# Patient Record
Sex: FEMALE | Race: BLACK OR AFRICAN AMERICAN | NOT HISPANIC OR LATINO | Employment: OTHER | URBAN - METROPOLITAN AREA
[De-identification: names, ages, dates, MRNs, and addresses within clinical notes are randomized per-mention and may not be internally consistent; named-entity substitution may affect disease eponyms.]

---

## 2022-02-16 ENCOUNTER — HOSPITAL ENCOUNTER (EMERGENCY)
Facility: HOSPITAL | Age: 52
Discharge: HOME | End: 2022-02-16
Attending: EMERGENCY MEDICINE
Payer: COMMERCIAL

## 2022-02-16 VITALS
HEIGHT: 63 IN | OXYGEN SATURATION: 99 % | WEIGHT: 220 LBS | DIASTOLIC BLOOD PRESSURE: 82 MMHG | RESPIRATION RATE: 15 BRPM | TEMPERATURE: 98 F | HEART RATE: 105 BPM | BODY MASS INDEX: 38.98 KG/M2 | SYSTOLIC BLOOD PRESSURE: 127 MMHG

## 2022-02-16 DIAGNOSIS — Z76.0 MEDICATION REFILL: Primary | ICD-10-CM

## 2022-02-16 PROCEDURE — 99281 EMR DPT VST MAYX REQ PHY/QHP: CPT

## 2022-02-16 PROCEDURE — 99284 EMERGENCY DEPT VISIT MOD MDM: CPT | Performed by: PHYSICIAN ASSISTANT

## 2022-02-16 RX ORDER — AMLODIPINE BESYLATE 5 MG/1
5 TABLET ORAL DAILY
Qty: 7 TABLET | Refills: 0 | Status: SHIPPED | OUTPATIENT
Start: 2022-02-16 | End: 2022-02-24 | Stop reason: SDUPTHER

## 2022-02-16 RX ORDER — LITHIUM CARBONATE 300 MG
TABLET ORAL
Qty: 21 TABLET | Refills: 0 | Status: SHIPPED | OUTPATIENT
Start: 2022-02-16 | End: 2022-02-24 | Stop reason: SDUPTHER

## 2022-02-16 RX ORDER — QUETIAPINE FUMARATE 100 MG/1
100 TABLET, FILM COATED ORAL
Qty: 7 TABLET | Refills: 0 | Status: SHIPPED | OUTPATIENT
Start: 2022-02-16 | End: 2022-02-24 | Stop reason: SDUPTHER

## 2022-02-16 RX ORDER — HYDROXYZINE PAMOATE 50 MG/1
50 CAPSULE ORAL 3 TIMES DAILY PRN
Qty: 12 CAPSULE | Refills: 0 | Status: SHIPPED | OUTPATIENT
Start: 2022-02-16 | End: 2022-02-24 | Stop reason: SDUPTHER

## 2022-02-16 NOTE — DISCHARGE INSTRUCTIONS
DISCHARGE INSTRUCTIONS:    FOLLOW UP WITH YOUR PRIMARY CARE PROVIDER OR THE [address] [place] [address]  MAKE AN APPOINTMENT TO BE SEEN  TAKE MEDICATION AS PRESCRIBED  IF RASH, SHORTNESS OF BREATH OR TROUBLE SWALLOWING, STOP TAKING THE MEDICATION AND BE SEEN  REST AND DRINK PLENTY OF FLUIDS  IF SYMPTOMS WORSEN OR NEW SYMPTOMS ARISE, RETURN TO THE ER TO BE SEEN

## 2022-02-16 NOTE — ED PROVIDER NOTES
History  Chief Complaint   Patient presents with    Medication Refill     amlodipine 5mg, lithium 300mg, seroquel 100mg, hydroxyzine 50mg  has appt on 3/23, recently out of rehab     52y  o female with PMH of HTN and bipolar presents to the ER for medication refill  Patient is requesting a refill on Hydroxyzine 50mg TID PRN anxiety, Seroquel 100mg HS, Lithium 300mg 1 tablet daily and 2 tablets HS and Amlodipine 5mg daily  Patient states she recently was discharged from drug rehab and only had enough tablets until today  She had an appointment today and was planning to get her medications refilled then but the appointment was  rescheduled for next week  She denies fever, chills, URI symptoms, chest pain, dyspnea, N/V/D, abdominal pain, weakness or paresthesias  History provided by:  Patient   used: No        None       Past Medical History:   Diagnosis Date    HTN (hypertension)     Mental health problem        Past Surgical History:   Procedure Laterality Date    BREAST SURGERY      COSMETIC SURGERY      HYSTERECTOMY      LAPAROSCOPIC GASTRIC BANDING         History reviewed  No pertinent family history  I have reviewed and agree with the history as documented  E-Cigarette/Vaping     E-Cigarette/Vaping Substances     Social History     Tobacco Use    Smoking status: Current Every Day Smoker     Types: Cigarettes    Smokeless tobacco: Never Used   Substance Use Topics    Alcohol use: Not Currently    Drug use: Not Currently       Review of Systems   Constitutional: Negative for activity change, appetite change, chills and fever  HENT: Negative for congestion, drooling, ear discharge, ear pain, facial swelling, rhinorrhea and sore throat  Eyes: Negative for redness  Respiratory: Negative for cough and shortness of breath  Cardiovascular: Negative for chest pain  Gastrointestinal: Negative for abdominal pain, diarrhea, nausea and vomiting     Musculoskeletal: Negative for neck stiffness  Skin: Negative for rash  Allergic/Immunologic: Negative for food allergies  Neurological: Negative for weakness and numbness  Physical Exam  Physical Exam  Vitals and nursing note reviewed  Constitutional:       General: She is not in acute distress  Appearance: She is not toxic-appearing  HENT:      Head: Normocephalic and atraumatic  Eyes:      Conjunctiva/sclera: Conjunctivae normal    Neck:      Trachea: No tracheal deviation  Cardiovascular:      Rate and Rhythm: Normal rate  Pulmonary:      Effort: Pulmonary effort is normal  No respiratory distress  Abdominal:      General: There is no distension  Musculoskeletal:      Cervical back: Normal range of motion and neck supple  Skin:     General: Skin is warm and dry  Findings: No rash  Neurological:      Mental Status: She is alert  GCS: GCS eye subscore is 4  GCS verbal subscore is 5  GCS motor subscore is 6  Psychiatric:         Mood and Affect: Mood normal          Vital Signs  ED Triage Vitals [02/16/22 1324]   Temperature Pulse Respirations Blood Pressure SpO2   98 °F (36 7 °C) 105 15 127/82 99 %      Temp src Heart Rate Source Patient Position - Orthostatic VS BP Location FiO2 (%)   -- -- -- -- --      Pain Score       --           Vitals:    02/16/22 1324   BP: 127/82   Pulse: 105         Visual Acuity      ED Medications  Medications - No data to display    Diagnostic Studies  Results Reviewed     None                 No orders to display              Procedures  Procedures         ED Course                               SBIRT 22yo+      Most Recent Value   SBIRT (24 yo +)    In order to provide better care to our patients, we are screening all of our patients for alcohol and drug use  Would it be okay to ask you these screening questions?  Unable to answer at this time Filed at: 02/16/2022 1326                    [facility]  Number of Diagnoses or Management Options  Medication refill: new and does not require workup  Diagnosis management comments: DDX consists of but not limited to: medication refill    Will refill medications  The management plan was discussed in detail with the patient at bedside and all questions were answered  Prior to discharge, we provided both verbal and written instructions  We discussed with the patient the signs and symptoms for which to return to the emergency department  All questions were answered and patient was comfortable with the plan of care and discharged to home  Instructed the patient to follow up with the primary care provider and/or specialist provided and their written instructions  The patient verbalized understanding of our discussion and plan of care, and agrees to return to the Emergency Department for concerns and progression of illness  At discharge, I instructed the patient to:  -follow up with pcp  -take medication as prescribed  -rest and drink plenty of fluids  -return to the ER if symptoms worsened or new symptoms arose  Patient agreed to this plan and was stable at time of discharge  Patient Progress  Patient progress: stable      Disposition  Final diagnoses:   Medication refill     Time reflects when diagnosis was documented in both MDM as applicable and the Disposition within this note     Time User Action Codes Description Comment    2/16/2022  1:49 PM Ree Guard A Add [Z76 0] Medication refill       ED Disposition     ED Disposition Condition Date/Time Comment    Discharge Stable Wed Feb 16, 2022  1:49 PM Gladis Rutledge discharge to home/self care              Follow-up Information     Follow up With Specialties Details Why Contact Info Additional [facility] Schedule an appointment as soon as possible for a visit in 1 day  [address], [address] [facility] [address]  [address], [address], [facility] [place], [place], [address]          Discharge Medication List as of 2/16/2022  1:55 PM      START taking these medications    Details   amLODIPine (NORVASC) 5 mg tablet Take 1 tablet (5 mg total) by mouth daily for 7 days, Starting Wed 2/16/2022, Until Wed 2/23/2022, Normal      hydrOXYzine pamoate (VISTARIL) 50 mg capsule Take 1 capsule (50 mg total) by mouth 3 (three) times a day as needed for anxiety for up to 4 days, Starting Wed 2/16/2022, Until Sun 2/20/2022 at 2359, Normal      lithium 300 MG tablet Take 1 tablet by mouth once daily and 2 tablets at bedtime, Normal      QUEtiapine (SEROquel) 100 mg tablet Take 1 tablet (100 mg total) by mouth daily at bedtime, Starting Wed 2/16/2022, Normal             No discharge procedures on file      PDMP Review       Value Time User    PDMP Reviewed  Yes 2/16/2022  1:56 PM Marito Molina PA-C          ED Provider  Electronically Signed by           Marito Molina PA-C  02/16/22 2522

## 2022-02-23 ENCOUNTER — HOSPITAL ENCOUNTER (EMERGENCY)
Facility: HOSPITAL | Age: 52
Discharge: HOME | End: 2022-02-23
Attending: EMERGENCY MEDICINE | Admitting: EMERGENCY MEDICINE
Payer: COMMERCIAL

## 2022-02-23 ENCOUNTER — APPOINTMENT (EMERGENCY)
Dept: NON INVASIVE DIAGNOSTICS | Facility: HOSPITAL | Age: 52
End: 2022-02-23
Payer: COMMERCIAL

## 2022-02-23 ENCOUNTER — APPOINTMENT (EMERGENCY)
Dept: RADIOLOGY | Facility: HOSPITAL | Age: 52
End: 2022-02-23
Payer: COMMERCIAL

## 2022-02-23 VITALS
WEIGHT: 195.77 LBS | HEART RATE: 98 BPM | TEMPERATURE: 99.1 F | DIASTOLIC BLOOD PRESSURE: 111 MMHG | RESPIRATION RATE: 20 BRPM | SYSTOLIC BLOOD PRESSURE: 160 MMHG | BODY MASS INDEX: 35.24 KG/M2 | OXYGEN SATURATION: 100 %

## 2022-02-23 DIAGNOSIS — M17.12 ARTHRITIS OF LEFT KNEE: ICD-10-CM

## 2022-02-23 DIAGNOSIS — M25.562 LEFT KNEE PAIN: Primary | ICD-10-CM

## 2022-02-23 PROCEDURE — 73564 X-RAY EXAM KNEE 4 OR MORE: CPT

## 2022-02-23 PROCEDURE — 99284 EMERGENCY DEPT VISIT MOD MDM: CPT | Performed by: EMERGENCY MEDICINE

## 2022-02-23 PROCEDURE — 99284 EMERGENCY DEPT VISIT MOD MDM: CPT

## 2022-02-23 PROCEDURE — 93971 EXTREMITY STUDY: CPT | Performed by: SURGERY

## 2022-02-23 PROCEDURE — 93971 EXTREMITY STUDY: CPT

## 2022-02-23 NOTE — ED PROVIDER NOTES
History  Chief Complaint   Patient presents with    Knee Pain     b/l knee pain x1 month  Denies injury  Reports b/l knee swelling after taking trazodone  51y F here for evaluation of left knee pain/left leg pain  Reports pain for about a month  Reports being in rehab in December and started on trazadone  Said she felt the trazadone caused her legs to swell, so she stopped the trazadone about a month ago  She said the swelling improved b/l, but then she feels the left has swollen again  C/o left knee pain, worse w/ activity - especially going down stairs  Feels like the knee wants to buckle or give out on her at times  Notes swelling to the knee and diffuse pain  Denies any falls or injuries to the knee in the last 1-2 months  No previous surgeries  Reports having trouble sleeping b/c it is painful to lay on the left side/leg  Denies any sig hip or ankle pain  No sig right leg/knee pain  Hx of PE - had double mastectomy for breast cancer (no chemo or radiation) and was told she "had a clot in surgery" and was placed on xarelto  She was told she was supposed to be on it for life, but she self terminated the med in 2018 - didn't want to take it anymore  Denies any known dvt/pe since then  History provided by:  Patient   used: No    Knee Pain  Location:  Knee  Injury: no    Knee location:  L knee  Pain details:     Quality:  Aching    Radiates to:  L leg    Severity:  Moderate    Onset quality:  Gradual    Timing:  Constant    Progression:  Unchanged  Chronicity:  New  Prior injury to area: no recent injury    Relieved by:  Nothing  Worsened by:  Extension, flexion and bearing weight  Ineffective treatments:  Elevation  Associated symptoms: decreased ROM and swelling    Associated symptoms: no back pain, no fatigue, no fever, no muscle weakness, no numbness, no stiffness and no tingling    Risk factors: no concern for non-accidental trauma and no recent illness Prior to Admission Medications   Prescriptions Last Dose Informant Patient Reported? Taking? QUEtiapine (SEROquel) 100 mg tablet   No No   Sig: Take 1 tablet (100 mg total) by mouth daily at bedtime   amLODIPine (NORVASC) 5 mg tablet   No No   Sig: Take 1 tablet (5 mg total) by mouth daily for 7 days   hydrOXYzine pamoate (VISTARIL) 50 mg capsule   No No   Sig: Take 1 capsule (50 mg total) by mouth 3 (three) times a day as needed for anxiety for up to 4 days   lithium 300 MG tablet   No No   Sig: Take 1 tablet by mouth once daily and 2 tablets at bedtime      Facility-Administered Medications: None       Past Medical History:   Diagnosis Date    Asthma     Bipolar 1 disorder ([facility] [address] )     HTN (hypertension)     Hypertension     Mental health problem        Past Surgical History:   Procedure Laterality Date    BREAST SURGERY      COSMETIC SURGERY      HYSTERECTOMY      LAPAROSCOPIC GASTRIC BANDING         History reviewed  No pertinent family history  I have reviewed and agree with the history as documented  E-Cigarette/Vaping     E-Cigarette/Vaping Substances     Social History     Tobacco Use    Smoking status: Current Every Day Smoker     Packs/day: 0 25     Types: Cigarettes    Smokeless tobacco: Never Used   Substance Use Topics    Alcohol use: Not Currently    Drug use: Not Currently       Review of Systems   Constitutional: Negative for chills, fatigue and fever  Musculoskeletal: Positive for arthralgias, gait problem and joint swelling  Negative for back pain and stiffness  Skin: Negative for color change and wound  Neurological: Negative for weakness and numbness  All other systems reviewed and are negative  Physical Exam  Physical Exam  Vitals and nursing note reviewed  Constitutional:       Appearance: Normal appearance  HENT:      Nose: Nose normal    Eyes:      Conjunctiva/sclera: Conjunctivae normal    Cardiovascular:      Rate and Rhythm: Normal rate     Pulmonary: Effort: Pulmonary effort is normal    Abdominal:      General: Abdomen is flat  Musculoskeletal:         General: Swelling (trace pretibial edema noted on the left  no edema to the right) present  Cervical back: Normal range of motion  Right hip: Normal       Left hip: Normal       Right knee: Normal       Left knee: Effusion and bony tenderness present  No deformity, erythema, ecchymosis or crepitus  Normal range of motion  Tenderness present over the medial joint line and lateral joint line  Normal alignment  Normal pulse  Instability Tests: Anterior drawer test negative  Posterior drawer test negative  Right ankle: Normal       Left ankle: Normal    Skin:     General: Skin is warm  Findings: No rash  Neurological:      General: No focal deficit present  Mental Status: She is alert and oriented to person, place, and time     Psychiatric:         Mood and Affect: Mood normal          Vital Signs  ED Triage Vitals   Temperature Pulse Respirations Blood Pressure SpO2   02/23/22 1012 02/23/22 1012 02/23/22 1012 02/23/22 1015 02/23/22 1012   99 1 °F (37 3 °C) 98 20 (!) 160/111 100 %      Temp Source Heart Rate Source Patient Position - Orthostatic VS BP Location FiO2 (%)   02/23/22 1012 02/23/22 1012 02/23/22 1015 02/23/22 1015 --   Oral Monitor Sitting Right leg       Pain Score       02/23/22 1012       8           Vitals:    02/23/22 1012 02/23/22 1015   BP:  (!) 160/111   Pulse: 98    Patient Position - Orthostatic VS:  Sitting         Visual Acuity      ED Medications  Medications - No data to display    Diagnostic Studies  Results Reviewed     None                 VAS lower limb venous duplex study, unilateral/limited   Final Result by Qiana Montelongo MD (02/23 1651)      XR knee 4+ views left injury   ED Interpretation by Ravi Chacko DO (02/23 1325)   Degen changes, no acute findings      Final Result by Araceli Crabtree MD (02/23 2938)      No acute osseous abnormality  Degenerative changes as described  Workstation performed: [id number]                    Procedures  Procedures         ED Course  ED Course as of 02/23/22 1654   Wed Feb 23, 2022   1354 VAS lower limb venous duplex study, unilateral/limited  Per tech, negative for DVT  MDM  Number of Diagnoses or Management Options  Arthritis of left knee: new and requires workup  Left knee pain: new and requires workup  Diagnosis management comments: Left knee/leg pain x1m, no new injury  Does have hx of PE and stopped [address] in 2018  Relatively benign exam  Likely degen changes/arthritis related but given clot hx will get venous duplex to eval for dvt  Additionally will get xray to eval joint space for degen changes/arthritis  Amount and/or Complexity of Data Reviewed  Tests in the radiology section of CPT®: ordered and reviewed  Independent visualization of images, tracings, or specimens: yes        Disposition  Final diagnoses:   Left knee pain   Arthritis of left knee     Time reflects when diagnosis was documented in both MDM as applicable and the Disposition within this note     Time User Action Codes Description Comment    2/23/2022  2:01 PM Kristyn Reyna Add [M25 562] Left knee pain     2/23/2022  2:01 PM Marcelo PICHARDO Add [M17 12] Arthritis of left knee       ED Disposition     ED Disposition Condition Date/Time Comment    Discharge Stable Wed Feb 23, 2022  2:01 PM Gladis Rutledge discharge to home/self care              Follow-up Information     Follow up With Specialties Details Why Contact Info Additional [address] [facility] [place] [facility] Schedule an appointment as soon as possible for a visit in 1 week for further evaluation and treatment [address] [facility]  [address] [facility] [address]  [address] [place], [place] [address] [facility], [address], [place], [place], [address]   [phone]          Discharge Medication List as of 2/23/2022  2:03 PM      CONTINUE these medications which have NOT CHANGED    Details   amLODIPine (NORVASC) 5 mg tablet Take 1 tablet (5 mg total) by mouth daily for 7 days, Starting Wed 2/16/2022, Until Wed 2/23/2022, Normal      hydrOXYzine pamoate (VISTARIL) 50 mg capsule Take 1 capsule (50 mg total) by mouth 3 (three) times a day as needed for anxiety for up to 4 days, Starting Wed 2/16/2022, Until Sun 2/20/2022 at 2359, Normal      lithium 300 MG tablet Take 1 tablet by mouth once daily and 2 tablets at bedtime, Normal      QUEtiapine (SEROquel) 100 mg tablet Take 1 tablet (100 mg total) by mouth daily at bedtime, Starting Wed 2/16/2022, Normal             No discharge procedures on file      PDMP Review       Value Time User    PDMP Reviewed  Yes 2/16/2022  1:56 PM Aly Freeman PA-C          ED Provider  Electronically Signed by           Baltazar Almaguer DO  02/23/22 9346

## 2022-02-23 NOTE — DISCHARGE INSTRUCTIONS
Return for any worsening swelling, redness, pain, fever more than 101 or for any concerns  Use the ace wrap for compression / pain control    Follow up with orthopedics for further evaluation and treatment

## 2022-02-24 ENCOUNTER — OFFICE VISIT (OUTPATIENT)
Dept: FAMILY MEDICINE CLINIC | Facility: CLINIC | Age: 52
End: 2022-02-24

## 2022-02-24 VITALS
RESPIRATION RATE: 16 BRPM | WEIGHT: 195 LBS | OXYGEN SATURATION: 97 % | DIASTOLIC BLOOD PRESSURE: 80 MMHG | TEMPERATURE: 97 F | BODY MASS INDEX: 34.55 KG/M2 | HEIGHT: 63 IN | SYSTOLIC BLOOD PRESSURE: 140 MMHG | HEART RATE: 91 BPM

## 2022-02-24 DIAGNOSIS — J45.20 MILD INTERMITTENT ASTHMA WITHOUT COMPLICATION: ICD-10-CM

## 2022-02-24 DIAGNOSIS — M17.0 OSTEOARTHRITIS OF BOTH KNEES, UNSPECIFIED OSTEOARTHRITIS TYPE: ICD-10-CM

## 2022-02-24 DIAGNOSIS — F31.9 BIPOLAR 1 DISORDER (HCC): Primary | ICD-10-CM

## 2022-02-24 DIAGNOSIS — Z76.0 MEDICATION REFILL: ICD-10-CM

## 2022-02-24 PROCEDURE — 99202 OFFICE O/P NEW SF 15 MIN: CPT | Performed by: FAMILY MEDICINE

## 2022-02-24 RX ORDER — AMLODIPINE BESYLATE 5 MG/1
5 TABLET ORAL DAILY
Qty: 7 TABLET | Refills: 0 | Status: SHIPPED | OUTPATIENT
Start: 2022-02-24 | End: 2022-03-03 | Stop reason: SDUPTHER

## 2022-02-24 RX ORDER — IBUPROFEN 800 MG/1
TABLET ORAL
COMMUNITY
Start: 2022-01-05 | End: 2022-02-24 | Stop reason: ALTCHOICE

## 2022-02-24 RX ORDER — QUETIAPINE FUMARATE 100 MG/1
100 TABLET, FILM COATED ORAL
Qty: 7 TABLET | Refills: 0 | Status: SHIPPED | OUTPATIENT
Start: 2022-02-24 | End: 2022-03-03 | Stop reason: SDUPTHER

## 2022-02-24 RX ORDER — LITHIUM CARBONATE 300 MG
TABLET ORAL
Qty: 21 TABLET | Refills: 0 | Status: SHIPPED | OUTPATIENT
Start: 2022-02-24 | End: 2022-03-03 | Stop reason: SDUPTHER

## 2022-02-24 RX ORDER — HYDROXYZINE PAMOATE 50 MG/1
50 CAPSULE ORAL 3 TIMES DAILY PRN
Qty: 12 CAPSULE | Refills: 0 | Status: SHIPPED | OUTPATIENT
Start: 2022-02-24 | End: 2022-03-03 | Stop reason: SDUPTHER

## 2022-02-24 RX ORDER — TRAZODONE HYDROCHLORIDE 100 MG/1
200 TABLET ORAL
COMMUNITY
Start: 2022-01-15 | End: 2022-02-24

## 2022-02-24 NOTE — ASSESSMENT & PLAN NOTE
-Blood pressure is suboptimal, but could be due to her current pain  -Blood pressure goal of less than 140/90  -DASH diet discussed with patient  -Continue amlodipine 5mg  -Recommend ambulatory blood pressure monitoring

## 2022-02-24 NOTE — ASSESSMENT & PLAN NOTE
-Suboptimal control  -Starting to have more mood swings  -Continue Seroquel and lithium  -Has appointment upcoming with Psychiatry outpatient

## 2022-02-24 NOTE — PATIENT INSTRUCTIONS
DASH Eating Plan   WHAT YOU NEED TO KNOW:   The DASH (Dietary Approaches to Stop Hypertension) Eating Plan is designed to help prevent or lower high blood pressure  It can also help to lower LDL (bad) cholesterol and decrease your risk for heart disease  The plan is low in sodium, sugar, unhealthy fats, and total fat  It is high in potassium, calcium, magnesium, and fiber  These nutrients are added when you eat more fruits, vegetables, and whole grains  With the DASH eating plan, you need to eat a certain number of servings from each food group  This will help you get enough of certain nutrients and limit others  The amount of servings you should eat depends on how many calories you need  Your dietitian can help you create meal plans with the right number of servings for each food group  DISCHARGE INSTRUCTIONS:   What you need to know about sodium:  Your dietitian will tell you how much sodium is safe for you to have each day  People with high blood pressure should have no more than 1,500 to 2,300 mg of sodium in a day  A teaspoon (tsp) of salt has 2,300 mg of sodium  This may seem like a difficult goal, but small changes to the foods you eat can make a big difference  Your healthcare provider or dietitian can help you create a meal plan that follows your sodium limit  · Read food labels  Food labels can help you choose foods that are low in sodium  The amount of sodium is listed in milligrams (mg)  The % Daily Value (DV) column tells you how much of your daily needs are met by 1 serving of the food for each nutrient listed  Choose foods that have less than 5% of the DV of sodium  These foods are considered low in sodium  Foods that have 20% or more of the DV of sodium are considered high in sodium  Avoid foods that have more than 300 mg of sodium in each serving  Choose foods that say low-sodium, reduced-sodium, or no salt added on the food label  · Limit added salt    Do not salt food at the table if you add salt when you cook  Use herbs and spices, such as onions, garlic, and salt-free seasonings to add flavor  Try lemon or lime juice or vinegar to add a tart flavor  Use hot peppers or a small amount of hot pepper sauce to add a spicy flavor  Limit foods high in added salt, such as the following:    ? Seasonings made with salt, such as garlic salt, celery salt, onion salt, seasoned salt, meat tenderizers, and monosodium glutamate (MSG)    ? Miso soup and canned or dried soup mixes    ? Regular soy sauce, barbecue sauce, teriyaki sauce, steak sauce, Worcestershire sauce, and most flavored vinegars    ? Snack foods, such as salted chips, popcorn, pretzels, pork rinds, salted crackers, and salted nuts    ? Frozen foods, such as dinners, entrees, vegetables with sauces, and breaded meats    · Ask about salt substitutes  Ask your healthcare provider if you may use salt substitutes  Some salt substitutes have ingredients that can be harmful if you have certain health conditions  · Choose foods carefully at restaurants  Meals from restaurants, especially fast food restaurants, are often high in sodium  Some restaurants have nutrition information that tells you the amount of sodium in their foods  Ask to have your food prepared with less, or no salt  What you need to know about fats:  Healthy fats include unsaturated fats and omega-3 fatty acids  Unhealthy fats include saturated fats and trans fats  · Include healthy fats, such as the following:      ? Cooking oils, such as soybean, canola, olive, or sunflower    ? Fatty fish, such as salmon, tuna, mackerel, or sardines    ? Flaxseed oil or ground flaxseed    ? ½ cup of cooked beans, such as black beans, kidney beans, or darnell beans    ? 1½ ounces of low-sodium nuts, such as almonds or walnuts    ? Low-sugar, low-sodium peanut butter    ?  Seeds such as romy seeds or sunflower seeds       · Limit or do not have unhealthy fats, such as the following: ? Foods that contain fat from animals, such as fatty meats, whole milk, butter, and cream    ? Shortening, stick margarine, palm oil, and coconut oil    ? Full-fat or creamy salad dressing    ? Creamy soup    ? Crackers, chips, and baked goods made with margarine or shortening    ? Foods that are fried in unhealthy fats    ? Gravy and sauces, such as Franky or cheese sauces    What you need to know about carbohydrates (carbs): All carbs break down into sugar  Complex carbs contain more fiber than simple carbs  This means complex carbs go into the bloodstream more slowly and cause less of a blood sugar spike  Try to include more complex carbs and fewer simple carbs  · Include complex carbs, such as the following:      ? 1 slice of whole-grain bread    ? 1 ounce of dry cereal that does not contain added sugar    ? ½ cup of cooked oatmeal    ? 2 ounces of cooked whole-grain pasta    ? ½ cup of cooked brown rice    · Limit or do not have simple carbs, such as the following:      ? Baked goods, such as doughnuts, pastries, and cookies    ? Mixes for cornbread and biscuits    ? White rice and pasta mixes, such as boxed macaroni and cheese    ? Instant and cold cereals that contain sugar    ? Jelly, jam, and ice cream that contain sugar    ? Condiments such as ketchup    ? Drinks high in sugar, such as soft drinks, lemonade, and fruit juice    What you need to know about vegetables and fruits:  Vegetables and fruits can be fresh, frozen, or canned  If possible, try to choose low-sodium canned options  · Include a variety of vegetables and fruits, such as the following:      ? 1 medium apple, pear, or peach (about ½ cup chopped)    ? ½ small banana    ? ½ cup berries, such as blueberries, strawberries, or blackberries    ? 1 cup of raw leafy greens, such as lettuce, spinach, kale, or saroj greens    ? ½ cup of frozen or canned (no added salt) vegetables, such as green beans    ?  ½ cup of fresh, frozen, or canned fruit (canned in light syrup or fruit juice)    ? ½ cup of vegetable or fruit juice    · Limit or do not have vegetables and fruits made in the following ways:      ? Frozen fruit such as cherries that have added sugar    ? Fruit in cream or butter sauce    ? Canned vegetables that are high in sodium    ? Sauerkraut, pickled vegetables, and other foods prepared in brine    ? Fried vegetables or vegetables in butter or high-fat sauces    What you need to know about protein foods:   · Include lean or low-fat protein foods, such as the following:      ? Poultry (chicken, turkey) with no skin    ? Fish (especially fatty fish, such as salmon, fresh tuna, or mackerel)    ? Lean beef and pork (loin, round, extra lean hamburger)    ? Egg whites and egg substitutes    ? 1 cup of nonfat (skim) or 1% milk    ? 1½ ounces of fat-free or low-fat cheese    ? 6 ounces of nonfat or low-fat yogurt    · Limit or do not have high-fat protein foods, such as the following:      ? Smoked or cured meat, such as corned beef, fernandez, ham, hot dogs, and sausage    ? Canned beans and canned meats or spreads, such as potted meats, sardines, anchovies, and imitation seafood    ? Deli or lunch meats, such as bologna, ham, turkey, and roast beef    ? High-fat meat (T-bone steak, regular hamburger, and ribs)    ? Whole eggs and egg yolks    ? Whole milk, 2% milk, and cream    ? Regular cheese and processed cheese    Other guidelines to follow:   · Maintain a healthy weight  Your risk for heart disease is higher if you are overweight  Your healthcare provider may suggest that you lose weight if you are overweight  You can lose weight by eating fewer calories and foods that have added sugars and fat  The DASH meal plan can help you do this  Decrease calories by eating smaller portions at each meal and fewer snacks  Ask your healthcare provider for more information about how to lose weight  · Exercise regularly    Regular exercise can help you reach or maintain a healthy weight  Regular exercise can also help decrease your blood pressure and improve your cholesterol levels  Get 30 minutes or more of moderate exercise each day of the week  To lose weight, get at least 60 minutes of exercise  Talk to your healthcare provider about the best exercise program for you  · Limit alcohol  Women should limit alcohol to 1 drink a day  Men should limit alcohol to 2 drinks a day  A drink of alcohol is 12 ounces of beer, 5 ounces of wine, or 1½ ounces of liquor  For more information:   · [facility] [address]  [address] [address]  [place] [address]  Phone: [phone] [phone] - [phone]  Web Address: [facility] no    © 5[address] [facility] 2021 Information is for End User's use only and may not be sold, redistributed or otherwise used for commercial purposes  All illustrations and images included in CareNotes® are the copyrighted property of A D [employer] M [employer]  or [facility] Corina Oneal   The above information is an  only  It is not intended as medical advice for individual conditions or treatments  Talk to your doctor, nurse or pharmacist before following any medical regimen to see if it is safe and effective for you

## 2022-02-24 NOTE — PROGRESS NOTES
Assessment/Plan:    Hypertension  -Blood pressure is suboptimal, but could be due to her current pain  -Blood pressure goal of less than 140/90  -DASH diet discussed with patient  -Continue amlodipine 5mg  -Recommend ambulatory blood pressure monitoring      Bipolar 1 disorder (HCC)  -Suboptimal control  -Starting to have more mood swings  -Continue Seroquel and lithium  -Has appointment upcoming with Psychiatry outpatient    Asthma  Well conttrolled   -continue using the albuterol p r n  Osteoarthritis of both knees  -Chronic  knee pain  -Recent Xray of right knee showed Mild lateral compartment osteoarthritis with osteophyte formation  -Referral to physical therapy  -Will schedule for knee injection      Return in about 4 days (around 2/28/2022) for Next scheduled follow up knee steroid injection with me  Patient Instructions     DASH Eating Plan   WHAT YOU NEED TO KNOW:   The DASH (Dietary Approaches to Stop Hypertension) Eating Plan is designed to help prevent or lower high blood pressure  It can also help to lower LDL (bad) cholesterol and decrease your risk for heart disease  The plan is low in sodium, sugar, unhealthy fats, and total fat  It is high in potassium, calcium, magnesium, and fiber  These nutrients are added when you eat more fruits, vegetables, and whole grains  With the DASH eating plan, you need to eat a certain number of servings from each food group  This will help you get enough of certain nutrients and limit others  The amount of servings you should eat depends on how many calories you need  Your dietitian can help you create meal plans with the right number of servings for each food group  DISCHARGE INSTRUCTIONS:   What you need to know about sodium:  Your dietitian will tell you how much sodium is safe for you to have each day  People with high blood pressure should have no more than 1,500 to 2,300 mg of sodium in a day  A teaspoon (tsp) of salt has 2,300 mg of sodium   This may seem like a difficult goal, but small changes to the foods you eat can make a big difference  Your healthcare provider or dietitian can help you create a meal plan that follows your sodium limit  · Read food labels  Food labels can help you choose foods that are low in sodium  The amount of sodium is listed in milligrams (mg)  The % Daily Value (DV) column tells you how much of your daily needs are met by 1 serving of the food for each nutrient listed  Choose foods that have less than 5% of the DV of sodium  These foods are considered low in sodium  Foods that have 20% or more of the DV of sodium are considered high in sodium  Avoid foods that have more than 300 mg of sodium in each serving  Choose foods that say low-sodium, reduced-sodium, or no salt added on the food label  · Limit added salt  Do not salt food at the table if you add salt when you cook  Use herbs and spices, such as onions, garlic, and salt-free seasonings to add flavor  Try lemon or lime juice or vinegar to add a tart flavor  Use hot peppers or a small amount of hot pepper sauce to add a spicy flavor  Limit foods high in added salt, such as the following:    ? Seasonings made with salt, such as garlic salt, celery salt, onion salt, seasoned salt, meat tenderizers, and monosodium glutamate (MSG)    ? Miso soup and canned or dried soup mixes    ? Regular soy sauce, barbecue sauce, teriyaki sauce, steak sauce, Worcestershire sauce, and most flavored vinegars    ? Snack foods, such as salted chips, popcorn, pretzels, pork rinds, salted crackers, and salted nuts    ? Frozen foods, such as dinners, entrees, vegetables with sauces, and breaded meats    · Ask about salt substitutes  Ask your healthcare provider if you may use salt substitutes  Some salt substitutes have ingredients that can be harmful if you have certain health conditions  · Choose foods carefully at restaurants    Meals from restaurants, especially fast food restaurants, are often high in sodium  Some restaurants have nutrition information that tells you the amount of sodium in their foods  Ask to have your food prepared with less, or no salt  What you need to know about fats:  Healthy fats include unsaturated fats and omega-3 fatty acids  Unhealthy fats include saturated fats and trans fats  · Include healthy fats, such as the following:      ? Cooking oils, such as soybean, canola, olive, or sunflower    ? Fatty fish, such as salmon, tuna, mackerel, or sardines    ? Flaxseed oil or ground flaxseed    ? ½ cup of cooked beans, such as black beans, kidney beans, or darnell beans    ? 1½ ounces of low-sodium nuts, such as almonds or walnuts    ? Low-sugar, low-sodium peanut butter    ? Seeds such as romy seeds or sunflower seeds       · Limit or do not have unhealthy fats, such as the following:      ? Foods that contain fat from animals, such as fatty meats, whole milk, butter, and cream    ? Shortening, stick margarine, palm oil, and coconut oil    ? Full-fat or creamy salad dressing    ? Creamy soup    ? Crackers, chips, and baked goods made with margarine or shortening    ? Foods that are fried in unhealthy fats    ? Gravy and sauces, such as Franky or cheese sauces    What you need to know about carbohydrates (carbs): All carbs break down into sugar  Complex carbs contain more fiber than simple carbs  This means complex carbs go into the bloodstream more slowly and cause less of a blood sugar spike  Try to include more complex carbs and fewer simple carbs  · Include complex carbs, such as the following:      ? 1 slice of whole-grain bread    ? 1 ounce of dry cereal that does not contain added sugar    ? ½ cup of cooked oatmeal    ? 2 ounces of cooked whole-grain pasta    ? ½ cup of cooked brown rice    · Limit or do not have simple carbs, such as the following:      ? Baked goods, such as doughnuts, pastries, and cookies    ?  Mixes for cornbread and biscuits    ? White rice and pasta mixes, such as boxed macaroni and cheese    ? Instant and cold cereals that contain sugar    ? Jelly, jam, and ice cream that contain sugar    ? Condiments such as ketchup    ? Drinks high in sugar, such as soft drinks, lemonade, and fruit juice    What you need to know about vegetables and fruits:  Vegetables and fruits can be fresh, frozen, or canned  If possible, try to choose low-sodium canned options  · Include a variety of vegetables and fruits, such as the following:      ? 1 medium apple, pear, or peach (about ½ cup chopped)    ? ½ small banana    ? ½ cup berries, such as blueberries, strawberries, or blackberries    ? 1 cup of raw leafy greens, such as lettuce, spinach, kale, or saroj greens    ? ½ cup of frozen or canned (no added salt) vegetables, such as green beans    ? ½ cup of fresh, frozen, or canned fruit (canned in light syrup or fruit juice)    ? ½ cup of vegetable or fruit juice    · Limit or do not have vegetables and fruits made in the following ways:      ? Frozen fruit such as cherries that have added sugar    ? Fruit in cream or butter sauce    ? Canned vegetables that are high in sodium    ? Sauerkraut, pickled vegetables, and other foods prepared in brine    ? Fried vegetables or vegetables in butter or high-fat sauces    What you need to know about protein foods:   · Include lean or low-fat protein foods, such as the following:      ? Poultry (chicken, turkey) with no skin    ? Fish (especially fatty fish, such as salmon, fresh tuna, or mackerel)    ? Lean beef and pork (loin, round, extra lean hamburger)    ? Egg whites and egg substitutes    ? 1 cup of nonfat (skim) or 1% milk    ? 1½ ounces of fat-free or low-fat cheese    ? 6 ounces of nonfat or low-fat yogurt    · Limit or do not have high-fat protein foods, such as the following:      ? Smoked or cured meat, such as corned beef, fernandez, ham, hot dogs, and sausage    ?  Canned beans and canned meats or spreads, such as potted meats, sardines, anchovies, and imitation seafood    ? Deli or lunch meats, such as bologna, ham, turkey, and roast beef    ? High-fat meat (T-bone steak, regular hamburger, and ribs)    ? Whole eggs and egg yolks    ? Whole milk, 2% milk, and cream    ? Regular cheese and processed cheese    Other guidelines to follow:   · Maintain a healthy weight  Your risk for heart disease is higher if you are overweight  Your healthcare provider may suggest that you lose weight if you are overweight  You can lose weight by eating fewer calories and foods that have added sugars and fat  The DASH meal plan can help you do this  Decrease calories by eating smaller portions at each meal and fewer snacks  Ask your healthcare provider for more information about how to lose weight  · Exercise regularly  Regular exercise can help you reach or maintain a healthy weight  Regular exercise can also help decrease your blood pressure and improve your cholesterol levels  Get 30 minutes or more of moderate exercise each day of the week  To lose weight, get at least 60 minutes of exercise  Talk to your healthcare provider about the best exercise program for you  · Limit alcohol  Women should limit alcohol to 1 drink a day  Men should limit alcohol to 2 drinks a day  A drink of alcohol is 12 ounces of beer, 5 ounces of wine, or 1½ ounces of liquor  For more information:   · [facility] and [address]  [address] [address]  [place] [address]  Phone: [phone]  Web Address: [facility] no    © [address] [facility] 2021 Information is for End User's use only and may not be sold, redistributed or otherwise used for commercial purposes  All illustrations and images included in CareNotes® are the copyrighted property of [employer]  or Ricki Oneal   The above information is an  only   It is not intended as medical advice for individual conditions or treatments  Talk to your doctor, nurse or pharmacist before following any medical regimen to see if it is safe and effective for you  Diagnoses and all orders for this visit:    Bipolar 1 disorder [facility])  -     Ambulatory Referral to Psychiatry; Future    Medication refill  -     amLODIPine (NORVASC) 5 mg tablet; Take 1 tablet (5 mg total) by mouth daily for 7 days  -     hydrOXYzine pamoate (VISTARIL) 50 mg capsule; Take 1 capsule (50 mg total) by mouth 3 (three) times a day as needed for anxiety for up to 3 days  -     lithium 300 MG tablet; Take 1 tablet by mouth once daily and 2 tablets at bedtime  -     QUEtiapine (SEROquel) 100 mg tablet; Take 1 tablet (100 mg total) by mouth daily at bedtime    Osteoarthritis of both knees, unspecified osteoarthritis type  -     Ambulatory Referral to Physical Therapy; Future    Mild intermittent asthma without complication    Other orders  -     Discontinue: traZODone (DESYREL) 100 mg tablet; Take 200 mg by mouth daily at bedtime  -     Discontinue: ibuprofen (MOTRIN) 800 mg tablet          Subjective:     Tegan Norris is a 47 y o  female who  has a past medical history of Asthma, Bipolar 1 disorder ([address] ), HTN (hypertension), Hypertension, and Mental health problem  who presented to the office today to establish care  She Ran out of medications and needs refill  Knee pain  -Chronic bilateral knee pain since over a year  -She has Difficulty coming down stairs  -She is Taking Advil for pain  -She had X-rays done on her knees which showed arthritis  - She Wants steroid injection    She has a history of bipolar disorder  She still gets mood swings here and there  She will be seeing a psychiatrist next month  She is  requesting refills for 1 week because she cannot afford her medications               HPI        Current Outpatient Medications on File Prior to Visit   Medication Sig Dispense Refill    [DISCONTINUED] amLODIPine (NORVASC) 5 mg tablet Take 1 tablet (5 mg total) by mouth daily for 7 days 7 tablet 0    [DISCONTINUED] hydrOXYzine pamoate (VISTARIL) 50 mg capsule Take 1 capsule (50 mg total) by mouth 3 (three) times a day as needed for anxiety for up to 4 days 12 capsule 0    [DISCONTINUED] ibuprofen (MOTRIN) 800 mg tablet       [DISCONTINUED] lithium 300 MG tablet Take 1 tablet by mouth once daily and 2 tablets at bedtime 21 tablet 0    [DISCONTINUED] QUEtiapine (SEROquel) 100 mg tablet Take 1 tablet (100 mg total) by mouth daily at bedtime 7 tablet 0    [DISCONTINUED] traZODone (DESYREL) 100 mg tablet Take 200 mg by mouth daily at bedtime       No current facility-administered medications on file prior to visit  Past Medical History:   Diagnosis Date    Asthma     [facility]     HTN (hypertension)     Hypertension     Mental health problem      Past Surgical History:   Procedure Laterality Date    BREAST SURGERY      COSMETIC SURGERY      HYSTERECTOMY      LAPAROSCOPIC GASTRIC BANDING       Social History     Socioeconomic History    Marital status: Single     Spouse name: None    Number of children: None    Years of education: None    Highest education level: None   Occupational History    None   Tobacco Use    Smoking status: Current Every Day Smoker     Packs/day: 0 25     Types: Cigarettes    Smokeless tobacco: Never Used   Substance and Sexual Activity    Alcohol use: Not Currently    Drug use: Not Currently    Sexual activity: None   Other Topics Concern    None   Social History Narrative    None     Social Determinants of Health     Financial Resource Strain: Not on file   Food Insecurity: Not on file   Transportation Needs: Not on file   Physical Activity: Not on file   Stress: Not on file   Social Connections: Not on file   Intimate Partner Violence: Not on file   Housing Stability: Not on file     History reviewed   No pertinent family history  Review of Systems   Constitutional: Negative for chills, diaphoresis, fatigue and fever  HENT: Negative for nosebleeds, postnasal drip, rhinorrhea, sinus pressure, sinus pain and sneezing  Eyes: Negative for visual disturbance  Respiratory: Negative for cough, choking and shortness of breath  Cardiovascular: Negative for chest pain, palpitations and leg swelling  Gastrointestinal: Negative for abdominal pain, blood in stool and constipation  Endocrine: Negative for polydipsia, polyphagia and polyuria  Musculoskeletal: Positive for arthralgias  Negative for myalgias  Skin: Negative for color change and rash  Neurological: Negative for dizziness, tremors, syncope, weakness, light-headedness and numbness  Hematological: Negative for adenopathy  Psychiatric/Behavioral: Negative for agitation and confusion  The patient is not hyperactive  Objective:    /80 (BP Location: Right leg, Patient Position: Sitting, Cuff Size: Large)   Pulse 91   Temp (!) 97 °F (36 1 °C) (Temporal)   Resp 16   Ht 5' 2 5" (1 588 m)   Wt 88 5 kg (195 lb)   SpO2 97%   Breastfeeding No   BMI 35 10 kg/m²     Physical Exam  Constitutional:       Appearance: She is well-developed  HENT:      Head: Normocephalic  Right Ear: External ear normal       Left Ear: External ear normal       Nose: Nose normal    Eyes:      Pupils: Pupils are equal, round, and reactive to light  Neck:      Thyroid: No thyromegaly  Vascular: No JVD  Trachea: No tracheal deviation  Cardiovascular:      Rate and Rhythm: Normal rate and regular rhythm  Heart sounds: Normal heart sounds  No murmur heard  No friction rub  No gallop  Pulmonary:      Effort: Pulmonary effort is normal  No respiratory distress  Breath sounds: Normal breath sounds  No stridor  No wheezing  Abdominal:      General: Bowel sounds are normal  There is no distension  Palpations: Abdomen is soft   There is no mass       Tenderness: There is no abdominal tenderness  There is no guarding  Musculoskeletal:         General: Normal range of motion  Cervical back: Normal range of motion  Right knee: Crepitus present  No swelling, effusion or bony tenderness  Normal range of motion  Instability Tests: Anterior drawer test negative  Left knee: Crepitus present  No swelling, effusion or bony tenderness  Normal range of motion  Instability Tests: Anterior drawer test negative  Skin:     General: Skin is warm  Capillary Refill: Capillary refill takes less than 2 seconds  Findings: No rash  Neurological:      Mental Status: She is alert and oriented to person, place, and time  Cranial Nerves: No cranial nerve deficit  Motor: No abnormal muscle tone        Coordination: Coordination normal       Deep Tendon Reflexes: Reflexes normal    Psychiatric:         Behavior: Behavior normal          Donna Choe MD  02/24/22  6:54 PM

## 2022-02-24 NOTE — ASSESSMENT & PLAN NOTE
-Chronic  knee pain  -Recent Xray of right knee showed Mild lateral compartment osteoarthritis with osteophyte formation  -Referral to physical therapy  -Will schedule for knee injection

## 2022-02-28 ENCOUNTER — PROCEDURE VISIT (OUTPATIENT)
Dept: FAMILY MEDICINE CLINIC | Facility: CLINIC | Age: 52
End: 2022-02-28

## 2022-02-28 VITALS
WEIGHT: 195 LBS | RESPIRATION RATE: 18 BRPM | HEIGHT: 63 IN | HEART RATE: 85 BPM | SYSTOLIC BLOOD PRESSURE: 128 MMHG | TEMPERATURE: 97.8 F | BODY MASS INDEX: 34.55 KG/M2 | DIASTOLIC BLOOD PRESSURE: 70 MMHG | OXYGEN SATURATION: 96 %

## 2022-02-28 DIAGNOSIS — M17.0 OSTEOARTHRITIS OF BOTH KNEES, UNSPECIFIED OSTEOARTHRITIS TYPE: ICD-10-CM

## 2022-02-28 DIAGNOSIS — J45.20 MILD INTERMITTENT ASTHMA WITHOUT COMPLICATION: Primary | ICD-10-CM

## 2022-02-28 PROCEDURE — 99213 OFFICE O/P EST LOW 20 MIN: CPT | Performed by: INTERNAL MEDICINE

## 2022-02-28 PROCEDURE — 20610 DRAIN/INJ JOINT/BURSA W/O US: CPT | Performed by: INTERNAL MEDICINE

## 2022-02-28 RX ORDER — ALBUTEROL SULFATE 90 UG/1
2 AEROSOL, METERED RESPIRATORY (INHALATION) EVERY 6 HOURS PRN
Qty: 18 G | Refills: 5 | Status: SHIPPED | OUTPATIENT
Start: 2022-02-28

## 2022-02-28 RX ORDER — TRIAMCINOLONE ACETONIDE 40 MG/ML
40 INJECTION, SUSPENSION INTRA-ARTICULAR; INTRAMUSCULAR
Status: COMPLETED | OUTPATIENT
Start: 2022-02-28 | End: 2022-02-28

## 2022-02-28 RX ORDER — LIDOCAINE HYDROCHLORIDE 10 MG/ML
2 INJECTION, SOLUTION INFILTRATION; PERINEURAL
Status: COMPLETED | OUTPATIENT
Start: 2022-02-28 | End: 2022-02-28

## 2022-02-28 RX ADMIN — TRIAMCINOLONE ACETONIDE 40 MG: 40 INJECTION, SUSPENSION INTRA-ARTICULAR; INTRAMUSCULAR at 11:55

## 2022-02-28 RX ADMIN — LIDOCAINE HYDROCHLORIDE 2 ML: 10 INJECTION, SOLUTION INFILTRATION; PERINEURAL at 11:55

## 2022-02-28 NOTE — ASSESSMENT & PLAN NOTE
-Chronic  knee pain  -Recent Xray of right knee showed  lateral compartment osteoarthritis with osteophyte formation  -Received left knee injection today  -Follow up with Physical therapy

## 2022-02-28 NOTE — PROGRESS NOTES
Assessment/Plan:    Osteoarthritis of both knees  -Chronic  knee pain  -Recent Xray of right knee showed  lateral compartment osteoarthritis with osteophyte formation  -Received knee injection today  -Follow up with Physical therapy        Return in about 4 weeks (around 3/28/2022) for Next scheduled follow up, Annual physical       There are no Patient Instructions on file for this visit  Diagnoses and all orders for this visit:    Mild intermittent asthma without complication  -     albuterol (Ventolin HFA) 90 mcg/act inhaler; Inhale 2 puffs every 6 (six) hours as needed for wheezing    Osteoarthritis of both knees, unspecified osteoarthritis type  -     Large joint arthrocentesis          Subjective:     Claribel Rebollar is a 47 y o  female who  has a past medical history of Asthma, Bipolar 1 disorder ([address] ), HTN (hypertension), Hypertension, and Mental health problem  who presented to the office today for knee injection for her left Osteoarthritis  She is still having pain with difficulty coming down the stairs  She is currently managing pain with oral pain medications  She is scheduled to see physical therapy in 2 weeks       HPI      The following portions of the patient's history were reviewed and updated as appropriate: allergies, current medications, past family history, past medical history, past social history, past surgical history and problem list     Current Outpatient Medications on File Prior to Visit   Medication Sig Dispense Refill    amLODIPine (NORVASC) 5 mg tablet Take 1 tablet (5 mg total) by mouth daily for 7 days 7 tablet 0    hydrOXYzine pamoate (VISTARIL) 50 mg capsule Take 1 capsule (50 mg total) by mouth 3 (three) times a day as needed for anxiety for up to 3 days 12 capsule 0    lithium 300 MG tablet Take 1 tablet by mouth once daily and 2 tablets at bedtime 21 tablet 0    QUEtiapine (SEROquel) 100 mg tablet Take 1 tablet (100 mg total) by mouth daily at bedtime 7 tablet 0     No current facility-administered medications on file prior to visit  Review of Systems   Constitutional: Negative for chills, diaphoresis, fatigue and fever  Eyes: Negative for visual disturbance  Respiratory: Negative for shortness of breath  Cardiovascular: Negative for chest pain  Gastrointestinal: Negative for nausea and vomiting  Musculoskeletal: Positive for arthralgias  Skin: Negative for rash  Neurological: Negative for syncope  Psychiatric/Behavioral: Negative for confusion  Objective:    /70 (BP Location: Right leg, Patient Position: Sitting, Cuff Size: Adult)   Pulse 85   Temp 97 8 °F (36 6 °C) (Temporal)   Resp 18   Ht 5' 2 5" (1 588 m)   Wt 88 5 kg (195 lb)   SpO2 96%   BMI 35 10 kg/m²     Physical Exam  Constitutional:       General: She is not in acute distress  Appearance: Normal appearance  She is well-developed  She is not ill-appearing, toxic-appearing or diaphoretic  HENT:      Head: Normocephalic and atraumatic  Right Ear: External ear normal       Left Ear: External ear normal       Nose: Nose normal    Eyes:      Extraocular Movements: Extraocular movements intact  Neck:      Thyroid: No thyromegaly  Vascular: No JVD  Trachea: No tracheal deviation  Cardiovascular:      Rate and Rhythm: Normal rate and regular rhythm  Heart sounds: Normal heart sounds  No murmur heard  No friction rub  No gallop  Pulmonary:      Effort: Pulmonary effort is normal       Breath sounds: Normal breath sounds  No wheezing  Musculoskeletal:         General: No swelling  Cervical back: Normal range of motion  Right knee: Crepitus present  No swelling, effusion or bony tenderness  Normal range of motion  Instability Tests: Anterior drawer test negative  Left knee: Crepitus present  No swelling, effusion or bony tenderness  Normal range of motion  Instability Tests: Anterior drawer test negative        Right lower leg: No edema  Left lower leg: No edema  Skin:     General: Skin is warm  Findings: No rash  Neurological:      Mental Status: She is alert and oriented to person, place, and time  Motor: No abnormal muscle tone  Large joint arthrocentesis  Universal Protocol:  Consent: Verbal consent obtained  Consent given by: patient  Patient understanding: patient states understanding of the procedure being performed  Patient consent: the patient's understanding of the procedure matches consent given  Site marked: the operative site was marked  Radiology Images displayed and confirmed   If images not available, report reviewed: imaging studies available  Patient identity confirmed: verbally with patient    Supporting Documentation  Indications: pain   Procedure Details  Location: knee -   Needle size: 25 G  Ultrasound guidance: no  Approach: lateral  Medications administered: 2 mL lidocaine 1 %; 40 mg triamcinolone acetonide 40 mg/mL    Patient tolerance: patient tolerated the procedure well with no immediate complications          Bin Lemons MD  02/28/22  11:55 AM

## 2022-03-03 DIAGNOSIS — Z76.0 MEDICATION REFILL: ICD-10-CM

## 2022-03-03 RX ORDER — HYDROXYZINE PAMOATE 50 MG/1
50 CAPSULE ORAL 3 TIMES DAILY PRN
Qty: 12 CAPSULE | Refills: 0 | Status: SHIPPED | OUTPATIENT
Start: 2022-03-03 | End: 2022-03-10 | Stop reason: SDUPTHER

## 2022-03-03 RX ORDER — QUETIAPINE FUMARATE 100 MG/1
100 TABLET, FILM COATED ORAL
Qty: 7 TABLET | Refills: 0 | Status: SHIPPED | OUTPATIENT
Start: 2022-03-03 | End: 2022-03-10 | Stop reason: SDUPTHER

## 2022-03-03 RX ORDER — LITHIUM CARBONATE 300 MG
TABLET ORAL
Qty: 21 TABLET | Refills: 0 | Status: SHIPPED | OUTPATIENT
Start: 2022-03-03 | End: 2022-03-10 | Stop reason: SDUPTHER

## 2022-03-03 RX ORDER — AMLODIPINE BESYLATE 5 MG/1
5 TABLET ORAL DAILY
Qty: 7 TABLET | Refills: 0 | Status: SHIPPED | OUTPATIENT
Start: 2022-03-03 | End: 2022-03-10 | Stop reason: SDUPTHER

## 2022-03-10 ENCOUNTER — TELEPHONE (OUTPATIENT)
Dept: FAMILY MEDICINE CLINIC | Facility: CLINIC | Age: 52
End: 2022-03-10

## 2022-03-10 DIAGNOSIS — J45.20 MILD INTERMITTENT ASTHMA WITHOUT COMPLICATION: ICD-10-CM

## 2022-03-10 DIAGNOSIS — Z76.0 MEDICATION REFILL: ICD-10-CM

## 2022-03-10 RX ORDER — LITHIUM CARBONATE 300 MG
TABLET ORAL
Qty: 14 TABLET | Refills: 0 | Status: SHIPPED | OUTPATIENT
Start: 2022-03-10 | End: 2022-03-17 | Stop reason: SDUPTHER

## 2022-03-10 RX ORDER — HYDROXYZINE PAMOATE 50 MG/1
50 CAPSULE ORAL 3 TIMES DAILY PRN
Qty: 12 CAPSULE | Refills: 0 | Status: SHIPPED | OUTPATIENT
Start: 2022-03-10 | End: 2022-03-17 | Stop reason: SDUPTHER

## 2022-03-10 RX ORDER — AMLODIPINE BESYLATE 5 MG/1
5 TABLET ORAL DAILY
Qty: 7 TABLET | Refills: 0 | Status: SHIPPED | OUTPATIENT
Start: 2022-03-10 | End: 2022-03-17 | Stop reason: SDUPTHER

## 2022-03-10 RX ORDER — QUETIAPINE FUMARATE 100 MG/1
100 TABLET, FILM COATED ORAL
Qty: 7 TABLET | Refills: 0 | Status: SHIPPED | OUTPATIENT
Start: 2022-03-10 | End: 2022-03-17 | Stop reason: SDUPTHER

## 2022-03-10 NOTE — TELEPHONE ENCOUNTER
Pt called office today 03/10/22 requesting refill on the following med:    amLODIPine (NORVASC) 5 mg tablet     hydrOXYzine pamoate (VISTARIL) 50 mg capsule    lithium 300 MG tablet    QUEtiapine (SEROquel) 100 mg tablet      Pt states that medication quantity is only for 7 days and she will be out of her med tomorrow 03/11/22  Pt next Appt: 03/28/22      Please Advise     Thank You

## 2022-03-16 DIAGNOSIS — Z76.0 MEDICATION REFILL: ICD-10-CM

## 2022-03-16 RX ORDER — QUETIAPINE FUMARATE 100 MG/1
100 TABLET, FILM COATED ORAL
Qty: 7 TABLET | Refills: 0 | OUTPATIENT
Start: 2022-03-16

## 2022-03-16 RX ORDER — LITHIUM CARBONATE 300 MG
TABLET ORAL
Qty: 14 TABLET | Refills: 0 | OUTPATIENT
Start: 2022-03-16

## 2022-03-16 RX ORDER — HYDROXYZINE PAMOATE 50 MG/1
50 CAPSULE ORAL 3 TIMES DAILY PRN
Qty: 12 CAPSULE | Refills: 0 | OUTPATIENT
Start: 2022-03-16 | End: 2022-03-19

## 2022-03-16 RX ORDER — AMLODIPINE BESYLATE 5 MG/1
5 TABLET ORAL DAILY
Qty: 7 TABLET | Refills: 0 | OUTPATIENT
Start: 2022-03-16 | End: 2022-03-23

## 2022-03-17 NOTE — TELEPHONE ENCOUNTER
Please review the medication refill request  The qty amount is low not sure if that needs to be changed   Thank you

## 2022-03-17 NOTE — TELEPHONE ENCOUNTER
Pt would like to know the reason as of why medications are being refused to be refilled  Please contact pt

## 2022-03-18 RX ORDER — HYDROXYZINE PAMOATE 50 MG/1
50 CAPSULE ORAL 3 TIMES DAILY PRN
Qty: 12 CAPSULE | Refills: 0 | Status: SHIPPED | OUTPATIENT
Start: 2022-03-18 | End: 2022-04-20

## 2022-03-18 RX ORDER — AMLODIPINE BESYLATE 5 MG/1
5 TABLET ORAL DAILY
Qty: 7 TABLET | Refills: 0 | Status: SHIPPED | OUTPATIENT
Start: 2022-03-18 | End: 2022-03-28 | Stop reason: SDUPTHER

## 2022-03-18 RX ORDER — QUETIAPINE FUMARATE 100 MG/1
100 TABLET, FILM COATED ORAL
Qty: 7 TABLET | Refills: 0 | Status: SHIPPED | OUTPATIENT
Start: 2022-03-18

## 2022-03-18 RX ORDER — LITHIUM CARBONATE 300 MG
TABLET ORAL
Qty: 14 TABLET | Refills: 0 | Status: SHIPPED | OUTPATIENT
Start: 2022-03-18

## 2022-03-28 ENCOUNTER — OFFICE VISIT (OUTPATIENT)
Dept: FAMILY MEDICINE CLINIC | Facility: CLINIC | Age: 52
End: 2022-03-28

## 2022-03-28 VITALS
HEART RATE: 83 BPM | RESPIRATION RATE: 18 BRPM | HEIGHT: 63 IN | OXYGEN SATURATION: 97 % | SYSTOLIC BLOOD PRESSURE: 132 MMHG | DIASTOLIC BLOOD PRESSURE: 80 MMHG | BODY MASS INDEX: 35.61 KG/M2 | TEMPERATURE: 96.7 F | WEIGHT: 201 LBS

## 2022-03-28 DIAGNOSIS — Z01.20 ENCOUNTER FOR DENTAL EXAM AND CLEANING W/O ABNORMAL FINDINGS: ICD-10-CM

## 2022-03-28 DIAGNOSIS — Z76.0 MEDICATION REFILL: ICD-10-CM

## 2022-03-28 DIAGNOSIS — E66.9 CLASS 2 OBESITY WITH BODY MASS INDEX (BMI) OF 36.0 TO 36.9 IN ADULT, UNSPECIFIED OBESITY TYPE, UNSPECIFIED WHETHER SERIOUS COMORBIDITY PRESENT: ICD-10-CM

## 2022-03-28 DIAGNOSIS — Z11.59 ENCOUNTER FOR HEPATITIS C SCREENING TEST FOR LOW RISK PATIENT: ICD-10-CM

## 2022-03-28 DIAGNOSIS — I10 PRIMARY HYPERTENSION: ICD-10-CM

## 2022-03-28 DIAGNOSIS — Z12.11 ENCOUNTER FOR SCREENING COLONOSCOPY: ICD-10-CM

## 2022-03-28 DIAGNOSIS — Z11.4 SCREENING FOR HIV (HUMAN IMMUNODEFICIENCY VIRUS): ICD-10-CM

## 2022-03-28 DIAGNOSIS — Z12.31 ENCOUNTER FOR SCREENING MAMMOGRAM FOR MALIGNANT NEOPLASM OF BREAST: Primary | ICD-10-CM

## 2022-03-28 PROBLEM — E66.812 CLASS 2 OBESITY WITH BODY MASS INDEX (BMI) OF 36.0 TO 36.9 IN ADULT: Status: ACTIVE | Noted: 2022-03-28

## 2022-03-28 PROCEDURE — 3008F BODY MASS INDEX DOCD: CPT | Performed by: FAMILY MEDICINE

## 2022-03-28 PROCEDURE — 1090F PRES/ABSN URINE INCON ASSESS: CPT | Performed by: FAMILY MEDICINE

## 2022-03-28 PROCEDURE — 1003F LEVEL OF ACTIVITY ASSESS: CPT | Performed by: FAMILY MEDICINE

## 2022-03-28 PROCEDURE — G0438 PPPS, INITIAL VISIT: HCPCS | Performed by: FAMILY MEDICINE

## 2022-03-28 PROCEDURE — 3725F SCREEN DEPRESSION PERFORMED: CPT | Performed by: FAMILY MEDICINE

## 2022-03-28 RX ORDER — AMLODIPINE BESYLATE 5 MG/1
5 TABLET ORAL DAILY
Qty: 90 TABLET | Refills: 1 | Status: SHIPPED | OUTPATIENT
Start: 2022-03-28 | End: 2022-04-20

## 2022-03-28 NOTE — PATIENT INSTRUCTIONS
Medicare Preventive Visit Patient Instructions  Thank you for completing your Welcome to Medicare Visit or Medicare Annual Wellness Visit today  Your next wellness visit will be due in one year (3/29/2023)  The screening/preventive services that you may require over the next 5-10 years are detailed below  Some tests may not apply to you based off risk factors and/or age  Screening tests ordered at today's visit but not completed yet may show as past due  Also, please note that scanned in results may not display below  Preventive Screenings:  Service Recommendations Previous Testing/Comments   Colorectal Cancer Screening  * Colonoscopy    * Fecal Occult Blood Test (FOBT)/Fecal Immunochemical Test (FIT)  * Fecal DNA/Cologuard Test  * Flexible Sigmoidoscopy Age: 54-65 years old   Colonoscopy: every 10 years (may be performed more frequently if at higher risk)  OR  FOBT/FIT: every 1 year  OR  Cologuard: every 3 years  OR  Sigmoidoscopy: every 5 years  Screening may be recommended earlier than age 48 if at higher risk for colorectal cancer  Also, an individualized decision between you and your healthcare provider will decide whether screening between the ages of 74-80 would be appropriate  Colonoscopy: Not on file  FOBT/FIT: Not on file  Cologuard: Not on file  Sigmoidoscopy: Not on file          Breast Cancer Screening Age: 36 years old  Frequency: every 1-2 years  Not required if history of left and right mastectomy Mammogram: Not on file        Cervical Cancer Screening Between the ages of 21-29, pap smear recommended once every 3 years  Between the ages of 33-67, can perform pap smear with HPV co-testing every 5 years     Recommendations may differ for women with a history of total hysterectomy, cervical cancer, or abnormal pap smears in past  Pap Smear: Not on file        Hepatitis C Screening Once for adults born between [facility]  More frequently in patients at high risk for Hepatitis C Hep C Antibody: Not on file        Diabetes Screening 1-2 times per year if you're at risk for diabetes or have pre-diabetes Fasting glucose: No results in last 5 years   A1C: No results in last 5 years        Cholesterol Screening Once every 5 years if you don't have a lipid disorder  May order more often based on risk factors  Lipid panel: Not on file          Other Preventive Screenings Covered by Medicare:  1  Abdominal Aortic Aneurysm (AAA) Screening: covered once if your at risk  You're considered to be at risk if you have a family history of AAA  2  Lung Cancer Screening: covers low dose CT scan once per year if you meet all of the following conditions: (1) Age 50-69; (2) No signs or symptoms of lung cancer; (3) Current smoker or have quit smoking within the last 15 years; (4) You have a tobacco smoking history of at least 30 pack years (packs per day multiplied by number of years you smoked); (5) You get a written order from a healthcare provider  3  Glaucoma Screening: covered annually if you're considered high risk: (1) You have diabetes OR (2) Family history of glaucoma OR (3)  aged 48 and older OR (3)  American aged 72 and older  3  Osteoporosis Screening: covered every 2 years if you meet one of the following conditions: (1) You're estrogen deficient and at risk for osteoporosis based off medical history and other findings; (2) Have a vertebral abnormality; (3) On glucocorticoid therapy for more than 3 months; (4) Have primary hyperparathyroidism; (5) On osteoporosis medications and need to assess response to drug therapy  · Last bone density test (DXA Scan): Not on file  5  HIV Screening: covered annually if you're between the age of 12-76  Also covered annually if you are younger than 13 and older than 72 with risk factors for HIV infection  For pregnant patients, it is covered up to 3 times per pregnancy      Immunizations:  Immunization Recommendations   Influenza Vaccine Annual influenza vaccination during flu season is recommended for all persons aged >= 6 months who do not have contraindications   Pneumococcal Vaccine (Prevnar and Pneumovax)  * Prevnar = PCV13  * Pneumovax = PPSV23   Adults 25-60 years old: 1-3 doses may be recommended based on certain risk factors  Adults 72 years old: Prevnar (PCV13) vaccine recommended followed by Pneumovax (PPSV23) vaccine  If already received PPSV23 since turning 65, then PCV13 recommended at least one year after PPSV23 dose  Hepatitis B Vaccine 3 dose series if at intermediate or high risk (ex: diabetes, end stage renal disease, liver disease)   Tetanus (Td) Vaccine - COST NOT COVERED BY MEDICARE PART B Following completion of primary series, a booster dose should be given every 10 years to maintain immunity against tetanus  Td may also be given as tetanus wound prophylaxis  Tdap Vaccine - COST NOT COVERED BY MEDICARE PART B Recommended at least once for all adults  For pregnant patients, recommended with each pregnancy  Shingles Vaccine (Shingrix) - COST NOT COVERED BY MEDICARE PART B  2 shot series recommended in those aged 48 and above     Health Maintenance Due:      Topic Date Due    Hepatitis C Screening  Never done    HIV Screening  Never done    Cervical Cancer Screening  Never done    Breast Cancer Screening: Mammogram  Never done    Colorectal Cancer Screening  Never done     Immunizations Due:      Topic Date Due    Pneumococcal Vaccine: Pediatrics (0 to 5 Years) and At-Risk Patients (6 to 59 Years) (1 of 2 - PPSV23) Never done    DTaP,Tdap,and Td Vaccines (1 - Tdap) Never done    Influenza Vaccine (1) Never done    COVID-19 Vaccine (3 - Booster for Pfizer series) 11/23/2021     Advance Directives   What are advance directives? Advance directives are legal documents that state your wishes and plans for medical care  These plans are made ahead of time in case you lose your ability to make decisions for yourself   Advance directives can apply to any medical decision, such as the treatments you want, and if you want to donate organs  What are the types of advance directives? There are many types of advance directives, and each state has rules about how to use them  You may choose a combination of any of the following:  · Living will: This is a written record of the treatment you want  You can also choose which treatments you do not want, which to limit, and which to stop at a certain time  This includes surgery, medicine, IV fluid, and tube feedings  · Durable power of  for healthcare [facility]): This is a written record that states who you want to make healthcare choices for you when you are unable to make them for yourself  This person, called a proxy, is usually a family member or a friend  You may choose more than 1 proxy  · Do not resuscitate (DNR) order:  A DNR order is used in case your heart stops beating or you stop breathing  It is a request not to have certain forms of treatment, such as CPR  A DNR order may be included in other types of advance directives  · Medical directive: This covers the care that you want if you are in a coma, near death, or unable to make decisions for yourself  You can list the treatments you want for each condition  Treatment may include pain medicine, surgery, blood transfusions, dialysis, IV or tube feedings, and a ventilator (breathing machine)  · Values history: This document has questions about your views, beliefs, and how you feel and think about life  This information can help others choose the care that you would choose  Why are advance directives important? An advance directive helps you control your care  Although spoken wishes may be used, it is better to have your wishes written down  Spoken wishes can be misunderstood, or not followed  Treatments may be given even if you do not want them   An advance directive may make it easier for your family to make difficult choices about your care  Cigarette Smoking and Your Health   Risks to your health if you smoke:  Nicotine and other chemicals found in tobacco damage every cell in your body  Even if you are a light smoker, you have an increased risk for cancer, heart disease, and lung disease  If you are pregnant or have diabetes, smoking increases your risk for complications  Benefits to your health if you stop smoking:   · You decrease respiratory symptoms such as coughing, wheezing, and shortness of breath  · You reduce your risk for cancers of the lung, mouth, throat, kidney, bladder, pancreas, stomach, and cervix  If you already have cancer, you increase the benefits of chemotherapy  You also reduce your risk for cancer returning or a second cancer from developing  · You reduce your risk for heart disease, blood clots, heart attack, and stroke  · You reduce your risk for lung infections, and diseases such as pneumonia, asthma, chronic bronchitis, and emphysema  · Your circulation improves  More oxygen can be delivered to your body  If you have diabetes, you lower your risk for complications, such as kidney, artery, and eye diseases  You also lower your risk for nerve damage  Nerve damage can lead to amputations, poor vision, and blindness  · You improve your body's ability to heal and to fight infections  For more information and support to stop smoking:   · [employer]  Phone: 2- 147 - 648-6974  Web Address: www "[employer]  Weight Management   Why it is important to manage your weight:  Being overweight increases your risk of health conditions such as heart disease, high blood pressure, type 2 diabetes, and certain types of cancer  It can also increase your risk for osteoarthritis, sleep apnea, and other respiratory problems  Aim for a slow, steady weight loss  Even a small amount of weight loss can lower your risk of health problems    How to lose weight safely:  A safe and healthy way to lose weight is to eat fewer calories and get regular exercise  You can lose up about 1 pound a week by decreasing the number of calories you eat by 500 calories each day  Healthy meal plan for weight management:  A healthy meal plan includes a variety of foods, contains fewer calories, and helps you stay healthy  A healthy meal plan includes the following:  · Eat whole-grain foods more often  A healthy meal plan should contain fiber  Fiber is the part of grains, fruits, and vegetables that is not broken down by your body  Whole-grain foods are healthy and provide extra fiber in your diet  Some examples of whole-grain foods are whole-wheat breads and pastas, oatmeal, brown rice, and bulgur  · Eat a variety of vegetables every day  Include dark, leafy greens such as spinach, kale, saroj greens, and mustard greens  Eat yellow and orange vegetables such as carrots, sweet potatoes, and winter squash  · Eat a variety of fruits every day  Choose fresh or canned fruit (canned in its own juice or light syrup) instead of juice  Fruit juice has very little or no fiber  · Eat low-fat dairy foods  Drink fat-free (skim) milk or 1% milk  Eat fat-free yogurt and low-fat cottage cheese  Try low-fat cheeses such as mozzarella and other reduced-fat cheeses  · Choose meat and other protein foods that are low in fat  Choose beans or other legumes such as split peas or lentils  Choose fish, skinless poultry (chicken or turkey), or lean cuts of red meat (beef or pork)  Before you cook meat or poultry, cut off any visible fat  · Use less fat and oil  Try baking foods instead of frying them  Add less fat, such as margarine, sour cream, regular salad dressing and mayonnaise to foods  Eat fewer high-fat foods  Some examples of high-fat foods include french fries, doughnuts, ice cream, and cakes  · Eat fewer sweets  Limit foods and drinks that are high in sugar  This includes candy, cookies, regular soda, and sweetened drinks    Exercise: Exercise at least 30 minutes per day on most days of the week  Some examples of exercise include walking, biking, dancing, and swimming  You can also fit in more physical activity by taking the stairs instead of the elevator or parking farther away from stores  Ask your healthcare provider about the best exercise plan for you  © Copyright [employer] 2018 Information is for End User's use only and may not be sold, redistributed or otherwise used for commercial purposes   All illustrations and images included in CareNotes® are the copyrighted property of [employer]  or [address] [place] [address]

## 2022-03-28 NOTE — PROGRESS NOTES
Assessment and Plan:     Problem List Items Addressed This Visit        Cardiovascular and Mediastinum    Hypertension     Well controlled   Continue current treatment         Relevant Medications    amLODIPine (NORVASC) 5 mg tablet       Other    Class 2 obesity with body mass index (BMI) of 36 0 to 36 9 in adult     BMI Counseling: Body mass index is 36 18 kg/m²  The BMI is above normal  Nutrition recommendations include reducing portion sizes and 3-5 servings of fruits/vegetables daily  Exercise recommendations include moderate aerobic physical activity for 150 minutes/week  -Lipid panel & HBA1C         Relevant Orders    CBC and differential    Comprehensive metabolic panel    Lipid panel    HEMOGLOBIN A1C W/ EAG ESTIMATION      Other Visit Diagnoses     Encounter for screening mammogram for malignant neoplasm of breast    -  Primary    Encounter for screening colonoscopy        Relevant Orders    Ambulatory Referral to General Surgery    Encounter for dental exam and cleaning w/o abnormal findings        Relevant Orders    Ambulatory Referral to Dentistry    Medication refill        Relevant Medications    amLODIPine (NORVASC) 5 mg tablet    Screening for HIV (human immunodeficiency virus)        Relevant Orders    HIV 1/2 Antigen/Antibody (4th Generation) w Reflex [facility]    Encounter for hepatitis C screening test for low risk patient        Relevant Orders    Hepatitis C antibody        BMI Counseling: Body mass index is 36 18 kg/m²  The BMI is above normal  Nutrition recommendations include decreasing portion sizes and moderation in carbohydrate intake  Exercise recommendations include moderate physical activity 150 minutes/week  Rationale for BMI follow-up plan is due to patient being overweight or obese  Preventive health issues were discussed with patient, and age appropriate screening tests were ordered as noted in patient's After Visit Summary    Personalized health advice and appropriate referrals for health education or preventive services given if needed, as noted in patient's After Visit Summary  History of Present Illness:     Patient presents for [employer] to Medicare visit  She has a history of breast cancer that required bilateral mastectomy with silicon breast reconstruction  She has a Significant family of breast, colon and prostate  She was told that she carried a cancer gene  She reports that she had a prophylactic total hysterectomy after for her risk factor      Patient Care Team:  Michael Taylor MD as PCP - General (Family Medicine)     Review of Systems:     Review of Systems   Constitutional: Negative for chills, diaphoresis, fatigue and fever  Eyes: Negative for visual disturbance  Respiratory: Negative for shortness of breath and wheezing  Cardiovascular: Negative for chest pain, palpitations and leg swelling  Gastrointestinal: Negative for abdominal pain, constipation, diarrhea, nausea and vomiting  Endocrine: Negative for polydipsia, polyphagia and polyuria  Genitourinary: Negative for dysuria, flank pain, frequency and urgency  Skin: Negative for rash  Neurological: Negative for syncope  Psychiatric/Behavioral: Negative for confusion  Problem List:     Patient Active Problem List   Diagnosis    Hypertension    [facility]    Asthma    Osteoarthritis of both knees    Class 2 obesity with body mass index (BMI) of 36 0 to 36 9 in adult      Past Medical and Surgical History:     Past Medical History:   Diagnosis Date    Asthma     Bipolar 1 disorder ([address] )     HTN (hypertension)     Hypertension     Mental health problem      Past Surgical History:   Procedure Laterality Date    BREAST SURGERY      COSMETIC SURGERY      HYSTERECTOMY      LAPAROSCOPIC GASTRIC BANDING        Family History:     No family history on file     Social History:     Social History     Socioeconomic History    Marital status: Single     Spouse name: None    Number of children: None    Years of education: None    Highest education level: None   Occupational History    None   Tobacco Use    Smoking status: Current Every Day Smoker     Packs/day: 0 25     Types: Cigarettes    Smokeless tobacco: Never Used   Substance and Sexual Activity    Alcohol use: Not Currently    Drug use: Not Currently    Sexual activity: None   Other Topics Concern    None   Social History Narrative    None     Social Determinants of Health     Financial Resource Strain: Not on file   Food Insecurity: Not on file   Transportation Needs: Not on file   Physical Activity: Not on file   Stress: Not on file   Social Connections: Not on file   Intimate Partner Violence: Not on file   Housing Stability: Not on file      Medications and Allergies:     Current Outpatient Medications   Medication Sig Dispense Refill    albuterol (Ventolin HFA) 90 mcg/act inhaler Inhale 2 puffs every 6 (six) hours as needed for wheezing 18 g 5    amLODIPine (NORVASC) 5 mg tablet Take 1 tablet (5 mg total) by mouth daily for 7 days 90 tablet 1    hydrOXYzine pamoate (VISTARIL) 50 mg capsule Take 1 capsule (50 mg total) by mouth 3 (three) times a day as needed for anxiety for up to 3 days 12 capsule 0    lithium 300 MG tablet Take 1 tablet by mouth once daily and 2 tablets at bedtime 14 tablet 0    QUEtiapine (SEROquel) 100 mg tablet Take 1 tablet (100 mg total) by mouth daily at bedtime 7 tablet 0     No current facility-administered medications for this visit       No Known Allergies   Immunizations:     Immunization History   Administered Date(s) Administered    COVID-19 PFIZER VACCINE 0 3 ML IM 06/02/2021, 06/23/2021      Health Maintenance:         Topic Date Due    Hepatitis C Screening  Never done    HIV Screening  Never done    Cervical Cancer Screening  Never done    Breast Cancer Screening: Mammogram  Never done    Colorectal Cancer Screening  Never done         Topic Date Due    Pneumococcal Vaccine: Pediatrics (0 to 5 Years) and At-Risk Patients (6 to 59 Years) (1 of 2 - PPSV23) Never done    DTaP,Tdap,and Td Vaccines (1 - Tdap) Never done    Influenza Vaccine (1) Never done    COVID-19 Vaccine (3 - Booster for Pfizer series) 11/23/2021      Medicare Screening Tests and Risk Assessments:     Fabio Farah is here for her Initial Wellness visit  Health Risk Assessment:   Patient rates overall health as good  Patient feels that their physical health rating is same  Patient is satisfied with their life  Eyesight was rated as same  Hearing was rated as same  Patient feels that their emotional and mental health rating is slightly better  Patients states they are never, rarely angry  Patient states they are always unusually tired/fatigued  Pain experienced in the last 7 days has been none  Patient states that she has experienced weight loss or gain in last 6 months  Depression Screening:   PHQ-2 Score: 4      Fall Risk Screening: In the past year, patient has experienced: no history of falling in past year      Urinary Incontinence Screening:   Patient has not leaked urine accidently in the last six months  Home Safety:  Patient does not have trouble with stairs inside or outside of their home  Patient has working smoke alarms and has working carbon monoxide detector  Home safety hazards include: none  Nutrition:   Current diet is Regular  Medications:   Patient is currently taking over-the-counter supplements  OTC medications include: women multivitamin, tylenol  Patient is able to manage medications  Activities of Daily Living (ADLs)/Instrumental Activities of Daily Living (IADLs):   Walk and transfer into and out of bed and chair?: Yes  Dress and groom yourself?: Yes    Bathe or shower yourself?: Yes    Feed yourself?  Yes  Do your laundry/housekeeping?: Yes  Manage your money, pay your bills and track your expenses?: Yes  Make your own meals?: Yes    Do your own shopping?: Yes    Previous Hospitalizations:   Any hospitalizations or ED visits within the last 12 months?: Yes    How many hospitalizations have you had in the last year?: 1-2    Advance Care Planning:     Five wishes given: Yes      Cognitive Screening:   Provider or family/friend/caregiver concerned regarding cognition?: No    PREVENTIVE SCREENINGS      Cardiovascular Screening:      Due for: Lipid Panel      Diabetes Screening:       Due for: Blood Glucose      Colorectal Cancer Screening:     General: Risks and Benefits Discussed    Due for: Colonoscopy - High Risk      Breast Cancer Screening:     General: History Breast Cancer      Cervical Cancer Screening:    General: Screening Not Indicated      Lung Cancer Screening:     General: Screening Not Indicated    Screening, Brief Intervention, and Referral to Treatment (SBIRT)    Screening  Typical number of drinks in a day: 0  Typical number of drinks in a week: 0  Interpretation: Low risk drinking behavior  No exam data present     Physical Exam:     /80 (BP Location: Left leg, Patient Position: Sitting, Cuff Size: Large)   Pulse 83   Temp (!) 96 7 °F (35 9 °C) (Temporal)   Resp 18   Ht 5' 2 5" (1 588 m)   Wt 91 2 kg (201 lb)   SpO2 97%   BMI 36 18 kg/m²     Physical Exam  Constitutional:       General: She is not in acute distress  Appearance: Normal appearance  She is well-developed  She is not ill-appearing, toxic-appearing or diaphoretic  HENT:      Head: Normocephalic and atraumatic  Right Ear: External ear normal       Left Ear: External ear normal       Mouth/Throat:      Pharynx: No oropharyngeal exudate or posterior oropharyngeal erythema  Eyes:      General: No scleral icterus  Right eye: No discharge  Left eye: No discharge  Extraocular Movements: Extraocular movements intact  Cardiovascular:      Rate and Rhythm: Normal rate and regular rhythm  Heart sounds: Normal heart sounds   No murmur heard   No friction rub  No gallop  Pulmonary:      Effort: Pulmonary effort is normal  No respiratory distress  Breath sounds: No stridor  No wheezing  Abdominal:      General: Bowel sounds are normal  There is no distension  Palpations: Abdomen is soft  There is no mass  Tenderness: There is no abdominal tenderness  There is no guarding or rebound  Hernia: No hernia is present  Musculoskeletal:         General: Normal range of motion  Cervical back: Normal range of motion  Lymphadenopathy:      Cervical: No cervical adenopathy  Skin:     General: Skin is warm  Capillary Refill: Capillary refill takes less than 2 seconds  Neurological:      General: No focal deficit present  Mental Status: She is alert and oriented to person, place, and time     Psychiatric:         Mood and Affect: Mood normal          Behavior: Behavior normal           Cesario Hayes MD

## 2022-03-28 NOTE — ASSESSMENT & PLAN NOTE
BMI Counseling: Body mass index is 36 18 kg/m²  The BMI is above normal  Nutrition recommendations include reducing portion sizes and 3-5 servings of fruits/vegetables daily  Exercise recommendations include moderate aerobic physical activity for 150 minutes/week     -Lipid panel & HBA1C

## 2022-04-04 ENCOUNTER — APPOINTMENT (OUTPATIENT)
Dept: LAB | Facility: CLINIC | Age: 52
End: 2022-04-04
Payer: COMMERCIAL

## 2022-04-04 DIAGNOSIS — Z11.59 ENCOUNTER FOR HEPATITIS C SCREENING TEST FOR LOW RISK PATIENT: ICD-10-CM

## 2022-04-04 DIAGNOSIS — E66.9 CLASS 2 OBESITY WITH BODY MASS INDEX (BMI) OF 36.0 TO 36.9 IN ADULT, UNSPECIFIED OBESITY TYPE, UNSPECIFIED WHETHER SERIOUS COMORBIDITY PRESENT: ICD-10-CM

## 2022-04-04 DIAGNOSIS — Z11.4 SCREENING FOR HIV (HUMAN IMMUNODEFICIENCY VIRUS): ICD-10-CM

## 2022-04-04 LAB
ALBUMIN SERPL BCP-MCNC: 3.5 G/DL (ref 3.5–5)
ALP SERPL-CCNC: 69 U/L (ref 46–116)
ALT SERPL W P-5'-P-CCNC: 35 U/L (ref 12–78)
ANION GAP SERPL CALCULATED.3IONS-SCNC: 6 MMOL/L (ref 4–13)
AST SERPL W P-5'-P-CCNC: 22 U/L (ref 5–45)
BASOPHILS # BLD AUTO: 0.05 THOUSANDS/ΜL (ref 0–0.1)
BASOPHILS NFR BLD AUTO: 1 % (ref 0–1)
BILIRUB SERPL-MCNC: 0.32 MG/DL (ref 0.2–1)
BUN SERPL-MCNC: 12 MG/DL (ref 5–25)
CALCIUM SERPL-MCNC: 9.3 MG/DL (ref 8.3–10.1)
CHLORIDE SERPL-SCNC: 108 MMOL/L (ref 100–108)
CHOLEST SERPL-MCNC: 212 MG/DL
CO2 SERPL-SCNC: 24 MMOL/L (ref 21–32)
CREAT SERPL-MCNC: 1.15 MG/DL (ref 0.6–1.3)
EOSINOPHIL # BLD AUTO: 0.36 THOUSAND/ΜL (ref 0–0.61)
EOSINOPHIL NFR BLD AUTO: 4 % (ref 0–6)
ERYTHROCYTE [DISTWIDTH] IN BLOOD BY AUTOMATED COUNT: 14.6 % (ref 11.6–15.1)
EST. AVERAGE GLUCOSE BLD GHB EST-MCNC: 105 MG/DL
GFR SERPL CREATININE-BSD FRML MDRD: 55 ML/MIN/1.73SQ M
GLUCOSE P FAST SERPL-MCNC: 98 MG/DL (ref 65–99)
HBA1C MFR BLD: 5.3 %
HCT VFR BLD AUTO: 39.4 % (ref 34.8–46.1)
HCV AB SER QL: NORMAL
HDLC SERPL-MCNC: 82 MG/DL
HGB BLD-MCNC: 12.9 G/DL (ref 11.5–15.4)
IMM GRANULOCYTES # BLD AUTO: 0.03 THOUSAND/UL (ref 0–0.2)
IMM GRANULOCYTES NFR BLD AUTO: 0 % (ref 0–2)
LDLC SERPL CALC-MCNC: 117 MG/DL (ref 0–100)
LYMPHOCYTES # BLD AUTO: 3.7 THOUSANDS/ΜL (ref 0.6–4.47)
LYMPHOCYTES NFR BLD AUTO: 43 % (ref 14–44)
MCH RBC QN AUTO: 25.1 PG (ref 26.8–34.3)
MCHC RBC AUTO-ENTMCNC: 32.7 G/DL (ref 31.4–37.4)
MCV RBC AUTO: 77 FL (ref 82–98)
MONOCYTES # BLD AUTO: 0.52 THOUSAND/ΜL (ref 0.17–1.22)
MONOCYTES NFR BLD AUTO: 6 % (ref 4–12)
NEUTROPHILS # BLD AUTO: 3.93 THOUSANDS/ΜL (ref 1.85–7.62)
NEUTS SEG NFR BLD AUTO: 46 % (ref 43–75)
NONHDLC SERPL-MCNC: 130 MG/DL
NRBC BLD AUTO-RTO: 0 /100 WBCS
PLATELET # BLD AUTO: 298 THOUSANDS/UL (ref 149–390)
PMV BLD AUTO: 9.6 FL (ref 8.9–12.7)
POTASSIUM SERPL-SCNC: 3.8 MMOL/L (ref 3.5–5.3)
PROT SERPL-MCNC: 7.2 G/DL (ref 6.4–8.2)
RBC # BLD AUTO: 5.13 MILLION/UL (ref 3.81–5.12)
SODIUM SERPL-SCNC: 138 MMOL/L (ref 136–145)
TRIGL SERPL-MCNC: 66 MG/DL
WBC # BLD AUTO: 8.59 THOUSAND/UL (ref 4.31–10.16)

## 2022-04-04 PROCEDURE — 80061 LIPID PANEL: CPT

## 2022-04-04 PROCEDURE — 86803 HEPATITIS C AB TEST: CPT

## 2022-04-04 PROCEDURE — 83036 HEMOGLOBIN GLYCOSYLATED A1C: CPT

## 2022-04-04 PROCEDURE — 85025 COMPLETE CBC W/AUTO DIFF WBC: CPT

## 2022-04-04 PROCEDURE — 36415 COLL VENOUS BLD VENIPUNCTURE: CPT

## 2022-04-04 PROCEDURE — 80053 COMPREHEN METABOLIC PANEL: CPT

## 2022-04-04 PROCEDURE — 87389 HIV-1 AG W/HIV-1&-2 AB AG IA: CPT

## 2022-04-05 LAB — HIV 1+2 AB+HIV1 P24 AG SERPL QL IA: NORMAL

## 2022-04-20 ENCOUNTER — CONSULT (OUTPATIENT)
Dept: SURGERY | Facility: CLINIC | Age: 52
End: 2022-04-20
Payer: COMMERCIAL

## 2022-04-20 VITALS
HEART RATE: 120 BPM | WEIGHT: 201 LBS | SYSTOLIC BLOOD PRESSURE: 128 MMHG | TEMPERATURE: 98.4 F | HEIGHT: 63 IN | OXYGEN SATURATION: 96 % | DIASTOLIC BLOOD PRESSURE: 76 MMHG | BODY MASS INDEX: 35.61 KG/M2

## 2022-04-20 DIAGNOSIS — Z12.11 ENCOUNTER FOR SCREENING COLONOSCOPY: Primary | ICD-10-CM

## 2022-04-20 PROCEDURE — 99204 OFFICE O/P NEW MOD 45 MIN: CPT | Performed by: SURGERY

## 2022-04-20 PROCEDURE — 3008F BODY MASS INDEX DOCD: CPT | Performed by: SURGERY

## 2022-04-20 PROCEDURE — 3008F BODY MASS INDEX DOCD: CPT | Performed by: FAMILY MEDICINE

## 2022-04-20 RX ORDER — QUETIAPINE FUMARATE 200 MG/1
TABLET, FILM COATED ORAL
COMMUNITY
Start: 2022-04-20

## 2022-04-20 RX ORDER — POLYETHYLENE GLYCOL 3350 17 G/17G
238 POWDER, FOR SOLUTION ORAL SEE ADMIN INSTRUCTIONS
Qty: 238 G | Refills: 0 | Status: SHIPPED | OUTPATIENT
Start: 2022-04-20 | End: 2022-04-25 | Stop reason: HOSPADM

## 2022-04-20 RX ORDER — LITHIUM CARBONATE 300 MG/1
600 CAPSULE ORAL
COMMUNITY
Start: 2022-04-20

## 2022-04-20 RX ORDER — HYDROXYZINE 50 MG/1
TABLET, FILM COATED ORAL
COMMUNITY
Start: 2022-04-20

## 2022-04-20 NOTE — PROGRESS NOTES
Assessment/Plan:  Discussed risks and benefits of colonoscopy including low risk of bleeding if polypectomy performed, abdominal discomfort/bloating and very small risk of colon perforation  Explained bowel prep in detail and gave instructions detailing appropriate pre-procedure diet and medication use  Prescription for bowel prep will be sent to the pharmacy  Procedure will be scheduled at earliest convenience  No problem-specific Assessment & Plan notes found for this encounter  Diagnoses and all orders for this visit:    Encounter for screening colonoscopy  -     Ambulatory Referral to General Surgery  -     polyethylene glycol (GLYCOLAX) 17 GM/SCOOP powder; Take 238 g by mouth see administration instructions Mix 238 g with 64 oz of clear liquid  Drink half starting at noon on the day prior to colonoscopy  Drink remaining half 6 hours prior to procedure on day of colonoscopy  Other orders  -     hydrOXYzine HCL (ATARAX) 50 mg tablet  -     lithium carbonate 300 mg capsule; 600 mg    -     QUEtiapine (SEROquel) 200 mg tablet          Subjective:      Patient ID: Marquis Mcelroy is a 47 y o  female  She presents to discuss colonoscopy screening  She had 1 previous colonoscopy when she was around 29to 37years old  This was done because her grandmother was diagnosed with colon cancer and did pass away from colon cancer at age 61  Her mother was diagnosed with breast cancer and she has undergone bilateral mastectomy  She denies any findings on her last colonoscopy  She denies any abdominal pain or rectal pain, no blood in her stool  No issues with constipation or loose stools, occasionally takes MiraLax but nothing consistently  A chart review was performed and previous primary care visit notes were reviewed  All applicable imaging studies were reviewed and images were reviewed personally  All applicable laboratory studies were reviewed personally    Care everywhere review was performed if  available and all pertinent notes were reviewed  The following portions of the patient's history were reviewed and updated as appropriate:   She  has a past medical history of Asthma, Bipolar 1 disorder ([facility] [address] ), HTN (hypertension), Hypertension, and Mental health problem  She   Patient Active Problem List    Diagnosis Date Noted    Class 2 obesity with body mass index (BMI) of 36 0 to 36 9 in adult 03/28/2022    Osteoarthritis of both knees 02/24/2022    Hypertension     Bipolar 1 disorder ([facility] )     Asthma      She  has a past surgical history that includes Hysterectomy; Cosmetic surgery; Laparoscopic gastric banding; and Breast surgery  Her family history is not on file  She  reports that she has been smoking cigarettes  She has been smoking about 0 25 packs per day  She has never used smokeless tobacco  She reports previous alcohol use  She reports previous drug use  Current Outpatient Medications   Medication Sig Dispense Refill    albuterol (Ventolin HFA) 90 mcg/act inhaler Inhale 2 puffs every 6 (six) hours as needed for wheezing 18 g 5    amLODIPine (NORVASC) 5 mg tablet Take 1 tablet (5 mg total) by mouth daily for 7 days 90 tablet 1    hydrOXYzine HCL (ATARAX) 50 mg tablet       hydrOXYzine pamoate (VISTARIL) 50 mg capsule Take 1 capsule (50 mg total) by mouth 3 (three) times a day as needed for anxiety for up to 3 days 12 capsule 0    lithium 300 MG tablet Take 1 tablet by mouth once daily and 2 tablets at bedtime 14 tablet 0    lithium carbonate 300 mg capsule 600 mg        QUEtiapine (SEROquel) 200 mg tablet       polyethylene glycol (GLYCOLAX) 17 GM/SCOOP powder Take 238 g by mouth see administration instructions Mix 238 g with 64 oz of clear liquid  Drink half starting at noon on the day prior to colonoscopy  Drink remaining half 6 hours prior to procedure on day of colonoscopy   238 g 0    QUEtiapine (SEROquel) 100 mg tablet Take 1 tablet (100 mg total) by mouth daily at bedtime (Patient not taking: Reported on 4/20/2022 ) 7 tablet 0     No current facility-administered medications for this visit  She has No Known Allergies       Review of Systems   Gastrointestinal: Negative for abdominal distention, abdominal pain, anal bleeding, constipation, diarrhea and rectal pain  All other systems reviewed and are negative  Objective:      /76 (BP Location: Left leg, Patient Position: Sitting, Cuff Size: Standard)   Pulse (!) 120   Temp 98 4 °F (36 9 °C) (Tympanic)   Ht 5' 2 5" (1 588 m)   Wt 91 2 kg (201 lb)   SpO2 96%   BMI 36 18 kg/m²          Physical Exam  Vitals reviewed  Constitutional:       General: She is not in acute distress  Appearance: Normal appearance  She is obese  HENT:      Head: Normocephalic and atraumatic  Eyes:      Extraocular Movements: Extraocular movements intact  Conjunctiva/sclera: Conjunctivae normal       Pupils: Pupils are equal, round, and reactive to light  Cardiovascular:      Rate and Rhythm: Normal rate and regular rhythm  Heart sounds: Normal heart sounds  Pulmonary:      Effort: Pulmonary effort is normal  No respiratory distress  Breath sounds: Normal breath sounds  Abdominal:      General: Abdomen is flat  There is no distension  Palpations: Abdomen is soft  Tenderness: There is no abdominal tenderness  Musculoskeletal:         General: No swelling or tenderness  Normal range of motion  Cervical back: Normal range of motion and neck supple  Skin:     General: Skin is warm and dry  Neurological:      General: No focal deficit present  Mental Status: She is alert and oriented to person, place, and time

## 2022-04-22 ENCOUNTER — TELEPHONE (OUTPATIENT)
Dept: PREADMISSION TESTING | Facility: HOSPITAL | Age: 52
End: 2022-04-22

## 2022-04-22 NOTE — TELEPHONE ENCOUNTER
Patients GI provider:  Dr Ed Callahan    Number to return call: ([phone])    Reason for call: Pt calling returning your call about her colonoscopy scheduled on 4/25/22  Please call back, thank you!     Scheduled procedure/appointment date if applicable: Apt/procedure 4/25/22

## 2022-04-24 ENCOUNTER — ANESTHESIA EVENT (OUTPATIENT)
Dept: GASTROENTEROLOGY | Facility: HOSPITAL | Age: 52
End: 2022-04-24

## 2022-04-25 ENCOUNTER — HOSPITAL ENCOUNTER (OUTPATIENT)
Dept: GASTROENTEROLOGY | Facility: HOSPITAL | Age: 52
Setting detail: OUTPATIENT SURGERY
Discharge: HOME | End: 2022-04-25
Attending: SURGERY | Admitting: SURGERY
Payer: COMMERCIAL

## 2022-04-25 ENCOUNTER — ANESTHESIA (OUTPATIENT)
Dept: GASTROENTEROLOGY | Facility: HOSPITAL | Age: 52
End: 2022-04-25

## 2022-04-25 VITALS
BODY MASS INDEX: 38.46 KG/M2 | HEART RATE: 4 BPM | TEMPERATURE: 98.3 F | SYSTOLIC BLOOD PRESSURE: 114 MMHG | OXYGEN SATURATION: 93 % | RESPIRATION RATE: 20 BRPM | WEIGHT: 209 LBS | HEIGHT: 62 IN | DIASTOLIC BLOOD PRESSURE: 88 MMHG

## 2022-04-25 DIAGNOSIS — Z12.11 ENCOUNTER FOR SCREENING COLONOSCOPY: ICD-10-CM

## 2022-04-25 PROCEDURE — 45385 COLONOSCOPY W/LESION REMOVAL: CPT | Performed by: SURGERY

## 2022-04-25 PROCEDURE — 88305 TISSUE EXAM BY PATHOLOGIST: CPT | Performed by: PATHOLOGY

## 2022-04-25 RX ORDER — SODIUM CHLORIDE 9 MG/ML
100 INJECTION, SOLUTION INTRAVENOUS CONTINUOUS
Status: DISCONTINUED | OUTPATIENT
Start: 2022-04-25 | End: 2022-04-29 | Stop reason: HOSPADM

## 2022-04-25 RX ORDER — PROPOFOL 10 MG/ML
INJECTION, EMULSION INTRAVENOUS AS NEEDED
Status: DISCONTINUED | OUTPATIENT
Start: 2022-04-25 | End: 2022-04-25

## 2022-04-25 RX ORDER — SODIUM CHLORIDE 9 MG/ML
75 INJECTION, SOLUTION INTRAVENOUS CONTINUOUS
Status: CANCELLED | OUTPATIENT
Start: 2022-04-25

## 2022-04-25 RX ORDER — LIDOCAINE HYDROCHLORIDE 10 MG/ML
INJECTION, SOLUTION EPIDURAL; INFILTRATION; INTRACAUDAL; PERINEURAL AS NEEDED
Status: DISCONTINUED | OUTPATIENT
Start: 2022-04-25 | End: 2022-04-25

## 2022-04-25 RX ORDER — SODIUM CHLORIDE 9 MG/ML
INJECTION, SOLUTION INTRAVENOUS CONTINUOUS PRN
Status: DISCONTINUED | OUTPATIENT
Start: 2022-04-25 | End: 2022-04-25

## 2022-04-25 RX ADMIN — PROPOFOL 50 MG: 10 INJECTION, EMULSION INTRAVENOUS at 09:48

## 2022-04-25 RX ADMIN — PROPOFOL 50 MG: 10 INJECTION, EMULSION INTRAVENOUS at 09:35

## 2022-04-25 RX ADMIN — PROPOFOL 50 MG: 10 INJECTION, EMULSION INTRAVENOUS at 09:44

## 2022-04-25 RX ADMIN — SODIUM CHLORIDE 100 ML/HR: 0.9 INJECTION, SOLUTION INTRAVENOUS at 07:36

## 2022-04-25 RX ADMIN — PROPOFOL 50 MG: 10 INJECTION, EMULSION INTRAVENOUS at 09:52

## 2022-04-25 RX ADMIN — PROPOFOL 150 MG: 10 INJECTION, EMULSION INTRAVENOUS at 09:30

## 2022-04-25 RX ADMIN — SODIUM CHLORIDE: 0.9 INJECTION, SOLUTION INTRAVENOUS at 07:33

## 2022-04-25 RX ADMIN — PROPOFOL 20 MG: 10 INJECTION, EMULSION INTRAVENOUS at 09:38

## 2022-04-25 RX ADMIN — LIDOCAINE HYDROCHLORIDE 50 MG: 10 INJECTION, SOLUTION EPIDURAL; INFILTRATION; INTRACAUDAL; PERINEURAL at 09:30

## 2022-04-25 NOTE — INTERVAL H&P NOTE
H&P reviewed  After examining the patient I find no changes in the patients condition since the H&P had been written      Vitals:    04/25/22 0730   BP: 150/98   Pulse: 96   Resp: 20   Temp: 97 6 °F (36 4 °C)   SpO2: 97%

## 2022-04-25 NOTE — DISCHARGE INSTRUCTIONS
Colonoscopy   WHAT YOU NEED TO KNOW:   A colonoscopy is a procedure to examine the inside of your colon (intestine) with a scope  Polyps or tissue growths may have been removed during your colonoscopy  It is normal to feel bloated and to have some abdominal discomfort  You should be passing gas  If you have hemorrhoids or you had polyps removed, you may have a small amount of bleeding  DISCHARGE INSTRUCTIONS:   Seek care immediately if:   · You have a large amount of bright red blood in your bowel movements  · Your abdomen is hard and firm and you have severe pain  · You have sudden trouble breathing  Call your doctor if:   · You develop a rash or hives  · You have a fever within 24 hours of your procedure  · You have nausea and vomiting  · You feel anesthesia effects greater than 24 hours  · You have not had a bowel movement for 3 days after your procedure  · You have questions or concerns about your condition or care  After your colonoscopy:   · Do not lift, strain, or run  until your healthcare provider says it is okay  · Rest as much as possible  You have been given medicine to relax you  Do not  drive or make important decisions for at least 24 hours  Return to your normal activity as directed  · Relieve gas and discomfort from bloating  by lying on your left side with a heating pad on your abdomen  You may need to take short walks to help the gas move out  Eat small meals until bloating is relieved  If you had polyps removed: For 7 days after your procedure:  · Do not  take aspirin  · Do not  go on long car rides  Help prevent constipation:   · Eat a variety of healthy foods  Healthy foods include fruit, vegetables, whole-grain breads, low-fat dairy products, beans, lean meat, and fish  Ask if you need to be on a special diet  Your healthcare provider may recommend that you eat high-fiber foods such as cooked beans   Fiber helps you have regular bowel movements  · Drink liquids as directed  Adults should drink between 9 and 13 eight-ounce cups of liquid every day  Ask what amount is best for you  For most people, good liquids to drink are water, juice, and milk  · Exercise as directed  Talk to your healthcare provider about the best exercise plan for you  Exercise can help prevent constipation, decrease your blood pressure and improve your health  Follow up with your doctor as directed:  Write down your questions so you remember to ask them during your visits  © Copyright [employer] 2022 Information is for End User's use only and may not be sold, redistributed or otherwise used for commercial purposes  All illustrations and images included in CareNotes® are the copyrighted property of A D A M , Inc  or [facility] Corina Oneal   The above information is an  only  It is not intended as medical advice for individual conditions or treatments  Talk to your doctor, nurse or pharmacist before following any medical regimen to see if it is safe and effective for you

## 2022-04-25 NOTE — ANESTHESIA PREPROCEDURE EVALUATION
Procedure:  COLONOSCOPY    Relevant Problems   CARDIO   (+) Hypertension      MUSCULOSKELETAL   (+) Osteoarthritis of both knees      PULMONARY   (+) Asthma      Other   (+) Bipolar 1 disorder (HCC)   (+) Class 2 obesity with body mass index (BMI) of 36 0 to 36 9 in adult        Physical Exam    Airway    Mallampati score: II  TM Distance: >3 FB  Neck ROM: full     Dental       Cardiovascular      Pulmonary      Other Findings        Anesthesia Plan  ASA Score- 3     Anesthesia Type- IV sedation with anesthesia with ASA Monitors  Additional Monitors:   Airway Plan:           Plan Factors-Exercise tolerance (METS): >4 METS  Chart reviewed  Patient summary reviewed  Patient is a current smoker  Patient smoked on day of surgery  Induction- intravenous  Postoperative Plan-     Informed Consent- Anesthetic plan and risks discussed with patient  I personally reviewed this patient with the CRNA  Discussed and agreed on the Anesthesia Plan with the CRNA  Darletta Pac

## 2022-05-11 ENCOUNTER — TELEPHONE (OUTPATIENT)
Dept: SURGERY | Facility: CLINIC | Age: 52
End: 2022-05-11

## 2022-05-19 ENCOUNTER — TELEPHONE (OUTPATIENT)
Dept: FAMILY MEDICINE CLINIC | Facility: CLINIC | Age: 52
End: 2022-05-19

## 2022-05-19 NOTE — TELEPHONE ENCOUNTER
Patient called and stated back in February she got a knee injection and that she believes Dr Emilia Narvaez told her she would need another in 3 months  Patient is asking if she can be scheduled for an injection because she has been in pain as of recently and can't even go to the gym

## 2022-06-13 ENCOUNTER — RA CDI HCC (OUTPATIENT)
Dept: OTHER | Facility: HOSPITAL | Age: 52
End: 2022-06-13

## 2022-06-13 ENCOUNTER — PROCEDURE VISIT (OUTPATIENT)
Dept: FAMILY MEDICINE CLINIC | Facility: CLINIC | Age: 52
End: 2022-06-13

## 2022-06-13 VITALS
OXYGEN SATURATION: 95 % | DIASTOLIC BLOOD PRESSURE: 84 MMHG | SYSTOLIC BLOOD PRESSURE: 136 MMHG | WEIGHT: 204 LBS | HEIGHT: 62 IN | RESPIRATION RATE: 18 BRPM | TEMPERATURE: 97.8 F | HEART RATE: 100 BPM | BODY MASS INDEX: 37.54 KG/M2

## 2022-06-13 DIAGNOSIS — R00.2 PALPITATION: Primary | ICD-10-CM

## 2022-06-13 DIAGNOSIS — M17.0 OSTEOARTHRITIS OF BOTH KNEES, UNSPECIFIED OSTEOARTHRITIS TYPE: ICD-10-CM

## 2022-06-13 PROCEDURE — 3008F BODY MASS INDEX DOCD: CPT | Performed by: FAMILY MEDICINE

## 2022-06-13 PROCEDURE — 99214 OFFICE O/P EST MOD 30 MIN: CPT | Performed by: FAMILY MEDICINE

## 2022-06-13 PROCEDURE — 20610 DRAIN/INJ JOINT/BURSA W/O US: CPT | Performed by: FAMILY MEDICINE

## 2022-06-13 RX ORDER — LIDOCAINE HYDROCHLORIDE 10 MG/ML
1 INJECTION, SOLUTION INFILTRATION; PERINEURAL
Status: COMPLETED | OUTPATIENT
Start: 2022-06-13 | End: 2022-06-13

## 2022-06-13 RX ORDER — TRIAMCINOLONE ACETONIDE 40 MG/ML
40 INJECTION, SUSPENSION INTRA-ARTICULAR; INTRAMUSCULAR
Status: COMPLETED | OUTPATIENT
Start: 2022-06-13 | End: 2022-06-13

## 2022-06-13 RX ADMIN — LIDOCAINE HYDROCHLORIDE 1 ML: 10 INJECTION, SOLUTION INFILTRATION; PERINEURAL at 14:20

## 2022-06-13 RX ADMIN — TRIAMCINOLONE ACETONIDE 40 MG: 40 INJECTION, SUSPENSION INTRA-ARTICULAR; INTRAMUSCULAR at 14:20

## 2022-06-13 NOTE — ASSESSMENT & PLAN NOTE
-Received cortisone injection into left knee today with immediate relief of symptoms   -Continue Home physical therapy

## 2022-06-13 NOTE — PROGRESS NOTES
[facility]  coding opportunities       Chart reviewed, no opportunity found:   [address]        Patients Insurance     Medicare Insurance: [employer]

## 2022-06-13 NOTE — PROGRESS NOTES
Assessment/Plan:    Palpitation  -Chronic intermittent asymptomatic palpitations  -Will check 48 hour Holter monitor  -Recommend that she limits stimulants (caffeine/sports drinks etc)    Osteoarthritis of both knees  -Received cortisone injection into left knee today with immediate relief of symptoms   -Continue Home physical therapy        Return in about 4 weeks (around 7/11/2022) for Next scheduled follow up palpitation  There are no Patient Instructions on file for this visit  Diagnoses and all orders for this visit:    Palpitation  -     Holter monitor; Future    Osteoarthritis of both knees, unspecified osteoarthritis type  -     Large joint arthrocentesis    Other orders  -     Cancel: Large joint arthrocentesis: L knee          Subjective:     Chris العراقي is a 47 y o  female who  has a past medical history of Asthma, Bipolar 1 disorder ([address] ), HTN (hypertension), Hypertension, and Mental health problem  who presented to the office today for Knee injection today  She has a history of OA  She has had steroid injection before with relieve of symptoms  Her last injection was over 3 months ago  She also endorses intermittent Palpitation that last less than 1 minute  She gets this episodes twice per week since about a year  She has no symptoms during these episodes             HPI      The following portions of the patient's history were reviewed and updated as appropriate: allergies, current medications, past family history, past medical history, past social history, past surgical history and problem list     Current Outpatient Medications on File Prior to Visit   Medication Sig Dispense Refill    albuterol (Ventolin HFA) 90 mcg/act inhaler Inhale 2 puffs every 6 (six) hours as needed for wheezing 18 g 5    amLODIPine (NORVASC) 5 mg tablet Take 1 tablet (5 mg total) by mouth daily for 7 days 90 tablet 1    hydrOXYzine HCL (ATARAX) 50 mg tablet       hydrOXYzine pamoate (VISTARIL) 50 mg capsule Take 1 capsule (50 mg total) by mouth 3 (three) times a day as needed for anxiety for up to 3 days 12 capsule 0    lithium 300 MG tablet Take 1 tablet by mouth once daily and 2 tablets at bedtime 14 tablet 0    lithium carbonate 300 mg capsule 600 mg        QUEtiapine (SEROquel) 100 mg tablet Take 1 tablet (100 mg total) by mouth daily at bedtime (Patient not taking: Reported on 4/20/2022 ) 7 tablet 0    QUEtiapine (SEROquel) 200 mg tablet        No current facility-administered medications on file prior to visit  Review of Systems   Constitutional: Negative for chills, diaphoresis, fatigue and fever  Eyes: Negative for visual disturbance  Respiratory: Negative for shortness of breath and wheezing  Cardiovascular: Positive for palpitations  Negative for chest pain and leg swelling  Gastrointestinal: Negative for abdominal pain, constipation, diarrhea, nausea and vomiting  Musculoskeletal: Positive for arthralgias  Negative for joint swelling and myalgias  Skin: Negative for rash  Neurological: Negative for dizziness, seizures, syncope, facial asymmetry, speech difficulty, light-headedness and headaches  Psychiatric/Behavioral: Negative for confusion  Objective:    /84 (BP Location: Right leg, Patient Position: Sitting, Cuff Size: Standard)   Pulse 100   Temp 97 8 °F (36 6 °C) (Temporal)   Resp 18   Ht 5' 2" (1 575 m)   Wt 92 5 kg (204 lb)   SpO2 95%   Breastfeeding No   BMI 37 31 kg/m²     Physical Exam  Constitutional:       General: She is not in acute distress  Appearance: Normal appearance  She is well-developed  She is obese  She is not ill-appearing, toxic-appearing or diaphoretic  HENT:      Head: Normocephalic and atraumatic  Right Ear: External ear normal       Left Ear: External ear normal       Nose: Nose normal    Eyes:      General: No scleral icterus  Right eye: No discharge  Left eye: No discharge        Extraocular Movements: Extraocular movements intact  Neck:      Thyroid: No thyromegaly  Vascular: No JVD  Trachea: No tracheal deviation  Cardiovascular:      Rate and Rhythm: Normal rate and regular rhythm  Heart sounds: Normal heart sounds  No murmur heard  No friction rub  No gallop  Pulmonary:      Effort: Pulmonary effort is normal  No respiratory distress  Breath sounds: Normal breath sounds  No stridor  No wheezing, rhonchi or rales  Chest:      Chest wall: No tenderness  Abdominal:      General: There is no distension  Palpations: Abdomen is soft  There is no mass  Tenderness: There is no abdominal tenderness  There is no guarding or rebound  Hernia: No hernia is present  Musculoskeletal:         General: No swelling or tenderness  Cervical back: Normal range of motion  Right knee: Crepitus present  No swelling, effusion or bony tenderness  Normal range of motion  Instability Tests: Anterior drawer test negative  Left knee: Crepitus present  No swelling, effusion or bony tenderness  Normal range of motion  Instability Tests: Anterior drawer test negative  Right lower leg: No edema  Left lower leg: No edema  Skin:     General: Skin is warm  Findings: No rash  Neurological:      General: No focal deficit present  Mental Status: She is alert and oriented to person, place, and time  Motor: No weakness or abnormal muscle tone            Large joint arthrocentesis: L knee  Universal Protocol:  Risks and benefits: risks, benefits and alternatives were discussed  Consent given by: patient  Patient understanding: patient states understanding of the procedure being performed  Relevant documents: relevant documents present and verified  Site marked: the operative site was marked  Patient identity confirmed: verbally with patient    Supporting Documentation  Indications: pain   Procedure Details  Location: knee - L knee  Needle size: 25 G  Ultrasound guidance: no  Approach: lateral  Medications administered: 1 mL lidocaine 1 %; 40 mg triamcinolone acetonide 40 mg/mL    Patient tolerance: patient tolerated the procedure well with no immediate complications  Dressing:  Sterile dressing applied          Ariel Lopez MD  06/13/22  8:03 PM

## 2022-06-13 NOTE — ASSESSMENT & PLAN NOTE
-Chronic intermittent asymptomatic palpitations  -Will check 48 hour Holter monitor  -Recommend that she limits stimulants (caffeine/sports drinks etc)

## 2022-07-06 ENCOUNTER — HOSPITAL ENCOUNTER (OUTPATIENT)
Dept: MAMMOGRAPHY | Facility: CLINIC | Age: 52
Discharge: HOME | End: 2022-07-06

## 2022-07-06 DIAGNOSIS — Z12.31 ENCOUNTER FOR SCREENING MAMMOGRAM FOR MALIGNANT NEOPLASM OF BREAST: ICD-10-CM

## 2022-07-08 ENCOUNTER — TELEPHONE (OUTPATIENT)
Dept: FAMILY MEDICINE CLINIC | Facility: CLINIC | Age: 52
End: 2022-07-08

## 2022-07-08 DIAGNOSIS — Z98.82 HISTORY OF BILATERAL BREAST IMPLANTS: ICD-10-CM

## 2022-07-08 DIAGNOSIS — Z90.13 HISTORY OF MASTECTOMY, BILATERAL: ICD-10-CM

## 2022-07-08 DIAGNOSIS — Z12.39 ENCOUNTER FOR SCREENING FOR MALIGNANT NEOPLASM OF BREAST, UNSPECIFIED SCREENING MODALITY: Primary | ICD-10-CM

## 2022-07-08 NOTE — TELEPHONE ENCOUNTER
Héctor from [facility] called the office regarding a mammogram screening that was inappropriately ordered  I called back Héctor, she advised that pt should f/u with a breast surgeon due to pt having a Bilateral Mastectomy with implants

## 2022-07-10 NOTE — TELEPHONE ENCOUNTER
Hi,   Please inform patient and ask her if she would like me to order a consult to a breast surgeon     Thanks,   Dr Brook Caceres

## 2022-07-11 ENCOUNTER — OFFICE VISIT (OUTPATIENT)
Dept: DENTISTRY | Facility: CLINIC | Age: 52
End: 2022-07-11

## 2022-07-11 VITALS — HEART RATE: 130 BPM | TEMPERATURE: 98 F | SYSTOLIC BLOOD PRESSURE: 166 MMHG | DIASTOLIC BLOOD PRESSURE: 99 MMHG

## 2022-07-11 DIAGNOSIS — Z01.20 ENCOUNTER FOR DENTAL EXAMINATION: Primary | ICD-10-CM

## 2022-07-11 DIAGNOSIS — K02.9 DECAY, TEETH: ICD-10-CM

## 2022-07-11 PROCEDURE — D0210 INTRAORAL - COMPLETE SERIES OF RADIOGRAPHIC IMAGES: HCPCS

## 2022-07-11 PROCEDURE — D0150 COMPREHENSIVE ORAL EVALUATION - NEW OR ESTABLISHED PATIENT: HCPCS

## 2022-07-11 NOTE — PROGRESS NOTES
Comprehensive Exam    Syeda Alanis presents for a comprehensive exam  Verbal consent for treatment given in addition to the forms  Reviewed health history -  Pt has had double mastectomy and elevated BP   Patient is ASA Class II  Consents signed: Yes    Perio: Generalized and Slight bleeding  Pain Scale: 5  Caries Assessment: medium  Radiographs:  FMX  Oral Hygiene instruction reviewed and given  Hygiene recall visits recommended to the patient  Treatment Plan:  1  Infection control   2  Periodontal therapy: all 4 quad SRP's   3  Caries control: moderate ( patient has dry mouth from medications)   4: Occlusal evaluation    Initial appointment its been few years since last exam pt is a smoker, and has used biotene products for dry mouth, patient is a bilateral cheek bitter  Patient was told to have #17 & #32 extracted by previous dentist      Prognosis is Good    Referrals needed:  [facility] for  #17,#18 #30 #32     NV:1 SRP UL & LL 90 min ( at SRP appt give patient the saliva script by Dr Eliza Bond)   NV: 2 SRP UR & LR   NV:3  4-6 week post op   NV: 4 Restorative to follow (in treatment plan)     Exam by Dr Matt Valdes (Dr Eliza Bond)

## 2022-08-22 DIAGNOSIS — J45.20 MILD INTERMITTENT ASTHMA WITHOUT COMPLICATION: ICD-10-CM

## 2022-08-22 RX ORDER — ALBUTEROL SULFATE 90 UG/1
2 AEROSOL, METERED RESPIRATORY (INHALATION) EVERY 6 HOURS PRN
Qty: 18 G | Refills: 5 | Status: SHIPPED | OUTPATIENT
Start: 2022-08-22 | End: 2022-10-13 | Stop reason: SDUPTHER

## 2022-08-22 NOTE — TELEPHONE ENCOUNTER
PATIENT IS REQUESTING REFILL ON HER MEDICATION     albuterol (Ventolin HFA) 90 mcg/act inhaler       SHE WOULD LIKE IT SENT TO THE PHARMACY THAT IS ON HER CHART

## 2022-08-24 ENCOUNTER — OFFICE VISIT (OUTPATIENT)
Dept: DENTISTRY | Facility: CLINIC | Age: 52
End: 2022-08-24

## 2022-08-24 VITALS — TEMPERATURE: 97.7 F | DIASTOLIC BLOOD PRESSURE: 78 MMHG | SYSTOLIC BLOOD PRESSURE: 129 MMHG | HEART RATE: 87 BPM

## 2022-08-24 DIAGNOSIS — K05.6 PERIODONTAL DISEASE: Primary | ICD-10-CM

## 2022-08-24 PROCEDURE — D4341 PERIODONTAL SCALING AND ROOT PLANING - 4 OR MORE TEETH PER QUADRANT: HCPCS | Performed by: DENTAL HYGIENIST

## 2022-08-24 RX ORDER — BENZTROPINE MESYLATE 1 MG/1
TABLET ORAL
COMMUNITY
Start: 2022-08-15

## 2022-08-24 NOTE — PROGRESS NOTES
Dental procedures in this visit     - PERIODONTAL SCALING AND ROOT PLANING - 4 OR MORE TEETH PER QUADRANT UR (Completed)     Service provider: [facility]     Billing provider: Yury Winter DDS     - PERIODONTAL SCALING AND ROOT PLANING - 4 OR MORE TEETH PER QUADRANT LR (Completed)     Service provider: [facility]     Billing provider: Yury Winter DDS     Subjective   Patient ID: Harley Meyer is a 47 y o  female  Chief Complaint   Patient presents with    Srp     UR/LR     SCALING AND ROOT PLANING     ASA:   II  Pain:  0  Reviewed M/H;  Had double Masectomy -  2014    SC/RP:   UR/LR   Quad scaling and root planning  Applied Topical Anesthetic,   Administered  _1__ carpules,  Long Needle, Lidocaine HCL 2 % and Epi 1 7 mL 1:100,000, Infiltration, IANB  ___2__ carpule , Short Needle,  Septocaine (articaine HCI ) and Epi 4 % and  1:100,000 1 7 mL, Infiltration, IANB   Type of Treatment:  Used Ultrasonic and Hand Scaling, Flossed, Polished, Subgingival Irrigation - post tx - Chlorhexidine  Reviewed OHI  - Brush 2X/day and Floss 1X/day    Oral Hygiene:  Poor   Plaque: Moderate  / Heavy  Calculus: Moderate /  Heavy  Bleeding: Moderate /  Heavy    Stain:  Light      Treatment Plan:  no changes to tx plan       Dr  Exam:  Resident Dr Debby Womack gave anesthesia today                 Referral:  No referral given  Gave follow-up directions    NV1:   Rest 28, 29, 31 - 90 min  NV2:  SC/RP - UL/LL - 75 min  NV3:  Ex, FMP, post SRP eval - 60 min  NV3:  3 month perio maintenance - 60 min

## 2022-09-06 ENCOUNTER — OFFICE VISIT (OUTPATIENT)
Dept: DENTISTRY | Facility: CLINIC | Age: 52
End: 2022-09-06

## 2022-09-06 VITALS — DIASTOLIC BLOOD PRESSURE: 75 MMHG | HEART RATE: 108 BPM | SYSTOLIC BLOOD PRESSURE: 109 MMHG | TEMPERATURE: 99.3 F

## 2022-09-06 DIAGNOSIS — Z01.20 ENCOUNTER FOR DENTAL EXAMINATION: Primary | ICD-10-CM

## 2022-09-06 PROCEDURE — D4341 PERIODONTAL SCALING AND ROOT PLANING - 4 OR MORE TEETH PER QUADRANT: HCPCS

## 2022-09-06 NOTE — PROGRESS NOTES
SCALING AND ROOT PLANING     ASA: I  Pain:0  Reviewed M/H took BP on right leg ( patient had double mastectomy   Patient has appointment to have #17 #18, #30 & 32 extracted with oral surgeon  SC/RP: UL/LL   Quad scaling and root planning  Applied Topical Anesthetic,   Administered  1 carpules,  Long Needle, Lidocaine HCL 2 % and Epi 1 7 mL 1:100,000,   3 carpule  Septocaine (articaine HCI ) and Epi 4 % and  1:100,000 1 7 mL, Infiltration, IANB     Type of Treatment:  Used Ultrasonic and Hand Scaling,  Subgingival Irrigation - post tx - Chlorhexidine  Reviewed OHI  - Brush 2X/day and Floss 1X/day    Oral Hygiene: Fair  Plaque: Light   Calculus: Light   Bleeding:  Light   Stain:   Light   Treatment Plan:  no changes to tx     Dr Knott Senior gave anesthesia today                 Referral:  No referral given  Gave follow-up directions  Patient is a smoker recommend try to refrain from smoking today       NV1:  post SRP eval - 45 min  NV2:  3 month perio maintenance - 60 min  NV: 3 Restorative see treatment plan ( 60 min)   NV: 4 3MRC

## 2022-09-09 ENCOUNTER — TELEPHONE (OUTPATIENT)
Dept: SURGICAL ONCOLOGY | Facility: CLINIC | Age: 52
End: 2022-09-09

## 2022-09-09 NOTE — TELEPHONE ENCOUNTER
After speaking with Juventino Rausch regarding patient's lack of records  Called and spoke with patient  She doesn't know anyone that would have anyone that has knowledge of her care  [facility] [address] and see if they have records  Provided her with fax number  Informed we will keep appt as scheduled and Juventino Rausch may order new genetic testing to confirm which gene she is a carrier for  She was agreeable

## 2022-09-09 NOTE — TELEPHONE ENCOUNTER
Called and spoke with the patient to find out if she has any records from her previous doctor/surgeon  She is scheduled to see Glenroy Clarke on 9/16 for a consult  She reported "I don't have any of that  I don't remember who or where I had it done  Maybe [facility] [address] but I don't know " I asked her where she had been getting her mammograms done, she reported she hasn't been getting any mammograms or routine imaging done  I asked if she had genetic testing done  She reported "I did have testing done and I have the gene " I asked her which gene she is a carrier for and she stated she doesn't know  I did confirm she had a bilateral mastectomy with reconstruction in 2014  I informed her I would let Glenroy Clarke know and will call her if anything changes

## 2022-09-16 ENCOUNTER — CONSULT (OUTPATIENT)
Dept: SURGICAL ONCOLOGY | Facility: CLINIC | Age: 52
End: 2022-09-16
Payer: COMMERCIAL

## 2022-09-16 VITALS
TEMPERATURE: 97.3 F | OXYGEN SATURATION: 96 % | RESPIRATION RATE: 16 BRPM | WEIGHT: 204 LBS | HEIGHT: 62 IN | HEART RATE: 89 BPM | BODY MASS INDEX: 37.54 KG/M2

## 2022-09-16 DIAGNOSIS — Z85.3 HISTORY OF BREAST CANCER: Primary | ICD-10-CM

## 2022-09-16 DIAGNOSIS — Z80.3 FAMILY HISTORY OF BREAST CANCER: ICD-10-CM

## 2022-09-16 PROCEDURE — 99213 OFFICE O/P EST LOW 20 MIN: CPT

## 2022-09-16 RX ORDER — MIRTAZAPINE 7.5 MG/1
TABLET, FILM COATED ORAL
COMMUNITY
Start: 2022-09-16

## 2022-09-16 NOTE — PROGRESS NOTES
Surgical Oncology Follow Up       [facility] SURGICAL ONCOLOGY [facility] [address] [place] [address]    Regla Valverde  1970  [id number]  [facility] SURGICAL ONCOLOGY [facility] [address] [address]    Chief Complaint   Patient presents with    Consult       Assessment/Plan:  1  History of breast cancer  - 1 year follow up  - Ambulatory Referral to Oncology Genetics; Future    2  Family history of breast cancer  - Ambulatory Referral to Oncology Genetics; Future    Discussion/Summary:  Patient is a 55-year-old female presenting today for a consult for history of breast left cancer  She was treated in [place] in 2014  Patient is unable to remember where exactly she was treated therefore we were not able to obtain any records or images  She is not sure what type of breast cancer she  She states she moved to the area in 2021 and has not had a breast exam since she was in [place]  Patient stated that she is unsure what type of breast cancer she had but she states she does know she had a positive gene mutation  She was treated with a bilateral mastectomy with implant reconstruction in 2014  She did not have radiation therapy, chemotherapy, or anti hormone therapy  Patient stated that she also had a prophylactic total hysterectomy  I have ordered genetic testing at this time  There were no concerns on her clinical breast exam  I told this patient we will plan to see her in 1 year as a follow up breast exam and we do not order routine breast imaging s/p mastectomy  I encouraged her to be self breast aware and call with issues or concerns  I will see the patient back in 1 year or sooner should the need arise  She was instructed to call with any questions or concerns prior to this time  All questions were answered today        History of Present Illness:     Oncology History    No history exists         -Interval History: Patient is a 71-year-old female with a history of left breast cancer treated in 2014  Her mother was diagnosed with breast cancer at the age of 37  Her maternal grandmother was diagnosed with colon cancer, maternal grandfather was diagnosed with prostate cancer and her maternal cousin was diagnosed cervical cancer, all at unknown ages  Patient states that she does have a genetic mutation but is unsure which gene  She has never taken hormone replacement therapy  Age of menarche was 7years old  She has had 1 pregnancy and 0 live births  She is currently perimenopausal   Patient denies changes on her breast exam  She denies persistent headaches, bone pain, back pain, sob, abdominal pain  Review of Systems:  Review of Systems   Constitutional: Negative for activity change, appetite change, fatigue and unexpected weight change  Respiratory: Positive for shortness of breath (smoker/asthmatic)  Negative for cough  Cardiovascular: Negative for chest pain  Gastrointestinal: Negative for abdominal pain, diarrhea, nausea and vomiting  Endocrine: Negative for heat intolerance  Musculoskeletal: Negative for arthralgias, back pain and myalgias  Skin: Negative for rash  Neurological: Negative for weakness and headaches  Hematological: Negative for adenopathy  Patient Active Problem List   Diagnosis    Hypertension    [facility]    Asthma    Osteoarthritis of both knees    Class 2 obesity with body mass index (BMI) of 36 0 to 36 9 in adult    Palpitation    History of breast cancer     Past Medical History:   Diagnosis Date    Asthma     [facility]     HTN (hypertension)     Hypertension     Mental health problem      Past Surgical History:   Procedure Laterality Date    BREAST SURGERY      COSMETIC SURGERY      HYSTERECTOMY      LAPAROSCOPIC GASTRIC BANDING       No family history on file    Social History     Socioeconomic History    Marital status: Single     Spouse name: Not on file    Number of children: Not on file    Years of education: Not on file    Highest education level: Not on file   Occupational History    Not on file   Tobacco Use    Smoking status: Current Every Day Smoker     Packs/day: 0 25     Types: Cigarettes    Smokeless tobacco: Never Used   Vaping Use    Vaping Use: Never used   Substance and Sexual Activity    Alcohol use: Not Currently    Drug use: Not Currently    Sexual activity: Not on file   Other Topics Concern    Not on file   Social History Narrative    Not on file     Social Determinants of Health     Financial Resource Strain: Low Risk     Difficulty of Paying Living Expenses: Not hard at all   Food Insecurity: No Food Insecurity    Worried About Running Out of Food in the Last Year: Never true    [address] in the Last Year: Never true   Transportation Needs: No Transportation Needs    Lack of Transportation (Medical): No    Lack of Transportation (Non-Medical):  No   Physical Activity: Not on file   Stress: Not on file   Social Connections: Not on file   Intimate Partner Violence: Not on file   Housing Stability: Not on file       Current Outpatient Medications:     albuterol (Ventolin HFA) 90 mcg/act inhaler, Inhale 2 puffs every 6 (six) hours as needed for wheezing, Disp: 18 g, Rfl: 5    amLODIPine (NORVASC) 5 mg tablet, Take 1 tablet (5 mg total) by mouth daily for 7 days, Disp: 90 tablet, Rfl: 1    benztropine (COGENTIN) 1 mg tablet, ONE TABLET 1 TIMES AT NIGHT , Disp: , Rfl:     hydrOXYzine pamoate (VISTARIL) 50 mg capsule, Take 1 capsule (50 mg total) by mouth 3 (three) times a day as needed for anxiety for up to 3 days, Disp: 12 capsule, Rfl: 0    lithium 300 MG tablet, Take 1 tablet by mouth once daily and 2 tablets at bedtime (Patient taking differently: Take 450 mg by mouth in the morning Take 1 tablet by mouth once daily and 2 tablets at bedtime), Disp: 14 tablet, Rfl: 0    lithium 600 MG capsule, Take 600 mg by mouth daily at bedtime, Disp: , Rfl:     mirtazapine (REMERON) 7 5 MG tablet, daily at bedtime, Disp: , Rfl:     QUEtiapine (SEROquel) 200 mg tablet, Take 200 mg by mouth daily at bedtime, Disp: , Rfl:     hydrOXYzine HCL (ATARAX) 50 mg tablet, Take 50 mg by mouth 3 (three) times a day as needed (Patient not taking: Reported on 9/16/2022), Disp: , Rfl:     QUEtiapine (SEROquel) 100 mg tablet, Take 1 tablet (100 mg total) by mouth daily at bedtime (Patient not taking: No sig reported), Disp: 7 tablet, Rfl: 0  No Known Allergies  Vitals:    09/16/22 1259   Pulse: 89   Resp: 16   Temp: (!) 97 3 °F (36 3 °C)   SpO2: 96%       Physical Exam  Constitutional:       General: She is not in acute distress  Appearance: Normal appearance  Cardiovascular:      Rate and Rhythm: Normal rate and regular rhythm  Pulses: Normal pulses  Heart sounds: Normal heart sounds  Pulmonary:      Effort: Pulmonary effort is normal       Breath sounds: Normal breath sounds  Chest:      Chest wall: No mass  Breasts:      Right: No swelling, bleeding, mass, skin change, tenderness, axillary adenopathy or supraclavicular adenopathy  Left: No swelling, bleeding, mass, skin change, tenderness, axillary adenopathy or supraclavicular adenopathy  Comments: B/L mastectomy scars with implants  No masses, nodularity, skin changes, or adenopathy appreciated on physical exam      Abdominal:      General: Abdomen is flat  Palpations: Abdomen is soft  Lymphadenopathy:      Upper Body:      Right upper body: No supraclavicular, axillary or pectoral adenopathy  Left upper body: No supraclavicular, axillary or pectoral adenopathy  Skin:     General: Skin is warm  Neurological:      General: No focal deficit present  Mental Status: She is alert and oriented to person, place, and time     Psychiatric:         Mood and Affect: Mood normal          Behavior: Behavior normal  Results:    Imaging  No results found  I reviewed the above imaging data  Advance Care Planning/Advance Directives:  Discussed disease status, cancer treatment plans and/or cancer treatment goals with the patient

## 2022-09-21 ENCOUNTER — TELEPHONE (OUTPATIENT)
Dept: GENETICS | Facility: CLINIC | Age: 52
End: 2022-09-21

## 2022-09-21 NOTE — TELEPHONE ENCOUNTER
I called Fabio Farah to schedule a new patient appointment with the Cancer [facility] Genetics Program       Outcome:   I left a voice message encouraging the patient to call the genetics team at [phone] to schedule this appointment  Follow-up:   At this time the referral will be closed and we will wait to hear back from the patient regarding scheduling this appointment

## 2022-10-13 ENCOUNTER — OFFICE VISIT (OUTPATIENT)
Dept: FAMILY MEDICINE CLINIC | Facility: CLINIC | Age: 52
End: 2022-10-13

## 2022-10-13 VITALS
BODY MASS INDEX: 37.17 KG/M2 | TEMPERATURE: 97.3 F | OXYGEN SATURATION: 97 % | RESPIRATION RATE: 18 BRPM | HEIGHT: 63 IN | DIASTOLIC BLOOD PRESSURE: 80 MMHG | HEART RATE: 86 BPM | SYSTOLIC BLOOD PRESSURE: 132 MMHG | WEIGHT: 209.8 LBS

## 2022-10-13 DIAGNOSIS — Z72.0 TOBACCO ABUSE: ICD-10-CM

## 2022-10-13 DIAGNOSIS — J45.30 MILD PERSISTENT ASTHMA WITHOUT COMPLICATION: Primary | ICD-10-CM

## 2022-10-13 DIAGNOSIS — E66.01 OBESITY, MORBID (HCC): ICD-10-CM

## 2022-10-13 DIAGNOSIS — Z98.84 HX OF BARIATRIC SURGERY: ICD-10-CM

## 2022-10-13 DIAGNOSIS — Z23 ENCOUNTER FOR IMMUNIZATION: ICD-10-CM

## 2022-10-13 DIAGNOSIS — I10 PRIMARY HYPERTENSION: ICD-10-CM

## 2022-10-13 DIAGNOSIS — J45.20 MILD INTERMITTENT ASTHMA WITHOUT COMPLICATION: ICD-10-CM

## 2022-10-13 DIAGNOSIS — F31.9 BIPOLAR 1 DISORDER (HCC): ICD-10-CM

## 2022-10-13 DIAGNOSIS — R00.2 PALPITATION: ICD-10-CM

## 2022-10-13 DIAGNOSIS — Z76.0 MEDICATION REFILL: ICD-10-CM

## 2022-10-13 PROCEDURE — G0009 ADMIN PNEUMOCOCCAL VACCINE: HCPCS | Performed by: FAMILY MEDICINE

## 2022-10-13 PROCEDURE — G0008 ADMIN INFLUENZA VIRUS VAC: HCPCS | Performed by: FAMILY MEDICINE

## 2022-10-13 PROCEDURE — 90682 RIV4 VACC RECOMBINANT DNA IM: CPT | Performed by: FAMILY MEDICINE

## 2022-10-13 PROCEDURE — 90677 PCV20 VACCINE IM: CPT | Performed by: FAMILY MEDICINE

## 2022-10-13 PROCEDURE — 99215 OFFICE O/P EST HI 40 MIN: CPT | Performed by: FAMILY MEDICINE

## 2022-10-13 RX ORDER — AMLODIPINE BESYLATE 5 MG/1
5 TABLET ORAL DAILY
Qty: 90 TABLET | Refills: 1 | Status: SHIPPED | OUTPATIENT
Start: 2022-10-13 | End: 2023-01-11

## 2022-10-13 RX ORDER — NICOTINE 21 MG/24HR
1 PATCH, TRANSDERMAL 24 HOURS TRANSDERMAL EVERY 24 HOURS
Qty: 28 PATCH | Refills: 0 | Status: SHIPPED | OUTPATIENT
Start: 2022-10-13

## 2022-10-13 RX ORDER — PREDNISONE 20 MG/1
40 TABLET ORAL DAILY
Qty: 10 TABLET | Refills: 0 | Status: SHIPPED | OUTPATIENT
Start: 2022-10-13 | End: 2022-10-18

## 2022-10-13 RX ORDER — ALBUTEROL SULFATE 90 UG/1
2 AEROSOL, METERED RESPIRATORY (INHALATION) EVERY 6 HOURS PRN
Qty: 18 G | Refills: 5 | Status: SHIPPED | OUTPATIENT
Start: 2022-10-13

## 2022-10-13 NOTE — ASSESSMENT & PLAN NOTE
-Currently Smokes 5 cigarette   -She wants to quit  -Will provide nicotine patch  -Spent 5 minutes on cousneling

## 2022-10-13 NOTE — ASSESSMENT & PLAN NOTE
-Hx of Lap band bariatric surgery from a few years ago  -Requesting referral to bariatric for follow up

## 2022-10-13 NOTE — ASSESSMENT & PLAN NOTE
-Patient did not obtain  Holter monitor ordered a few months ago  -She is no longer symptomatic once she reduce her caffeine and sports drink intake  -Will monitor

## 2022-10-13 NOTE — ASSESSMENT & PLAN NOTE
-Asthma flaring up lately with seasonal change  -Patient currently using asthma once to twice daily now  -Patient was previously prescribed ICS Laba but was unable to afford the co-pay  -Will provide Flovent for symptom control  -Will provide emergency prednisone burst treatment to use for persistent symptoms   ED precautions provided

## 2022-10-13 NOTE — ASSESSMENT & PLAN NOTE
Stable  Following with psychiatrist and therapist  Continue hydroxyzine prn, cogentin, Remeron, lithium and Seroquel

## 2022-10-13 NOTE — PROGRESS NOTES
Name: lCaribel Rebollar      : 1970      MRN: [id number]  Encounter Provider: Luz Elena Saleh MD  Encounter Date: 10/13/2022   Encounter department: [address] [place] [address]     1  Mild persistent asthma without complication  Assessment & Plan:  -Asthma flaring up lately with seasonal change  -Patient currently using asthma once to twice daily now  -Patient was previously prescribed ICS Laba but was unable to afford the co-pay  -Will provide Flovent for symptom control  -Will provide emergency prednisone burst treatment to use for persistent symptoms   ED precautions provided          Orders:  -     fluticasone (Flovent Diskus) 50 MCG/BLIST diskus inhaler; Inhale 1 puff 2 (two) times a day Rinse mouth after use  -     predniSONE 20 mg tablet; Take 2 tablets (40 mg total) by mouth daily for 5 days    2  Primary hypertension  Assessment & Plan:  Well controlled   Continue amlodipine 5 mg  Diet discussed with patient      3  Tobacco abuse  Assessment & Plan:  -Currently Smokes 5 cigarette   -She wants to quit  -Will provide nicotine patch  -Spent 5 minutes on cousneling    Orders:  -     nicotine (NICODERM CQ) 14 mg/24hr TD 24 hr patch; Place 1 patch on the skin every 24 hours    4  Obesity, morbid ([facility] )    5  Hx of bariatric surgery  Assessment & Plan:  -Hx of Lap band bariatric surgery from a few years ago  -Requesting referral to bariatric for follow up     Orders:  -     Ambulatory Referral to Bariatric Surgery; Future    6  Bipolar 1 disorder ([facility] )  Assessment & Plan:  Stable  Following with psychiatrist and therapist  Continue hydroxyzine prn, cogentin, Remeron, lithium and Seroquel       7   Mild intermittent asthma without complication  Assessment & Plan:  -Asthma flaring up lately with seasonal change  -Patient currently using asthma once to twice daily now  -Patient was previously prescribed ICS Laba but was unable to afford the co-pay  -Will provide Flovent for symptom control  -Will provide emergency prednisone burst treatment to use for persistent symptoms   ED precautions provided          Orders:  -     albuterol (Ventolin HFA) 90 mcg/act inhaler; Inhale 2 puffs every 6 (six) hours as needed for wheezing    8  Palpitation  Assessment & Plan:  -Patient did not obtain  Holter monitor ordered a few months ago  -She is no longer symptomatic once she reduce her caffeine and sports drink intake  -Will monitor        9  Encounter for immunization  -     influenza vaccine, quadrivalent, recombinant, PF, 0 5 mL, for patients 18 yr+ (FLUBLOK)  -     Pneumococcal Conjugate Vaccine 20-valent (Pcv20)    10  Medication refill  -     amLODIPine (NORVASC) 5 mg tablet; Take 1 tablet (5 mg total) by mouth daily         Subjective      72-year-old female with a history of back surgery, hypertension, and asthma who presents today for routine medical follow-up  She states that the only complaint she has is that her asthma has been flaring up for the past few weeks since the weather changed  She states that she is using her albuterol inhaler more frequently about once or twice daily  She was ordered a controller medication in the past but she was not able to afford it because of the high co-pay  She would like to try another inhaler to see if she can afford it  Otherwise she denies any fever ,chills ,nausea ,vomiting any chest pain and shortness of breath    Review of Systems   Constitutional: Negative for chills, diaphoresis, fatigue and fever  HENT: Negative for congestion, postnasal drip, rhinorrhea, sinus pressure and sinus pain  Eyes: Negative for visual disturbance  Respiratory: Negative for cough, shortness of breath and wheezing  Cardiovascular: Negative for chest pain, palpitations and leg swelling  Gastrointestinal: Negative for abdominal pain, blood in stool, constipation, diarrhea, nausea and vomiting     Genitourinary: Negative for dysuria, hematuria and urgency  Musculoskeletal: Negative for arthralgias  Skin: Negative for rash  Neurological: Negative for syncope, facial asymmetry, weakness, numbness and headaches  Hematological: Negative for adenopathy  Psychiatric/Behavioral: Negative for agitation  Current Outpatient Medications on File Prior to Visit   Medication Sig   • benztropine (COGENTIN) 1 mg tablet ONE TABLET 1 TIMES AT NIGHT  • hydrOXYzine HCL (ATARAX) 50 mg tablet Take 50 mg by mouth 3 (three) times a day as needed (Patient not taking: Reported on 9/16/2022)   • hydrOXYzine pamoate (VISTARIL) 50 mg capsule Take 1 capsule (50 mg total) by mouth 3 (three) times a day as needed for anxiety for up to 3 days   • lithium 300 MG tablet Take 1 tablet by mouth once daily and 2 tablets at bedtime (Patient taking differently: Take 450 mg by mouth in the morning Take 1 tablet by mouth once daily and 2 tablets at bedtime)   • lithium 600 MG capsule Take 600 mg by mouth daily at bedtime   • mirtazapine (REMERON) 7 5 MG tablet daily at bedtime   • QUEtiapine (SEROquel) 100 mg tablet Take 1 tablet (100 mg total) by mouth daily at bedtime (Patient not taking: No sig reported)   • QUEtiapine (SEROquel) 200 mg tablet Take 200 mg by mouth daily at bedtime   • [DISCONTINUED] albuterol (Ventolin HFA) 90 mcg/act inhaler Inhale 2 puffs every 6 (six) hours as needed for wheezing   • [DISCONTINUED] amLODIPine (NORVASC) 5 mg tablet TAKE 1 TABLET BY MOUTH DAILY FOR 7 DAYS  Objective     /80 (BP Location: Right leg, Patient Position: Sitting, Cuff Size: Standard)   Pulse 86   Temp (!) 97 3 °F (36 3 °C) (Temporal)   Resp 18   Ht 5' 2 5" (1 588 m)   Wt 95 2 kg (209 lb 12 8 oz)   SpO2 97%   BMI 37 76 kg/m²     Physical Exam  Vitals and nursing note reviewed  Constitutional:       General: She is not in acute distress  Appearance: Normal appearance  She is well-developed  She is not ill-appearing, toxic-appearing or diaphoretic     HENT: Head: Normocephalic and atraumatic  Right Ear: External ear normal       Left Ear: External ear normal       Nose: Nose normal       Mouth/Throat:      Mouth: Mucous membranes are moist       Pharynx: No oropharyngeal exudate or posterior oropharyngeal erythema  Eyes:      General: No scleral icterus  Right eye: No discharge  Left eye: No discharge  Extraocular Movements: Extraocular movements intact  Conjunctiva/sclera: Conjunctivae normal    Neck:      Thyroid: No thyromegaly  Vascular: No JVD  Trachea: No tracheal deviation  Cardiovascular:      Rate and Rhythm: Normal rate and regular rhythm  Pulses:           Dorsalis pedis pulses are 2+ on the right side and 2+ on the left side  Posterior tibial pulses are 2+ on the right side and 2+ on the left side  Heart sounds: Normal heart sounds  No murmur heard  No friction rub  No gallop  Pulmonary:      Effort: Pulmonary effort is normal  No respiratory distress  Breath sounds: Normal breath sounds  No stridor  No wheezing or rhonchi  Abdominal:      General: Bowel sounds are normal  There is no distension  Palpations: Abdomen is soft  There is no mass  Tenderness: There is no abdominal tenderness  There is no guarding or rebound  Hernia: No hernia is present  Musculoskeletal:         General: Normal range of motion  Cervical back: Normal range of motion and neck supple  Right lower leg: No edema  Left lower leg: No edema  Feet:      Right foot:      Skin integrity: No ulcer, skin breakdown, erythema, warmth, callus or dry skin  Left foot:      Skin integrity: No ulcer, skin breakdown, erythema, warmth, callus or dry skin  Skin:     General: Skin is warm  Capillary Refill: Capillary refill takes less than 2 seconds  Findings: No lesion or rash  Neurological:      General: No focal deficit present        Mental Status: She is alert and oriented to person, place, and time  Cranial Nerves: No cranial nerve deficit  Motor: No weakness or abnormal muscle tone        Coordination: Coordination normal       Gait: Gait normal    Psychiatric:         Mood and Affect: Mood normal          Behavior: Behavior normal        Angel Sood MD

## 2022-10-27 ENCOUNTER — TELEPHONE (OUTPATIENT)
Dept: PSYCHIATRY | Facility: CLINIC | Age: 52
End: 2022-10-27

## 2022-10-27 NOTE — TELEPHONE ENCOUNTER
Spoke to patient in regards to med mgmt wait list and to determine if services are still needed  Patient is still interested  [place]

## 2022-10-28 ENCOUNTER — TELEPHONE (OUTPATIENT)
Dept: BARIATRICS | Facility: CLINIC | Age: 52
End: 2022-10-28

## 2022-11-09 DIAGNOSIS — Z98.84 BARIATRIC SURGERY STATUS: Primary | ICD-10-CM

## 2022-11-10 DIAGNOSIS — J45.30 MILD PERSISTENT ASTHMA WITHOUT COMPLICATION: ICD-10-CM

## 2022-11-10 RX ORDER — FLUTICASONE PROPIONATE 50 MCG
BLISTER, WITH INHALATION DEVICE INHALATION
Qty: 60 EACH | Refills: 0 | Status: SHIPPED | OUTPATIENT
Start: 2022-11-10

## 2022-11-21 ENCOUNTER — HOSPITAL ENCOUNTER (OUTPATIENT)
Dept: RADIOLOGY | Facility: HOSPITAL | Age: 52
Discharge: HOME | End: 2022-11-21
Attending: SURGERY

## 2022-11-21 DIAGNOSIS — Z98.84 BARIATRIC SURGERY STATUS: ICD-10-CM

## 2022-12-13 ENCOUNTER — OFFICE VISIT (OUTPATIENT)
Dept: FAMILY MEDICINE CLINIC | Facility: CLINIC | Age: 52
End: 2022-12-13

## 2022-12-13 VITALS
OXYGEN SATURATION: 97 % | TEMPERATURE: 98.7 F | DIASTOLIC BLOOD PRESSURE: 80 MMHG | BODY MASS INDEX: 37.17 KG/M2 | WEIGHT: 202 LBS | SYSTOLIC BLOOD PRESSURE: 126 MMHG | RESPIRATION RATE: 16 BRPM | HEIGHT: 62 IN | HEART RATE: 111 BPM

## 2022-12-13 DIAGNOSIS — Z00.00 ANNUAL PHYSICAL EXAM: ICD-10-CM

## 2022-12-13 DIAGNOSIS — Z72.0 TOBACCO ABUSE: Primary | ICD-10-CM

## 2022-12-13 DIAGNOSIS — Z23 ENCOUNTER FOR IMMUNIZATION: ICD-10-CM

## 2022-12-13 RX ORDER — NICOTINE 21 MG/24HR
1 PATCH, TRANSDERMAL 24 HOURS TRANSDERMAL EVERY 24 HOURS
Qty: 28 PATCH | Refills: 1 | Status: SHIPPED | OUTPATIENT
Start: 2022-12-13

## 2022-12-13 NOTE — PATIENT INSTRUCTIONS
Wellness Visit for Adults   AMBULATORY CARE:   A wellness visit  is when you see your healthcare provider to get screened for health problems  Your healthcare provider will also give you advice on how to stay healthy  Write down your questions so you remember to ask them  Ask your healthcare provider how often you should have a wellness visit  What happens at a wellness visit:  Your healthcare provider will ask about your health, and your family history of health problems  This includes high blood pressure, heart disease, and cancer  He or she will ask if you have symptoms that concern you, if you smoke, and about your mood  You may also be asked about your intake of medicines, supplements, food, and alcohol  Any of the following may be done:  · Your weight  will be checked  Your height may also be checked so your body mass index (BMI) can be calculated  Your BMI shows if you are at a healthy weight  · Your blood pressure  and heart rate will be checked  Your temperature may also be checked  · Blood and urine tests  may be done  Blood tests may be done to check your cholesterol levels  Abnormal cholesterol levels increase your risk for heart disease and stroke  You may also need a blood or urine test to check for diabetes if you are at increased risk  Urine tests may be done to look for signs of an infection or kidney disease  · A physical exam  includes checking your heartbeat and lungs with a stethoscope  Your healthcare provider may also check your skin to look for sun damage  · Screening tests  may be recommended  A screening test is done to check for diseases that may not cause symptoms  The screening tests you may need depend on your age, gender, family history, and lifestyle habits  For example, colorectal screening may be recommended if you are 48years old or older  Screening tests you need if you are a woman:   · A Pap smear  is used to screen for cervical cancer   Pap smears are usually done every 3 to 5 years depending on your age  You may need them more often if you have had abnormal Pap smear test results in the past  Ask your healthcare provider how often you should have a Pap smear  · A mammogram  is an x-ray of your breasts to screen for breast cancer  Experts recommend mammograms every 2 years starting at age 48 years  You may need a mammogram at age 52 years or younger if you have an increased risk for breast cancer  Talk to your healthcare provider about when you should start having mammograms and how often you need them  Vaccines you may need:   · Get an influenza vaccine  every year  The influenza vaccine protects you from the flu  Several types of viruses cause the flu  The viruses change over time, so new vaccines are made each year  · Get a tetanus-diphtheria (Td) booster vaccine  every 10 years  This vaccine protects you against tetanus and diphtheria  Tetanus is a severe infection that may cause painful muscle spasms and lockjaw  Diphtheria is a severe bacterial infection that causes a thick covering in the back of your mouth and throat  · Get a human papillomavirus (HPV) vaccine  if you are female and aged 23 to 32 or male 23 to 24 and never received it  This vaccine protects you from HPV infection  HPV is the most common infection spread by sexual contact  HPV may also cause vaginal, penile, and anal cancers  · Get a pneumococcal vaccine  if you are aged 72 years or older  The pneumococcal vaccine is an injection given to protect you from pneumococcal disease  Pneumococcal disease is an infection caused by pneumococcal bacteria  The infection may cause pneumonia, meningitis, or an ear infection  · Get a shingles vaccine  if you are 60 or older, even if you have had shingles before  The shingles vaccine is an injection to protect you from the varicella-zoster virus  This is the same virus that causes chickenpox   Shingles is a painful rash that develops in people who had chickenpox or have been exposed to the virus  How to eat healthy:  My Plate is a model for planning healthy meals  It shows the types and amounts of foods that should go on your plate  Fruits and vegetables make up about half of your plate, and grains and protein make up the other half  A serving of dairy is included on the side of your plate  The amount of calories and serving sizes you need depends on your age, gender, weight, and height  Examples of healthy foods are listed below:  · Eat a variety of vegetables  such as dark green, red, and orange vegetables  You can also include canned vegetables low in sodium (salt) and frozen vegetables without added butter or sauces  · Eat a variety of fresh fruits , canned fruit in 100% juice, frozen fruit, and dried fruit  · Include whole grains  At least half of the grains you eat should be whole grains  Examples include whole-wheat bread, wheat pasta, brown rice, and whole-grain cereals such as oatmeal     · Eat a variety of protein foods such as seafood (fish and shellfish), lean meat, and poultry without skin (turkey and chicken)  Examples of lean meats include pork leg, shoulder, or tenderloin, and beef round, sirloin, tenderloin, and extra lean ground beef  Other protein foods include eggs and egg substitutes, beans, peas, soy products, nuts, and seeds  · Choose low-fat dairy products such as skim or 1% milk or low-fat yogurt, cheese, and cottage cheese  · Limit unhealthy fats  such as butter, hard margarine, and shortening  Exercise:  Exercise at least 30 minutes per day on most days of the week  Some examples of exercise include walking, biking, dancing, and swimming  You can also fit in more physical activity by taking the stairs instead of the elevator or parking farther away from stores  Include muscle strengthening activities 2 days each week  Regular exercise provides many health benefits   It helps you manage your weight, and decreases your risk for type 2 diabetes, heart disease, stroke, and high blood pressure  Exercise can also help improve your mood  Ask your healthcare provider about the best exercise plan for you  General health and safety guidelines:   · Do not smoke  Nicotine and other chemicals in cigarettes and cigars can cause lung damage  Ask your healthcare provider for information if you currently smoke and need help to quit  E-cigarettes or smokeless tobacco still contain nicotine  Talk to your healthcare provider before you use these products  · Limit alcohol  A drink of alcohol is 12 ounces of beer, 5 ounces of wine, or 1½ ounces of liquor  · Lose weight, if needed  Being overweight increases your risk of certain health conditions  These include heart disease, high blood pressure, type 2 diabetes, and certain types of cancer  · Protect your skin  Do not sunbathe or use tanning beds  Use sunscreen with a SPF 15 or higher  Apply sunscreen at least 15 minutes before you go outside  Reapply sunscreen every 2 hours  Wear protective clothing, hats, and sunglasses when you are outside  · Drive safely  Always wear your seatbelt  Make sure everyone in your car wears a seatbelt  A seatbelt can save your life if you are in an accident  Do not use your cell phone when you are driving  This could distract you and cause an accident  Pull over if you need to make a call or send a text message  · Practice safe sex  Use latex condoms if are sexually active and have more than one partner  Your healthcare provider may recommend screening tests for sexually transmitted infections (STIs)  · Wear helmets, lifejackets, and protective gear  Always wear a helmet when you ride a bike or motorcycle, go skiing, or play sports that could cause a head injury  Wear protective equipment when you play sports  Wear a lifejacket when you are on a boat or doing water sports      © Copyright [employer] 2022 Information is for End User's use only and may not be sold, redistributed or otherwise used for commercial purposes  All illustrations and images included in CareNotes® are the copyrighted property of A D A M [employer] Inc  or Ricki Madison  The above information is an  only  It is not intended as medical advice for individual conditions or treatments  Talk to your doctor, nurse or pharmacist before following any medical regimen to see if it is safe and effective for you  Weight Management   AMBULATORY CARE:   Why it is important to manage your weight:  Being overweight increases your risk of health conditions such as heart disease, high blood pressure, type 2 diabetes, and certain types of cancer  It can also increase your risk for osteoarthritis, sleep apnea, and other respiratory problems  Aim for a slow, steady weight loss  Even a small amount of weight loss can lower your risk of health problems  Risks of being overweight:  Extra weight can cause many health problems, including the following:  · Diabetes (high blood sugar level)    · High blood pressure or high cholesterol    · Heart disease    · Stroke    · Gallbladder or liver disease    · Cancer of the colon, breast, prostate, liver, or kidney    · Sleep apnea    · Arthritis or gout    Screening  is done to check for health conditions before you have signs or symptoms  If you are 28to 79years old, your blood sugar level may be checked every 3 years for signs of prediabetes or diabetes  Your healthcare provider will check your blood pressure at each visit  High blood pressure can lead to a stroke or other problems  Your provider may check for signs of heart disease, cancer, or other health problems  How to lose weight safely:  A safe and healthy way to lose weight is to eat fewer calories and get regular exercise  · You can lose up about 1 pound a week by decreasing the number of calories you eat by 500 calories each day   You can decrease calories by eating smaller portion sizes or by cutting out high-calorie foods  Read labels to find out how many calories are in the foods you eat  · You can also burn calories with exercise such as walking, swimming, or biking  You will be more likely to keep weight off if you make these changes part of your lifestyle  Exercise at least 30 minutes per day on most days of the week  You can also fit in more physical activity by taking the stairs instead of the elevator or parking farther away from stores  Ask your healthcare provider about the best exercise plan for you  Healthy meal plan for weight management:  A healthy meal plan includes a variety of foods, contains fewer calories, and helps you stay healthy  A healthy meal plan includes the following:     · Eat whole-grain foods more often  A healthy meal plan should contain fiber  Fiber is the part of grains, fruits, and vegetables that is not broken down by your body  Whole-grain foods are healthy and provide extra fiber in your diet  Some examples of whole-grain foods are whole-wheat breads and pastas, oatmeal, brown rice, and bulgur  · Eat a variety of vegetables every day  Include dark, leafy greens such as spinach, kale, saroj greens, and mustard greens  Eat yellow and orange vegetables such as carrots, sweet potatoes, and winter squash  · Eat a variety of fruits every day  Choose fresh or canned fruit (canned in its own juice or light syrup) instead of juice  Fruit juice has very little or no fiber  · Eat low-fat dairy foods  Drink fat-free (skim) milk or 1% milk  Eat fat-free yogurt and low-fat cottage cheese  Try low-fat cheeses such as mozzarella and other reduced-fat cheeses  · Choose meat and other protein foods that are low in fat  Choose beans or other legumes such as split peas or lentils  Choose fish, skinless poultry (chicken or turkey), or lean cuts of red meat (beef or pork)   Before you cook meat or poultry, cut off any visible fat  · Use less fat and oil  Try baking foods instead of frying them  Add less fat, such as margarine, sour cream, regular salad dressing and mayonnaise to foods  Eat fewer high-fat foods  Some examples of high-fat foods include french fries, doughnuts, ice cream, and cakes  · Eat fewer sweets  Limit foods and drinks that are high in sugar  This includes candy, cookies, regular soda, and sweetened drinks  Ways to decrease calories:   · Eat smaller portions  ? Use a small plate with smaller servings  ? Do not eat second helpings  ? When you eat at a restaurant, ask for a box and place half of your meal in the box before you eat  ? Share an entrée with someone else  · Replace high-calorie snacks with healthy, low-calorie snacks  ? Choose fresh fruit, vegetables, fat-free rice cakes, or air-popped popcorn instead of potato chips, nuts, or chocolate  ? Choose water or calorie-free drinks instead of soda or sweetened drinks  · Do not shop for groceries when you are hungry  You may be more likely to make unhealthy food choices  Take a grocery list of healthy foods and shop after you have eaten  · Eat regular meals  Do not skip meals  Skipping meals can lead to overeating later in the day  This can make it harder for you to lose weight  Eat a healthy snack in place of a meal if you do not have time to eat a regular meal  Talk with a dietitian to help you create a meal plan and schedule that is right for you  Other things to consider as you try to lose weight:   · Be aware of situations that may give you the urge to overeat, such as eating while watching television  Find ways to avoid these situations  For example, read a book, go for a walk, or do crafts  · Meet with a weight loss support group or friends who are also trying to lose weight  This may help you stay motivated to continue working on your weight loss goals      © Copyright [employer] 2022 Information is for End User's use only and may not be sold, redistributed or otherwise used for commercial purposes  All illustrations and images included in CareNotes® are the copyrighted property of A D A M , Inc  or Ricki Madison  The above information is an  only  It is not intended as medical advice for individual conditions or treatments  Talk to your doctor, nurse or pharmacist before following any medical regimen to see if it is safe and effective for you  Cigarette Smoking and Your Health   AMBULATORY CARE:   Risks to your health if you smoke:  Nicotine and other chemicals found in tobacco and e-cigarettes can damage every cell in your body  Even if you are a light smoker, you have an increased risk for cancer, heart disease, and lung disease  If you are pregnant or have diabetes, smoking increases your risk for complications  Nicotine can affect an adolescent's developing brain  This can lead to trouble thinking, learning, or paying attention  Benefits to your health if you stop smoking:   · You decrease respiratory symptoms such as coughing, wheezing, and shortness of breath  · You reduce your risk for cancers of the lung, mouth, throat, kidney, bladder, pancreas, stomach, and cervix  If you already have cancer, you increase the benefits of chemotherapy  You also reduce your risk for cancer returning or a second cancer from developing  · You reduce your risk for heart disease, blood clots, heart attack, and stroke  · You reduce your risk for lung infections, and diseases such as pneumonia, asthma, chronic bronchitis, and emphysema  · Your circulation improves  More oxygen can be delivered to your body  If you have diabetes, you lower your risk for complications, such as kidney, artery, and eye diseases  You also lower your risk for nerve damage  Nerve damage can lead to amputations, poor vision, and blindness      · You improve your body's ability to heal and to fight infections  · An adolescent can help his or her brain and body develop in a healthy way  Talk to your adolescent about all the health risks of nicotine  If you can, start talking about nicotine when your child is younger than 12 years  This may make it easier for him or her not to start using nicotine as a teenager or adult  Explain to him or her that it is best never to start  It can be hard to try to quit later  Benefits to the health of others if you stop smoking:  Tobacco is harmful to nonsmokers who breathe in your secondhand smoke  The following are ways the health of others around you may improve when you stop smoking:  · You lower the risks for lung cancer and heart disease in nonsmoking adults  · If you are pregnant, you lower the risk for miscarriage, early delivery, low birth weight, and stillbirth  You also lower your baby's risk for SIDS, obesity, developmental delay, and neurobehavioral problems, such as ADHD  · If you have children, you lower their risk for ear infections, colds, pneumonia, bronchitis, and asthma  Follow up with your doctor as directed:  Write down your questions so you remember to ask them during your visits  For support and more information:   · [employer]  [address] [place] [address],[address]  [address]  Phone: [facility] [address]  Phone: 7- 719 - 431-8055  Web Address: [employer]    · Smokefree  gov  Phone: 3- 148 - 460-2214  Web Address: www smokefree  [facility] 21 2022 Information is for End User's use only and may not be sold, redistributed or otherwise used for commercial purposes  All illustrations and images included in CareNotes® are the copyrighted property of [employer]  or [address] [place], [place] [address]reginald   The above information is an  only  It is not intended as medical advice for individual conditions or treatments   Talk to your doctor, nurse or pharmacist before following any medical regimen to see if it is safe and effective for you

## 2022-12-13 NOTE — PROGRESS NOTES
[address] [facility] PRACTICE TIA    NAME: Jose Cazares  AGE: 47 y o  SEX: female  : 1970     DATE: 2022     Assessment and Plan:     Problem List Items Addressed This Visit        Other    Tobacco abuse - Primary     -Currently smokes 8-10 cigarettes per day currently  -Tobacco Cessation Counseling: Tobacco cessation counseling and education was provided  The patient is sincerely urged to quit consumption of tobacco  She is ready to quit tobacco  The numerous health risks of tobacco consumption were discussed  Prescribed the following medications: nicotine patch    -Spent 4 minutes on smoking cessation counseling         Relevant Medications    nicotine (NICODERM CQ) 21 mg/24 hr TD 24 hr patch   Other Visit Diagnoses     Annual physical exam        Encounter for immunization        Relevant Orders    Age 15 y+, BOOSTER (BIVALENT): Kristy 78 vac bivalent israel-sucr (Completed)          Immunizations and preventive care screenings were discussed with patient today  Appropriate education was printed on patient's after visit summary  Counseling:  Alcohol/drug use: discussed moderation in alcohol intake, the recommendations for healthy alcohol use, and avoidance of illicit drug use  · Exercise: the importance of regular exercise/physical activity was discussed  Recommend exercise 3-5 times per week for at least 30 minutes  Return in 3 months (on 3/13/2023) for Next scheduled follow up tobacco abuse/ HTN  Chief Complaint:     Chief Complaint   Patient presents with   • Physical Exam      History of Present Illness:     Adult Annual Physical   Patient here for a comprehensive physical exam  The patient reports no problems  She is trying to quit smoking  She will like a higher dose of nicotine patch  Diet and Physical Activity  · Diet/Nutrition: poor diet  · Exercise: no formal exercise        Depression Screening  PHQ-2/9 Depression Screening         General Health  · Sleep: sleeps well  · Hearing: normal - bilateral   · Vision: wears glasses  · Dental: regular dental visits  /GYN Health  · Patient is: postmenopausal  · Last menstrual period: years ago  · Contraceptive method: Bilateral ophorectomy and hysteroctomy  Review of Systems:     Review of Systems   Constitutional: Negative for chills, diaphoresis, fatigue and fever  HENT: Negative for congestion, postnasal drip and rhinorrhea  Eyes: Negative for visual disturbance  Respiratory: Negative for cough, shortness of breath and wheezing  Cardiovascular: Negative for chest pain, palpitations and leg swelling  Gastrointestinal: Negative for abdominal pain, blood in stool, constipation, diarrhea, nausea and vomiting  Genitourinary: Negative for dysuria, hematuria and urgency  Musculoskeletal: Negative for arthralgias and gait problem  Skin: Negative for rash  Neurological: Negative for syncope, weakness, numbness and headaches  Hematological: Negative for adenopathy  Psychiatric/Behavioral: Negative for agitation        Past Medical History:     Past Medical History:   Diagnosis Date   • Asthma    • B[facility] 1 disorder ([facility] )    • HTN (hypertension)    • Hypertension    • Mental health problem       Past Surgical History:     Past Surgical History:   Procedure Laterality Date   • BREAST SURGERY     • COSMETIC SURGERY     • HYSTERECTOMY     • LAPAROSCOPIC GASTRIC BANDING        Social History:     Social History     Socioeconomic History   • Marital status: Single     Spouse name: None   • Number of children: None   • Years of education: None   • Highest education level: None   Occupational History   • None   Tobacco Use   • Smoking status: Every Day     Packs/day: 0 25     Types: Cigarettes   • Smokeless tobacco: Never   Vaping Use   • Vaping Use: Never used   Substance and Sexual Activity   • Alcohol use: Not Currently   • Drug use: Not Currently   • Sexual activity: None   Other Topics Concern   • None   Social History Narrative   • None     Social Determinants of Health     Financial Resource Strain: Low Risk    • Difficulty of Paying Living Expenses: Not hard at all   Food Insecurity: No Food Insecurity   • Worried About Running Out of Food in the Last Year: Never true   • Ran Out of Food in the Last Year: Never true   Transportation Needs: No Transportation Needs   • Lack of Transportation (Medical): No   • Lack of Transportation (Non-Medical): No   Physical Activity: Not on file   Stress: Not on file   Social Connections: Not on file   Intimate Partner Violence: Not on file   Housing Stability: Not on file      Family History:     History reviewed  No pertinent family history  Current Medications:     Current Outpatient Medications   Medication Sig Dispense Refill   • nicotine (NICODERM CQ) 21 mg/24 hr TD 24 hr patch Place 1 patch on the skin every 24 hours 28 patch 1   • albuterol (Ventolin HFA) 90 mcg/act inhaler Inhale 2 puffs every 6 (six) hours as needed for wheezing 18 g 5   • amLODIPine (NORVASC) 5 mg tablet Take 1 tablet (5 mg total) by mouth daily 90 tablet 1   • benztropine (COGENTIN) 1 mg tablet ONE TABLET 1 TIMES AT NIGHT  • Flovent Diskus 50 MCG/ACT diskus inhaler INHALE 1 PUFF 2 TIMES A DAY RINSE MOUTH AFTER USE   60 each 0   • hydrOXYzine HCL (ATARAX) 50 mg tablet Take 50 mg by mouth 3 (three) times a day as needed (Patient not taking: Reported on 9/16/2022)     • hydrOXYzine pamoate (VISTARIL) 50 mg capsule Take 1 capsule (50 mg total) by mouth 3 (three) times a day as needed for anxiety for up to 3 days 12 capsule 0   • lithium 300 MG tablet Take 1 tablet by mouth once daily and 2 tablets at bedtime (Patient taking differently: Take 450 mg by mouth in the morning Take 1 tablet by mouth once daily and 2 tablets at bedtime) 14 tablet 0   • lithium 600 MG capsule Take 600 mg by mouth daily at bedtime     • mirtazapine (REMERON) 7 5 MG tablet daily at bedtime     • QUEtiapine (SEROquel) 100 mg tablet Take 1 tablet (100 mg total) by mouth daily at bedtime (Patient not taking: No sig reported) 7 tablet 0   • QUEtiapine (SEROquel) 200 mg tablet Take 200 mg by mouth daily at bedtime       No current facility-administered medications for this visit  Allergies:     No Known Allergies   Physical Exam:     /80 (BP Location: Other (Comment), Patient Position: Sitting, Cuff Size: Standard)   Pulse (!) 111   Temp 98 7 °F (37 1 °C) (Temporal)   Resp 16   Ht 5' 2" (1 575 m)   Wt 91 6 kg (202 lb)   SpO2 97%   Breastfeeding No   BMI 36 95 kg/m²     Physical Exam  Constitutional:       General: She is not in acute distress  Appearance: Normal appearance  She is well-developed  She is not ill-appearing, toxic-appearing or diaphoretic  HENT:      Head: Normocephalic and atraumatic  Right Ear: External ear normal       Left Ear: External ear normal       Mouth/Throat:      Pharynx: No oropharyngeal exudate  Eyes:      General: No scleral icterus  Right eye: No discharge  Left eye: No discharge  Extraocular Movements: Extraocular movements intact  Pupils: Pupils are equal, round, and reactive to light  Cardiovascular:      Rate and Rhythm: Normal rate and regular rhythm  Heart sounds: Normal heart sounds  No murmur heard  No friction rub  No gallop  Pulmonary:      Effort: Pulmonary effort is normal  No respiratory distress  Breath sounds: No stridor  No wheezing or rhonchi  Abdominal:      General: Bowel sounds are normal  There is no distension  Palpations: Abdomen is soft  There is no mass  Tenderness: There is no abdominal tenderness  There is no guarding or rebound  Hernia: No hernia is present  Musculoskeletal:         General: Normal range of motion  Cervical back: Normal range of motion  Right lower leg: No edema        Left lower leg: No edema  Lymphadenopathy:      Cervical: No cervical adenopathy  Skin:     General: Skin is warm  Capillary Refill: Capillary refill takes less than 2 seconds  Neurological:      General: No focal deficit present  Mental Status: She is alert and oriented to person, place, and time  Motor: No weakness     Psychiatric:         Mood and Affect: Mood normal           MD Erickson Klein

## 2022-12-13 NOTE — ASSESSMENT & PLAN NOTE
-Currently smokes 8-10 cigarettes per day currently  -Tobacco Cessation Counseling: Tobacco cessation counseling and education was provided  The patient is sincerely urged to quit consumption of tobacco  She is ready to quit tobacco  The numerous health risks of tobacco consumption were discussed   Prescribed the following medications: nicotine patch    -Spent 4 minutes on smoking cessation counseling

## 2022-12-29 ENCOUNTER — OFFICE VISIT (OUTPATIENT)
Dept: DENTISTRY | Facility: CLINIC | Age: 52
End: 2022-12-29

## 2022-12-29 VITALS — TEMPERATURE: 97.8 F | DIASTOLIC BLOOD PRESSURE: 97 MMHG | HEART RATE: 102 BPM | SYSTOLIC BLOOD PRESSURE: 128 MMHG

## 2022-12-29 DIAGNOSIS — K02.9 DENTAL CARIES: Primary | ICD-10-CM

## 2022-12-29 NOTE — PROGRESS NOTES
Resin Filling #8MIDFL    Sulaiman Berry is a 55yo female and presented to clinic for composite fillings in LRQ-#28-O, #29-O, #31OB  Pt wished to switch today's procedure to #8MIDFL    PMH reviewed, significant findings include:  • Asthma  • Bipolar dx  • Hx breast cancer  • Tobacco abuse  • Hx osteoarthritis  • HTN    ASA II  Pain = pt denies  BP: 128/97mmHg  Translation: none needed    Applied topical benzocaine, administered 0 5 carps 4% articaine 1:100k epi via ASA block    Prepped tooth #8MIDFL with 330 carbide on high speed  Caries removed with 4 round carbide on slow speed  Placed mylar strip  Isolation with cotton rolls  Etched with 37% H2PO4, rinse and dry  Applied Limelight with light cure  Applied Adhese with 20 second scrub once, gentle air dry and light cured for 10s  Restored with Omnichroma  Refined with finishing burs, polished with enhance point  Verified occlusion and contacts  POI given  Pt left satisfied and ambulatory        NV: #9MILF - 75min    NNV: LRQ-#28-O, #29-O, #31OB - 75min

## 2023-01-11 ENCOUNTER — OFFICE VISIT (OUTPATIENT)
Dept: BARIATRICS | Facility: CLINIC | Age: 53
End: 2023-01-11

## 2023-01-11 VITALS — BODY MASS INDEX: 35.53 KG/M2 | TEMPERATURE: 96.1 F | WEIGHT: 200.5 LBS | HEIGHT: 63 IN | HEART RATE: 98 BPM

## 2023-01-11 DIAGNOSIS — Z98.84 HX OF BARIATRIC SURGERY: ICD-10-CM

## 2023-01-11 DIAGNOSIS — E66.9 OBESITY, CLASS II, BMI 35-39.9: ICD-10-CM

## 2023-01-11 DIAGNOSIS — F17.200 SMOKER: Primary | ICD-10-CM

## 2023-01-11 DIAGNOSIS — E66.01 OBESITY, MORBID (HCC): ICD-10-CM

## 2023-01-11 RX ORDER — COVID-19 ANTIGEN TEST
KIT MISCELLANEOUS
COMMUNITY

## 2023-01-11 RX ORDER — DIPHENOXYLATE HYDROCHLORIDE AND ATROPINE SULFATE 2.5; .025 MG/1; MG/1
1 TABLET ORAL DAILY
COMMUNITY

## 2023-01-11 NOTE — PROGRESS NOTES
OFFICE VISIT - BARIATRIC SURGERY  Emelia Bain 47 y o  female MRN: [id number]  Unit/Bed#:  Encounter: [id number]      HPI:  Emelia Bain is a 47 y o  female with history of lap band in 2004 with Dr Monico Villagomez in [place] comes to the office with complaints of reflux, dysphasia, weight regain, pain, and vomiting, referred by PCP  Subjective     Patient notes feelings of dysphagia and feels as if band is out of place  She also notes intermittent vomiting  Patient saw Dr Monico Villagomez and had band deflated 2011  She notes gaining weight after band deflated  She is currently smoking but is open to quitting  At the time of their surgery the patient was 320lb, and was able to drop down as low as 145lb  Today, their weight is 200lb, with a BMI of 36 09  Smoking: Patient smokes 5-6 cigarettes per day  Alcohol use: Denies  NSAID use: Aleve PRN, 3x monthly  Caffeine use: Patient drinks 1 cup coffee in the morning  Previous EGD: No  Previous Upper GI: Yes   Previous Manometry: No  Ambulation is not impaired  Review of Systems   Constitutional: Negative for chills and fever  HENT: Negative for ear pain and sore throat  Eyes: Negative for pain and visual disturbance  Respiratory: Negative for cough and shortness of breath  Cardiovascular: Negative for chest pain and palpitations  Gastrointestinal: Negative for abdominal pain and vomiting  Dsyphasia   Genitourinary: Negative for dysuria and hematuria  Musculoskeletal: Negative for arthralgias and back pain  Skin: Negative for color change and rash  Neurological: Negative for seizures and syncope  All other systems reviewed and are negative        Historical Information   Past Medical History:   Diagnosis Date   • Asthma    • Bipolar 1 disorder ([facility] )    • HTN (hypertension)    • Hypertension    • Mental health problem      Past Surgical History:   Procedure Laterality Date   • BREAST SURGERY Bilateral 2014   • COSMETIC SURGERY     • HYSTERECTOMY     • LAPAROSCOPIC GASTRIC BANDING       Social History   Social History     Substance and Sexual Activity   Alcohol Use Not Currently     Social History     Substance and Sexual Activity   Drug Use Not Currently     Social History     Tobacco Use   Smoking Status Every Day   • Packs/day: 0 25   • Types: Cigarettes   Smokeless Tobacco Never       Objective       Current Vitals:   Pulse: 98 (01/11/23 0929)  Temperature: (!) 96 1 °F (35 6 °C) (01/11/23 0929)  Temp Source: Tympanic (01/11/23 0929)  Height: 5' 2 5" (158 8 cm) (01/11/23 0929)  Weight - Scale: 90 9 kg (200 lb 8 oz) (01/11/23 0929)    Invasive Devices     None                 Physical Exam  Constitutional:       Appearance: Normal appearance  She is obese  HENT:      Head: Normocephalic and atraumatic  Nose: Nose normal       Mouth/Throat:      Mouth: Mucous membranes are moist    Eyes:      Extraocular Movements: Extraocular movements intact  Pupils: Pupils are equal, round, and reactive to light  Cardiovascular:      Rate and Rhythm: Normal rate and regular rhythm  Pulses: Normal pulses  Heart sounds: Normal heart sounds  Pulmonary:      Effort: Pulmonary effort is normal       Breath sounds: Normal breath sounds  Abdominal:      Palpations: Abdomen is soft  Musculoskeletal:      Cervical back: Normal range of motion  Skin:     General: Skin is warm  Neurological:      General: No focal deficit present  Mental Status: She is alert and oriented to person, place, and time  Psychiatric:         Mood and Affect: Mood normal          Behavior: Behavior normal            Pathology, and Other Studies: I have personally reviewed pertinent reports  Assessment/PLAN:    Cosme Weston is a 47 y o  female with history of lap band in 2004 with Dr Cabrera Araiza in [place] comes to the office with complaints of dysphasia, weight regain, and vomiting, referred by PCP      Workup thus far reviewed and discussed with patient  Workup includes:       UGI  UPPER GI SERIES  SINGLE CONTRAST     INDICATION:  [id number]: Bariatric surgery status  History of lap band       COMPARISON:  None     IMAGES:  28     FLUOROSCOPY TIME:  2 1min     TECHNIQUE:  The patient was given barium by mouth and images of the esophagus, stomach, and small bowel were obtained       FINDINGS:      image demonstrates a lap band in appropriate position with phi angle of 31 degrees      The esophagus is normal in caliber  Esophageal motility is normal and emptying of contrast from the esophagus is prompt  There was mild proximal escape with grade 1 stasis  No gross mucosal abnormalities although evaluation is limited with single   contrast technique      The stomach is unremarkable in size  No gross gastric mucosal abnormalities although evaluation limited with single contrast technique  No penetrating ulcers or masses      Contrast empties promptly into the duodenum  The duodenum is normal in caliber  The ligament of Treitz/duodenojejunal junction lies in a normal position      Gastroesophageal reflux was not observed        There is a small to moderate hiatal hernia         IMPRESSION:     1  Lap band in appropriate position      2   Small to moderate hiatal hernia      3   Mild proximal escape with grade 1 stasis      4   Unremarkable single contrast appearance of the stomach            Workstation performed: [id number]    --------------------------------------------------------------------  Patient having reflux, dysphasia, pain, vomiting, and weight regain  Smoking cessation is necessary to proceed with lap band removal with conversion to RNYGB  Discussed that conversion to sleeve isn't the best option due to hernia  Discussed we may need to stage her if band eroded  We will see that on planned EGD  We will enroll in level 3 revision pathway and submit to patient's insurance when she is smoke free       We will proceed with EGD to further assess anatomy    Follow up after smoking cessation successful and revision pathway complete              Allison Macario PA-C  Bariatric Surgery  1/11/2023  10:12 AM

## 2023-01-17 ENCOUNTER — TELEPHONE (OUTPATIENT)
Dept: PSYCHIATRY | Facility: CLINIC | Age: 53
End: 2023-01-17

## 2023-02-07 ENCOUNTER — PREP FOR PROCEDURE (OUTPATIENT)
Dept: BARIATRICS | Facility: CLINIC | Age: 53
End: 2023-02-07

## 2023-02-07 DIAGNOSIS — Z98.84 BARIATRIC SURGERY STATUS: Primary | ICD-10-CM

## 2023-02-28 NOTE — PROGRESS NOTES
Bariatric Behavioral Health Evaluation    Revision pathway level 3   (smoker)    Presenting Problem:  history of lap band in 2004 with Dr Romana Overman in [place]   Now with complaints of reflux, dysphasia, weight regain, pain, and vomiting,  At the time of their surgery the patient was 320lb, and was able to drop down as low as 145lb  Is the patient seeking Bariatric Surgery Eval? Yes      Realizes Post- Op Requirements? Yes:  And will learn more through the program while meeting with the dietitian and the   Pre-morbid level of function and history of present illness: GERD, HTN    Additional comments/STRESSORS related to family/relationships/peer SUPPORT:   is a good friend and supportive  Sponsor is supportive: They talk every day  Some people in the [place] [id number] meetings  Work:  Disability     Activity:  Some walking  About 3 or 4 times a week  Lives with:  Roommates    5 people in the home  Stated it is a [facility]  Everyone smokes in the house, but they smoke outside   is a good friend of hers  Moved in January 14, 2022: After discharge from inpt rehab  Family [employer] (include relationship with each and Psych/Med HX)    Brother / sister both passed of OD  Brother in 2020 and sister last year  2022  Stated she has "no family"      Psychiatric/Psychological Treatment Diagnosis:  Bi Polar 1              Outpatient Counselor:  Yes  Current: Since February 2022  [facility] family services:  Joey Sanders, [address], [address] [facility]                 Psychiatrist Yes    Dr David Krueger               Have you had Inpatient Treatment? 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    Clean date December 15, 2021  Follows AA and has a sponsor     Tobacco History: Smoking: Patient smokes 5-6 cigarettes per day  Negative test due in chart by June 1st 2023  Physical/Psychological Assessment:              Appearance: appropriate           Sociability: average           Affect: appropriate           Mood: calm           Thought Process: coherent           Speech: normal           Content: no impairment           Orientation: person  Yes , place  Yes , time  Yes , normal attention span  Yes , normal memory  Yes   and normal judgement  Yes            Insight: emotional  good      Risk Assessment:                Risk of Harm to Self or Others:   none noted during evaluation  No HI/SI                Observation: Interviews :  this interview only (SW and RD will follow Brian Pederson through the bariatric program)                Access to weapons : not reported                Based on the previous information, the client presents the following risk of harm to self or others: low      Recommendations: Recommended for surgery  yes  Smoking cessation is necessary to proceed with lap band removal with conversion to RNYGB  Discussed that conversion to sleeve isn't the best option due to hernia   Note :  Patient presented for behavioral health evaluation for the bariatric program    Positive for Mental Health with diagnosis of Bi Polar 1     Current  Therapy and Psychiatry at [facility]      Previous Drug and/or Alcohol abuse and treatment  Clean date December 15, 2021   Current tobacco use  Patient educated regarding the impact of nicotine and alcohol on the post surgery bariatric patient  Patient has a positive  family history of tobacco and alcohol addiction  Patient meets criteria for surgery at this time and is referred to the bariatric surgeon  Negative Nicotine test is due 90 days from today ( on 6/1/2023 )  After the negative nicotine test is recorded, patient can proceed to reschedule with the bariatric surgeon              DULCE Colon, LCSW  _____________________________      BARIATRIC SURGERY EDUCATION CHECKLIST     Education related to my bariatric surgery process:     Patients may be required to complete a psychiatric evaluation and receive clearance for surgery from their psychiatrist     Patients who undergo weight loss surgery are at higher risk of increased mental health concerns and suicide attempts  Patients may be required to complete a full substance abuse evaluation and then complete all  treatment recommendations prior to surgery  If diagnosis of abuse/dependence results, patient may be required to remain sober for one (1) year before having bariatric surgery  Patient's on psychiatric medications should check with their provider to discuss psychiatric medications and the changes in absorption  Patient should discuss all time release medications with provider and take all medications as prescribed  The recommendation is that there is no use of any tobacco products, Hookah or  vapes for the bariatric post-operation patient  Bariatric surgery patients should not consume alcohol as a post-operative patient as it may increase risk of numerous health conditions including but not limited to alcohol abuse and ulcers  There is a possibility of weight regain if patient does not follow all program guidelines and recommendations  Bariatric surgery patients should exercise thirty (30) to sixty (60) minutes per day to maintain post-surgical weight loss  Research indicates that bariatric patients are more successful when they see a therapist for up to two (2) years post-op  Patients will follow all medical and dietary recommendations provided  Patient will keep all scheduled appointments and follow up with their physician for a minimum of five (5) years  Patient will take all vitamins as recommended  Post-operative vitamins are life-long  There is a goal month set  All requirements should be met by this time  Don't wait to get started! There is a deadline month set  All requirements must be finished by this time and if not, the patient will be halted in the surgery process  The patient can be referred to the medical weight management program or can come back to the surgical program once the unfinished tasks from the previous program are completed

## 2023-02-28 NOTE — PROGRESS NOTES
Bariatric Nutrition Assessment Note    Type of surgery    lap band in 2004 with Dr Cheryl Harding in [place]   9 years post-op  had band deflated 2011  Surgeon: Dr Estevan Singleton  47 y o   female     Wt with BMI of 25: 138 91lbs  Pre-Op Excess Wt: 65 59lbs from current weight  Wt 92 8 kg (204 lb 8 oz)   BMI 36 81 kg/m²     [facility]  [place] Equation:     Weight [id number]/day  Estimated calories for weight loss 1301kcal/day (1# per wk wt loss - sedentary )  Estimated protein needs 63-94 7g/day (1 0-1 5 gms/kg IBW )   Estimated fluid needs 1890-2205ml/day (30-35 ml/kg IBW )      Weight History   Onset of Obesity: Childhood  Family history of obesity: Yes  Wt Loss Attempts: Commercial Programs ([employer] - /l?k â€¢ splik/rp, Berta Cowden, etc )  Counseling with  MD  Exercise  Self Created Diets (Portion Control, Healthy Food Choices, etc )  Patient has tried the above for 6 months or more with insufficient weight loss or weight regain, which is why patient has requested to be evaluated for weight loss surgery today  Maximum Wt Lost: At the time of their surgery the patient was 320lb, and was able to drop down as low as 145lb- 2/5 years after surgery  Started noticing weight regain around 2020    Weight on Day of Weight Loss Surgery: 320lbs  Weight in (lb) to have BMI = 25: 138 91lbs  Pre-Op Excess Wt: 181lbs  Post-Op Wt Loss: 115 5#/ 63 8% EBWL in 9 year(s)  Post-op [id number]    Review of History and Medications   Past Medical History:   Diagnosis Date   • Asthma    • Bipolar 1 disorder ([facility] [address] )    • HTN (hypertension)    • Hypertension    • Mental health problem      Past Surgical History:   Procedure Laterality Date   • BREAST SURGERY Bilateral 2014   • COSMETIC SURGERY     • HYSTERECTOMY     • LAPAROSCOPIC GASTRIC BANDING       Social History     Socioeconomic History   • Marital status: Single     Spouse name: Not on file   • Number of children: Not on file   • Years of education: Not on file   • Highest education level: Not on file   Occupational History   • Not on file   Tobacco Use   • Smoking status: Every Day     Packs/day: 0 25     Types: Cigarettes   • Smokeless tobacco: Never   Vaping Use   • Vaping Use: Never used   Substance and Sexual Activity   • Alcohol use: Not Currently   • Drug use: Not Currently   • Sexual activity: Not on file   Other Topics Concern   • Not on file   Social History Narrative   • Not on file     Social Determinants of Health     Financial Resource Strain: Low Risk    • Difficulty of Paying Living Expenses: Not hard at all   Food Insecurity: No Food Insecurity   • Worried About Running Out of Food in the Last Year: Never true   • Ran Out of Food in the Last Year: Never true   Transportation Needs: No Transportation Needs   • Lack of Transportation (Medical): No   • Lack of Transportation (Non-Medical):  No   Physical Activity: Not on file   Stress: Not on file   Social Connections: Not on file   Intimate Partner Violence: Not on file   Housing Stability: Not on file       Current Outpatient Medications:   •  albuterol (Ventolin HFA) 90 mcg/act inhaler, Inhale 2 puffs every 6 (six) hours as needed for wheezing, Disp: 18 g, Rfl: 5  •  amLODIPine (NORVASC) 5 mg tablet, Take 1 tablet (5 mg total) by mouth daily, Disp: 90 tablet, Rfl: 1  •  benztropine (COGENTIN) 1 mg tablet, ONE TABLET 1 TIMES AT NIGHT , Disp: , Rfl:   •  Flovent Diskus 50 MCG/ACT diskus inhaler, INHALE 1 PUFF 2 TIMES A DAY RINSE MOUTH AFTER USE , Disp: 60 each, Rfl: 0  •  hydrOXYzine HCL (ATARAX) 50 mg tablet, Take 50 mg by mouth 3 (three) times a day as needed, Disp: , Rfl:   •  hydrOXYzine pamoate (VISTARIL) 50 mg capsule, Take 1 capsule (50 mg total) by mouth 3 (three) times a day as needed for anxiety for up to 3 days, Disp: 12 capsule, Rfl: 0  •  lithium 300 MG tablet, Take 1 tablet by mouth once daily and 2 tablets at bedtime (Patient taking differently: Take 450 mg by mouth in the morning Take 1 tablet by mouth once daily and 2 tablets at bedtime), Disp: 14 tablet, Rfl: 0  •  lithium 600 MG capsule, Take 600 mg by mouth daily at bedtime, Disp: , Rfl:   •  mirtazapine (REMERON) 7 5 MG tablet, daily at bedtime, Disp: , Rfl:   •  multivitamin (THERAGRAN) TABS, Take 1 tablet by mouth daily, Disp: , Rfl:   •  Naproxen Sodium (Aleve) 220 MG CAPS, Take by mouth, Disp: , Rfl:   •  nicotine (NICODERM CQ) 21 mg/24 hr TD 24 hr patch, Place 1 patch on the skin every 24 hours (Patient not taking: Reported on 1/11/2023), Disp: 28 patch, Rfl: 1  •  QUEtiapine (SEROquel) 100 mg tablet, Take 1 tablet (100 mg total) by mouth daily at bedtime, Disp: 7 tablet, Rfl: 0  •  QUEtiapine (SEROquel) 200 mg tablet, Take 200 mg by mouth daily at bedtime (Patient not taking: Reported on 1/11/2023), Disp: , Rfl:      Food Intake and Lifestyle Assessment   Food Intake Assessment completed via usual diet recall  Breakfast: coffee with cream and sugar  Usually skips  sometimes 1-2 days a week: 2 over easy eggs and a toast   Snack: none   Lunch: sometimes hamburger or oatmeal or grits or couple hot dogs  Snack: none  Dinner: heaviest meal: chicken: pan seared then in oven  Asparagus or green beans    Snack: most nights bite sized chocolates  Beverage intake: water and diet soda: one can a day  Protein supplement: none  Estimated protein intake per day: 30-60g  Estimated fluid intake per day: 5-6 16 oz corbin, 12 oz soda, 4-8oz coffee  Meals eaten away from home: never tried them  Typical meal pattern: 3 meals per day and 0 snacks per day  Eating Behaviors: Consumption of high calorie/ high fat foods  Food allergies or intolerances: No Known Allergies NKFA  Cultural or Episcopal considerations: none   Avoids milk- bother her asthma    Physical Assessment  Physical Activity  Types of exercise: Walking most mornings 3-4 days a week for 30-60 minutes  GMI Ratings- goes maybe 5 times a month: 30 min treadmill, 10 min bike, then hand weights  Current physical limitations: knee pain    Lithium makes her mouth extremely dry- drinks a lot of water    Psychosocial Assessment   Support systems: lives with 5 friends in recovery house  Socioeconomic factors: not currently working  Food stamps: $270/mo    Nutrition Diagnosis  Diagnosis: Overweight / Obesity (NC-3 3) and Altered GI function (NC-1 4)  Related to: Physical inactivity, Excessive energy intake and Altered GI function  As Evidenced by: BMI >25, Excessive energy intake and Unintentional weight gain     Nutrition Prescription: Recommend the following diet  Low fat, Low sugar, High protein and Regular    Interventions and Teaching   Discussed pre-op and post-op nutrition guidelines  Patient educated and handouts provided    Surgical changes to stomach / GI  Capacity of post-surgery stomach  Diet progression  Adequate hydration  Sugar and fat restriction to decrease "dumping syndrome"  Expected weight loss  Weight loss plateaus/ possibility of weight regain  Exercise  Suggestions for pre-op diet  Nutrition considerations after surgery  Meal planning and preparation  Appropriate carbohydrate, protein, and fat intake, and food/fluid choices to maximize safe weight loss, nutrient intake, and tolerance   Dietary and lifestyle changes  Possible problems with poor eating habits  Techniques for self monitoring and keeping daily food journal  Potential for food intolerance after surgery, and ways to deal with them including: lactose intolerance, nausea, reflux, vomiting, diarrhea, food intolerance, appetite changes, gas  Vitamin / Mineral supplementation of Multivitamin with minerals, Calcium, Vitamin B12, Iron and Vitamin D  OTC MVI daily gummy  Cranberry supplement    Patient is not currently pregnant and doesn't desire to become pregnant a minimum of one year post-op    Education provided to: patient    Barriers to learning: No barriers identified    Readiness to change: action    Prior research on procedure: discussed with provider and previous wt  loss surgery    Comprehension: verbalizes understanding     Expected Compliance: good    Recommendations  Pt is an appropriate candidate for surgery  Yes  Pt should make the following changes and then be reassessed for weight loss surgery:   Pt will need negative nicotine blood test prior to proceeding with a revision      Evaluation / Monitoring  Dietitian to Monitor: Eating pattern as discussed Tolerance of nutrition prescription Body weight Lab values Physical activity Bowel pattern    Goals  Food journal, Exercise 30 minutes 5 times per week, Complete lession plans 1-6, Eat 3 meals per day and Eliminate mindless snacking    Time Spent:   1 Hour

## 2023-03-01 ENCOUNTER — OFFICE VISIT (OUTPATIENT)
Dept: BARIATRICS | Facility: CLINIC | Age: 53
End: 2023-03-01

## 2023-03-01 VITALS — WEIGHT: 204.5 LBS | BODY MASS INDEX: 36.81 KG/M2

## 2023-03-01 DIAGNOSIS — Z98.84 HX OF BARIATRIC SURGERY: ICD-10-CM

## 2023-03-01 DIAGNOSIS — E66.01 OBESITY, MORBID (HCC): Primary | ICD-10-CM

## 2023-03-01 DIAGNOSIS — Z72.0 TOBACCO ABUSE: ICD-10-CM

## 2023-03-07 ENCOUNTER — TELEPHONE (OUTPATIENT)
Dept: BARIATRICS | Facility: CLINIC | Age: 53
End: 2023-03-07

## 2023-03-07 NOTE — TELEPHONE ENCOUNTER
Spoke to pt to ensure they knew their EGD was scheduled for next week 3/15   Reminded patient not to eat or drink anything after midnight and to have a

## 2023-03-13 ENCOUNTER — OFFICE VISIT (OUTPATIENT)
Dept: FAMILY MEDICINE CLINIC | Facility: CLINIC | Age: 53
End: 2023-03-13

## 2023-03-13 VITALS
DIASTOLIC BLOOD PRESSURE: 80 MMHG | WEIGHT: 210 LBS | OXYGEN SATURATION: 97 % | BODY MASS INDEX: 37.21 KG/M2 | RESPIRATION RATE: 16 BRPM | SYSTOLIC BLOOD PRESSURE: 130 MMHG | HEIGHT: 63 IN | TEMPERATURE: 96.4 F | HEART RATE: 93 BPM

## 2023-03-13 DIAGNOSIS — N18.30 STAGE 3 CHRONIC KIDNEY DISEASE, UNSPECIFIED WHETHER STAGE 3A OR 3B CKD (HCC): ICD-10-CM

## 2023-03-13 DIAGNOSIS — Z72.0 TOBACCO ABUSE: ICD-10-CM

## 2023-03-13 DIAGNOSIS — R33.9 ACUTE ON CHRONIC RETENTION OF URINE: ICD-10-CM

## 2023-03-13 DIAGNOSIS — E66.01 OBESITY, MORBID (HCC): ICD-10-CM

## 2023-03-13 DIAGNOSIS — M17.0 OSTEOARTHRITIS OF BOTH KNEES, UNSPECIFIED OSTEOARTHRITIS TYPE: ICD-10-CM

## 2023-03-13 DIAGNOSIS — J45.40 MODERATE PERSISTENT ASTHMA WITHOUT COMPLICATION: ICD-10-CM

## 2023-03-13 DIAGNOSIS — I10 PRIMARY HYPERTENSION: Primary | ICD-10-CM

## 2023-03-13 DIAGNOSIS — Z23 ENCOUNTER FOR IMMUNIZATION: ICD-10-CM

## 2023-03-13 RX ORDER — AMLODIPINE BESYLATE 5 MG/1
5 TABLET ORAL DAILY
Qty: 90 TABLET | Refills: 1 | Status: SHIPPED | OUTPATIENT
Start: 2023-03-13 | End: 2023-06-11

## 2023-03-13 RX ORDER — SODIUM CHLORIDE 9 MG/ML
125 INJECTION, SOLUTION INTRAVENOUS CONTINUOUS
Status: CANCELLED | OUTPATIENT
Start: 2023-03-13

## 2023-03-13 RX ORDER — NICOTINE 21 MG/24HR
1 PATCH, TRANSDERMAL 24 HOURS TRANSDERMAL EVERY 24 HOURS
Qty: 28 PATCH | Refills: 1 | Status: SHIPPED | OUTPATIENT
Start: 2023-03-13

## 2023-03-13 RX ORDER — BUDESONIDE AND FORMOTEROL FUMARATE DIHYDRATE 80; 4.5 UG/1; UG/1
2 AEROSOL RESPIRATORY (INHALATION) 2 TIMES DAILY
Qty: 10.2 G | Refills: 1 | Status: SHIPPED | OUTPATIENT
Start: 2023-03-13

## 2023-03-13 RX ORDER — ALBUTEROL SULFATE 90 UG/1
2 AEROSOL, METERED RESPIRATORY (INHALATION) EVERY 6 HOURS PRN
Qty: 18 G | Refills: 5 | Status: SHIPPED | OUTPATIENT
Start: 2023-03-13

## 2023-03-13 NOTE — ASSESSMENT & PLAN NOTE
-Poorly controlled  -Will presctribe Symbicort  -Continue albuterol PRN  -Smoking cessation counseling provided

## 2023-03-13 NOTE — ASSESSMENT & PLAN NOTE
Patient:   BRIAN ARCE            MRN: [id number]            FIN: [id number]              Age:   33 years     Sex:  FEMALE     :  87   Associated Diagnoses:   None   Author:   Stephanie Barton     Chief Complaint   buttock abscess   Additional Info   Referring physician: Dr. Elin Beckman     History of Present Illness             The patient presents with persistent left buttock abscess. Pt is a 34yo female with history of hypothyroidism and diabetes that has had an enlarging abscess of the left buttock over the last week. She was first prescribed Clindamycin by her PCP last week. The abscess only got bigger. She went again to her PCP on Monday where they attempted to drain it in the office. Pt was then cleansing the area with antibiotic soap and using hot  soaks. This did not help the infection. She then went to the [facility] ED yesterday where it was again drained at the bedside. She was transfered to [facility] this morning for insurance issues. Pt states that her pain has only slightly improved since being on antibiotics and having it drained twice. She reports subjective fevers at home on Wednesday with shaking chills. She has had boils before, mostly on her abdomen and back. General surgery was  asked to see patient regarding abscess and need for further drainage.       Review of Systems   Constitutional:  Chills.    Eye:  Negative.    Ear/Nose/Mouth/Throat:  Negative.    Cardiovascular:  Negative.    Respiratory:  Negative.    Gastrointestinal:  Negative.    Genitourinary:  Negative.    Musculoskeletal:  Negative.    Integumentary:  Negative except as documented in history of present illness.   Hematology/Lymphatics:  Negative.      Histories   Past Med History: Past Medical History   Diabetes mellitus  Hypothyroid  Obesity   Family History:    FATHER  Diabetes mellitus type 2  GRANDMOTHER  Diabetes mellitus type 2     History: Uncomplicated  delivery   Procedure History:   Will check bladder US    section (SNOMED CT 34070641).   Social History       Alcohol  Details: Alcohol Abuse in Household: Yes.  Use: Past.  Exercise  Details: Exercise: Never.  Substance Abuse  Details: Substance Abuse in Household: No.  Tobacco  Details: Smoker in Houshold: No.  Smoked/Smokeless Tobacco Last 30 Days: No.  Smoking Tobacco Use: Never smoker.  Smokeless Tobacco Use Never.  Cultural/Jehovah's witness Practices  Details: Jehovah's witness or Cultural Practices While in Hospital: No.  .       Health Status   Allergies:    Allergic Reactions (All)  Severity Not Documented  Eggs- No reactions were documented.  PCN (penicillins)- No reactions were documented.   Current medications:  (Selected)   Documented Medications  Documented  Victoza subcutaneous injection 18 mg/3 mL: 0.8, Subcutaneous, Daily, Maintenance  Vitamin C 1000 mg oral tablet: 1,000 mg = 1 tab, Oral, Daily, Tab, Maintenance  ibuprofen oral 800 mg tablet (Motrin / Advil): 800 mg = 1 tab, Oral, Q6H, Tab, # 40 tab, Maintenance  levothyroxine 175 mcg (0.175 mg) oral capsule: 0.175, Oral, Daily, Maintenance  multivitamin oral tablet: = 1 tab, Oral, Daily, Tab, Maintenance  traMADol oral 50 mg tablet: 50 mg = 1 tab, Oral, Q6H, PRN for pain, Maintenance     Physical Examination   VS/Measurements       Vitals between:   2019 15:39:18   TO   01-MAR-2019 15:39:18                   LAST RESULT MINIMUM MAXIMUM  Temperature 37.1 37.1 37.1  Heart Rate 99 99 99  Respiratory Rate 16 16 16  NISBP           110 110 110  NIDBP           68 68 68  SpO2                    96 96 96    General:  Alert and oriented, No acute distress.    Eye:  Pupils are equal, round and reactive to light, Extraocular movements are intact, Normal conjunctiva.   HENT:  Normocephalic.    Respiratory:  Lungs are clear to auscultation, Respirations are non-labored, Breath sounds are equal.   Cardiovascular:  Normal rate, Regular rhythm, No murmur.    Gastrointestinal:  Soft, Non-tender, Non-distended,  Normal bowel sounds, healed lower abdominal incision with hernias.   Genitourinary:  No inguinal tenderness.    Integumentary:  1.5cm incision on left buttock with surrounding induration especially inferior. cavity packed with iodoform, removed. tried to manually probe wound with finger but pt did not tolerate this. some purulent drainage on gauze dressing.      Review / Management   Laboratory results:       Labs between:  28-FEB-2019 15:43 to 01-MAR-2019 15:43    CBC:                 WBC  HgB  Hct  Plt  MCV  RDW   01-MAR-2019 (H) 12.1  12.4  (L) 35.7  300  (L) 77.9  12.5     BMP:                 Na  Cl  BUN  Glu   01-MAR-2019 142  (H) 111  (L) 5  (H) 221                              K  CO2  Cr  Ca                              3.4  23  0.58  (L) 8.3     CMP:                 AST  ALT  AlkPhos  Bili  Albumin   01-MAR-2019 16  22  115  0.5  (L) 2.7     POC GLU:                 Latest Result  Latest Date  Minimum  Min Date  Maximum  Max Date                             (H) 213  01-MAR-2019 (H) 213  01-MAR-2019 (H) 213                              .      Impression and Plan   Dx and Plan:     Diagnosis     Persistent left buttock abscess that failed outpatient treatment.    .         Course: Pt afraid of general anesthesia and breathing tubes. Discussed that we could perform the surgery under MAC or \"twilight\" that doesn't require intubation. She is in agreement of this method. Would not recommend bedside drainage since this has failed twice and patient is very tender. Will proceed to the OR tomorrow for incision and drainage of left buttock abscess. Discussed with Dr. Johnathan Glynn.    Stephanie Joy PA-C  General Surgery.

## 2023-03-13 NOTE — PROGRESS NOTES
Name: Kimani Casas      : 1970      MRN: [id number]  Encounter Provider: Lissette Massey MD  Encounter Date: 3/13/2023   Encounter department: [address] [place] [address]     1  Primary hypertension  Assessment & Plan:  Well controlled   Continue amlodipine 5 mg      Orders:  -     amLODIPine (NORVASC) 5 mg tablet; Take 1 tablet (5 mg total) by mouth daily    2  Obesity, morbid ([facility] [address] )  -     CBC and differential; Future  -     Comprehensive metabolic panel; Future  -     Hemoglobin A1C; Future  -     TSH, 3rd generation with Free T4 reflex; Future  -     Lipid panel; Future    3  Acute on chronic retention of urine  Assessment & Plan: Will check bladder US    Orders:  -     US bladder; Future; Expected date: 2023    4  Stage 3 chronic kidney disease, unspecified whether stage 3a or 3b CKD ([address] )    5  Tobacco abuse  Assessment & Plan:  Tobacco Cessation Counseling: Tobacco cessation counseling and education was provided  The patient is sincerely urged to quit consumption of tobacco  She is ready to quit tobacco  The numerous health risks of tobacco consumption were discussed  Prescribed the following medications: nicotine patch  Spent 3 minutes on counseling    Orders:  -     nicotine (NICODERM CQ) 21 mg/24 hr TD 24 hr patch; Place 1 patch on the skin over 24 hours every 24 hours    6  Encounter for immunization  -     TDAP VACCINE GREATER THAN OR EQUAL TO 8YO IM    7  Moderate persistent asthma without complication  Assessment & Plan:  -Poorly controlled  -Will presctribe Symbicort  -Continue albuterol PRN  -Smoking cessation counseling provided    Orders:  -     albuterol (Ventolin HFA) 90 mcg/act inhaler; Inhale 2 puffs every 6 (six) hours as needed for wheezing  -     budesonide-formoterol (Symbicort) 80-4 5 MCG/ACT inhaler; Inhale 2 puffs 2 (two) times a day Rinse mouth after use      8  Osteoarthritis of both knees, unspecified osteoarthritis type  Assessment & Plan:  -Chronic knee pain  -Continue oral pain medication  -Schedule for knee injection           Subjective       46 yo female with a history of osteoarthritis, HTN and tobacco abuse who presents today for routine follow up  She reports that for the past month she has been having diculty with urination  She states that she has to strain to force urine out of her bladder  She denies any other urinary symptoms  She has been experiencing chronic left knee pan  She has not had physical therapy  She will like to have knee injection  She states that her asthma has been acting up the past few months  She uses her albuterol at least once per day  She continues to smoke cigarette        Review of Systems   Constitutional: Negative for chills, diaphoresis, fatigue and fever  HENT: Negative for congestion, postnasal drip and rhinorrhea  Eyes: Negative for visual disturbance  Respiratory: Negative for cough, shortness of breath and wheezing  Cardiovascular: Negative for chest pain, palpitations and leg swelling  Gastrointestinal: Negative for abdominal pain, blood in stool, constipation, diarrhea, nausea and vomiting  Genitourinary: Positive for difficulty urinating  Negative for dysuria, frequency, hematuria and urgency  Musculoskeletal: Positive for arthralgias  Negative for gait problem  Skin: Negative for rash  Neurological: Negative for syncope, weakness, numbness and headaches  Hematological: Negative for adenopathy  Psychiatric/Behavioral: Negative for agitation  Current Outpatient Medications on File Prior to Visit   Medication Sig   • benztropine (COGENTIN) 1 mg tablet ONE TABLET 1 TIMES AT NIGHT     • hydrOXYzine HCL (ATARAX) 50 mg tablet Take 50 mg by mouth 3 (three) times a day as needed   • hydrOXYzine pamoate (VISTARIL) 50 mg capsule Take 1 capsule (50 mg total) by mouth 3 (three) times a day as needed for anxiety for up to 3 days   • lithium 300 MG tablet Take 1 tablet by mouth once daily and 2 tablets at bedtime (Patient taking differently: Take 450 mg by mouth in the morning Take 1 tablet by mouth once daily and 2 tablets at bedtime)   • lithium 600 MG capsule Take 600 mg by mouth daily at bedtime   • mirtazapine (REMERON) 7 5 MG tablet daily at bedtime   • multivitamin (THERAGRAN) TABS Take 1 tablet by mouth daily   • Naproxen Sodium (Aleve) 220 MG CAPS Take by mouth   • QUEtiapine (SEROquel) 100 mg tablet Take 1 tablet (100 mg total) by mouth daily at bedtime   • QUEtiapine (SEROquel) 200 mg tablet Take 200 mg by mouth daily at bedtime (Patient not taking: Reported on 1/11/2023)   • [DISCONTINUED] albuterol (Ventolin HFA) 90 mcg/act inhaler Inhale 2 puffs every 6 (six) hours as needed for wheezing   • [DISCONTINUED] amLODIPine (NORVASC) 5 mg tablet Take 1 tablet (5 mg total) by mouth daily   • [DISCONTINUED] Flovent Diskus 50 MCG/ACT diskus inhaler INHALE 1 PUFF 2 TIMES A DAY RINSE MOUTH AFTER USE  • [DISCONTINUED] nicotine (NICODERM CQ) 21 mg/24 hr TD 24 hr patch Place 1 patch on the skin every 24 hours (Patient not taking: Reported on 1/11/2023)       Objective     /80 (BP Location: Right leg, Patient Position: Sitting, Cuff Size: Large)   Pulse 93   Temp (!) 96 4 °F (35 8 °C) (Temporal)   Resp 16   Ht 5' 2 5" (1 588 m)   Wt 95 3 kg (210 lb)   SpO2 97%   BMI 37 80 kg/m²     Physical Exam  Vitals and nursing note reviewed  Constitutional:       General: She is not in acute distress  Appearance: Normal appearance  She is well-developed  She is not ill-appearing, toxic-appearing or diaphoretic  HENT:      Head: Normocephalic and atraumatic  Right Ear: External ear normal       Left Ear: External ear normal       Nose: Nose normal       Mouth/Throat:      Mouth: Mucous membranes are moist       Pharynx: No oropharyngeal exudate or posterior oropharyngeal erythema  Eyes:      General: No scleral icterus  Right eye: No discharge  Left eye: No discharge  Extraocular Movements: Extraocular movements intact  Conjunctiva/sclera: Conjunctivae normal    Neck:      Thyroid: No thyromegaly  Vascular: No JVD  Trachea: No tracheal deviation  Cardiovascular:      Rate and Rhythm: Normal rate and regular rhythm  Pulses:           Dorsalis pedis pulses are 2+ on the right side and 2+ on the left side  Posterior tibial pulses are 2+ on the right side and 2+ on the left side  Heart sounds: Normal heart sounds  No murmur heard  No friction rub  No gallop  Pulmonary:      Effort: Pulmonary effort is normal  No respiratory distress  Breath sounds: Normal breath sounds  No stridor  No wheezing or rhonchi  Abdominal:      General: Bowel sounds are normal  There is no distension  Palpations: Abdomen is soft  There is no mass  Tenderness: There is no abdominal tenderness  There is no guarding or rebound  Hernia: No hernia is present  Musculoskeletal:         General: Normal range of motion  Cervical back: Normal range of motion and neck supple  Right lower leg: No edema  Left lower leg: No edema  Feet:      Right foot:      Skin integrity: No ulcer, skin breakdown, erythema, warmth, callus or dry skin  Left foot:      Skin integrity: No ulcer, skin breakdown, erythema, warmth, callus or dry skin  Skin:     General: Skin is warm  Capillary Refill: Capillary refill takes less than 2 seconds  Findings: No lesion or rash  Neurological:      General: No focal deficit present  Mental Status: She is alert and oriented to person, place, and time  Cranial Nerves: No cranial nerve deficit  Motor: No weakness or abnormal muscle tone        Coordination: Coordination normal       Gait: Gait normal    Psychiatric:         Mood and Affect: Mood normal          Behavior: Behavior normal        Breanna Moya MD

## 2023-03-13 NOTE — ASSESSMENT & PLAN NOTE
Tobacco Cessation Counseling: Tobacco cessation counseling and education was provided  The patient is sincerely urged to quit consumption of tobacco  She is ready to quit tobacco  The numerous health risks of tobacco consumption were discussed  Prescribed the following medications: nicotine patch     Spent 3 minutes on counseling

## 2023-03-15 ENCOUNTER — ANESTHESIA EVENT (OUTPATIENT)
Dept: GASTROENTEROLOGY | Facility: HOSPITAL | Age: 53
End: 2023-03-15

## 2023-03-15 ENCOUNTER — HOSPITAL ENCOUNTER (OUTPATIENT)
Dept: GASTROENTEROLOGY | Facility: HOSPITAL | Age: 53
Setting detail: OUTPATIENT SURGERY
Discharge: HOME | End: 2023-03-15
Attending: SURGERY | Admitting: SURGERY

## 2023-03-15 ENCOUNTER — ANESTHESIA (OUTPATIENT)
Dept: GASTROENTEROLOGY | Facility: HOSPITAL | Age: 53
End: 2023-03-15

## 2023-03-15 VITALS
SYSTOLIC BLOOD PRESSURE: 120 MMHG | RESPIRATION RATE: 16 BRPM | TEMPERATURE: 98 F | WEIGHT: 210 LBS | HEIGHT: 63 IN | DIASTOLIC BLOOD PRESSURE: 70 MMHG | BODY MASS INDEX: 37.21 KG/M2 | HEART RATE: 76 BPM | OXYGEN SATURATION: 100 %

## 2023-03-15 DIAGNOSIS — Z98.84 BARIATRIC SURGERY STATUS: ICD-10-CM

## 2023-03-15 RX ORDER — PROPOFOL 10 MG/ML
INJECTION, EMULSION INTRAVENOUS AS NEEDED
Status: DISCONTINUED | OUTPATIENT
Start: 2023-03-15 | End: 2023-03-15

## 2023-03-15 RX ORDER — LIDOCAINE HYDROCHLORIDE 20 MG/ML
INJECTION, SOLUTION EPIDURAL; INFILTRATION; INTRACAUDAL; PERINEURAL AS NEEDED
Status: DISCONTINUED | OUTPATIENT
Start: 2023-03-15 | End: 2023-03-15

## 2023-03-15 RX ORDER — SODIUM CHLORIDE 9 MG/ML
125 INJECTION, SOLUTION INTRAVENOUS CONTINUOUS
Status: DISCONTINUED | OUTPATIENT
Start: 2023-03-15 | End: 2023-03-19 | Stop reason: HOSPADM

## 2023-03-15 RX ADMIN — PROPOFOL 170 MG: 10 INJECTION, EMULSION INTRAVENOUS at 10:34

## 2023-03-15 RX ADMIN — PROPOFOL 50 MG: 10 INJECTION, EMULSION INTRAVENOUS at 10:40

## 2023-03-15 RX ADMIN — PROPOFOL 30 MG: 10 INJECTION, EMULSION INTRAVENOUS at 10:36

## 2023-03-15 RX ADMIN — SODIUM CHLORIDE 125 ML/HR: 0.9 INJECTION, SOLUTION INTRAVENOUS at 10:11

## 2023-03-15 RX ADMIN — LIDOCAINE HYDROCHLORIDE 100 MG: 20 INJECTION, SOLUTION EPIDURAL; INFILTRATION; INTRACAUDAL; PERINEURAL at 10:34

## 2023-03-15 NOTE — ANESTHESIA POSTPROCEDURE EVALUATION
Post-Op Assessment Note    CV Status:  Stable    Pain management: adequate     Mental Status:  Alert and awake   Hydration Status:  Euvolemic   PONV Controlled:  Controlled   Airway Patency:  Patent      Post Op Vitals Reviewed: Yes      Staff: Anesthesiologist, CRNA         No notable events documented      BP      Temp      Pulse     Resp      SpO2      /70   Pulse 76   Temp 98 °F (36 7 °C) (Temporal)   Resp 16   Ht 5' 2 5" (1 588 m)   Wt 95 3 kg (210 lb)   SpO2 100%   BMI 37 80 kg/m²

## 2023-03-15 NOTE — ANESTHESIA PREPROCEDURE EVALUATION
Procedure:  EGD    Relevant Problems   CARDIO   (+) Hypertension      GI/HEPATIC   (+) Hx of bariatric surgery      /RENAL   (+) Stage 3 chronic kidney disease, unspecified whether stage 3a or 3b CKD (HCC)      MUSCULOSKELETAL   (+) Osteoarthritis of both knees      NEURO/PSYCH   (+) History of breast cancer      PULMONARY   (+) Moderate persistent asthma without complication        Physical Exam    Airway    Mallampati score: III  TM Distance: >3 FB  Neck ROM: full     Dental   No notable dental hx     Cardiovascular  Rhythm: regular, Rate: normal, Cardiovascular exam normal    Pulmonary  Pulmonary exam normal Breath sounds clear to auscultation,     Other Findings        Anesthesia Plan  ASA Score- 3     Anesthesia Type- IV sedation with anesthesia with ASA Monitors  Additional Monitors:   Airway Plan:     Comment: GA prn  Plan Factors-    Chart reviewed  Patient summary reviewed  Patient is not a current smoker  Patient not instructed to abstain from smoking on day of procedure  Patient did not smoke on day of surgery  Induction- intravenous  Postoperative Plan-     Informed Consent- Anesthetic plan and risks discussed with patient  I personally reviewed this patient with the CRNA  Discussed and agreed on the Anesthesia Plan with the CRNA  David Arboleda

## 2023-03-20 ENCOUNTER — PROCEDURE VISIT (OUTPATIENT)
Dept: FAMILY MEDICINE CLINIC | Facility: CLINIC | Age: 53
End: 2023-03-20

## 2023-03-20 VITALS
OXYGEN SATURATION: 98 % | WEIGHT: 210 LBS | HEART RATE: 88 BPM | TEMPERATURE: 98.3 F | RESPIRATION RATE: 18 BRPM | HEIGHT: 63 IN | BODY MASS INDEX: 37.21 KG/M2

## 2023-03-20 DIAGNOSIS — M17.0 OSTEOARTHRITIS OF BOTH KNEES, UNSPECIFIED OSTEOARTHRITIS TYPE: Primary | ICD-10-CM

## 2023-03-20 RX ORDER — TRIAMCINOLONE ACETONIDE 40 MG/ML
40 INJECTION, SUSPENSION INTRA-ARTICULAR; INTRAMUSCULAR
Status: COMPLETED | OUTPATIENT
Start: 2023-03-20 | End: 2023-03-20

## 2023-03-20 RX ORDER — BUPIVACAINE HYDROCHLORIDE 5 MG/ML
2 INJECTION, SOLUTION EPIDURAL; INTRACAUDAL
Status: COMPLETED | OUTPATIENT
Start: 2023-03-20 | End: 2023-03-20

## 2023-03-20 RX ADMIN — BUPIVACAINE HYDROCHLORIDE 2 ML: 5 INJECTION, SOLUTION EPIDURAL; INTRACAUDAL at 15:04

## 2023-03-20 RX ADMIN — TRIAMCINOLONE ACETONIDE 40 MG: 40 INJECTION, SUSPENSION INTRA-ARTICULAR; INTRAMUSCULAR at 15:04

## 2023-03-20 NOTE — ASSESSMENT & PLAN NOTE
Chronic knee pain  -Received left knee Cortisone injection today with improvement in symptoms  -Follow up with PT outpatient

## 2023-03-20 NOTE — PROGRESS NOTES
Name: Cosme Weston      : 1970      MRN: [id number]  Encounter Provider: Jaime Dow MD  Encounter Date: 3/20/2023   Encounter department: [address] [place] [address]  Osteoarthritis of both knees, unspecified osteoarthritis type  Assessment & Plan:  Chronic knee pain  -Received left knee Cortisone injection today with improvement in symptoms  -Follow up with PT outpatient           Subjective      81-year-old female with a past medical history of osteoarthritis, asthma, hypertension, and obesity who presents today for worsening of left knee pain  She reports that she has been having knee pain for quite some time  Her knee pain is worse when ambulation  She denies any rauma  She had steroid injection before which helped her symptoms  She has not had a chance to schedule physical therapy evaluation  Review of Systems   Constitutional: Negative for chills, diaphoresis, fatigue and fever  Respiratory: Negative for shortness of breath and wheezing  Cardiovascular: Negative for chest pain, palpitations and leg swelling  Gastrointestinal: Negative for abdominal pain, diarrhea, nausea and vomiting  Musculoskeletal: Positive for arthralgias  Skin: Negative for rash  Neurological: Negative for syncope, weakness and numbness  Hematological: Negative for adenopathy  Psychiatric/Behavioral: Negative for agitation  Current Outpatient Medications on File Prior to Visit   Medication Sig   • albuterol (Ventolin HFA) 90 mcg/act inhaler Inhale 2 puffs every 6 (six) hours as needed for wheezing   • amLODIPine (NORVASC) 5 mg tablet Take 1 tablet (5 mg total) by mouth daily   • benztropine (COGENTIN) 1 mg tablet ONE TABLET 1 TIMES AT NIGHT  • budesonide-formoterol (Symbicort) 80-4 5 MCG/ACT inhaler Inhale 2 puffs 2 (two) times a day Rinse mouth after use     • hydrOXYzine HCL (ATARAX) 50 mg tablet Take 50 mg by mouth 3 (three) times a day as needed   • hydrOXYzine pamoate (VISTARIL) 50 mg capsule Take 1 capsule (50 mg total) by mouth 3 (three) times a day as needed for anxiety for up to 3 days   • lithium 300 MG tablet Take 1 tablet by mouth once daily and 2 tablets at bedtime (Patient taking differently: Take 450 mg by mouth in the morning Take 1 tablet by mouth once daily and 2 tablets at bedtime)   • lithium 600 MG capsule Take 600 mg by mouth daily at bedtime   • mirtazapine (REMERON) 7 5 MG tablet daily at bedtime   • multivitamin (THERAGRAN) TABS Take 1 tablet by mouth daily   • Naproxen Sodium 220 MG CAPS Take by mouth   • nicotine (NICODERM CQ) 21 mg/24 hr TD 24 hr patch Place 1 patch on the skin over 24 hours every 24 hours   • QUEtiapine (SEROquel) 100 mg tablet Take 1 tablet (100 mg total) by mouth daily at bedtime   • QUEtiapine (SEROquel) 200 mg tablet Take 200 mg by mouth daily at bedtime (Patient not taking: Reported on 1/11/2023)       Objective     Pulse 88   Temp 98 3 °F (36 8 °C) (Temporal)   Resp 18   Ht 5' 2 5" (1 588 m)   Wt 95 3 kg (210 lb)   SpO2 98%   BMI 37 80 kg/m²     Physical Exam  Vitals and nursing note reviewed  Constitutional:       General: She is not in acute distress  Appearance: Normal appearance  She is well-developed  She is not ill-appearing, toxic-appearing or diaphoretic  HENT:      Head: Normocephalic and atraumatic  Right Ear: External ear normal       Left Ear: External ear normal       Nose: Nose normal       Mouth/Throat:      Mouth: Mucous membranes are moist       Pharynx: No oropharyngeal exudate or posterior oropharyngeal erythema  Eyes:      General: No scleral icterus  Right eye: No discharge  Left eye: No discharge  Extraocular Movements: Extraocular movements intact  Conjunctiva/sclera: Conjunctivae normal    Neck:      Thyroid: No thyromegaly  Vascular: No JVD  Trachea: No tracheal deviation     Cardiovascular:      Rate and Rhythm: Normal rate and regular rhythm  Pulses:           Dorsalis pedis pulses are 2+ on the right side and 2+ on the left side  Posterior tibial pulses are 2+ on the right side and 2+ on the left side  Heart sounds: Normal heart sounds  No murmur heard  No friction rub  No gallop  Pulmonary:      Effort: Pulmonary effort is normal  No respiratory distress  Breath sounds: Normal breath sounds  No stridor  No wheezing or rhonchi  Abdominal:      General: Bowel sounds are normal  There is no distension  Palpations: Abdomen is soft  There is no mass  Tenderness: There is no abdominal tenderness  There is no guarding or rebound  Hernia: No hernia is present  Musculoskeletal:         General: Normal range of motion  Cervical back: Normal range of motion and neck supple  Right knee: Bony tenderness and crepitus present  No swelling, deformity, effusion or ecchymosis  Normal range of motion  Left knee: Bony tenderness and crepitus present  No swelling, deformity, effusion or ecchymosis  Normal range of motion  Right lower leg: No edema  Left lower leg: No edema  Feet:      Right foot:      Skin integrity: No ulcer, skin breakdown, erythema, warmth, callus or dry skin  Left foot:      Skin integrity: No ulcer, skin breakdown, erythema, warmth, callus or dry skin  Skin:     General: Skin is warm  Capillary Refill: Capillary refill takes less than 2 seconds  Findings: No lesion or rash  Neurological:      General: No focal deficit present  Mental Status: She is alert and oriented to person, place, and time  Cranial Nerves: No cranial nerve deficit  Motor: No weakness or abnormal muscle tone        Coordination: Coordination normal       Gait: Gait normal    Psychiatric:         Mood and Affect: Mood normal          Behavior: Behavior normal      Large joint arthrocentesis: L knee  Universal Protocol:  Consent: Verbal consent obtained    Risks and benefits: risks, benefits and alternatives were discussed  Consent given by: patient  Patient understanding: patient states understanding of the procedure being performed  Patient consent: the patient's understanding of the procedure matches consent given  Relevant documents: relevant documents present and verified  Site marked: the operative site was marked  Patient identity confirmed: verbally with patient    Supporting Documentation  Indications: pain   Procedure Details  Location: knee - L knee  Needle size: 25 G  Ultrasound guidance: no  Approach: lateral  Medications administered: 2 mL bupivacaine (PF) 0 5 %; 40 mg triamcinolone acetonide 40 mg/mL    Patient tolerance: patient tolerated the procedure well with no immediate complications  Dressing:  Sterile dressing applied          Wan Christensen MD

## 2023-04-05 ENCOUNTER — OFFICE VISIT (OUTPATIENT)
Dept: BARIATRICS | Facility: CLINIC | Age: 53
End: 2023-04-05

## 2023-04-05 VITALS
WEIGHT: 205 LBS | BODY MASS INDEX: 36.32 KG/M2 | HEART RATE: 90 BPM | TEMPERATURE: 98 F | OXYGEN SATURATION: 98 % | HEIGHT: 63 IN

## 2023-04-05 DIAGNOSIS — K21.9 GERD (GASTROESOPHAGEAL REFLUX DISEASE): Primary | ICD-10-CM

## 2023-04-05 DIAGNOSIS — E66.01 MORBID (SEVERE) OBESITY DUE TO EXCESS CALORIES (HCC): ICD-10-CM

## 2023-04-05 NOTE — PROGRESS NOTES
"  Follow Up - Bariatric Surgery   Helio Khanna 47 y o  female MRN: [id number]  Unit/Bed#:  Encounter: [id number]            Subjective: Patient still complaining of chronic nausea and vomiting    Objective:         Lab, Imaging and other studies: I have personally reviewed pertinent labs  Family History: non-contributory    Meds/Allergies   all medications and allergies reviewed    Vitals: Pulse 90, temperature 98 °F (36 7 °C), temperature source Tympanic, height 5' 2 5\" (1 588 m), weight 93 kg (205 lb), SpO2 98 %, not currently breastfeeding  ,Body mass index is 36 9 kg/m²  [unfilled]    Invasive Devices     None                 Physical Exam:     NAD  Abdomen soft non-tender, incisions well healed  No palpable hernias    Assessment/Plan:    I had a long discussion today with the patient regarding her endoscopy findings and we reviewed all the surgical options with her including band removal , band removal and conversion to sleeve gastrectomy and band removal and conversion to Kenneth-en-Y gastric bypass and we decided to proceed with band removal and conversion to Kenneth-en-Y gastric bypass,  risks and benefits were explained to the patient in details, all questions were answered all concerns were addressed   Patient is level 3    "

## 2023-04-05 NOTE — PROGRESS NOTES
OFFICE VISIT - BARIATRIC SURGERY  Katarina Mruray 47 y o  female MRN: [id number]  Unit/Bed#:  Encounter: [id number]      HPI:  Katarina Murray is a 47 y o  female status post *** by Dr Layla Elliott on ***  Comes to the office today with complaints of ***, referred by Dr Tammi Zaidi Subjective     At the time of their surgery the patient was ***, and was able to drop down as low as ***  Today, their weight is ***, with a BMI of ***  The EWL is ***%  Tolerating diet: ***  Main symptoms: ***  Worse with food: ***  Nausea: ***  Vomiting: ***  Diarrhea: ***  Duration of symptoms: ***  Smoking: ***  Alcohol use: ***  NSAID use: ***  Caffeine use: ***  Current treatment: ***  Past medical history significant for: ***  Previous cholecystectomy or pertinent abdominal surgeries:   Previous EGD: ***  Previous Upper GI: ***   Previous Manometry: ***  Ambulation is not impaired  Review of Systems   Constitutional: Negative for chills and fever  HENT: Negative for ear pain and sore throat  Eyes: Negative for pain and visual disturbance  Respiratory: Negative for cough and shortness of breath  Cardiovascular: Negative for chest pain and palpitations  Gastrointestinal: Negative for abdominal pain and vomiting  Genitourinary: Negative for dysuria and hematuria  Musculoskeletal: Negative for arthralgias and back pain  Skin: Negative for color change and rash  Neurological: Negative for seizures and syncope  All other systems reviewed and are negative        Historical Information   Past Medical History:   Diagnosis Date   • Asthma    • Bipolar 1 disorder ([facility] [address] )    • HTN (hypertension)    • Hypertension    • Mental health problem      Past Surgical History:   Procedure Laterality Date   • BREAST SURGERY Bilateral 2014   • COSMETIC SURGERY     • HYSTERECTOMY     • LAPAROSCOPIC GASTRIC BANDING       Social History   Social History     Substance and Sexual Activity   Alcohol Use Not Currently     Social History "    Substance and Sexual Activity   Drug Use Not Currently     Social History     Tobacco Use   Smoking Status Former   • Packs/day: 0 25   • Types: Cigarettes   • Quit date: 2023   • Years since quittin 0   Smokeless Tobacco Never       Objective       Current Vitals:   Pulse: 90 (23)  Temperature: 98 °F (36 7 °C) (23)  Temp Source: Tympanic (23)  Height: 5' 2 5\" (158 8 cm) (23)  Weight - Scale: 93 kg (205 lb) (23)  SpO2: 98 % (23)    Invasive Devices     None                 Physical Exam  Constitutional:       Appearance: Normal appearance  She is obese  HENT:      Head: Normocephalic and atraumatic  Nose: Nose normal       Mouth/Throat:      Mouth: Mucous membranes are moist    Eyes:      Extraocular Movements: Extraocular movements intact  Pupils: Pupils are equal, round, and reactive to light  Cardiovascular:      Rate and Rhythm: Normal rate and regular rhythm  Pulses: Normal pulses  Heart sounds: Normal heart sounds  Pulmonary:      Effort: Pulmonary effort is normal       Breath sounds: Normal breath sounds  Abdominal:      Palpations: Abdomen is soft  Musculoskeletal:      Cervical back: Normal range of motion  Skin:     General: Skin is warm  Neurological:      General: No focal deficit present  Mental Status: She is alert and oriented to person, place, and time  Psychiatric:         Mood and Affect: Mood normal          Behavior: Behavior normal            Pathology, and Other Studies: {Results Review Statement:39555}      Assessment/PLAN:    Jae Lazo is a 47 y o  female status post *** on *** with Dr Deeann Dakins Workup thus far reviewed and discussed with patient: *** for abnormal findings    Workup includes:     UGI        EGD        Pathology from EGD         MANOMETRY/pH Study        GASTRIC EMPTYING STUDY      --------------------------------------------------------------------    We " will refer for medical clearance preoperatively  Follow up in *** month  Ronen Macario PA-C  Bariatric Surgery  4/5/2023  9:37 AM

## 2023-05-06 DIAGNOSIS — J45.40 MODERATE PERSISTENT ASTHMA WITHOUT COMPLICATION: ICD-10-CM

## 2023-05-08 RX ORDER — DILTIAZEM HYDROCHLORIDE 60 MG/1
TABLET, FILM COATED ORAL
Qty: 10.2 G | Refills: 1 | Status: SHIPPED | OUTPATIENT
Start: 2023-05-08

## 2023-05-08 NOTE — PROGRESS NOTES
Weight check:  Level 3  (smoker)  Negative test due in chart by June 1st 2023  history of lap band in 2004 with Dr Nataliia Jang in [place]   Now with complaints of reflux, dysphasia, weight regain, pain, and vomiting,  At the time of their surgery the patient was 320lb, and was able to drop down as low as 145lb  Following Dr Milton Booker    Started to wear the patch for her nicotine  Discussed June 1st as her deadline for the negative nicotine test      Increased her awareness with her food intake  Stopped eating around 7pm   Some sweet cravings, but better  History of candy for breakfast, lunch and dinner  Doing better with the emotional cravings  Using butter on her fresh vegetables  Routine of eating chicken (gets old), some steak  Stopped eating sugary cereal  Working on her workflow  Stated she is taking off the nicotine patch today  That's provides 21 days until deadline

## 2023-05-10 ENCOUNTER — OFFICE VISIT (OUTPATIENT)
Dept: BARIATRICS | Facility: CLINIC | Age: 53
End: 2023-05-10

## 2023-05-10 VITALS — WEIGHT: 206.6 LBS | BODY MASS INDEX: 37.19 KG/M2

## 2023-05-10 DIAGNOSIS — Z98.84 BARIATRIC SURGERY STATUS: Primary | ICD-10-CM

## 2023-05-14 PROBLEM — N18.31 STAGE 3A CHRONIC KIDNEY DISEASE (HCC): Status: ACTIVE | Noted: 2023-05-14

## 2023-05-14 PROBLEM — Z01.810 PREOPERATIVE CARDIOVASCULAR EXAMINATION: Status: ACTIVE | Noted: 2023-05-14

## 2023-05-19 ENCOUNTER — OFFICE VISIT (OUTPATIENT)
Dept: CARDIOLOGY CLINIC | Facility: CLINIC | Age: 53
End: 2023-05-19

## 2023-05-19 VITALS
SYSTOLIC BLOOD PRESSURE: 110 MMHG | WEIGHT: 207 LBS | DIASTOLIC BLOOD PRESSURE: 60 MMHG | HEART RATE: 73 BPM | BODY MASS INDEX: 37.26 KG/M2

## 2023-05-19 DIAGNOSIS — Z01.810 PREOPERATIVE CARDIOVASCULAR EXAMINATION: Primary | ICD-10-CM

## 2023-05-19 DIAGNOSIS — N18.31 STAGE 3A CHRONIC KIDNEY DISEASE (HCC): ICD-10-CM

## 2023-05-19 DIAGNOSIS — Z72.0 TOBACCO ABUSE: ICD-10-CM

## 2023-05-19 DIAGNOSIS — E66.01 OBESITY, MORBID (HCC): ICD-10-CM

## 2023-05-19 DIAGNOSIS — N18.30 STAGE 3 CHRONIC KIDNEY DISEASE, UNSPECIFIED WHETHER STAGE 3A OR 3B CKD (HCC): ICD-10-CM

## 2023-05-19 DIAGNOSIS — I10 PRIMARY HYPERTENSION: ICD-10-CM

## 2023-05-19 DIAGNOSIS — Z98.84 HX OF BARIATRIC SURGERY: ICD-10-CM

## 2023-05-19 NOTE — LETTER
May 19, 2023     Tarun Pires, [address] [facility] [address] [facility]    Patient: Sarah Baca   YOB: 1970   Date of Visit: 2023       Dear Dr Jose Ardon: Thank you for referring Sarah Baca to me for evaluation  Below are my notes for this consultation  If you have questions, please do not hesitate to call me  I look forward to following your patient along with you  Sincerely,        Maureen Carlos MD        CC: MD Maureen Maldonado MD  2023 10:52 AM  Signed     [facility]  [address] [place] [address]  Phone#  [phone]   Fax#  [phone]  *-*-*-*-*-*-*-*-*-*-*-*-*-*-*-*-*-*-*-*-*-*-*-*-*-*-*-*-*-*-*-*-*-*-*-*-*-*-*-*-*-*-*-*-*-*-*-*-*-*-*-*-*-*                                              Cardiology Consultation       ENCOUNTER DATE: 23 10:52 AM  PATIENT NAME: Sarah Baca   : 1970    MRN: [id number]  AGE:53 y o  SEX: female  Katlyn Pruitt MD     PRIMARY CARE PHYSICIAN: Maryan Cohen MD    ACTIVE DIAGNOSIS THIS VISIT  1  Preoperative cardiovascular examination  POCT ECG      2  Primary hypertension        3  Tobacco abuse        4  Stage 3 chronic kidney disease, unspecified whether stage 3a or 3b CKD (HCC)        5  Stage 3a chronic kidney disease ([address] )        6  Hx of bariatric surgery        7   Obesity, morbid ([address] )  Ambulatory referral to Cardiology        ACTIVE PROBLEM LIST  Patient Active Problem List   Diagnosis   • Hypertension   • Bipolar 1 disorder ([address] )   • Moderate persistent asthma without complication   • Osteoarthritis of both knees   • Obesity, morbid (HCC)   • Palpitation   • History of breast cancer   • Hx of bariatric surgery   • Tobacco abuse   • Acute on chronic retention of urine   • Preoperative cardiovascular examination   • Stage 3a chronic kidney disease (HCC)       CARDIOLOGY SPECIALTY COMMENTS  Referred to cardiology by Mikayla Colbert MD for preoperative cardiovascular examination for bariatric surgery  HPI:   Patient is here for preoperative cardiovascular approval for bariatric surgery  Patient has known history of heart disease  She was having palpitations when she would lay down at night and it would keep her awake  However at that time she was drinking energy drinks and since she stopped the energy drinks, she no longer has palpitations and can go to sleep without difficulty  Her PCP had ordered a Holter monitor at that time which she never had done because the palpitations resolved when she stopped the energy drinks  Patient has chest discomfort which occurs at rest and she feels it is related to indigestion  Patient denies anginal type chest discomfort which occurs with exertion and is relieved by rest   Patient denies shortness of breath  Patient has only has no palpitations  Patient denies symptoms of dizziness, lightheadedness or near-syncope/syncope  Patient denies leg edema  Patient denies symptoms of orthopnea or paroxysmal nocturnal dyspnea  He stopped smoking on May 10 and only smoked 5 or 6 cigarettes a day for 10 years to a total of 2 5 pack years  She has no family history of heart disease but her mother had metastatic breast cancer and in later life had strokes which may have been secondary to metastasis from her breast cancer rather than cardiovascular disease  Her first stroke was at age 59  DISCUSSION/PLAN:         · Recommend proceeding with bariatric surgery as planned  · Patient is a low cardiac risk    · Return as needed    Lab Studies:    Lab Results   Component Value Date    CHOLESTEROL 212 (H) 04/04/2022     Lab Results   Component Value Date    TRIG 66 04/04/2022     Lab Results   Component Value Date    HDL 82 04/04/2022     Lab Results   Component Value Date    LDLCALC 117 (H) 04/04/2022       Lab Results   Component Value Date    HGBA1C 5 3 04/04/2022      Lab Results   Component Value Date    EGFR 55 04/04/2022    SODIUM 138 04/04/2022    K 3 8 04/04/2022     04/04/2022    CO2 24 04/04/2022    BUN 12 04/04/2022    CREATININE 1 15 04/04/2022     Lab Results   Component Value Date    WBC 8 59 04/04/2022    HGB 12 9 04/04/2022    HCT 39 4 04/04/2022    MCV 77 (L) 04/04/2022    MCH 25 1 (L) 04/04/2022    MCHC 32 7 04/04/2022     04/04/2022        Lab Results   Component Value Date    CALCIUM 9 3 04/04/2022    AST 22 04/04/2022    ALT 35 04/04/2022    ALKPHOS 69 04/04/2022       Results for orders placed or performed in visit on 05/19/23   POCT ECG    Narrative    Normal sinus rhythm at a rate of 73 bpm   Low amplitude T waves  Normal EKG           Current Outpatient Medications:   •  albuterol (Ventolin HFA) 90 mcg/act inhaler, Inhale 2 puffs every 6 (six) hours as needed for wheezing, Disp: 18 g, Rfl: 5  •  amLODIPine (NORVASC) 5 mg tablet, Take 1 tablet (5 mg total) by mouth daily, Disp: 90 tablet, Rfl: 1  •  benztropine (COGENTIN) 1 mg tablet, ONE TABLET 1 TIMES AT NIGHT , Disp: , Rfl:   •  hydrOXYzine HCL (ATARAX) 50 mg tablet, Take 50 mg by mouth 3 (three) times a day as needed, Disp: , Rfl:   •  lithium 300 MG tablet, Take 1 tablet by mouth once daily and 2 tablets at bedtime (Patient taking differently: Take 450 mg by mouth in the morning Take 1 tablet by mouth once daily and 2 tablets at bedtime), Disp: 14 tablet, Rfl: 0  •  lithium 600 MG capsule, Take 600 mg by mouth daily at bedtime, Disp: , Rfl:   •  mirtazapine (REMERON) 7 5 MG tablet, daily at bedtime, Disp: , Rfl:   •  multivitamin (THERAGRAN) TABS, Take 1 tablet by mouth daily, Disp: , Rfl:   •  QUEtiapine (SEROquel) 100 mg tablet, Take 1 tablet (100 mg total) by mouth daily at bedtime, Disp: 7 tablet, Rfl: 0  •  Symbicort 80-4 5 MCG/ACT inhaler, INHALE 2 PUFFS BY MOUTH 2 TIMES A DAY RINSE MOUTH AFTER USE , Disp: 10 2 g, Rfl: 1  •  hydrOXYzine pamoate (VISTARIL) 50 mg capsule, Take 1 capsule (50 mg total) by mouth 3 (three) times a day as needed for anxiety for up to 3 days, Disp: 12 capsule, Rfl: 0  •  Naproxen Sodium 220 MG CAPS, Take by mouth (Patient not taking: Reported on 2023), Disp: , Rfl:   •  nicotine (NICODERM CQ) 21 mg/24 hr TD 24 hr patch, Place 1 patch on the skin over 24 hours every 24 hours (Patient not taking: Reported on 2023), Disp: 28 patch, Rfl: 1  •  QUEtiapine (SEROquel) 200 mg tablet, Take 200 mg by mouth daily at bedtime (Patient not taking: Reported on 2023), Disp: , Rfl:   No Known Allergies    Past Medical History:   Diagnosis Date   • Asthma    • Bipolar 1 disorder ([facility] )    • HTN (hypertension)    • Hypertension    • Mental health problem      Social History     Socioeconomic History   • Marital status: Single     Spouse name: Not on file   • Number of children: Not on file   • Years of education: Not on file   • Highest education level: Not on file   Occupational History   • Not on file   Tobacco Use   • Smoking status: Former     Packs/day: 0 25     Types: Cigarettes     Quit date: 2023     Years since quittin 2   • Smokeless tobacco: Never   Vaping Use   • Vaping Use: Never used   Substance and Sexual Activity   • Alcohol use: Not Currently   • Drug use: Not Currently   • Sexual activity: Not on file   Other Topics Concern   • Not on file   Social History Narrative   • Not on file     Social Determinants of Health     Financial Resource Strain: Low Risk    • Difficulty of Paying Living Expenses: Not hard at all   Food Insecurity: No Food Insecurity   • Worried About Running Out of Food in the Last Year: Never true   • Ran Out of Food in the Last Year: Never true   Transportation Needs: No Transportation Needs   • Lack of Transportation (Medical): No   • Lack of Transportation (Non-Medical):  No   Physical Activity: Not on file   Stress: Not on file   Social Connections: Not on file   Intimate Partner Violence: Not on file   Housing Stability: Not on file History reviewed  No pertinent family history  Past Surgical History:   Procedure Laterality Date   • BREAST SURGERY Bilateral 2014   • COSMETIC SURGERY     • HYSTERECTOMY     • LAPAROSCOPIC GASTRIC BANDING         Review of Systems:  Review of Systems   Constitutional: Negative  HENT: Negative  Respiratory: Negative for cough, choking, chest tightness, shortness of breath, wheezing and stridor  Cardiovascular: Negative for chest pain, palpitations and leg swelling  Gastrointestinal: Negative  Endocrine: Negative  Genitourinary: Negative  Musculoskeletal: Negative  Skin: Negative  Allergic/Immunologic: Negative  Neurological: Negative  Hematological: Negative  Psychiatric/Behavioral: Negative  Physical Exam:  /60 (BP Location: Left leg, Patient Position: Sitting, Cuff Size: Large)   Pulse 73   Wt 93 9 kg (207 lb)   BMI 37 26 kg/m²     PREVIOUS WEIGHTS:   Wt Readings from Last 10 Encounters:   05/19/23 93 9 kg (207 lb)   05/10/23 93 7 kg (206 lb 9 6 oz)   04/05/23 93 kg (205 lb)   03/20/23 95 3 kg (210 lb)   03/15/23 95 3 kg (210 lb)   03/13/23 95 3 kg (210 lb)   03/01/23 92 8 kg (204 lb 8 oz)   01/11/23 90 9 kg (200 lb 8 oz)   12/13/22 91 6 kg (202 lb)   10/13/22 95 2 kg (209 lb 12 8 oz)      Physical Exam  Constitutional:       General: She is not in acute distress  Appearance: She is well-developed  HENT:      Head: Normocephalic and atraumatic  Neck:      Thyroid: No thyromegaly  Vascular: No carotid bruit or JVD  Trachea: No tracheal deviation  Cardiovascular:      Rate and Rhythm: Normal rate and regular rhythm  Pulses: Normal pulses  Heart sounds: Normal heart sounds  No murmur heard  No friction rub  No gallop  Pulmonary:      Effort: Pulmonary effort is normal  No respiratory distress  Breath sounds: Normal breath sounds  No wheezing, rhonchi or rales  Chest:      Chest wall: No tenderness        Comments: "Bilateral mastectomies  Musculoskeletal:         General: Normal range of motion  Cervical back: Normal range of motion and neck supple  Right lower leg: No edema  Left lower leg: No edema  Skin:     General: Skin is warm and dry  Neurological:      General: No focal deficit present  Mental Status: She is alert and oriented to person, place, and time  Gait: Gait normal    Psychiatric:         Mood and Affect: Mood normal          Behavior: Behavior normal          Thought Content: Thought content normal          Judgment: Judgment normal      ======================================================   I have personally reviewed pertinent reports  I spent 40 minutes on the patient's office visit  This time was spent on the day of the visit  I had direct contact with the patient in the office on the day of the visit  Greater than 50% of the total time was spent obtaining a history, examining patient, answering all patient questions, arranging and discussing plan of care with patient as well as directly providing instructions  Additional time then spent on orders and office chart  Portions of the record may have been created with voice recognition software  Occasional wrong word or \"sound a like\" substitutions may have occurred due to the inherent limitations of voice recognition software  Read the chart carefully and recognize, using context, where substitutions have occurred      SIGNATURES:   Lon Mishra MD     "

## 2023-05-19 NOTE — PROGRESS NOTES
[facility]  [address]  Phone#  [phone]   Fax#  [phone]  *-*-*-*-*-*-*-*-*-*-*-*-*-*-*-*-*-*-*-*-*-*-*-*-*-*-*-*-*-*-*-*-*-*-*-*-*-*-*-*-*-*-*-*-*-*-*-*-*-*-*-*-*-*                                              Cardiology Consultation       ENCOUNTER DATE: 23 10:52 AM  PATIENT NAME: Tim Alamo   : 1970    MRN: [id number]  AGE:53 y o  SEX: female  Rohini Vegas MD     PRIMARY CARE PHYSICIAN: Geoffrey Melendrez MD    ACTIVE DIAGNOSIS THIS VISIT  1  Preoperative cardiovascular examination  POCT ECG      2  Primary hypertension        3  Tobacco abuse        4  Stage 3 chronic kidney disease, unspecified whether stage 3a or 3b CKD (HCC)        5  Stage 3a chronic kidney disease ([address] )        6  Hx of bariatric surgery        7  Obesity, morbid ([facility] )  Ambulatory referral to Cardiology        ACTIVE PROBLEM LIST  Patient Active Problem List   Diagnosis   • Hypertension   • Bipolar 1 disorder ([address] )   • Moderate persistent asthma without complication   • Osteoarthritis of both knees   • Obesity, morbid (HCC)   • Palpitation   • History of breast cancer   • Hx of bariatric surgery   • Tobacco abuse   • Acute on chronic retention of urine   • Preoperative cardiovascular examination   • Stage 3a chronic kidney disease ([address] )       CARDIOLOGY SPECIALTY COMMENTS  Referred to cardiology by Kenneth Martinez MD for preoperative cardiovascular examination for bariatric surgery  HPI:   Patient is here for preoperative cardiovascular approval for bariatric surgery  Patient has known history of heart disease  She was having palpitations when she would lay down at night and it would keep her awake  However at that time she was drinking energy drinks and since she stopped the energy drinks, she no longer has palpitations and can go to sleep without difficulty    Her PCP had ordered a Holter monitor at that time which she never had done because the palpitations resolved when she stopped the energy drinks  Patient has chest discomfort which occurs at rest and she feels it is related to indigestion  Patient denies anginal type chest discomfort which occurs with exertion and is relieved by rest   Patient denies shortness of breath  Patient has only has no palpitations  Patient denies symptoms of dizziness, lightheadedness or near-syncope/syncope  Patient denies leg edema  Patient denies symptoms of orthopnea or paroxysmal nocturnal dyspnea  He stopped smoking on May 10 and only smoked 5 or 6 cigarettes a day for 10 years to a total of 2 5 pack years  She has no family history of heart disease but her mother had metastatic breast cancer and in later life had strokes which may have been secondary to metastasis from her breast cancer rather than cardiovascular disease  Her first stroke was at age 59  DISCUSSION/PLAN:         · Recommend proceeding with bariatric surgery as planned  · Patient is a low cardiac risk    · Return as needed    Lab Studies:    Lab Results   Component Value Date    CHOLESTEROL 212 (H) 04/04/2022     Lab Results   Component Value Date    TRIG 66 04/04/2022     Lab Results   Component Value Date    HDL 82 04/04/2022     Lab Results   Component Value Date    LDLCALC 117 (H) 04/04/2022       Lab Results   Component Value Date    HGBA1C 5 3 04/04/2022      Lab Results   Component Value Date    EGFR 55 04/04/2022    SODIUM 138 04/04/2022    K 3 8 04/04/2022     04/04/2022    CO2 24 04/04/2022    BUN 12 04/04/2022    CREATININE 1 15 04/04/2022     Lab Results   Component Value Date    WBC 8 59 04/04/2022    HGB 12 9 04/04/2022    HCT 39 4 04/04/2022    MCV 77 (L) 04/04/2022    MCH 25 1 (L) 04/04/2022    MCHC 32 7 04/04/2022     04/04/2022        Lab Results   Component Value Date    CALCIUM 9 3 04/04/2022    AST 22 04/04/2022    ALT 35 04/04/2022    ALKPHOS 69 04/04/2022       Results for orders placed or performed in visit on 05/19/23   POCT ECG    Narrative    Normal sinus rhythm at a rate of 73 bpm   Low amplitude T waves  Normal EKG           Current Outpatient Medications:   •  albuterol (Ventolin HFA) 90 mcg/act inhaler, Inhale 2 puffs every 6 (six) hours as needed for wheezing, Disp: 18 g, Rfl: 5  •  amLODIPine (NORVASC) 5 mg tablet, Take 1 tablet (5 mg total) by mouth daily, Disp: 90 tablet, Rfl: 1  •  benztropine (COGENTIN) 1 mg tablet, ONE TABLET 1 TIMES AT NIGHT , Disp: , Rfl:   •  hydrOXYzine HCL (ATARAX) 50 mg tablet, Take 50 mg by mouth 3 (three) times a day as needed, Disp: , Rfl:   •  lithium 300 MG tablet, Take 1 tablet by mouth once daily and 2 tablets at bedtime (Patient taking differently: Take 450 mg by mouth in the morning Take 1 tablet by mouth once daily and 2 tablets at bedtime), Disp: 14 tablet, Rfl: 0  •  lithium 600 MG capsule, Take 600 mg by mouth daily at bedtime, Disp: , Rfl:   •  mirtazapine (REMERON) 7 5 MG tablet, daily at bedtime, Disp: , Rfl:   •  multivitamin (THERAGRAN) TABS, Take 1 tablet by mouth daily, Disp: , Rfl:   •  QUEtiapine (SEROquel) 100 mg tablet, Take 1 tablet (100 mg total) by mouth daily at bedtime, Disp: 7 tablet, Rfl: 0  •  Symbicort 80-4 5 MCG/ACT inhaler, INHALE 2 PUFFS BY MOUTH 2 TIMES A DAY RINSE MOUTH AFTER USE , Disp: 10 2 g, Rfl: 1  •  hydrOXYzine pamoate (VISTARIL) 50 mg capsule, Take 1 capsule (50 mg total) by mouth 3 (three) times a day as needed for anxiety for up to 3 days, Disp: 12 capsule, Rfl: 0  •  Naproxen Sodium 220 MG CAPS, Take by mouth (Patient not taking: Reported on 4/5/2023), Disp: , Rfl:   •  nicotine (NICODERM CQ) 21 mg/24 hr TD 24 hr patch, Place 1 patch on the skin over 24 hours every 24 hours (Patient not taking: Reported on 4/5/2023), Disp: 28 patch, Rfl: 1  •  QUEtiapine (SEROquel) 200 mg tablet, Take 200 mg by mouth daily at bedtime (Patient not taking: Reported on 5/19/2023), Disp: , Rfl:   No Known Allergies    Past Medical History:   Diagnosis Date   • Asthma    • Bipolar 1 disorder ([facility] )    • HTN (hypertension)    • Hypertension    • Mental health problem      Social History     Socioeconomic History   • Marital status: Single     Spouse name: Not on file   • Number of children: Not on file   • Years of education: Not on file   • Highest education level: Not on file   Occupational History   • Not on file   Tobacco Use   • Smoking status: Former     Packs/day: 0 25     Types: Cigarettes     Quit date: 2023     Years since quittin 2   • Smokeless tobacco: Never   Vaping Use   • Vaping Use: Never used   Substance and Sexual Activity   • Alcohol use: Not Currently   • Drug use: Not Currently   • Sexual activity: Not on file   Other Topics Concern   • Not on file   Social History Narrative   • Not on file     Social Determinants of Health     Financial Resource Strain: Low Risk    • Difficulty of Paying Living Expenses: Not hard at all   Food Insecurity: No Food Insecurity   • Worried About Running Out of Food in the Last Year: Never true   • Ran Out of Food in the Last Year: Never true   Transportation Needs: No Transportation Needs   • Lack of Transportation (Medical): No   • Lack of Transportation (Non-Medical): No   Physical Activity: Not on file   Stress: Not on file   Social Connections: Not on file   Intimate Partner Violence: Not on file   Housing Stability: Not on file      History reviewed  No pertinent family history  Past Surgical History:   Procedure Laterality Date   • BREAST SURGERY Bilateral    • COSMETIC SURGERY     • HYSTERECTOMY     • LAPAROSCOPIC GASTRIC BANDING         Review of Systems:  Review of Systems   Constitutional: Negative  HENT: Negative  Respiratory: Negative for cough, choking, chest tightness, shortness of breath, wheezing and stridor  Cardiovascular: Negative for chest pain, palpitations and leg swelling  Gastrointestinal: Negative  Endocrine: Negative  Genitourinary: Negative  Musculoskeletal: Negative  Skin: Negative  Allergic/Immunologic: Negative  Neurological: Negative  Hematological: Negative  Psychiatric/Behavioral: Negative  Physical Exam:  /60 (BP Location: Left leg, Patient Position: Sitting, Cuff Size: Large)   Pulse 73   Wt 93 9 kg (207 lb)   BMI 37 26 kg/m²     PREVIOUS WEIGHTS:   Wt Readings from Last 10 Encounters:   05/19/23 93 9 kg (207 lb)   05/10/23 93 7 kg (206 lb 9 6 oz)   04/05/23 93 kg (205 lb)   03/20/23 95 3 kg (210 lb)   03/15/23 95 3 kg (210 lb)   03/13/23 95 3 kg (210 lb)   03/01/23 92 8 kg (204 lb 8 oz)   01/11/23 90 9 kg (200 lb 8 oz)   12/13/22 91 6 kg (202 lb)   10/13/22 95 2 kg (209 lb 12 8 oz)      Physical Exam  Constitutional:       General: She is not in acute distress  Appearance: She is well-developed  HENT:      Head: Normocephalic and atraumatic  Neck:      Thyroid: No thyromegaly  Vascular: No carotid bruit or JVD  Trachea: No tracheal deviation  Cardiovascular:      Rate and Rhythm: Normal rate and regular rhythm  Pulses: Normal pulses  Heart sounds: Normal heart sounds  No murmur heard  No friction rub  No gallop  Pulmonary:      Effort: Pulmonary effort is normal  No respiratory distress  Breath sounds: Normal breath sounds  No wheezing, rhonchi or rales  Chest:      Chest wall: No tenderness  Comments: Bilateral mastectomies  Musculoskeletal:         General: Normal range of motion  Cervical back: Normal range of motion and neck supple  Right lower leg: No edema  Left lower leg: No edema  Skin:     General: Skin is warm and dry  Neurological:      General: No focal deficit present  Mental Status: She is alert and oriented to person, place, and time  Gait: Gait normal    Psychiatric:         Mood and Affect: Mood normal          Behavior: Behavior normal          Thought Content:  Thought content normal  "Judgment: Judgment normal      ======================================================   I have personally reviewed pertinent reports  I spent 40 minutes on the patient's office visit  This time was spent on the day of the visit  I had direct contact with the patient in the office on the day of the visit  Greater than 50% of the total time was spent obtaining a history, examining patient, answering all patient questions, arranging and discussing plan of care with patient as well as directly providing instructions  Additional time then spent on orders and office chart  Portions of the record may have been created with voice recognition software  Occasional wrong word or \"sound a like\" substitutions may have occurred due to the inherent limitations of voice recognition software  Read the chart carefully and recognize, using context, where substitutions have occurred      SIGNATURES:   Camila Ingram MD   "

## 2023-05-23 ENCOUNTER — OFFICE VISIT (OUTPATIENT)
Dept: SLEEP CENTER | Facility: CLINIC | Age: 53
End: 2023-05-23

## 2023-05-23 VITALS
DIASTOLIC BLOOD PRESSURE: 62 MMHG | BODY MASS INDEX: 37.91 KG/M2 | HEIGHT: 62 IN | WEIGHT: 206 LBS | HEART RATE: 85 BPM | SYSTOLIC BLOOD PRESSURE: 102 MMHG

## 2023-05-23 DIAGNOSIS — E66.01 OBESITY, MORBID (HCC): ICD-10-CM

## 2023-05-23 DIAGNOSIS — R06.83 SNORING: ICD-10-CM

## 2023-05-23 DIAGNOSIS — R29.818 SUSPECTED SLEEP APNEA: Primary | ICD-10-CM

## 2023-05-23 RX ORDER — LITHIUM CARBONATE 300 MG/1
CAPSULE ORAL
COMMUNITY
Start: 2023-05-19

## 2023-05-23 NOTE — PATIENT INSTRUCTIONS
I ordered a home sleep study  Please schedule that as soon as possible  If you are found to have sleep apnea, we would order CPAP for you and follow-up with you 30 to 90 days after you begin treatment  If you do not have sleep apnea, we can see you as needed  Nursing Support:  When: Monday through Friday 7A-5PM except holidays  Where: Our direct line is [phone]  If you are having a true emergency please call 911  In the event that the line is busy or it is after hours please leave a voice message and we will return your call  Please speak clearly, leaving your full name, birth date, best number to reach you and the reason for your call  Medication refills: We will need the name of the medication, the dosage, the ordering provider, whether you get a 30 or 90 day refill, and the pharmacy name and address  Medications will be ordered by the provider only  Nurses cannot call in prescriptions  Please allow 7 days for medication refills  Physician requested updates: If your provider requested that you call with an update after starting medication, please be ready to provide us the medication and dosage, what time you take your medication, the time you attempt to fall asleep, time you fall asleep, when you wake up, and what time you get out of bed  Sleep Study Results: We will contact you with sleep study results and/or next steps after the physician has reviewed your testing
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
## 2023-05-23 NOTE — ASSESSMENT & PLAN NOTE
Patient is planning for gastric bypass surgery in the near future  We discussed that weight loss can potentially improve and sometimes resolve obstructive sleep apnea

## 2023-05-23 NOTE — ASSESSMENT & PLAN NOTE
It is likely the patient has undiagnosed and untreated obstructive sleep apnea due to her symptoms loud snoring and waking herself if she is sleeping on her back  A home sleep study was ordered today  If she is found to have sleep apnea, CPAP will be ordered and she will follow-up 30 to 90 days after she begins treatment  We discussed that treating her sleep apnea if she is found to have it would be very important prior to having gastric bypass surgery as it can reduce her perioperative risk

## 2023-06-06 ENCOUNTER — TELEPHONE (OUTPATIENT)
Dept: CARDIOLOGY CLINIC | Facility: CLINIC | Age: 53
End: 2023-06-06

## 2023-06-06 NOTE — TELEPHONE ENCOUNTER
Pt is calling and would explanation of why her her chart has listed on active diagnosis list Stage 3 chronic kidney disease   she has never been made aware of this and would like to review with the doctor that diagnosis  Please advise  Thank you

## 2023-06-08 ENCOUNTER — RA CDI HCC (OUTPATIENT)
Dept: OTHER | Facility: HOSPITAL | Age: 53
End: 2023-06-08

## 2023-06-08 NOTE — PROGRESS NOTES
Jennifer [facility]  coding opportunities       Chart reviewed, no opportunity found:   [address]        Patients Insurance     Medicare Insurance: [employer]

## 2023-06-12 NOTE — PROGRESS NOTES
Bariatric Nutrition Assessment Note    Type of surgery    lap band in 2004 with Dr Nyasia Peterson in [place]   9 years post-op  had band deflated in 2011  Surgeon: Dr Alessia Murray  [facility]  47 [facility]   female   Wt with BMI of 25: 138 91lbs  Pre-Op Excess Wt: 65 59lbs from current weight  Wt 94 8 kg (209 lb)   BMI 38 23 kg/m²    Wt Readings from Last 3 Encounters:   06/14/23 94 8 kg (209 lb)   06/13/23 97 1 kg (214 lb)   05/23/23 93 4 kg (206 lb)     Donna- [place] Equation:     Weight [id number]/day  Estimated calories for weight loss 1301kcal/day (1# per wk wt loss - sedentary )  Estimated protein needs 63-94 7g/day (1 0-1 5 gms/kg IBW )   Estimated fluid needs 1890-2205ml/day (30-35 ml/kg IBW )      Weight History   Onset of Obesity: Childhood  Family history of obesity: Yes  Wt Loss Attempts: Commercial Programs ([place]/Woo[employer], Delia Tse, etc )  Counseling with  MD  Exercise  Self Created Diets (Portion Control, Healthy Food Choices, etc )  Patient has tried the above for 6 months or more with insufficient weight loss or weight regain, which is why patient has requested to be evaluated for weight loss surgery today  Maximum Wt Lost: At the time of their surgery the patient was 320lb, and was able to drop down as low as 145lb- 2/5 years after surgery  Started noticing weight regain around 2020    Weight on Day of Weight Loss Surgery: 320lbs  Weight in (lb) to have BMI = 25: 138 91lbs  Pre-Op Excess Wt: 181lbs  Post-Op Wt Loss: 115 5#/ 63 8% EBWL in 9 year(s)  Post-op [id number]  64lb wt regain    Review of History and Medications   Past Medical History:   Diagnosis Date   • Asthma    • Bipolar 1 disorder ([facility] [address] )    • HTN (hypertension)    • Hypertension    • Mental health problem      Past Surgical History:   Procedure Laterality Date   • BREAST SURGERY Bilateral 2014   • COSMETIC SURGERY     • HYSTERECTOMY     • LAPAROSCOPIC GASTRIC BANDING Social History     Socioeconomic History   • Marital status: Single     Spouse name: Not on file   • Number of children: Not on file   • Years of education: Not on file   • Highest education level: Not on file   Occupational History   • Not on file   Tobacco Use   • Smoking status: Former     Packs/day: 0 25     Types: Cigarettes     Quit date: 2023     Years since quittin 2     Passive exposure: Past   • Smokeless tobacco: Never   Vaping Use   • Vaping Use: Never used   Substance and Sexual Activity   • Alcohol use: Not Currently   • Drug use: Not Currently   • Sexual activity: Not on file   Other Topics Concern   • Not on file   Social History Narrative   • Not on file     Social Determinants of Health     Financial Resource Strain: Low Risk  (2022)    Overall Financial Resource Strain (CARDIA)    • Difficulty of Paying Living Expenses: Not hard at all   Food Insecurity: No Food Insecurity (2022)    Hunger Vital Sign    • Worried About Running Out of Food in the Last Year: Never true    • Ran Out of Food in the Last Year: Never true   Transportation Needs: No Transportation Needs (2022)    PRAPARE - Transportation    • Lack of Transportation (Medical): No    • Lack of Transportation (Non-Medical):  No   Physical Activity: Not on file   Stress: Not on file   Social Connections: Not on file   Intimate Partner Violence: Not on file   Housing Stability: Not on file       Current Outpatient Medications:   •  albuterol (Ventolin HFA) 90 mcg/act inhaler, Inhale 2 puffs every 6 (six) hours as needed for wheezing, Disp: 18 g, Rfl: 5  •  amLODIPine (NORVASC) 5 mg tablet, Take 1 tablet (5 mg total) by mouth daily, Disp: 90 tablet, Rfl: 1  •  benztropine (COGENTIN) 1 mg tablet, ONE TABLET 1 TIMES AT NIGHT , Disp: , Rfl:   •  hydrOXYzine HCL (ATARAX) 50 mg tablet, Take 50 mg by mouth 3 (three) times a day as needed, Disp: , Rfl:   •  lithium 300 MG tablet, Take 1 tablet by mouth once daily and 2 tablets at bedtime (Patient taking differently: Take 450 mg by mouth in the morning Take 1 tablet by mouth once daily and 2 tablets at bedtime), Disp: 14 tablet, Rfl: 0  •  lithium 600 MG capsule, Take 600 mg by mouth daily at bedtime, Disp: , Rfl:   •  mirtazapine (REMERON) 7 5 MG tablet, daily at bedtime, Disp: , Rfl:   •  multivitamin (THERAGRAN) TABS, Take 1 tablet by mouth daily, Disp: , Rfl:   •  nicotine (NICODERM CQ) 21 mg/24 hr TD 24 hr patch, Place 1 patch on the skin over 24 hours every 24 hours, Disp: 28 patch, Rfl: 1  •  QUEtiapine (SEROquel) 100 mg tablet, Take 1 tablet (100 mg total) by mouth daily at bedtime, Disp: 7 tablet, Rfl: 0  •  Symbicort 80-4 5 MCG/ACT inhaler, INHALE 2 PUFFS BY MOUTH 2 TIMES A DAY RINSE MOUTH AFTER USE , Disp: 10 2 g, Rfl: 1     Food Intake and Lifestyle Assessment   Food Intake Assessment completed via usual diet recall  Breakfast: coffee with cream and sugar  Raison bran cereal with lactaid milk  Snack: none   Lunch: grabs something smaller:  Salad with chicken, toast  Snack: none  Dinner: heaviest meal: steak, salad or asparagus  Snack: has stopped snacking at night  Beverage intake: water and diet soda: one can a day  Stopped drinking coffee    Protein supplement: none  Estimated protein intake per day: 30-60g  Estimated fluid intake per day: Pt estimated about a gallon of water, 12 oz soda  Meals eaten away from home: rare  Typical meal pattern: 3 meals per day and 0 snacks per day  Eating Behaviors: Consumption of high calorie/ high fat foods  Food allergies or intolerances: No Known Allergies [facility]  Cultural or Caodaism considerations: none   Avoids milk- bother her asthma    Physical Assessment  Physical Activity  Types of exercise: Walking most mornings 3-4 days a week for 30-60 minutes  Has Actiwave- goes maybe 5 times a month: 30 min treadmill, 10 min bike, then hand weights  Current physical limitations: knee pain    Lithium makes her mouth extremely dry- "drinks a lot of water    Psychosocial Assessment   Support systems: lives with 5 friends in recovery house  Socioeconomic factors: not currently working  Food stamps: $270/mo    Today pt reports no nicotine products at all for 36 days now  Nutrition Diagnosis-continued  Diagnosis: Overweight / Obesity (NC-3 3) and Altered GI function (NC-1 4)  Related to: Physical inactivity, Excessive energy intake and Altered GI function  As Evidenced by: BMI >25, Excessive energy intake and Unintentional weight gain     Nutrition Prescription: Recommend the following diet  Low fat, Low sugar, High protein and Regular    Interventions and Teaching   Discussed pre-op and post-op nutrition guidelines  Patient educated and handouts provided    Surgical changes to stomach / GI  Capacity of post-surgery stomach  Diet progression  Adequate hydration  Sugar and fat restriction to decrease \"dumping syndrome\"  Expected weight loss  Weight loss plateaus/ possibility of weight regain  Exercise  Suggestions for pre-op diet  Nutrition considerations after surgery  Meal planning and preparation  Appropriate carbohydrate, protein, and fat intake, and food/fluid choices to maximize safe weight loss, nutrient intake, and tolerance   Dietary and lifestyle changes  Possible problems with poor eating habits  Techniques for self monitoring and keeping daily food journal  Potential for food intolerance after surgery, and ways to deal with them including: lactose intolerance, nausea, reflux, vomiting, diarrhea, food intolerance, appetite changes, gas  Vitamin / Mineral supplementation of Multivitamin with minerals, Calcium, Vitamin B12, Iron and Vitamin D  OTC MVI daily gummy  Cranberry supplement    Patient is not currently pregnant and doesn't desire to become pregnant a minimum of one year post-op    Education provided to: patient    Barriers to learning: No barriers identified    Readiness to change: action    Prior research on procedure: " discussed with provider and previous wt  loss surgery    Comprehension: verbalizes understanding     Expected Compliance: good    Recommendations  Pt is an appropriate candidate for surgery  Yes  Pt should make the following changes and then be reassessed for weight loss surgery:   Pt will need negative nicotine blood test prior to proceeding with a revision:  Discussed with pt that she had verbalized understanding at last appointment with SW that her nicotine test needed to be done by this appointment of her surgery case was going to be halted  Pt reports she called and spoke to our office yesterday and was told she just needed to have it done some time today and she was planning on getting it drawn directly after this appointment  Emphasized with pt that if the nicotine blood test is not drawn by the end of today her surgery case is going to be halted  Pt verbalized understanding  Evaluation / Monitoring  Dietitian to Monitor: Eating pattern as discussed Tolerance of nutrition prescription Body weight Lab values Physical activity Bowel pattern    Goals  Food journal, Exercise 30 minutes 5 times per week, Complete lession plans 1-6, Eat 3 meals per day and Eliminate mindless snacking     Workflow: (Incomplete in [place]):  • Psych and/or D+A Clearance: not needed  • Blood Work: ordered 3/13/23  • PCP letter: not needed  • Support Group: not done  • Surgeon Appt: 1/11/23+4/5/23  • UGI: done 11/21/22  • EGD: done 3/15/13  • Cardiac Risk Assessment: done 5/19/23  • Sleep Studies: consult done 5/23/23  Home sleep study scheduled for 6/20/23  • Nicotine test: ordered 3/1/23  Deadline for test today     • 6 Month Pre-Operative Program: not required  • Weight Loss: 5% wt loss=10 225#=Day of surgery goal weight of 194 275#  • NAFLD Score Calculated: not needed  • Hepatology consult: not needed    Time Spent:   30 minutes

## 2023-06-13 ENCOUNTER — TELEPHONE (OUTPATIENT)
Dept: BARIATRICS | Facility: CLINIC | Age: 53
End: 2023-06-13

## 2023-06-13 ENCOUNTER — OFFICE VISIT (OUTPATIENT)
Dept: FAMILY MEDICINE CLINIC | Facility: CLINIC | Age: 53
End: 2023-06-13

## 2023-06-13 VITALS
TEMPERATURE: 97.6 F | WEIGHT: 214 LBS | SYSTOLIC BLOOD PRESSURE: 118 MMHG | HEART RATE: 84 BPM | OXYGEN SATURATION: 98 % | DIASTOLIC BLOOD PRESSURE: 76 MMHG | RESPIRATION RATE: 18 BRPM | BODY MASS INDEX: 39.38 KG/M2 | HEIGHT: 62 IN

## 2023-06-13 DIAGNOSIS — F31.9 BIPOLAR 1 DISORDER (HCC): ICD-10-CM

## 2023-06-13 DIAGNOSIS — Z72.0 TOBACCO ABUSE: ICD-10-CM

## 2023-06-13 DIAGNOSIS — I10 PRIMARY HYPERTENSION: Primary | ICD-10-CM

## 2023-06-13 DIAGNOSIS — N18.31 STAGE 3A CHRONIC KIDNEY DISEASE (HCC): ICD-10-CM

## 2023-06-13 DIAGNOSIS — E66.01 OBESITY, MORBID (HCC): ICD-10-CM

## 2023-06-13 DIAGNOSIS — J45.40 MODERATE PERSISTENT ASTHMA WITHOUT COMPLICATION: ICD-10-CM

## 2023-06-13 DIAGNOSIS — R29.818 SUSPECTED SLEEP APNEA: ICD-10-CM

## 2023-06-13 PROCEDURE — 99214 OFFICE O/P EST MOD 30 MIN: CPT | Performed by: FAMILY MEDICINE

## 2023-06-13 NOTE — ASSESSMENT & PLAN NOTE
-Stable  -Following with psychiatrist and therapist  -Continue hydroxyzine prn, cogentin, Remeron, lithium and Seroquel

## 2023-06-13 NOTE — TELEPHONE ENCOUNTER
Called patient and lvm reminding her to go for her Nicotine test due tomorrow as well as her other labs to be completed on her workflow  Also reminded her to join the next Support Group which is also a requirement of the Revision Process  Left my name an phone number for pt to call me back with any questions or concerns   Connie Sinha

## 2023-06-13 NOTE — ASSESSMENT & PLAN NOTE
Lab Results   Component Value Date    CREATININE 1 15 04/04/2022    EGFR 55 04/04/2022   -Recheck renal function  -Avoid nephrotoxic agent

## 2023-06-13 NOTE — ASSESSMENT & PLAN NOTE
-She has not smoked in 34 days  -She has been using nicotine patch  -Congratulated patient on efforts

## 2023-06-13 NOTE — ASSESSMENT & PLAN NOTE
-Dietary and exercise counseling provided to patient  -She following with  weight management outpatient  -She is planning for gastric bypass in the near future

## 2023-06-13 NOTE — PROGRESS NOTES
Name: Gaby Shen      : 1970      MRN: [id number]  Encounter Provider: Aly Mock MD  Encounter Date: 2023   Encounter department: [address] [place], [place] [address]     1  Primary hypertension  Assessment & Plan:  Well controlled   Continue amlodipine 5 mg        2  Suspected sleep apnea  Assessment & Plan:  Following with sleep medicine  Home sleep study has been ordered for patient      3  Obesity, morbid ([facility] )  Assessment & Plan:  -Dietary and exercise counseling provided to patient  -She following with  weight management outpatient  -She is planning for gastric bypass in the near future      4  Tobacco abuse  Assessment & Plan:  -She has not smoked in 34 days  -She has been using nicotine patch  -Congratulated patient on efforts        5  Bipolar 1 disorder ([facility] )  Assessment & Plan:  -Stable  -Following with psychiatrist and therapist  -Continue hydroxyzine prn, cogentin, Remeron, lithium and Seroquel       6  Moderate persistent asthma without complication  Assessment & Plan:  -Well-controlled  -Continue Symbicort and albuterol PRN        7  Stage 3a chronic kidney disease (HCC)  Assessment & Plan:  Lab Results   Component Value Date    CREATININE 1 15 2022    EGFR 55 2022   -Recheck renal function  -Avoid nephrotoxic agent           Subjective      4year-old female with a past medical history of osteoarthritis, asthma, hypertension, bipolar disorder and obesity who presents today for follow-up  She reports that she is doing well overall  She recently just had cardiology evaluation for preop clearance as she is planning to have upcoming bariatric surgery  She also was recently evaluated by sleep medicine and she is going to complete a home study for sleep apnea diagnosis  She quit smoking cigarettes for the past 34 days  She has been using her nicotine patch  She reports that she is stable from a mental health perspective  She is following regularly with her psychiatrist    Review of Systems   Constitutional: Negative for chills, diaphoresis, fatigue and fever  HENT: Negative for congestion  Eyes: Negative for visual disturbance  Respiratory: Negative for cough, shortness of breath and wheezing  Cardiovascular: Negative for chest pain, palpitations and leg swelling  Gastrointestinal: Negative for abdominal pain, diarrhea, nausea and vomiting  Genitourinary: Negative for dysuria, hematuria and urgency  Musculoskeletal: Negative for back pain  Skin: Negative for rash  Neurological: Negative for dizziness, tremors, syncope, weakness, numbness and headaches  Hematological: Negative for adenopathy  Psychiatric/Behavioral: Negative for agitation  Current Outpatient Medications on File Prior to Visit   Medication Sig   • albuterol (Ventolin HFA) 90 mcg/act inhaler Inhale 2 puffs every 6 (six) hours as needed for wheezing   • amLODIPine (NORVASC) 5 mg tablet Take 1 tablet (5 mg total) by mouth daily   • benztropine (COGENTIN) 1 mg tablet ONE TABLET 1 TIMES AT NIGHT  • hydrOXYzine HCL (ATARAX) 50 mg tablet Take 50 mg by mouth 3 (three) times a day as needed   • lithium 300 MG tablet Take 1 tablet by mouth once daily and 2 tablets at bedtime (Patient taking differently: Take 450 mg by mouth in the morning Take 1 tablet by mouth once daily and 2 tablets at bedtime)   • lithium 600 MG capsule Take 600 mg by mouth daily at bedtime   • mirtazapine (REMERON) 7 5 MG tablet daily at bedtime   • multivitamin (THERAGRAN) TABS Take 1 tablet by mouth daily   • nicotine (NICODERM CQ) 21 mg/24 hr TD 24 hr patch Place 1 patch on the skin over 24 hours every 24 hours   • QUEtiapine (SEROquel) 100 mg tablet Take 1 tablet (100 mg total) by mouth daily at bedtime   • Symbicort 80-4 5 MCG/ACT inhaler INHALE 2 PUFFS BY MOUTH 2 TIMES A DAY RINSE MOUTH AFTER USE     • [DISCONTINUED] hydrOXYzine pamoate (VISTARIL) 50 mg capsule Take 1 "capsule (50 mg total) by mouth 3 (three) times a day as needed for anxiety for up to 3 days (Patient not taking: Reported on 5/23/2023)   • [DISCONTINUED] lithium carbonate 300 mg capsule    • [DISCONTINUED] Naproxen Sodium 220 MG CAPS Take by mouth (Patient not taking: Reported on 4/5/2023)   • [DISCONTINUED] QUEtiapine (SEROquel) 200 mg tablet Take 200 mg by mouth daily at bedtime (Patient not taking: Reported on 5/19/2023)       Objective     /76 (BP Location: Left leg, Patient Position: Sitting, Cuff Size: Large)   Pulse 84   Temp 97 6 °F (36 4 °C) (Temporal)   Resp 18   Ht 5' 2\" (1 575 m)   Wt 97 1 kg (214 lb)   SpO2 98%   BMI 39 14 kg/m²     Physical Exam  Vitals and nursing note reviewed  Constitutional:       General: She is not in acute distress  Appearance: Normal appearance  She is well-developed  She is not ill-appearing, toxic-appearing or diaphoretic  HENT:      Head: Normocephalic and atraumatic  Right Ear: External ear normal       Left Ear: External ear normal       Nose: Nose normal       Mouth/Throat:      Mouth: Mucous membranes are moist       Pharynx: No oropharyngeal exudate or posterior oropharyngeal erythema  Eyes:      General: No scleral icterus  Right eye: No discharge  Left eye: No discharge  Extraocular Movements: Extraocular movements intact  Conjunctiva/sclera: Conjunctivae normal    Neck:      Thyroid: No thyromegaly  Vascular: No JVD  Trachea: No tracheal deviation  Cardiovascular:      Rate and Rhythm: Normal rate and regular rhythm  Pulses:           Dorsalis pedis pulses are 2+ on the right side and 2+ on the left side  Posterior tibial pulses are 2+ on the right side and 2+ on the left side  Heart sounds: Normal heart sounds  No murmur heard  No friction rub  No gallop  Pulmonary:      Effort: Pulmonary effort is normal  No respiratory distress  Breath sounds: Normal breath sounds   No " stridor  No wheezing or rhonchi  Abdominal:      General: Bowel sounds are normal  There is no distension  Palpations: Abdomen is soft  There is no mass  Tenderness: There is no abdominal tenderness  There is no guarding or rebound  Hernia: No hernia is present  Musculoskeletal:         General: Normal range of motion  Cervical back: Normal range of motion  Feet:      Right foot:      Skin integrity: No ulcer, skin breakdown, erythema, warmth, callus or dry skin  Left foot:      Skin integrity: No ulcer, skin breakdown, erythema, warmth, callus or dry skin  Skin:     General: Skin is warm  Capillary Refill: Capillary refill takes less than 2 seconds  Neurological:      General: No focal deficit present  Mental Status: She is alert and oriented to person, place, and time  Motor: No weakness or abnormal muscle tone        Gait: Gait normal    Psychiatric:         Mood and Affect: Mood normal          Behavior: Behavior normal        Alecia Davis MD

## 2023-06-14 ENCOUNTER — OFFICE VISIT (OUTPATIENT)
Dept: BARIATRICS | Facility: CLINIC | Age: 53
End: 2023-06-14

## 2023-06-14 ENCOUNTER — APPOINTMENT (OUTPATIENT)
Dept: LAB | Facility: MEDICAL CENTER | Age: 53
End: 2023-06-14
Attending: SURGERY
Payer: COMMERCIAL

## 2023-06-14 VITALS — WEIGHT: 209 LBS | BODY MASS INDEX: 38.23 KG/M2

## 2023-06-14 DIAGNOSIS — E66.01 OBESITY, MORBID (HCC): Primary | ICD-10-CM

## 2023-06-14 DIAGNOSIS — Z98.84 HX OF BARIATRIC SURGERY: ICD-10-CM

## 2023-06-14 DIAGNOSIS — Z72.0 TOBACCO ABUSE: ICD-10-CM

## 2023-06-14 DIAGNOSIS — E66.01 OBESITY, MORBID (HCC): ICD-10-CM

## 2023-06-14 LAB
ALBUMIN SERPL BCP-MCNC: 3.8 G/DL (ref 3.5–5)
ALP SERPL-CCNC: 71 U/L (ref 46–116)
ALT SERPL W P-5'-P-CCNC: 29 U/L (ref 12–78)
ANION GAP SERPL CALCULATED.3IONS-SCNC: 1 MMOL/L (ref 4–13)
AST SERPL W P-5'-P-CCNC: 16 U/L (ref 5–45)
BASOPHILS # BLD AUTO: 0.04 THOUSANDS/ÂΜL (ref 0–0.1)
BASOPHILS NFR BLD AUTO: 1 % (ref 0–1)
BILIRUB SERPL-MCNC: 0.59 MG/DL (ref 0.2–1)
BUN SERPL-MCNC: 9 MG/DL (ref 5–25)
CALCIUM SERPL-MCNC: 9.7 MG/DL (ref 8.3–10.1)
CHLORIDE SERPL-SCNC: 112 MMOL/L (ref 96–108)
CHOLEST SERPL-MCNC: 215 MG/DL
CO2 SERPL-SCNC: 27 MMOL/L (ref 21–32)
CREAT SERPL-MCNC: 1.22 MG/DL (ref 0.6–1.3)
EOSINOPHIL # BLD AUTO: 0.46 THOUSAND/ÂΜL (ref 0–0.61)
EOSINOPHIL NFR BLD AUTO: 6 % (ref 0–6)
ERYTHROCYTE [DISTWIDTH] IN BLOOD BY AUTOMATED COUNT: 15.2 % (ref 11.6–15.1)
GFR SERPL CREATININE-BSD FRML MDRD: 51 ML/MIN/1.73SQ M
GLUCOSE P FAST SERPL-MCNC: 101 MG/DL (ref 65–99)
HCT VFR BLD AUTO: 36 % (ref 34.8–46.1)
HDLC SERPL-MCNC: 77 MG/DL
HGB BLD-MCNC: 11.8 G/DL (ref 11.5–15.4)
IMM GRANULOCYTES # BLD AUTO: 0.02 THOUSAND/UL (ref 0–0.2)
IMM GRANULOCYTES NFR BLD AUTO: 0 % (ref 0–2)
LDLC SERPL CALC-MCNC: 121 MG/DL (ref 0–100)
LYMPHOCYTES # BLD AUTO: 2.52 THOUSANDS/ÂΜL (ref 0.6–4.47)
LYMPHOCYTES NFR BLD AUTO: 33 % (ref 14–44)
MCH RBC QN AUTO: 25 PG (ref 26.8–34.3)
MCHC RBC AUTO-ENTMCNC: 32.8 G/DL (ref 31.4–37.4)
MCV RBC AUTO: 76 FL (ref 82–98)
MONOCYTES # BLD AUTO: 0.55 THOUSAND/ÂΜL (ref 0.17–1.22)
MONOCYTES NFR BLD AUTO: 7 % (ref 4–12)
NEUTROPHILS # BLD AUTO: 4.14 THOUSANDS/ÂΜL (ref 1.85–7.62)
NEUTS SEG NFR BLD AUTO: 53 % (ref 43–75)
NONHDLC SERPL-MCNC: 138 MG/DL
NRBC BLD AUTO-RTO: 0 /100 WBCS
PLATELET # BLD AUTO: 319 THOUSANDS/UL (ref 149–390)
PMV BLD AUTO: 10.1 FL (ref 8.9–12.7)
POTASSIUM SERPL-SCNC: 4.2 MMOL/L (ref 3.5–5.3)
PROT SERPL-MCNC: 7.4 G/DL (ref 6.4–8.4)
RBC # BLD AUTO: 4.72 MILLION/UL (ref 3.81–5.12)
SODIUM SERPL-SCNC: 140 MMOL/L (ref 135–147)
TRIGL SERPL-MCNC: 83 MG/DL
TSH SERPL DL<=0.05 MIU/L-ACNC: 1.7 UIU/ML (ref 0.45–4.5)
WBC # BLD AUTO: 7.73 THOUSAND/UL (ref 4.31–10.16)

## 2023-06-14 PROCEDURE — 83036 HEMOGLOBIN GLYCOSYLATED A1C: CPT

## 2023-06-14 PROCEDURE — 84443 ASSAY THYROID STIM HORMONE: CPT

## 2023-06-14 PROCEDURE — 36415 COLL VENOUS BLD VENIPUNCTURE: CPT

## 2023-06-14 PROCEDURE — 80053 COMPREHEN METABOLIC PANEL: CPT

## 2023-06-14 PROCEDURE — 80061 LIPID PANEL: CPT

## 2023-06-14 PROCEDURE — 85025 COMPLETE CBC W/AUTO DIFF WBC: CPT

## 2023-06-14 PROCEDURE — 80323 ALKALOIDS NOS: CPT

## 2023-06-14 PROCEDURE — RECHECK: Performed by: DIETITIAN, REGISTERED

## 2023-06-15 LAB
EST. AVERAGE GLUCOSE BLD GHB EST-MCNC: 103 MG/DL
HBA1C MFR BLD: 5.2 %

## 2023-06-19 LAB
COTININE SERPL-MCNC: <1 NG/ML
NICOTINE SERPL-MCNC: <1 NG/ML

## 2023-06-20 ENCOUNTER — HOSPITAL ENCOUNTER (OUTPATIENT)
Dept: SLEEP CENTER | Facility: CLINIC | Age: 53
Discharge: HOME | End: 2023-06-20

## 2023-06-20 ENCOUNTER — OFFICE VISIT (OUTPATIENT)
Dept: DENTISTRY | Facility: CLINIC | Age: 53
End: 2023-06-20

## 2023-06-20 VITALS — SYSTOLIC BLOOD PRESSURE: 162 MMHG | HEART RATE: 109 BPM | DIASTOLIC BLOOD PRESSURE: 84 MMHG

## 2023-06-20 DIAGNOSIS — R29.818 SUSPECTED SLEEP APNEA: ICD-10-CM

## 2023-06-20 DIAGNOSIS — Z01.21 ENCOUNTER FOR DENTAL EXAMINATION AND CLEANING WITH ABNORMAL FINDINGS: Primary | ICD-10-CM

## 2023-06-20 DIAGNOSIS — R06.83 SNORING: ICD-10-CM

## 2023-06-20 DIAGNOSIS — E66.01 OBESITY, MORBID (HCC): ICD-10-CM

## 2023-06-20 PROCEDURE — D2335 RESIN-BASED COMPOSITE - 4 OR MORE SURFACES OR INVOLVING INCISAL ANGLE (ANTERIOR): HCPCS | Performed by: DENTIST

## 2023-06-20 PROCEDURE — D2391 RESIN-BASED COMPOSITE - 1 SURFACE, POSTERIOR: HCPCS | Performed by: DENTIST

## 2023-06-20 NOTE — DENTAL PROCEDURE DETAILS
Patient presents for a dental restoration and verbally consents for treatment:  Reviewed health history-  Pt is ASA type II  Treatment consents signed: Yes  Perio: Gingivitis  Pain Scale: 0  Caries Assessment: High    Radiographs: Films are current  Oral Hygiene instruction reviewed and given  Hygiene recall visits recommended to the patient    Patient agrees with the diagnosis of Caries and the proposed treatment plan for the resin restoration:  Tooth ##8 and #21  Dental Anesthesia:  1 7 ml Lido 2 % 1/100 K ( Lower Left via local infiltration ) No Up anesthesia     Material:   Etch Ivoclar bond and resin   Shade: Shade A3    Prognosis is Good     Referrals Needed: No  Next visit: More fillings

## 2023-06-20 NOTE — PROGRESS NOTES
Composite Filling    Bettinagolden Bettina presents for composite filling  PMH reviewed, no changes  ASAS : II  Pain Level : 0  Discussed with patient need for RCT if pulp exposure occurs or in future if pulp is inflamed  Pt understands and consents  Applied topical benzocaine, administered one  carp 2% lido 1:100k epi via local infiltration in the lower left area of # 21 only  No anesthesia at # 8   Prepped teeth # 21 (B) and 8 ([facility]) with 245 carbide on high speed  Caries removed with round carbide on slow speed  Placed tofflemire/palodent matrix  Isolation with cotton rolls and dri-angles    Etch with 37% H2PO4, rinse, dry  Applied Adhese with 20 second scrub once, gentle air dry and light cured for 10s  Restored with Tetric bulk jaime shade A3 and light cured  Refined with finishing burs, polished with enhance point  Verified occlusion and contacts  Post op instructions given  Pt left satisfied      NV : Chiquita Bracket

## 2023-06-21 ENCOUNTER — TELEPHONE (OUTPATIENT)
Dept: SLEEP CENTER | Facility: CLINIC | Age: 53
End: 2023-06-21

## 2023-06-21 NOTE — PROGRESS NOTES
Home Sleep Study Documentation    HOME STUDY DEVICE: Noxturnal no                                           Blanca G3 yes      Pre-Sleep Home Study:    Set-up and instructions performed by:  Shantal    Technician performed demonstration for Patient: yes    Return demonstration performed by Patient: yes    Written instructions provided to Patient: yes    Patient signed consent form: yes        Post-Sleep Home Study:    intermittent pulse ox signal  Will check sensor    Home Sleep Study Failed:yes:     Failure reason: Failed Study sensor     Reported or Detected: detected    Scored by: SANDRA Byrne RPSGT

## 2023-06-21 NOTE — TELEPHONE ENCOUNTER
Spoke to patient regarding Failed HST  intermitten oxygen data  Reschedule patient for tomorrow at 8:30am to try again

## 2023-06-22 ENCOUNTER — HOSPITAL ENCOUNTER (OUTPATIENT)
Dept: SLEEP CENTER | Facility: CLINIC | Age: 53
Discharge: HOME | End: 2023-06-22
Payer: COMMERCIAL

## 2023-06-22 PROCEDURE — G0399 HOME SLEEP TEST/TYPE 3 PORTA: HCPCS

## 2023-06-26 PROCEDURE — 95806 SLEEP STUDY UNATT&RESP EFFT: CPT | Performed by: PSYCHIATRY & NEUROLOGY

## 2023-06-26 NOTE — PROGRESS NOTES
Home Sleep Study Documentation    HOME STUDY DEVICE: Noxturnal no                                           Blanca G3 yes      Pre-Sleep Home Study:    Set-up and instructions performed by: Lit Walters performed demonstration for Patient: yes    Return demonstration performed by Patient: yes    Written instructions provided to Patient: yes    Patient signed consent form: yes        Post-Sleep Home Study:    Additional comments by Patient: None    Home Sleep Study Failed:no:    Failure reason: N/A    Reported or Detected: N/A    Scored by: Carlene Calvillo

## 2023-06-27 DIAGNOSIS — G47.33 OSA (OBSTRUCTIVE SLEEP APNEA): Primary | ICD-10-CM

## 2023-06-27 DIAGNOSIS — R09.02 HYPOXEMIA: ICD-10-CM

## 2023-06-28 ENCOUNTER — TELEPHONE (OUTPATIENT)
Dept: SLEEP CENTER | Facility: CLINIC | Age: 53
End: 2023-06-28

## 2023-06-28 NOTE — TELEPHONE ENCOUNTER
----- Message from Christiana Gupta PA-C sent at 6/27/2023 12:40 PM EDT -----  Mild obstructive sleep apnea is noted  A CPAP titration study is recommended due to sleep apnea and hypoxemia  Order was placed  Please schedule as soon as possible as patient is planning for bariatric surgery  22

## 2023-06-28 NOTE — TELEPHONE ENCOUNTER
Left message for patient to call office to review sleep study results  Need to schedule CPAP study and expedite due to bariatric surgery

## 2023-06-29 NOTE — TELEPHONE ENCOUNTER
Returned call from patient left detailed message advising of sleep study results and Dede's recommendation for a CPAP study  Provided office phone number to call to schedule CPAP study  Study can be expedited once scheduled due to bariatric surgery

## 2023-07-07 DIAGNOSIS — J45.40 MODERATE PERSISTENT ASTHMA WITHOUT COMPLICATION: ICD-10-CM

## 2023-07-10 RX ORDER — DILTIAZEM HYDROCHLORIDE 60 MG/1
TABLET, FILM COATED ORAL
Qty: 10.2 G | Refills: 2 | Status: SHIPPED | OUTPATIENT
Start: 2023-07-10

## 2023-07-11 ENCOUNTER — PROCEDURE VISIT (OUTPATIENT)
Dept: FAMILY MEDICINE CLINIC | Facility: CLINIC | Age: 53
End: 2023-07-11

## 2023-07-11 VITALS
OXYGEN SATURATION: 98 % | SYSTOLIC BLOOD PRESSURE: 132 MMHG | HEIGHT: 62 IN | RESPIRATION RATE: 18 BRPM | TEMPERATURE: 98 F | WEIGHT: 208 LBS | DIASTOLIC BLOOD PRESSURE: 74 MMHG | BODY MASS INDEX: 38.28 KG/M2 | HEART RATE: 76 BPM

## 2023-07-11 DIAGNOSIS — M17.0 OSTEOARTHRITIS OF BOTH KNEES, UNSPECIFIED OSTEOARTHRITIS TYPE: Primary | ICD-10-CM

## 2023-07-11 PROCEDURE — 20610 DRAIN/INJ JOINT/BURSA W/O US: CPT | Performed by: FAMILY MEDICINE

## 2023-07-11 PROCEDURE — 99214 OFFICE O/P EST MOD 30 MIN: CPT | Performed by: FAMILY MEDICINE

## 2023-07-11 RX ORDER — TRIAMCINOLONE ACETONIDE 40 MG/ML
40 INJECTION, SUSPENSION INTRA-ARTICULAR; INTRAMUSCULAR
Status: COMPLETED | OUTPATIENT
Start: 2023-07-11 | End: 2023-07-11

## 2023-07-11 RX ORDER — BUPIVACAINE HYDROCHLORIDE 5 MG/ML
2 INJECTION, SOLUTION EPIDURAL; INTRACAUDAL
Status: COMPLETED | OUTPATIENT
Start: 2023-07-11 | End: 2023-07-11

## 2023-07-11 RX ADMIN — BUPIVACAINE HYDROCHLORIDE 2 ML: 5 INJECTION, SOLUTION EPIDURAL; INTRACAUDAL at 07:00

## 2023-07-11 RX ADMIN — TRIAMCINOLONE ACETONIDE 40 MG: 40 INJECTION, SUSPENSION INTRA-ARTICULAR; INTRAMUSCULAR at 07:00

## 2023-07-11 NOTE — ASSESSMENT & PLAN NOTE
-Chronic bilateral knee pain  -Received bilateral knee cortisone injection today with significant improvement in symptoms  -We discussed that the anesthetic portion of injection will wear off in the next few hours and steroid will take effect within the next 24 hours. Recommend oral anti-inflammatory and ice application to knee.   We discussed that  any hot swollen knee should be evaluated emergently  -Continue home physical therapy

## 2023-07-11 NOTE — PROGRESS NOTES
Name: Ina Dinh      : 1970      MRN: [id number]  Encounter Provider: Patrizia Roe MD  Encounter Date: 2023   Encounter department: [address] [facility]     1. Osteoarthritis of both knees, unspecified osteoarthritis type  Assessment & Plan:  -Chronic bilateral knee pain  -Received bilateral knee cortisone injection today with significant improvement in symptoms  -We discussed that the anesthetic portion of injection will wear off in the next few hours and steroid will take effect within the next 24 hours. Recommend oral anti-inflammatory and ice application to knee. We discussed that  any hot swollen knee should be evaluated emergently  -Continue home physical therapy           Subjective      52-year-old female with a past medical history of obesity, hypertension, and asthma who presents today for knee injection procedure. She has a history of osteoarthritis of both knee. She reports that she has been experiencing bilateral knee pain that is worse when she is walking up and down the stairs. She has had an injection in her knee before which she tolerated well. She is managing her pain with oral anti-inflammatory medication. She denies any recent trauma. She denies any fever, chills, malaise, loss of appetite nausea, or vomiting. Review of Systems   Constitutional: Negative for appetite change, chills, diaphoresis, fatigue, fever and unexpected weight change. Eyes: Negative for visual disturbance. Respiratory: Negative for cough, shortness of breath and wheezing. Cardiovascular: Negative for chest pain, palpitations and leg swelling. Gastrointestinal: Negative for abdominal pain, diarrhea, nausea and vomiting. Musculoskeletal: Positive for arthralgias. Skin: Negative for rash.        Current Outpatient Medications on File Prior to Visit   Medication Sig   • albuterol (Ventolin HFA) 90 mcg/act inhaler Inhale 2 puffs every 6 (six) hours as needed for wheezing   • amLODIPine (NORVASC) 5 mg tablet Take 1 tablet (5 mg total) by mouth daily   • benztropine (COGENTIN) 1 mg tablet ONE TABLET 1 TIMES AT NIGHT. • hydrOXYzine HCL (ATARAX) 50 mg tablet Take 50 mg by mouth 3 (three) times a day as needed   • lithium 300 MG tablet Take 1 tablet by mouth once daily and 2 tablets at bedtime (Patient taking differently: Take 450 mg by mouth in the morning Take 1 tablet by mouth once daily and 2 tablets at bedtime)   • lithium 600 MG capsule Take 600 mg by mouth daily at bedtime   • mirtazapine (REMERON) 7.5 MG tablet daily at bedtime   • multivitamin (THERAGRAN) TABS Take 1 tablet by mouth daily   • nicotine (NICODERM CQ) 21 mg/24 hr TD 24 hr patch Place 1 patch on the skin over 24 hours every 24 hours   • QUEtiapine (SEROquel) 100 mg tablet Take 1 tablet (100 mg total) by mouth daily at bedtime   • Symbicort 80-4.5 MCG/ACT inhaler INHALE 2 PUFFS BY MOUTH TWICE A DAY RINSE MOUTH AFTER USE       Objective     /74 (BP Location: Left leg, Patient Position: Sitting, Cuff Size: Standard)   Pulse 76   Temp 98 °F (36.7 °C) (Temporal)   Resp 18   Ht 5' 2" (1.575 m)   Wt 94.3 kg (208 lb)   SpO2 98%   BMI 38.04 kg/m²     Physical Exam  Vitals reviewed. Constitutional:       General: She is not in acute distress. Appearance: Normal appearance. She is well-developed. She is not ill-appearing, toxic-appearing or diaphoretic. HENT:      Head: Normocephalic and atraumatic. Right Ear: External ear normal.      Left Ear: External ear normal.      Nose: Nose normal.      Mouth/Throat:      Mouth: Mucous membranes are moist.      Pharynx: No oropharyngeal exudate or posterior oropharyngeal erythema. Eyes:      Extraocular Movements: Extraocular movements intact. Conjunctiva/sclera: Conjunctivae normal.   Neck:      Thyroid: No thyromegaly. Vascular: No JVD. Trachea: No tracheal deviation.    Cardiovascular:      Rate and Rhythm: Normal rate and regular rhythm. Pulses:           Dorsalis pedis pulses are 2+ on the right side and 2+ on the left side. Posterior tibial pulses are 2+ on the right side and 2+ on the left side. Heart sounds: Normal heart sounds. No murmur heard. No friction rub. No gallop. Pulmonary:      Effort: Pulmonary effort is normal. No respiratory distress. Breath sounds: Normal breath sounds. No stridor. No wheezing or rhonchi. Abdominal:      General: Bowel sounds are normal. There is no distension. Palpations: Abdomen is soft. There is no mass. Tenderness: There is no abdominal tenderness. There is no guarding or rebound. Hernia: No hernia is present. Musculoskeletal:         General: Normal range of motion. Cervical back: Normal range of motion and neck supple. Right knee: Crepitus present. No swelling, deformity, effusion, erythema or bony tenderness. Normal range of motion. No tenderness. Normal meniscus. Left knee: Crepitus present. No swelling, deformity, effusion, erythema or bony tenderness. Normal range of motion. No tenderness. Normal meniscus. Right lower leg: No edema. Left lower leg: No edema. Feet:      Right foot:      Skin integrity: No ulcer, skin breakdown, erythema, warmth, callus or dry skin. Left foot:      Skin integrity: No ulcer, skin breakdown, erythema, warmth, callus or dry skin. Skin:     General: Skin is warm. Capillary Refill: Capillary refill takes less than 2 seconds. Neurological:      General: No focal deficit present. Mental Status: She is alert and oriented to person, place, and time. Cranial Nerves: No cranial nerve deficit. Motor: No weakness or abnormal muscle tone.       Gait: Gait normal.   Psychiatric:         Mood and Affect: Mood normal.         Behavior: Behavior normal.     Large joint arthrocentesis: bilateral knee  Universal Protocol:  Consent: Verbal consent obtained.   Risks and benefits: risks, benefits and alternatives were discussed  Consent given by: patient  Patient understanding: patient states understanding of the procedure being performed  Patient consent: the patient's understanding of the procedure matches consent given  Relevant documents: relevant documents present and verified  Site marked: the operative site was marked  Patient identity confirmed: verbally with patient    Supporting Documentation  Indications: pain   Procedure Details  Location: knee - bilateral knee  Needle size: 25 G  Ultrasound guidance: no  Approach: lateral    Medications (Right): 2 mL bupivacaine (PF) 0.5 %; 40 mg triamcinolone acetonide 40 mg/mLMedications (Left): 2 mL bupivacaine (PF) 0.5 %; 40 mg triamcinolone acetonide 40 mg/mL   Patient tolerance: patient tolerated the procedure well with no immediate complications  Dressing:  Sterile dressing applied          Camilla Chatterjee MD

## 2023-07-14 ENCOUNTER — OFFICE VISIT (OUTPATIENT)
Dept: DENTISTRY | Facility: CLINIC | Age: 53
End: 2023-07-14

## 2023-07-14 VITALS — HEART RATE: 95 BPM | TEMPERATURE: 99 F | SYSTOLIC BLOOD PRESSURE: 138 MMHG | DIASTOLIC BLOOD PRESSURE: 90 MMHG

## 2023-07-14 DIAGNOSIS — Z01.20 ENCOUNTER FOR DENTAL EXAMINATION: Primary | ICD-10-CM

## 2023-07-14 DIAGNOSIS — K02.9 DENTAL CARIES: ICD-10-CM

## 2023-07-14 DIAGNOSIS — K05.6 PERIODONTAL DISEASE: ICD-10-CM

## 2023-07-14 PROCEDURE — D4910 PERIODONTAL MAINTENANCE: HCPCS

## 2023-07-14 PROCEDURE — D0274 BITEWINGS - 4 RADIOGRAPHIC IMAGES: HCPCS

## 2023-07-14 PROCEDURE — D0120 PERIODIC ORAL EVALUATION - ESTABLISHED PATIENT: HCPCS | Performed by: DENTIST

## 2023-07-14 NOTE — DENTAL PROCEDURE DETAILS
Frankey Glasgow presents for a Periodic exam. Verbal consent for treatment given in addition to the forms. Reviewed health history - Patient is ASA II  Consents signed: Yes     Perio: Localized molars  great improvement noted since initial probe last July !! SRP was completed 9/2022 but she never returned for re eval   STAGE 2 GRADE B  Pain Scale: 0  Caries Assessment: High  XEROSTOMIA NOTED!! Radiographs: Bitewings x 6 VERTICAL      Oral Hygiene instruction reviewed and given. Recommended Hygiene PERIO MAINTENANCE visits with  [facility]. Treatment Plan:  1. Infection control:    2. Periodontal therapy    PERIO MAINTENANCE 4 MOS  3. Caries control: as charted  MULTIPLE AREAS OF DECAY CAHRTED  4. Occlusal evaluation:      Prognosis is Good. Referrals needed: No  Next Visit:   JAG LL  # 19, 20   WILL NEED DF OVERHANG # 21 POLISHED    Nv 2  CONTINUE WITH JAG  Nv 3  4 MOS PERIO MAINT. STRESSED REGULAR VISITS    PERIODONTAL MAINTENANCE     Periodontal therapy includes removal of bacterial plaque and calculus from supragingival and subgingival regions. Site specific scaling and root planing where indicated, and polishing the teeth. SHE HAD 3 TEETH EXTRACTED  areas healing well   minimal BUP this visit!! Good improvement  Periodontal therapy recommendation to continue 4 months intervals.      Exam  DR ELISA [facility]

## 2023-07-18 NOTE — PROGRESS NOTES
[id number] [facility] follow up    Scheduled for surgery:  10/3/2023    Weight check:  Level 3  (smoker)  Negative test 6/14/2023  Will repeat 30 prior to surgery     history of lap band in 2004 with Dr. Shanna Abdalla in [facility]. Now with complaints of reflux, dysphasia, weight regain, pain, and vomiting,  At the time of their surgery the patient was 320lb, and was able to drop down as low as 145lb. Following Dr. Tai Ford for a revision to RYGB    Very organized. Has all her dates written down. Ready for her surgery. Has sleep pending for this week. Will take her nicotine test 30 prior to surgery. Advised her to take in time to get the results. Still struggling with GERD. Made some changes. Purchased protein shakes and a . Framing her changes as a lifestyle change. Supports within the house hold. House hold smokes, but still outside. She is still smoke free. Has a ride to and from the hospital for her surgery.

## 2023-07-21 ENCOUNTER — OFFICE VISIT (OUTPATIENT)
Dept: BARIATRICS | Facility: CLINIC | Age: 53
End: 2023-07-21

## 2023-07-21 VITALS — BODY MASS INDEX: 37.64 KG/M2 | WEIGHT: 205.8 LBS

## 2023-07-21 DIAGNOSIS — Z01.810 PREOPERATIVE CARDIOVASCULAR EXAMINATION: ICD-10-CM

## 2023-07-21 DIAGNOSIS — F17.200 SMOKER: ICD-10-CM

## 2023-07-21 DIAGNOSIS — K21.9 GERD (GASTROESOPHAGEAL REFLUX DISEASE): ICD-10-CM

## 2023-07-21 DIAGNOSIS — Z72.0 TOBACCO ABUSE: ICD-10-CM

## 2023-07-21 DIAGNOSIS — Z98.84 HX OF BARIATRIC SURGERY: ICD-10-CM

## 2023-07-21 DIAGNOSIS — F17.201 NICOTINE DEPENDENCE IN REMISSION, UNSPECIFIED NICOTINE PRODUCT TYPE: ICD-10-CM

## 2023-07-21 DIAGNOSIS — E66.9 OBESITY, CLASS II, BMI 35-39.9: ICD-10-CM

## 2023-07-21 DIAGNOSIS — Z01.818 PRE-OPERATIVE CLEARANCE: Primary | ICD-10-CM

## 2023-07-21 PROCEDURE — RECHECK

## 2023-07-28 ENCOUNTER — HOSPITAL ENCOUNTER (OUTPATIENT)
Dept: SLEEP CENTER | Facility: CLINIC | Age: 53
Discharge: HOME | End: 2023-07-28
Payer: COMMERCIAL

## 2023-07-28 DIAGNOSIS — R09.02 HYPOXEMIA: ICD-10-CM

## 2023-07-28 DIAGNOSIS — G47.33 OSA (OBSTRUCTIVE SLEEP APNEA): ICD-10-CM

## 2023-07-28 PROCEDURE — 95811 POLYSOM 6/>YRS CPAP 4/> PARM: CPT

## 2023-07-28 PROCEDURE — 95811 POLYSOM 6/>YRS CPAP 4/> PARM: CPT | Performed by: PSYCHIATRY & NEUROLOGY

## 2023-07-29 NOTE — PROGRESS NOTES
Sleep Study Documentation    Pre-Sleep Study       Sleep testing procedure explained to patient:YES    Patient napped prior to study:NO    Caffeine:Dayshift worker after 12PM.  Caffeine use:NO    Alcohol:Dayshift workers after 5PM: Alcohol use:NO    Typical day for patient:YES       Study Documentation    Sleep Study Indications: GINGER - diagnose home study    Sleep Study: Treatment   Optimal PAP pressure: +[id number]  Leak:None  Snore:Eliminated  REM Obtained:yes  Supplemental O2: no    Minimum SaO2 86%  Baseline SaO2 92%    PAP mask choice (final)ResMed AirTouch F20  PAP mask type:full face  PAP pressure at which snoring was eliminated +4cmH2O  Minimum SaO2 at final PAP pressure 93%  Mode of Therapy:CPAP  ETCO2:No  CPAP changed to BiPAP:No    Mode of Therapy:CPAP    EKG abnormalities: no     EEG abnormalities: no    Sleep Study Recorded < 2 hours: N/A    Sleep Study Recorded > 2 hours but incomplete study: N/A    Sleep Study Recorded 6 hours but no sleep obtained: NO    Patient classification: employed       Post-Sleep Study    Medication used at bedtime or during sleep study:YES other prescription medications    Patient reports time it took to fall asleep:20 to 30 minutes    Patient reports waking up during study:1 to 2 times. Patient reports returning to sleep without difficulty. Patient reports sleeping 6 to 8 hours without dreaming. Patient reports sleep during study:better than usual    Patient rated sleepiness: Not sleepy or tired    PAP treatment:yes: Post PAP treatment patient reports feeling better and  would wear PAP mask at home.

## 2023-08-01 DIAGNOSIS — G47.33 OSA (OBSTRUCTIVE SLEEP APNEA): Primary | ICD-10-CM

## 2023-08-03 ENCOUNTER — TELEPHONE (OUTPATIENT)
Dept: SLEEP CENTER | Facility: CLINIC | Age: 53
End: 2023-08-03

## 2023-08-03 ENCOUNTER — CLINICAL SUPPORT (OUTPATIENT)
Dept: BARIATRICS | Facility: CLINIC | Age: 53
End: 2023-08-03

## 2023-08-03 DIAGNOSIS — E66.9 OBESITY, CLASS II, BMI 35-39.9: ICD-10-CM

## 2023-08-03 DIAGNOSIS — Z98.84 H/O LAPAROSCOPIC ADJUSTABLE GASTRIC BANDING: Primary | ICD-10-CM

## 2023-08-03 PROCEDURE — RECHECK: Performed by: DIETITIAN, REGISTERED

## 2023-08-03 NOTE — TELEPHONE ENCOUNTER
Left call back message and MyChart message.    Patient needs DME set up and compliance appointments scheduled

## 2023-08-03 NOTE — PROGRESS NOTES
Pt attended pre-op education session. Standardized packet of information for bariatric surgery was provided and was reviewed with pt. Importance of lifestyle change and development of regular exercise routine stressed. Pt given the opportunity to ask questions. Ensure pre-surgery drink instructions were given. Questions were answered. Pt verbalized understanding of all information provided. Pt appeared prepared for upcoming surgery. Pt. educated on two-week pre operative liver shrinking diet. Pt understands that the diet needs to be followed for 2 weeks prior to surgery. Handout reviewed. Emphasized the need to drink 80 ounces of fluid per day while on the diet. Patient will not eat any solid food for 2 days prior to surgery, including 2 cups of non starchy vegetables to ensure that the stomach will be empty day of surgery. Reviewed pre-op ERAS drink, post-operative clear liquid, full liquid, and pureed diet, post-operative nutrition rules and facts, and post-operative bariatric multivitamin/mineral recommendations and brand comparison. Contact information provided for any questions/concerns. Patient was able to verbalize basic diet (protein, fluid, vitamin and mineral) recommendations and possible nutrition-related complications.  Yes

## 2023-08-03 NOTE — TELEPHONE ENCOUNTER
----- Message from Farida Haque PA-C sent at 8/1/2023  8:10 AM EDT -----  Effective CPAP study, I will order CPAP set at 14 cm h2o as recommended. Please have the patient schedule set up as soon as possible and follow up in 31-90 days.

## 2023-08-07 ENCOUNTER — TELEPHONE (OUTPATIENT)
Dept: SLEEP CENTER | Facility: CLINIC | Age: 53
End: 2023-08-07

## 2023-08-08 LAB
DME PARACHUTE DELIVERY DATE REQUESTED: NORMAL
DME PARACHUTE DELIVERY NOTE: NORMAL
DME PARACHUTE ITEM DESCRIPTION: NORMAL
DME PARACHUTE ORDER STATUS: NORMAL
DME PARACHUTE SUPPLIER NAME: NORMAL
DME PARACHUTE SUPPLIER PHONE: NORMAL

## 2023-08-17 ENCOUNTER — OFFICE VISIT (OUTPATIENT)
Dept: DENTISTRY | Facility: CLINIC | Age: 53
End: 2023-08-17

## 2023-08-17 VITALS — TEMPERATURE: 98.3 F | SYSTOLIC BLOOD PRESSURE: 138 MMHG | HEART RATE: 89 BPM | DIASTOLIC BLOOD PRESSURE: 89 MMHG

## 2023-08-17 DIAGNOSIS — K02.9 CARIES INVOLVING MULTIPLE SURFACES OF TOOTH: Primary | ICD-10-CM

## 2023-08-17 NOTE — DENTAL PROCEDURE DETAILS
Composite Filling #19    Antonio Sarah presents for composite filling. Patient consent treatment verbally. PMH reviewed, no changes. ASA" II  Pain score: 0  Chief complain: "Chipped my bottom left back tooth"   EOE: WNL. IOE: Tooth #19 with deep cervical facial caries as well as occlusal, chipped OL angle with caries. Discussed with patient need for RCT if pulp exposure occurs or in future if pulp is inflamed. Pt understands and consents. Applied topical benzocaine, administered one carps of 1.7 ML 2% lido 1:100k epi via mandibular block and infiltration injections. Prepped tooth #19 OL and Class V facial with 245 carbide on high speed. Caries removed with round carbide on slow speed. Placed Dry shield, Isolation with cotton rolls and dri-angles    Etch with 37% H2PO4, rinse, dry. Applied Adhese with 20 second scrub once, gentle air dry and light cured for 10s. Restored with Tetric bulk jaime shade A2 and light cured. Refined with finishing burs, polished with enhance point. Verified occlusion and contacts. POI is given. Pt left satisfied and ambulatory. NV: Restoration teeth #20 and #22.

## 2023-08-18 LAB
DME PARACHUTE DELIVERY DATE ACTUAL: NORMAL
DME PARACHUTE DELIVERY DATE EXPECTED: NORMAL
DME PARACHUTE DELIVERY DATE REQUESTED: NORMAL
DME PARACHUTE DELIVERY NOTE: NORMAL
DME PARACHUTE ITEM DESCRIPTION: NORMAL
DME PARACHUTE ORDER STATUS: NORMAL
DME PARACHUTE SUPPLIER NAME: NORMAL
DME PARACHUTE SUPPLIER PHONE: NORMAL

## 2023-08-22 ENCOUNTER — TELEPHONE (OUTPATIENT)
Dept: SLEEP CENTER | Facility: CLINIC | Age: 53
End: 2023-08-22

## 2023-08-22 NOTE — TELEPHONE ENCOUNTER
Pt was set up on 8/18 by Josefina Cantrell with a Resmed S11 set at 14cm flex 3 and gave mask F20 Large

## 2023-08-30 DIAGNOSIS — J45.40 MODERATE PERSISTENT ASTHMA WITHOUT COMPLICATION: ICD-10-CM

## 2023-08-30 RX ORDER — ALBUTEROL SULFATE 90 UG/1
AEROSOL, METERED RESPIRATORY (INHALATION)
Qty: 18 G | Refills: 5 | Status: SHIPPED | OUTPATIENT
Start: 2023-08-30

## 2023-09-05 ENCOUNTER — APPOINTMENT (OUTPATIENT)
Dept: LAB | Facility: MEDICAL CENTER | Age: 53
DRG: 328 | End: 2023-09-05
Attending: SURGERY
Payer: COMMERCIAL

## 2023-09-05 DIAGNOSIS — K21.9 GERD (GASTROESOPHAGEAL REFLUX DISEASE): ICD-10-CM

## 2023-09-05 DIAGNOSIS — F17.201 NICOTINE DEPENDENCE IN REMISSION, UNSPECIFIED NICOTINE PRODUCT TYPE: ICD-10-CM

## 2023-09-05 DIAGNOSIS — E66.9 OBESITY, CLASS II, BMI 35-39.9: ICD-10-CM

## 2023-09-05 DIAGNOSIS — Z72.0 TOBACCO ABUSE: ICD-10-CM

## 2023-09-05 DIAGNOSIS — Z98.84 HX OF BARIATRIC SURGERY: ICD-10-CM

## 2023-09-05 DIAGNOSIS — F17.200 SMOKER: ICD-10-CM

## 2023-09-05 DIAGNOSIS — Z01.810 PREOPERATIVE CARDIOVASCULAR EXAMINATION: ICD-10-CM

## 2023-09-05 DIAGNOSIS — Z01.818 PRE-OPERATIVE CLEARANCE: ICD-10-CM

## 2023-09-05 PROCEDURE — 80323 ALKALOIDS NOS: CPT

## 2023-09-07 ENCOUNTER — ANESTHESIA EVENT (OUTPATIENT)
Dept: PERIOP | Facility: HOSPITAL | Age: 53
DRG: 328 | End: 2023-09-07
Payer: COMMERCIAL

## 2023-09-07 RX ORDER — ACETAMINOPHEN 500 MG
500 TABLET ORAL EVERY 6 HOURS PRN
COMMUNITY

## 2023-09-07 RX ORDER — POLYETHYLENE GLYCOL 3350 17 G/17G
17 POWDER, FOR SOLUTION ORAL DAILY
COMMUNITY

## 2023-09-07 NOTE — PRE-PROCEDURE INSTRUCTIONS
Pre-Surgery Instructions:   Medication Instructions   • acetaminophen (TYLENOL) 500 mg tablet Uses PRN- OK to take day of surgery   • albuterol (PROVENTIL HFA,VENTOLIN HFA) 90 mcg/act inhaler Uses PRN- OK to take day of surgery   • amLODIPine (NORVASC) 5 mg tablet Take day of surgery. • benztropine (COGENTIN) 1 mg tablet Take day of surgery. • hydrOXYzine HCL (ATARAX) 50 mg tablet Uses PRN- OK to take day of surgery   • lithium 300 MG tablet Take day of surgery. • lithium 600 MG capsule Take night before surgery   • mirtazapine (REMERON) 7.5 MG tablet Take night before surgery   • multivitamin (THERAGRAN) TABS Stop taking 7 days prior to surgery. • polyethylene glycol (MIRALAX) 17 g packet Hold day of surgery. • QUEtiapine (SEROquel) 100 mg tablet Take night before surgery   • Symbicort 80-4.5 MCG/ACT inhaler Take day of surgery. Medication instructions for day surgery reviewed. Please use only a sip of water to take your instructed medications. Avoid all over the counter vitamins, supplements and NSAIDS for one week prior to surgery per anesthesia guidelines. Tylenol is ok to take as needed. You will receive a call one business day prior to surgery with an arrival time and hospital directions. If your surgery is scheduled on a Monday, the hospital will be calling you on the Friday prior to your surgery. If you have not heard from anyone by 8pm, please call the hospital supervisor through the hospital  at [phone]. Owen Waddell [phone]). Do not eat or drink anything after midnight the night before your surgery, including candy, mints, lifesavers, or chewing gum. Do not drink alcohol 24hrs before your surgery. Try not to smoke at least 24hrs before your surgery. Follow the pre surgery showering instructions as listed in the [facility] Surgical Experience Booklet” or otherwise provided by your surgeon's office. Do not shave the surgical area 24 hours before surgery.  Do not apply any lotions, creams, including makeup, cologne, deodorant, or perfumes after showering on the day of your surgery. No contact lenses, eye make-up, or artificial eyelashes. Remove nail polish, including gel polish, and any artificial, gel, or acrylic nails if possible. Remove all jewelry including rings and body piercing jewelry. Wear causal clothing that is easy to take on and off. Consider your type of surgery. Keep any valuables, jewelry, piercings at home. Please bring any specially ordered equipment (sling, braces) if indicated. Arrange for a responsible person to drive you to and from the hospital on the day of your surgery. Visitor Guidelines discussed. Call the surgeon's office with any new illnesses, exposures, or additional questions prior to surgery. Please reference your [facility] Surgical Experience Booklet” for additional information to prepare for your upcoming surgery. Pt has surgical soap. ERAS reviewed.

## 2023-09-08 DIAGNOSIS — I10 PRIMARY HYPERTENSION: ICD-10-CM

## 2023-09-08 LAB
COTININE SERPL-MCNC: <1 NG/ML
NICOTINE SERPL-MCNC: <1 NG/ML

## 2023-09-11 RX ORDER — AMLODIPINE BESYLATE 5 MG/1
5 TABLET ORAL DAILY
Qty: 90 TABLET | Refills: 1 | Status: SHIPPED | OUTPATIENT
Start: 2023-09-11 | End: 2023-12-10

## 2023-09-14 ENCOUNTER — OFFICE VISIT (OUTPATIENT)
Dept: BARIATRICS | Facility: CLINIC | Age: 53
End: 2023-09-14
Payer: COMMERCIAL

## 2023-09-14 VITALS — BODY MASS INDEX: 35.97 KG/M2 | HEIGHT: 63 IN | WEIGHT: 203 LBS | HEART RATE: 103 BPM | TEMPERATURE: 97.7 F

## 2023-09-14 DIAGNOSIS — R63.5 ABNORMAL WEIGHT GAIN: Primary | ICD-10-CM

## 2023-09-14 DIAGNOSIS — K44.9 HIATAL HERNIA: ICD-10-CM

## 2023-09-14 PROCEDURE — 99213 OFFICE O/P EST LOW 20 MIN: CPT | Performed by: SURGERY

## 2023-09-14 RX ORDER — SCOLOPAMINE TRANSDERMAL SYSTEM 1 MG/1
1 PATCH, EXTENDED RELEASE TRANSDERMAL ONCE
OUTPATIENT
Start: 2023-09-14 | End: 2023-09-14

## 2023-09-14 RX ORDER — CEFAZOLIN SODIUM 2 G/50ML
2000 SOLUTION INTRAVENOUS ONCE
OUTPATIENT
Start: 2023-09-14 | End: 2023-09-14

## 2023-09-14 RX ORDER — ACETAMINOPHEN 325 MG/1
975 TABLET ORAL ONCE
OUTPATIENT
Start: 2023-09-14 | End: 2023-09-14

## 2023-09-14 RX ORDER — METRONIDAZOLE 500 MG/100ML
500 INJECTION, SOLUTION INTRAVENOUS ONCE
OUTPATIENT
Start: 2023-09-14 | End: 2023-09-14

## 2023-09-14 RX ORDER — HEPARIN SODIUM 5000 [USP'U]/ML
5000 INJECTION, SOLUTION INTRAVENOUS; SUBCUTANEOUS
OUTPATIENT
Start: 2023-09-15 | End: 2023-09-16

## 2023-09-14 NOTE — H&P (VIEW-ONLY)
BARIATRIC HISTORY AND PHYSICAL - BARIATRIC SURGERY  Halina Thurman 47 y.o. female MRN: [id number]  Unit/Bed#:  Encounter: [id number]      HPI:  Halina Thurman is a 47 y.o. female who presents to review their preoperative workup and see if they are a good candidate to undergo a bariatric procedure. History of lap band in  with Dr. Diana Palacios in [facility] comes to the office with complaints of reflux, dysphasia, weight regain, pain, and vomiting, referred by PCP. Will plan for band removal and gastric bypass    Review of Systems   Constitutional: Negative for activity change and appetite change. HENT: Negative for congestion. Respiratory: Negative for shortness of breath. Cardiovascular: Negative for chest pain. Gastrointestinal: Negative for abdominal distention and abdominal pain. Skin: Negative for color change.        Historical Information   Past Medical History:   Diagnosis Date   • Arthritis    • Asthma    • Bipolar 1 disorder ([address])    • Cancer ([address])     right breast   • Constipation    • CPAP (continuous positive airway pressure) dependence    • Depression    • GERD (gastroesophageal reflux disease)    • Hiatal hernia    • HTN (hypertension)    • Hypertension    • Mental health problem    • Sleep apnea    • Wears glasses      Past Surgical History:   Procedure Laterality Date   • BREAST SURGERY Bilateral     mastectomy   • COLONOSCOPY     • COSMETIC SURGERY      breast reconstruction   • EGD     • HYSTERECTOMY     • LAPAROSCOPIC GASTRIC BANDING       Social History   Social History     Substance and Sexual Activity   Alcohol Use Not Currently     Social History     Substance and Sexual Activity   Drug Use Not Currently     Social History     Tobacco Use   Smoking Status Former   • Packs/day: 0.25   • Types: Cigarettes   • Quit date: 2023   • Years since quittin.5   • Passive exposure: Past   Smokeless Tobacco Never     Family History: non-contributory    Meds/Allergies   all medications and allergies reviewed  No Known Allergies    Objective     Current Vitals:      bowel movement  [unfilled]    Invasive Devices     None                 Physical Exam  Constitutional:       Appearance: Normal appearance. HENT:      Head: Normocephalic. Cardiovascular:      Rate and Rhythm: Normal rate and regular rhythm. Pulmonary:      Effort: Pulmonary effort is normal.      Breath sounds: Normal breath sounds. Abdominal:      General: Abdomen is flat. Skin:     General: Skin is warm. Neurological:      General: No focal deficit present. Mental Status: She is alert. Lab Results: I have personally reviewed pertinent lab results. Imaging: I have personally reviewed pertinent reports. EKG, Pathology, and Other Studies: I have personally reviewed pertinent reports. Code Status: [unfilled]  Advance Directive and Living Will:      Power of :    POLST:      EGD:  DATE OF SERVICE:  3/15/23     PHYSICIAN(S):  Attending:   Mustapha Shipman MD      Fellow:   No Staff Documented         INDICATION:  Bariatric surgery status     POST-OP DIAGNOSIS:  See the impression below.     PREPROCEDURE:  Informed consent was obtained for the procedure, including sedation. Risks of perforation, hemorrhage, adverse drug reaction and aspiration were discussed. The patient was placed in the left lateral decubitus position.     Patient was explained about the risks and benefits of the procedure. Risks including but not limited to bleeding, infection, and perforation were explained in detail. Also explained about less than 100% sensitivity with the exam and other alternatives.     PROCEDURE: EGD     DETAILS OF PROCEDURE:   Patient was taken to the procedure room where a time out was performed to confirm correct patient and correct procedure.  The patient underwent monitored anesthesia care, which was administered by an anesthesia professional. The patient's blood pressure, heart rate, level of consciousness, respirations and oxygen were monitored throughout the procedure. The scope was advanced to the second part of the duodenum. Retroflexion was performed in the fundus. The patient experienced no blood loss. The procedure was not difficult. The patient tolerated the procedure well. There were no apparent complications.      ANESTHESIA INFORMATION:  ASA: III  Anesthesia Type: IV Sedation with Anesthesia     MEDICATIONS:  No administrations occurring from 1031 to 1034 on 03/15/23         FINDINGS:  • Signs of previous surgery in the stomach. Adjustable gastric band  No evidence of band erosion  Possible band slippage  • Medium sliding hiatal hernia (type I hiatal hernia) - GE junction 34 cm from the incisors, diaphragmatic impression 37 cm from the incisors  • The esophagus, stomach and 2nd part of the duodenum appeared normal.        SPECIMENS:  ID Type Source Tests Collected by Time Destination   1 : r/o h pylori Tissue Stomach TISSUE EXAM Bianka Finnegan MD 3/15/2023 1038              IMPRESSION:  Status post adjustable gastric band  No evidence of band erosion  Sliding hiatal hernia     RECOMMENDATION:    There is no recommended follow-up for this procedure.                  Bianka Finnegan MD     Upper GI:  UPPER GI SERIES  SINGLE CONTRAST     INDICATION:  Z98.84: Bariatric surgery status. History of lap band.      COMPARISON:  None     IMAGES:  28     FLUOROSCOPY TIME:  2.1min     TECHNIQUE:  The patient was given barium by mouth and images of the esophagus, stomach, and small bowel were obtained.      FINDINGS:      image demonstrates a lap band in appropriate position with phi angle of 31 degrees.     The esophagus is normal in caliber. Esophageal motility is normal and emptying of contrast from the esophagus is prompt. There was mild proximal escape with grade 1 stasis.  No gross mucosal abnormalities although evaluation is limited with single   contrast technique.     The stomach is unremarkable in size. No gross gastric mucosal abnormalities although evaluation limited with single contrast technique. No penetrating ulcers or masses.     Contrast empties promptly into the duodenum. The duodenum is normal in caliber. The ligament of Treitz/duodenojejunal junction lies in a normal position.     Gastroesophageal reflux was not observed.       There is a small to moderate hiatal hernia.        IMPRESSION:     1. Lap band in appropriate position.     2.  Small to moderate hiatal hernia.     3.  Mild proximal escape with grade 1 stasis.     4.  Unremarkable single contrast appearance of the stomach. Assessment/Plan:    The patient presented to review the preoperative workup and see if bariatric surgery is appropriate and indicated following the extensive preoperative workup and the enrollment in our weight loss program.  Preoperative workup was complete. Results were reviewed with the patient including the blood work results and the endoscopy findings and the biopsy results. We also reviewed the cardiology evaluation.     The patient was determined to be a good candidate for band removal, hiatal hernia repair and RYGB.  ----------------------------------------------------------------------  Smoking: No  Blood thinner use: No  Home pain medication use and who manages it: No  CPAP use: Yes (If yes, sleep study obtained and reminded pt to bring CPAP to hospital)  History of blood clots: No  Previous abdominal surgeries: Gastric band, abdominoplasty in 2009  Cardiac clearance obtained: Yes   EKG performed: Yes  EGD prior to surgery: Completed  Screening Labs obtained (CBC, CMP): Yes  H. Pylori status: Negative  Medication allergies: None  SLIM consult Post-Op: No  Reminded patient about 2 week pre-op liquid diet: Yes  10% body weight loss pre-operatively met/discussed: Yes  Consent signed: Yes  Pre-Op ERAS Orders placed: Yes  -----------------------------------------------------------------------  Risks and benefits explained one more time to the patient. Alternatives to surgery and alternative forms of surgery were also explained. Post-surgical commitment and after care programs were explained. Consent was signed. Questions were answered and concerns were addressed. Patient will need to start the 2 week liquid diet prior to surgery. Patient wishes to proceed. As per [address] [place] [address] guidelines, I had a discussion with the patient regarding their CODE STATUS in the perioperative period and the patient is level 1 or FULL CODE STATUS.     Shannan Francois MD  Bariatric Surgery   9/14/2023  1:29 PM

## 2023-09-14 NOTE — PROGRESS NOTES
BARIATRIC HISTORY AND PHYSICAL - BARIATRIC SURGERY  Rosemary Frank 47 y.o. female MRN: [id number]  Unit/Bed#:  Encounter: [id number]      HPI:  Rosemary Frank is a 47 y.o. female who presents to review their preoperative workup and see if they are a good candidate to undergo a bariatric procedure. History of lap band in  with Dr. Abbi Frankel in [facility] comes to the office with complaints of reflux, dysphasia, weight regain, pain, and vomiting, referred by PCP. Will plan for band removal and gastric bypass    Review of Systems   Constitutional: Negative for activity change and appetite change. HENT: Negative for congestion. Respiratory: Negative for shortness of breath. Cardiovascular: Negative for chest pain. Gastrointestinal: Negative for abdominal distention and abdominal pain. Skin: Negative for color change.        Historical Information   Past Medical History:   Diagnosis Date   • Arthritis    • Asthma    • Bipolar 1 disorder ([address])    • Cancer ([address])     right breast   • Constipation    • CPAP (continuous positive airway pressure) dependence    • Depression    • GERD (gastroesophageal reflux disease)    • Hiatal hernia    • HTN (hypertension)    • Hypertension    • Mental health problem    • Sleep apnea    • Wears glasses      Past Surgical History:   Procedure Laterality Date   • BREAST SURGERY Bilateral     mastectomy   • COLONOSCOPY     • COSMETIC SURGERY      breast reconstruction   • EGD     • HYSTERECTOMY     • LAPAROSCOPIC GASTRIC BANDING       Social History   Social History     Substance and Sexual Activity   Alcohol Use Not Currently     Social History     Substance and Sexual Activity   Drug Use Not Currently     Social History     Tobacco Use   Smoking Status Former   • Packs/day: 0.25   • Types: Cigarettes   • Quit date: 2023   • Years since quittin.5   • Passive exposure: Past   Smokeless Tobacco Never     Family History: non-contributory    Meds/Allergies   all medications and allergies reviewed  No Known Allergies    Objective     Current Vitals:      bowel movement  [unfilled]    Invasive Devices     None                 Physical Exam  Constitutional:       Appearance: Normal appearance. HENT:      Head: Normocephalic. Cardiovascular:      Rate and Rhythm: Normal rate and regular rhythm. Pulmonary:      Effort: Pulmonary effort is normal.      Breath sounds: Normal breath sounds. Abdominal:      General: Abdomen is flat. Skin:     General: Skin is warm. Neurological:      General: No focal deficit present. Mental Status: She is alert. Lab Results: I have personally reviewed pertinent lab results. Imaging: I have personally reviewed pertinent reports. EKG, Pathology, and Other Studies: I have personally reviewed pertinent reports. Code Status: [unfilled]  Advance Directive and Living Will:      Power of :    POLST:      EGD:  DATE OF SERVICE:  3/15/23     PHYSICIAN(S):  Attending:   Juliet Gordon MD      Fellow:   No Staff Documented         INDICATION:  Bariatric surgery status     POST-OP DIAGNOSIS:  See the impression below.     PREPROCEDURE:  Informed consent was obtained for the procedure, including sedation. Risks of perforation, hemorrhage, adverse drug reaction and aspiration were discussed. The patient was placed in the left lateral decubitus position.     Patient was explained about the risks and benefits of the procedure. Risks including but not limited to bleeding, infection, and perforation were explained in detail. Also explained about less than 100% sensitivity with the exam and other alternatives.     PROCEDURE: EGD     DETAILS OF PROCEDURE:   Patient was taken to the procedure room where a time out was performed to confirm correct patient and correct procedure.  The patient underwent monitored anesthesia care, which was administered by an anesthesia professional. The patient's blood pressure, heart rate, level of consciousness, respirations and oxygen were monitored throughout the procedure. The scope was advanced to the second part of the duodenum. Retroflexion was performed in the fundus. The patient experienced no blood loss. The procedure was not difficult. The patient tolerated the procedure well. There were no apparent complications.      ANESTHESIA INFORMATION:  ASA: III  Anesthesia Type: IV Sedation with Anesthesia     MEDICATIONS:  No administrations occurring from 1031 to 1034 on 03/15/23         FINDINGS:  • Signs of previous surgery in the stomach. Adjustable gastric band  No evidence of band erosion  Possible band slippage  • Medium sliding hiatal hernia (type I hiatal hernia) - GE junction 34 cm from the incisors, diaphragmatic impression 37 cm from the incisors  • The esophagus, stomach and 2nd part of the duodenum appeared normal.        SPECIMENS:  ID Type Source Tests Collected by Time Destination   1 : r/o h pylori Tissue Stomach TISSUE EXAM Rossy Ochoa MD 3/15/2023 1038              IMPRESSION:  Status post adjustable gastric band  No evidence of band erosion  Sliding hiatal hernia     RECOMMENDATION:    There is no recommended follow-up for this procedure.                  Rossy Ochoa MD     Upper GI:  UPPER GI SERIES  SINGLE CONTRAST     INDICATION:  Z98.84: Bariatric surgery status. History of lap band.      COMPARISON:  None     IMAGES:  28     FLUOROSCOPY TIME:  2.1min     TECHNIQUE:  The patient was given barium by mouth and images of the esophagus, stomach, and small bowel were obtained.      FINDINGS:      image demonstrates a lap band in appropriate position with phi angle of 31 degrees.     The esophagus is normal in caliber. Esophageal motility is normal and emptying of contrast from the esophagus is prompt. There was mild proximal escape with grade 1 stasis.  No gross mucosal abnormalities although evaluation is limited with single   contrast technique.     The stomach is unremarkable in size. No gross gastric mucosal abnormalities although evaluation limited with single contrast technique. No penetrating ulcers or masses.     Contrast empties promptly into the duodenum. The duodenum is normal in caliber. The ligament of Treitz/duodenojejunal junction lies in a normal position.     Gastroesophageal reflux was not observed.       There is a small to moderate hiatal hernia.        IMPRESSION:     1. Lap band in appropriate position.     2.  Small to moderate hiatal hernia.     3.  Mild proximal escape with grade 1 stasis.     4.  Unremarkable single contrast appearance of the stomach. Assessment/Plan:    The patient presented to review the preoperative workup and see if bariatric surgery is appropriate and indicated following the extensive preoperative workup and the enrollment in our weight loss program.  Preoperative workup was complete. Results were reviewed with the patient including the blood work results and the endoscopy findings and the biopsy results. We also reviewed the cardiology evaluation.     The patient was determined to be a good candidate for band removal, hiatal hernia repair and RYGB.  ----------------------------------------------------------------------  Smoking: No  Blood thinner use: No  Home pain medication use and who manages it: No  CPAP use: Yes (If yes, sleep study obtained and reminded pt to bring CPAP to hospital)  History of blood clots: No  Previous abdominal surgeries: Gastric band, abdominoplasty in 2009  Cardiac clearance obtained: Yes   EKG performed: Yes  EGD prior to surgery: Completed  Screening Labs obtained (CBC, CMP): Yes  H. Pylori status: Negative  Medication allergies: None  SLIM consult Post-Op: No  Reminded patient about 2 week pre-op liquid diet: Yes  10% body weight loss pre-operatively met/discussed: Yes  Consent signed: Yes  Pre-Op ERAS Orders placed: Yes  -----------------------------------------------------------------------  Risks and benefits explained one more time to the patient. Alternatives to surgery and alternative forms of surgery were also explained. Post-surgical commitment and after care programs were explained. Consent was signed. Questions were answered and concerns were addressed. Patient will need to start the 2 week liquid diet prior to surgery. Patient wishes to proceed. As per [facility] guidelines, I had a discussion with the patient regarding their CODE STATUS in the perioperative period and the patient is level 1 or FULL CODE STATUS.     Katelynn Robertson MD  Bariatric Surgery   9/14/2023  1:29 PM

## 2023-09-15 ENCOUNTER — TELEPHONE (OUTPATIENT)
Dept: SURGICAL ONCOLOGY | Facility: CLINIC | Age: 53
End: 2023-09-15

## 2023-09-15 DIAGNOSIS — E66.9 OBESITY, CLASS II, BMI 35-39.9: Primary | ICD-10-CM

## 2023-09-15 NOTE — TELEPHONE ENCOUNTER
Po med, Laparoscopic Removal of Adjustable Gastric Band With Robotics With a Conversion to CHEN-EN-Y Gastric Bypass With Robotics & Intraoperative EGD.   SX 10/3/2023 King Clemens

## 2023-09-18 RX ORDER — OXYCODONE HYDROCHLORIDE 5 MG/1
5 TABLET ORAL EVERY 4 HOURS PRN
Qty: 10 TABLET | Refills: 0 | Status: SHIPPED | OUTPATIENT
Start: 2023-10-04

## 2023-09-18 RX ORDER — OMEPRAZOLE 20 MG/1
20 CAPSULE, DELAYED RELEASE ORAL DAILY
Qty: 30 CAPSULE | Refills: 3 | Status: SHIPPED | OUTPATIENT
Start: 2023-09-18

## 2023-09-26 ENCOUNTER — TELEPHONE (OUTPATIENT)
Dept: BARIATRICS | Facility: CLINIC | Age: 53
End: 2023-09-26

## 2023-09-26 DIAGNOSIS — J45.40 MODERATE PERSISTENT ASTHMA WITHOUT COMPLICATION: ICD-10-CM

## 2023-09-26 RX ORDER — DILTIAZEM HYDROCHLORIDE 60 MG/1
TABLET, FILM COATED ORAL
Qty: 10.2 G | Refills: 2 | Status: SHIPPED | OUTPATIENT
Start: 2023-09-26

## 2023-09-27 ENCOUNTER — TELEPHONE (OUTPATIENT)
Dept: BARIATRICS | Facility: CLINIC | Age: 53
End: 2023-09-27

## 2023-09-27 NOTE — TELEPHONE ENCOUNTER
Returned phone call. Answered questions concerning the pre surgery drinks.   Pt appreciative of the call

## 2023-10-03 ENCOUNTER — HOSPITAL ENCOUNTER (INPATIENT)
Facility: HOSPITAL | Age: 53
LOS: 1 days | Discharge: HOME | DRG: 328 | End: 2023-10-04
Attending: SURGERY | Admitting: SURGERY
Payer: COMMERCIAL

## 2023-10-03 ENCOUNTER — APPOINTMENT (INPATIENT)
Dept: RADIOLOGY | Facility: HOSPITAL | Age: 53
DRG: 328 | End: 2023-10-03
Payer: COMMERCIAL

## 2023-10-03 ENCOUNTER — ANESTHESIA (OUTPATIENT)
Dept: PERIOP | Facility: HOSPITAL | Age: 53
DRG: 328 | End: 2023-10-03
Payer: COMMERCIAL

## 2023-10-03 DIAGNOSIS — I10 PRIMARY HYPERTENSION: ICD-10-CM

## 2023-10-03 DIAGNOSIS — N18.31 STAGE 3A CHRONIC KIDNEY DISEASE (HCC): Primary | ICD-10-CM

## 2023-10-03 DIAGNOSIS — R11.10 VOMITING: ICD-10-CM

## 2023-10-03 DIAGNOSIS — K21.9 GASTROESOPHAGEAL REFLUX DISEASE: ICD-10-CM

## 2023-10-03 DIAGNOSIS — R13.10 DYSPHAGIA: ICD-10-CM

## 2023-10-03 DIAGNOSIS — Z98.84 STATUS POST BARIATRIC SURGERY: ICD-10-CM

## 2023-10-03 DIAGNOSIS — G47.33 OSA (OBSTRUCTIVE SLEEP APNEA): ICD-10-CM

## 2023-10-03 DIAGNOSIS — R10.9 ABDOMINAL PAIN: ICD-10-CM

## 2023-10-03 DIAGNOSIS — F31.9 BIPOLAR 1 DISORDER (HCC): ICD-10-CM

## 2023-10-03 PROCEDURE — C9290 INJ, BUPIVACAINE LIPOSOME: HCPCS | Performed by: STUDENT IN AN ORGANIZED HEALTH CARE EDUCATION/TRAINING PROGRAM

## 2023-10-03 PROCEDURE — 88300 SURGICAL PATH GROSS: CPT | Performed by: PATHOLOGY

## 2023-10-03 PROCEDURE — 87205 SMEAR GRAM STAIN: CPT | Performed by: SURGERY

## 2023-10-03 PROCEDURE — 0DP64CZ REMOVAL OF EXTRALUMINAL DEVICE FROM STOMACH, PERCUTANEOUS ENDOSCOPIC APPROACH: ICD-10-PCS | Performed by: SURGERY

## 2023-10-03 PROCEDURE — 0DJ08ZZ INSPECTION OF UPPER INTESTINAL TRACT, VIA NATURAL OR ARTIFICIAL OPENING ENDOSCOPIC: ICD-10-PCS | Performed by: SURGERY

## 2023-10-03 PROCEDURE — 74018 RADEX ABDOMEN 1 VIEW: CPT

## 2023-10-03 PROCEDURE — 0DNU4ZZ RELEASE OMENTUM, PERCUTANEOUS ENDOSCOPIC APPROACH: ICD-10-PCS | Performed by: SURGERY

## 2023-10-03 PROCEDURE — 43774 LAP RMVL GASTR ADJ ALL PARTS: CPT | Performed by: SURGERY

## 2023-10-03 PROCEDURE — 0FN24ZZ RELEASE LEFT LOBE LIVER, PERCUTANEOUS ENDOSCOPIC APPROACH: ICD-10-PCS | Performed by: SURGERY

## 2023-10-03 PROCEDURE — 87070 CULTURE OTHR SPECIMN AEROBIC: CPT | Performed by: SURGERY

## 2023-10-03 PROCEDURE — 43774 LAP RMVL GASTR ADJ ALL PARTS: CPT | Performed by: STUDENT IN AN ORGANIZED HEALTH CARE EDUCATION/TRAINING PROGRAM

## 2023-10-03 PROCEDURE — 87176 TISSUE HOMOGENIZATION CULTR: CPT | Performed by: SURGERY

## 2023-10-03 PROCEDURE — S2900 ROBOTIC SURGICAL SYSTEM: HCPCS | Performed by: SURGERY

## 2023-10-03 PROCEDURE — 8E0W4CZ ROBOTIC ASSISTED PROCEDURE OF TRUNK REGION, PERCUTANEOUS ENDOSCOPIC APPROACH: ICD-10-PCS | Performed by: SURGERY

## 2023-10-03 RX ORDER — PROMETHAZINE HYDROCHLORIDE 25 MG/ML
25 INJECTION, SOLUTION INTRAMUSCULAR; INTRAVENOUS EVERY 6 HOURS PRN
Status: DISCONTINUED | OUTPATIENT
Start: 2023-10-03 | End: 2023-10-04 | Stop reason: HOSPADM

## 2023-10-03 RX ORDER — FENTANYL CITRATE/PF 50 MCG/ML
25 SYRINGE (ML) INJECTION
Status: DISCONTINUED | OUTPATIENT
Start: 2023-10-03 | End: 2023-10-03 | Stop reason: HOSPADM

## 2023-10-03 RX ORDER — MAGNESIUM HYDROXIDE 1200 MG/15ML
LIQUID ORAL AS NEEDED
Status: DISCONTINUED | OUTPATIENT
Start: 2023-10-03 | End: 2023-10-03 | Stop reason: HOSPADM

## 2023-10-03 RX ORDER — METRONIDAZOLE 500 MG/100ML
500 INJECTION, SOLUTION INTRAVENOUS ONCE
Status: COMPLETED | OUTPATIENT
Start: 2023-10-03 | End: 2023-10-03

## 2023-10-03 RX ORDER — PROPOFOL 10 MG/ML
INJECTION, EMULSION INTRAVENOUS AS NEEDED
Status: DISCONTINUED | OUTPATIENT
Start: 2023-10-03 | End: 2023-10-03

## 2023-10-03 RX ORDER — MEPERIDINE HYDROCHLORIDE 25 MG/ML
12.5 INJECTION INTRAMUSCULAR; INTRAVENOUS; SUBCUTANEOUS
Status: DISCONTINUED | OUTPATIENT
Start: 2023-10-03 | End: 2023-10-03 | Stop reason: HOSPADM

## 2023-10-03 RX ORDER — MIDAZOLAM HYDROCHLORIDE 2 MG/2ML
INJECTION, SOLUTION INTRAMUSCULAR; INTRAVENOUS AS NEEDED
Status: DISCONTINUED | OUTPATIENT
Start: 2023-10-03 | End: 2023-10-03

## 2023-10-03 RX ORDER — LIDOCAINE HYDROCHLORIDE 20 MG/ML
INJECTION, SOLUTION EPIDURAL; INFILTRATION; INTRACAUDAL; PERINEURAL AS NEEDED
Status: DISCONTINUED | OUTPATIENT
Start: 2023-10-03 | End: 2023-10-03

## 2023-10-03 RX ORDER — HYDROXYZINE 50 MG/1
50 TABLET, FILM COATED ORAL 3 TIMES DAILY PRN
Status: DISCONTINUED | OUTPATIENT
Start: 2023-10-03 | End: 2023-10-04 | Stop reason: HOSPADM

## 2023-10-03 RX ORDER — METRONIDAZOLE 500 MG/100ML
500 INJECTION, SOLUTION INTRAVENOUS EVERY 8 HOURS
Status: COMPLETED | OUTPATIENT
Start: 2023-10-03 | End: 2023-10-04

## 2023-10-03 RX ORDER — SODIUM CHLORIDE, SODIUM LACTATE, POTASSIUM CHLORIDE, CALCIUM CHLORIDE 600; 310; 30; 20 MG/100ML; MG/100ML; MG/100ML; MG/100ML
100 INJECTION, SOLUTION INTRAVENOUS CONTINUOUS
Status: DISCONTINUED | OUTPATIENT
Start: 2023-10-03 | End: 2023-10-04 | Stop reason: HOSPADM

## 2023-10-03 RX ORDER — HYDROMORPHONE HCL/PF 1 MG/ML
SYRINGE (ML) INJECTION AS NEEDED
Status: DISCONTINUED | OUTPATIENT
Start: 2023-10-03 | End: 2023-10-03

## 2023-10-03 RX ORDER — SODIUM CHLORIDE 9 MG/ML
INJECTION, SOLUTION INTRAVENOUS AS NEEDED
Status: DISCONTINUED | OUTPATIENT
Start: 2023-10-03 | End: 2023-10-03 | Stop reason: HOSPADM

## 2023-10-03 RX ORDER — FAMOTIDINE 10 MG/ML
20 INJECTION, SOLUTION INTRAVENOUS 2 TIMES DAILY
Status: DISCONTINUED | OUTPATIENT
Start: 2023-10-03 | End: 2023-10-04 | Stop reason: HOSPADM

## 2023-10-03 RX ORDER — ROCURONIUM BROMIDE 10 MG/ML
INJECTION, SOLUTION INTRAVENOUS AS NEEDED
Status: DISCONTINUED | OUTPATIENT
Start: 2023-10-03 | End: 2023-10-03

## 2023-10-03 RX ORDER — LABETALOL HYDROCHLORIDE 5 MG/ML
INJECTION, SOLUTION INTRAVENOUS AS NEEDED
Status: DISCONTINUED | OUTPATIENT
Start: 2023-10-03 | End: 2023-10-03

## 2023-10-03 RX ORDER — FENTANYL CITRATE 50 UG/ML
INJECTION, SOLUTION INTRAMUSCULAR; INTRAVENOUS AS NEEDED
Status: DISCONTINUED | OUTPATIENT
Start: 2023-10-03 | End: 2023-10-03

## 2023-10-03 RX ORDER — DEXAMETHASONE SODIUM PHOSPHATE 10 MG/ML
INJECTION, SOLUTION INTRAMUSCULAR; INTRAVENOUS AS NEEDED
Status: DISCONTINUED | OUTPATIENT
Start: 2023-10-03 | End: 2023-10-03

## 2023-10-03 RX ORDER — HEPARIN SODIUM 5000 [USP'U]/ML
5000 INJECTION, SOLUTION INTRAVENOUS; SUBCUTANEOUS
Status: COMPLETED | OUTPATIENT
Start: 2023-10-03 | End: 2023-10-03

## 2023-10-03 RX ORDER — SCOLOPAMINE TRANSDERMAL SYSTEM 1 MG/1
1 PATCH, EXTENDED RELEASE TRANSDERMAL ONCE
Status: DISCONTINUED | OUTPATIENT
Start: 2023-10-03 | End: 2023-10-03

## 2023-10-03 RX ORDER — ACETAMINOPHEN 160 MG/5ML
975 SUSPENSION ORAL EVERY 8 HOURS
Status: DISCONTINUED | OUTPATIENT
Start: 2023-10-03 | End: 2023-10-04 | Stop reason: HOSPADM

## 2023-10-03 RX ORDER — ONDANSETRON 2 MG/ML
4 INJECTION INTRAMUSCULAR; INTRAVENOUS EVERY 6 HOURS PRN
Status: DISCONTINUED | OUTPATIENT
Start: 2023-10-03 | End: 2023-10-04 | Stop reason: HOSPADM

## 2023-10-03 RX ORDER — PHENYLEPHRINE HCL IN 0.9% NACL 1 MG/10 ML
SYRINGE (ML) INTRAVENOUS AS NEEDED
Status: DISCONTINUED | OUTPATIENT
Start: 2023-10-03 | End: 2023-10-03

## 2023-10-03 RX ORDER — SODIUM CHLORIDE, SODIUM LACTATE, POTASSIUM CHLORIDE, CALCIUM CHLORIDE 600; 310; 30; 20 MG/100ML; MG/100ML; MG/100ML; MG/100ML
125 INJECTION, SOLUTION INTRAVENOUS CONTINUOUS
Status: DISCONTINUED | OUTPATIENT
Start: 2023-10-03 | End: 2023-10-03 | Stop reason: SDUPTHER

## 2023-10-03 RX ORDER — LITHIUM CARBONATE 300 MG/1
300 CAPSULE ORAL
Status: DISCONTINUED | OUTPATIENT
Start: 2023-10-04 | End: 2023-10-04 | Stop reason: HOSPADM

## 2023-10-03 RX ORDER — BUPIVACAINE HYDROCHLORIDE 5 MG/ML
INJECTION, SOLUTION EPIDURAL; INTRACAUDAL AS NEEDED
Status: DISCONTINUED | OUTPATIENT
Start: 2023-10-03 | End: 2023-10-03 | Stop reason: HOSPADM

## 2023-10-03 RX ORDER — CEFAZOLIN SODIUM 2 G/50ML
2000 SOLUTION INTRAVENOUS ONCE
Status: COMPLETED | OUTPATIENT
Start: 2023-10-03 | End: 2023-10-03

## 2023-10-03 RX ORDER — DIPHENHYDRAMINE HCL 25 MG
25 TABLET ORAL EVERY 8 HOURS PRN
Status: DISCONTINUED | OUTPATIENT
Start: 2023-10-03 | End: 2023-10-03 | Stop reason: SDUPTHER

## 2023-10-03 RX ORDER — HYDROMORPHONE HCL/PF 1 MG/ML
0.5 SYRINGE (ML) INJECTION
Status: DISCONTINUED | OUTPATIENT
Start: 2023-10-03 | End: 2023-10-03 | Stop reason: HOSPADM

## 2023-10-03 RX ORDER — MORPHINE SULFATE 4 MG/ML
4 INJECTION, SOLUTION INTRAMUSCULAR; INTRAVENOUS EVERY 4 HOURS PRN
Status: DISCONTINUED | OUTPATIENT
Start: 2023-10-03 | End: 2023-10-04 | Stop reason: HOSPADM

## 2023-10-03 RX ORDER — METOCLOPRAMIDE HYDROCHLORIDE 5 MG/ML
10 INJECTION INTRAMUSCULAR; INTRAVENOUS EVERY 6 HOURS PRN
Status: DISCONTINUED | OUTPATIENT
Start: 2023-10-03 | End: 2023-10-04 | Stop reason: HOSPADM

## 2023-10-03 RX ORDER — LITHIUM CARBONATE 300 MG/1
600 CAPSULE ORAL
Status: DISCONTINUED | OUTPATIENT
Start: 2023-10-03 | End: 2023-10-04 | Stop reason: HOSPADM

## 2023-10-03 RX ORDER — ACETAMINOPHEN 325 MG/1
975 TABLET ORAL ONCE
Status: COMPLETED | OUTPATIENT
Start: 2023-10-03 | End: 2023-10-03

## 2023-10-03 RX ORDER — SIMETHICONE 80 MG
80 TABLET,CHEWABLE ORAL 4 TIMES DAILY PRN
Status: DISCONTINUED | OUTPATIENT
Start: 2023-10-03 | End: 2023-10-04 | Stop reason: HOSPADM

## 2023-10-03 RX ORDER — ACETAMINOPHEN 325 MG/1
975 TABLET ORAL EVERY 8 HOURS
Status: DISCONTINUED | OUTPATIENT
Start: 2023-10-03 | End: 2023-10-04 | Stop reason: HOSPADM

## 2023-10-03 RX ORDER — MIRTAZAPINE 15 MG/1
7.5 TABLET, FILM COATED ORAL
Status: DISCONTINUED | OUTPATIENT
Start: 2023-10-03 | End: 2023-10-04 | Stop reason: HOSPADM

## 2023-10-03 RX ORDER — ONDANSETRON 2 MG/ML
INJECTION INTRAMUSCULAR; INTRAVENOUS AS NEEDED
Status: DISCONTINUED | OUTPATIENT
Start: 2023-10-03 | End: 2023-10-03

## 2023-10-03 RX ORDER — ALBUTEROL SULFATE 90 UG/1
2 AEROSOL, METERED RESPIRATORY (INHALATION) EVERY 4 HOURS PRN
Status: DISCONTINUED | OUTPATIENT
Start: 2023-10-03 | End: 2023-10-04 | Stop reason: HOSPADM

## 2023-10-03 RX ORDER — ONDANSETRON 2 MG/ML
4 INJECTION INTRAMUSCULAR; INTRAVENOUS ONCE AS NEEDED
Status: DISCONTINUED | OUTPATIENT
Start: 2023-10-03 | End: 2023-10-03 | Stop reason: HOSPADM

## 2023-10-03 RX ORDER — QUETIAPINE FUMARATE 100 MG/1
100 TABLET, FILM COATED ORAL
Status: DISCONTINUED | OUTPATIENT
Start: 2023-10-03 | End: 2023-10-04 | Stop reason: HOSPADM

## 2023-10-03 RX ORDER — CEFAZOLIN SODIUM 2 G/50ML
2000 SOLUTION INTRAVENOUS EVERY 8 HOURS
Status: COMPLETED | OUTPATIENT
Start: 2023-10-03 | End: 2023-10-04

## 2023-10-03 RX ORDER — OXYCODONE HCL 5 MG/5 ML
10 SOLUTION, ORAL ORAL EVERY 4 HOURS PRN
Status: DISCONTINUED | OUTPATIENT
Start: 2023-10-03 | End: 2023-10-04 | Stop reason: HOSPADM

## 2023-10-03 RX ORDER — OXYCODONE HCL 5 MG/5 ML
5 SOLUTION, ORAL ORAL EVERY 4 HOURS PRN
Status: DISCONTINUED | OUTPATIENT
Start: 2023-10-03 | End: 2023-10-04 | Stop reason: HOSPADM

## 2023-10-03 RX ORDER — LITHIUM CARBONATE 300 MG/1
300 CAPSULE ORAL
Status: DISCONTINUED | OUTPATIENT
Start: 2023-10-03 | End: 2023-10-03

## 2023-10-03 RX ADMIN — ACETAMINOPHEN 325MG 975 MG: 325 TABLET ORAL at 11:25

## 2023-10-03 RX ADMIN — FENTANYL CITRATE 50 MCG: 50 INJECTION, SOLUTION INTRAMUSCULAR; INTRAVENOUS at 14:21

## 2023-10-03 RX ADMIN — CEFAZOLIN SODIUM 2000 MG: 2 SOLUTION INTRAVENOUS at 13:33

## 2023-10-03 RX ADMIN — HEPARIN SODIUM 5000 UNITS: 5000 INJECTION INTRAVENOUS; SUBCUTANEOUS at 11:51

## 2023-10-03 RX ADMIN — LITHIUM CARBONATE 600 MG: 300 CAPSULE, GELATIN COATED ORAL at 22:28

## 2023-10-03 RX ADMIN — HYDROMORPHONE HYDROCHLORIDE 0.5 MG: 1 INJECTION, SOLUTION INTRAMUSCULAR; INTRAVENOUS; SUBCUTANEOUS at 15:24

## 2023-10-03 RX ADMIN — MIDAZOLAM 2 MG: 1 INJECTION INTRAMUSCULAR; INTRAVENOUS at 13:33

## 2023-10-03 RX ADMIN — FENTANYL CITRATE 50 MCG: 50 INJECTION, SOLUTION INTRAMUSCULAR; INTRAVENOUS at 13:37

## 2023-10-03 RX ADMIN — ROCURONIUM BROMIDE 20 MG: 10 INJECTION, SOLUTION INTRAVENOUS at 14:57

## 2023-10-03 RX ADMIN — ROCURONIUM BROMIDE 10 MG: 10 INJECTION, SOLUTION INTRAVENOUS at 15:46

## 2023-10-03 RX ADMIN — PROPOFOL 200 MG: 10 INJECTION, EMULSION INTRAVENOUS at 13:37

## 2023-10-03 RX ADMIN — DEXAMETHASONE SODIUM PHOSPHATE 8 MG: 10 INJECTION INTRAMUSCULAR; INTRAVENOUS at 13:47

## 2023-10-03 RX ADMIN — FAMOTIDINE 20 MG: 10 INJECTION INTRAVENOUS at 20:24

## 2023-10-03 RX ADMIN — METRONIDAZOLE 500 MG: 500 INJECTION, SOLUTION INTRAVENOUS at 22:28

## 2023-10-03 RX ADMIN — MIRTAZAPINE 7.5 MG: 15 TABLET, FILM COATED ORAL at 22:28

## 2023-10-03 RX ADMIN — SODIUM CHLORIDE, SODIUM LACTATE, POTASSIUM CHLORIDE, AND CALCIUM CHLORIDE 125 ML/HR: .6; .31; .03; .02 INJECTION, SOLUTION INTRAVENOUS at 11:50

## 2023-10-03 RX ADMIN — ROCURONIUM BROMIDE 20 MG: 10 INJECTION, SOLUTION INTRAVENOUS at 14:21

## 2023-10-03 RX ADMIN — Medication 100 MCG: at 13:43

## 2023-10-03 RX ADMIN — ROCURONIUM BROMIDE 50 MG: 10 INJECTION, SOLUTION INTRAVENOUS at 13:37

## 2023-10-03 RX ADMIN — LIDOCAINE HYDROCHLORIDE 100 MG: 20 INJECTION, SOLUTION EPIDURAL; INFILTRATION; INTRACAUDAL; PERINEURAL at 13:37

## 2023-10-03 RX ADMIN — HYDROMORPHONE HYDROCHLORIDE 0.5 MG: 1 INJECTION, SOLUTION INTRAMUSCULAR; INTRAVENOUS; SUBCUTANEOUS at 14:33

## 2023-10-03 RX ADMIN — SODIUM CHLORIDE, SODIUM LACTATE, POTASSIUM CHLORIDE, AND CALCIUM CHLORIDE: .6; .31; .03; .02 INJECTION, SOLUTION INTRAVENOUS at 15:12

## 2023-10-03 RX ADMIN — FENTANYL CITRATE 50 MCG: 50 INJECTION, SOLUTION INTRAMUSCULAR; INTRAVENOUS at 14:27

## 2023-10-03 RX ADMIN — ONDANSETRON 4 MG: 2 INJECTION INTRAMUSCULAR; INTRAVENOUS at 13:47

## 2023-10-03 RX ADMIN — QUETIAPINE FUMARATE 100 MG: 100 TABLET ORAL at 22:28

## 2023-10-03 RX ADMIN — METRONIDAZOLE: 500 INJECTION, SOLUTION INTRAVENOUS at 13:43

## 2023-10-03 RX ADMIN — ACETAMINOPHEN 325MG 975 MG: 325 TABLET ORAL at 22:28

## 2023-10-03 RX ADMIN — SODIUM CHLORIDE, SODIUM LACTATE, POTASSIUM CHLORIDE, AND CALCIUM CHLORIDE 100 ML/HR: .6; .31; .03; .02 INJECTION, SOLUTION INTRAVENOUS at 18:38

## 2023-10-03 RX ADMIN — SODIUM CHLORIDE, SODIUM LACTATE, POTASSIUM CHLORIDE, AND CALCIUM CHLORIDE: .6; .31; .03; .02 INJECTION, SOLUTION INTRAVENOUS at 16:12

## 2023-10-03 RX ADMIN — FENTANYL CITRATE 50 MCG: 50 INJECTION, SOLUTION INTRAMUSCULAR; INTRAVENOUS at 14:08

## 2023-10-03 RX ADMIN — CEFAZOLIN SODIUM 2000 MG: 2 SOLUTION INTRAVENOUS at 20:24

## 2023-10-03 RX ADMIN — SUGAMMADEX 300 MG: 100 INJECTION, SOLUTION INTRAVENOUS at 16:13

## 2023-10-03 RX ADMIN — Medication 10 MG: at 14:41

## 2023-10-03 RX ADMIN — ROCURONIUM BROMIDE 10 MG: 10 INJECTION, SOLUTION INTRAVENOUS at 15:09

## 2023-10-03 RX ADMIN — SCOPALAMINE 1 PATCH: 1 PATCH, EXTENDED RELEASE TRANSDERMAL at 11:28

## 2023-10-03 RX ADMIN — HYDROMORPHONE HYDROCHLORIDE 0.5 MG: 1 INJECTION, SOLUTION INTRAMUSCULAR; INTRAVENOUS; SUBCUTANEOUS at 15:19

## 2023-10-03 RX ADMIN — Medication 10 MG: at 14:47

## 2023-10-03 NOTE — INTERVAL H&P NOTE
H&P reviewed. After examining the patient I find no changes in the patients condition since the H&P had been written.     Vitals:    10/03/23 1141   BP: 133/80   Pulse: 88   Resp: 16   Temp: 98.1 °F (36.7 °C)   SpO2: 94%

## 2023-10-03 NOTE — PLAN OF CARE
Problem: PAIN - ADULT  Goal: Verbalizes/displays adequate comfort level or baseline comfort level  Description: Interventions:  - Encourage patient to monitor pain and request assistance  - Assess pain using appropriate pain scale  - Administer analgesics based on type and severity of pain and evaluate response  - Implement non-pharmacological measures as appropriate and evaluate response  - Consider cultural and social influences on pain and pain management  - Notify physician/advanced practitioner if interventions unsuccessful or patient reports new pain  Outcome: Progressing     Problem: INFECTION - ADULT  Goal: Absence or prevention of progression during hospitalization  Description: INTERVENTIONS:  - Assess and monitor for signs and symptoms of infection  - Monitor lab/diagnostic results  - Monitor all insertion sites, i.e. indwelling lines, tubes, and drains  - Monitor endotracheal if appropriate and nasal secretions for changes in amount and color  - Cameron appropriate cooling/warming therapies per order  - Administer medications as ordered  - Instruct and encourage patient and family to use good hand hygiene technique  - Identify and instruct in appropriate isolation precautions for identified infection/condition  Outcome: Progressing     Problem: SAFETY ADULT  Goal: Patient will remain free of falls  Description: INTERVENTIONS:  - Educate patient/family on patient safety including physical limitations  - Instruct patient to call for assistance with activity   - Consult OT/PT to assist with strengthening/mobility   - Keep Call bell within reach  - Keep bed low and locked with side rails adjusted as appropriate  - Keep care items and personal belongings within reach  - Initiate and maintain comfort rounds  - Make Fall Risk Sign visible to staff  - Offer Toileting every 2 Hours, in advance of need  - Apply yellow socks and bracelet for high fall risk patients  - Consider moving patient to room near nurses station  Outcome: Progressing  Goal: Maintain or return to baseline ADL function  Description: INTERVENTIONS:  -  Assess patient's ability to carry out ADLs; assess patient's baseline for ADL function and identify physical deficits which impact ability to perform ADLs (bathing, care of mouth/teeth, toileting, grooming, dressing, etc.)  - Assess/evaluate cause of self-care deficits   - Assess range of motion  - Assess patient's mobility; develop plan if impaired  - Assess patient's need for assistive devices and provide as appropriate  - Encourage maximum independence but intervene and supervise when necessary  - Involve family in performance of ADLs  - Assess for home care needs following discharge   - Consider OT consult to assist with ADL evaluation and planning for discharge  - Provide patient education as appropriate  Outcome: Progressing  Goal: Maintains/Returns to pre admission functional level  Description: INTERVENTIONS:  - Perform BMAT or MOVE assessment daily.   - Set and communicate daily mobility goal to care team and patient/family/caregiver. - Collaborate with rehabilitation services on mobility goals if consulted  - Perform Range of Motion 3 times a day. - Reposition patient every 2 hours.   - Dangle patient 3 times a day  - Stand patient 3 times a day  - Ambulate patient 3 times a day  - Out of bed to chair 3 times a day   - Out of bed for meals 3 times a day  - Out of bed for toileting  - Record patient progress and toleration of activity level   Outcome: Progressing     Problem: DISCHARGE PLANNING  Goal: Discharge to home or other facility with appropriate resources  Description: INTERVENTIONS:  - Identify barriers to discharge w/patient and caregiver  - Arrange for needed discharge resources and transportation as appropriate  - Identify discharge learning needs (meds, wound care, etc.)  - Arrange for interpretive services to assist at discharge as needed  - Refer to Case Management Department for coordinating discharge planning if the patient needs post-hospital services based on physician/advanced practitioner order or complex needs related to functional status, cognitive ability, or social support system  Outcome: Progressing     Problem: Knowledge Deficit  Goal: Patient/family/caregiver demonstrates understanding of disease process, treatment plan, medications, and discharge instructions  Description: Complete learning assessment and assess knowledge base.   Interventions:  - Provide teaching at level of understanding  - Provide teaching via preferred learning methods  Outcome: Progressing

## 2023-10-03 NOTE — ANESTHESIA POSTPROCEDURE EVALUATION
Post-Op Assessment Note    CV Status:  Stable    Pain management: adequate     Mental Status:  Alert and awake   Hydration Status:  Euvolemic   PONV Controlled:  Controlled   Airway Patency:  Patent      Post Op Vitals Reviewed: Yes      Staff: Anesthesiologist         No notable events documented.     BP      Temp      Pulse     Resp      SpO2      /83   Pulse 66   Temp 98 °F (36.7 °C)   Resp 16   Ht 5' 2" (1.575 m)   Wt 89.9 kg (198 lb 3.1 oz)   SpO2 95%   BMI 35.67 kg/m²

## 2023-10-03 NOTE — OP NOTE
OPERATIVE REPORT  PATIENT NAME: Abena Suárez    :  1970  MRN: [id number]  Pt Location: [facility] OR ROOM 08    SURGERY DATE: 10/3/2023    Surgeon(s) and Role:     * Monik Osullivan MD - Primary     * Qasim Diaz MD - Fellow    Preop Diagnosis:  Gastroesophageal reflux disease [K21.9]  Vomiting [R11.10]  Abdominal pain [R10.9]  Dysphagia [R13.10]  Status post bariatric surgery [Z98.84]    Post-Op Diagnosis Codes:     * Gastroesophageal reflux disease [K21.9]     * Vomiting [R11.10]     * Abdominal pain [R10.9]     * Dysphagia [R13.10]     * Status post bariatric surgery [Z98.84]    Procedure(s):  LAPAROSCOPIC REMOVAL OF ADJUSTABLE GASTRIC BAND W/ ROBOT & INTROPERATIVE EGD  LAP CONVERSION TO R-N-Y GASTRIC BYPASS W/ ROBOT    Specimen(s):  ID Type Source Tests Collected by Time Destination   1 : Gastric band  Other Foreign body TISSUE EXAM Monik Osullivan MD 10/3/2023 1543    A : pseudo capsule culture Tissue Soft Tissue, Other CULTURE, TISSUE AND GRAM STAIN Monik Osullivan MD 10/3/2023 1526        Estimated Blood Loss:   20 ml    Drains:  * No LDAs found *    Anesthesia Type:   General    Operative Indications:  Gastroesophageal reflux disease [K21.9]  Vomiting [R11.10]  Abdominal pain [R10.9]  Dysphagia [R13.10]  Status post bariatric surgery [Z98.84]      Operative Findings:  Status post adjustable gastric band with chronic slippage  Gastric band erosion into the left lobe of the liver  Possible gastric erosion  Severe inflammatory reaction around the hiatus probably secondary to subclinical gastric erosion  Multiple interloop adhesions in the distal small bowel loops but the proximal 100 250 cm of the small bowel was free of any adhesions and would be amenable to a Kenneth-en-Y reconstruction in the future    Complications:   None    Procedure and Technique:  Robotic extensive lysis of adhesions  Robotic removal of adjustable gastric band  Port explantation   I was present for the entire procedure.     Patient Disposition:  hemodynamically stable  No qualified resident was available  Assistant was necessary for instrument exchange and counter traction and assistance with stapling and intraop endoscopy    Indication:   Patient suffers from morbid obesity and associated co-morbidities he underwent an adjustable gastric band and outside institution and presented with reflux, dysphagia and chronic nausea and vomiting. Patient work-up revealed evidence of a hiatal hernia without evidence of band erosion. However upper endoscopy was performed in early March. During the procedure there was evidence of dense inflammatory reaction and extensive adhesions around the hiatus in addition to band erosion into the left liver lobe after the band was removed the band was inspected there was also evidence of what appears to be erosion of the band material which is suspicious for gastric erosion. Intra-Op endoscopy was performed did not show evidence of gastric wall mucosal breakdown but there was evidence of severe inflammatory reaction at the EGJ so a decision was made not to proceed with a Kenneth-en-Y gastric bypass at the time. Procedure: The patient was brought to the operating room and placed in a supine position. The patient received a dose of IV antibiotics and a dose of subcutaneous Heparin prior to the procedure. The patient was induced under general endotracheal anesthesia. The abdominal wall was prepped and draped under sterile conditions in the usual fashion. The procedure was started by obtaining access to the abdominal cavity using a Veress needle to the left side of the midline around 6 inches from the xiphoid the abdominal cavity was insufflated with CO2 to a pressure of 15 mmHg. After that, the abdomen was entered with an 8 mm trocar using an Optiview trocar under direct visualization.  At that point, an 8 and 12 mm robotic trocars were placed on the right side of the abdominal wall, under direct visualization, and another 8 mm robotic trocar and 12 mm assistant trocar were placed on the left side of the abdominal wall, also under direct visualization. A TAP block was performed using 20 ml of Exparel mixed with saline and 0.5 % Marcaine for a total of 100 ml. The patient was then placed in a reverse Trendelenburg position. Upon inspecting the abdominal cavity we could not see the adjustable gastric band and the left lobe of the liver could not be lifted off the stomach wall so we docked the robot and started by taking adhesions down using the vessel sealer, there was extensive intra-abdominal adhesions between the left lobe of the liver and the omentum those were taken down with the vessel sealer until the left lobe of the liver was lifted in a medial fashion and the band was identified at that point we were able to appreciate the fact that the band had eroded into the left lobe of the liver, using electrocautery pseudocapsule around the band was dissected and the band was completely exposed, the band was then cut in half and then we attempted to remove the band from its tract but because of the severe inflammatory reaction and the erosion of the band material this step was very tedious and took a significant amount of time because the band would not slide out from its tract, at that point we checked a more of the surrounding pseudocapsule until we were able to remove the band from its tract entirely, because of the appearance of the adjustable gastric band material and the severe inflammatory reaction we decided to perform an Intra-Op endoscopy to rule out gastric erosion, bowel clamp was placed around the ligament of Treitz and Intra-Op endoscopy was performed there was no evidence of mucosal breakdown to confirm gastric erosion but there was severe inflammatory reaction around the [facility].   At that point we decided not to proceed with a Kenneth-en-Y gastric bypass, an 25 [place] drain was placed below the left lobe of the liver and brought through a stab incision on the right side of the abdominal wall. One of the laparoscopic instruments tip broke off and this was retrieved from the assistant port and for additional confirmation an x-ray of the abdomen was performed and that did not show any evidence of broken pieces of metal.  Abdominal cavity was inspected normal time and appeared to be hemostatic port were removed under direct visualization, the 12 mm port was closed #1 Vicryl suture. We then turned our attention to the band port 4 cm incision was made in the right upper quadrant over the port site, subcutaneous tissue was dissected down to the port pseudocapsule was excised port was removed and the incision was closed in 2 layers using 2-0 interrupted Vicryl sutures and a running 4-0 Monocryl suture. All the skin edges of the trocar sites were approximated with 4-0 Monocryl in a subcuticular inverted fashion. The patient was extubated and transferred to the PACU in stable condition.       SIGNATURE: Hemanth Londono MD  DATE: October 3, 2023  TIME: 4:11 PM

## 2023-10-03 NOTE — ANESTHESIA PREPROCEDURE EVALUATION
Procedure:  LAPAROSCOPIC REMOVAL OF ADJUSTABLE GASTRIC BAND W/ ROBOT & INTROPERATIVE EGD (Abdomen)  LAP CONVERSION TO R-N-Y GASTRIC BYPASS W/ ROBOT (Abdomen)    Relevant Problems   CARDIO   (+) Hypertension      GI/HEPATIC   (+) GERD (gastroesophageal reflux disease)   (+) Hiatal hernia      /RENAL   (+) Stage 3a chronic kidney disease (HCC)      MUSCULOSKELETAL   (+) Hiatal hernia   (+) Osteoarthritis of both knees      PULMONARY   (+) Asthma   (+) Moderate persistent asthma without complication   (+) GINGER (obstructive sleep apnea)      Other   (+) Bipolar 1 disorder (HCC)   (+) Obesity (BMI 35.0-39.9 without comorbidity)   (+) Tobacco abuse        Physical Exam    Airway    Mallampati score: III  TM Distance: >3 FB  Neck ROM: full     Dental   No notable dental hx     Cardiovascular  Rhythm: regular, Rate: normal, Cardiovascular exam normal    Pulmonary  Breath sounds clear to auscultation,     Other Findings        Anesthesia Plan  ASA Score- 3     Anesthesia Type- general with ASA Monitors. Additional Monitors:   Airway Plan: ETT. Comment: Pt quit smoking in March 2023. Fide Calhoun Plan Factors-    Chart reviewed. Existing labs reviewed. Patient summary reviewed. Patient is not a current smoker. Induction- intravenous. Postoperative Plan- Plan for postoperative opioid use. Planned trial extubation    Informed Consent- Anesthetic plan and risks discussed with patient.

## 2023-10-04 ENCOUNTER — APPOINTMENT (INPATIENT)
Dept: RADIOLOGY | Facility: HOSPITAL | Age: 53
DRG: 328 | End: 2023-10-04
Payer: COMMERCIAL

## 2023-10-04 ENCOUNTER — TELEPHONE (OUTPATIENT)
Dept: FAMILY MEDICINE CLINIC | Facility: CLINIC | Age: 53
End: 2023-10-04

## 2023-10-04 VITALS
DIASTOLIC BLOOD PRESSURE: 70 MMHG | BODY MASS INDEX: 36.47 KG/M2 | RESPIRATION RATE: 16 BRPM | HEART RATE: 65 BPM | HEIGHT: 62 IN | TEMPERATURE: 98.3 F | WEIGHT: 198.19 LBS | OXYGEN SATURATION: 94 % | SYSTOLIC BLOOD PRESSURE: 120 MMHG

## 2023-10-04 LAB
ANION GAP SERPL CALCULATED.3IONS-SCNC: 7 MMOL/L
BUN SERPL-MCNC: 11 MG/DL (ref 5–25)
CALCIUM SERPL-MCNC: 9.2 MG/DL (ref 8.4–10.2)
CHLORIDE SERPL-SCNC: 111 MMOL/L (ref 96–108)
CO2 SERPL-SCNC: 22 MMOL/L (ref 21–32)
CREAT SERPL-MCNC: 0.98 MG/DL (ref 0.6–1.3)
ERYTHROCYTE [DISTWIDTH] IN BLOOD BY AUTOMATED COUNT: 14 % (ref 11.6–15.1)
GFR SERPL CREATININE-BSD FRML MDRD: 66 ML/MIN/1.73SQ M
GLUCOSE SERPL-MCNC: 108 MG/DL (ref 65–140)
HCT VFR BLD AUTO: 32.3 % (ref 34.8–46.1)
HGB BLD-MCNC: 10.9 G/DL (ref 11.5–15.4)
MCH RBC QN AUTO: 25.3 PG (ref 26.8–34.3)
MCHC RBC AUTO-ENTMCNC: 33.7 G/DL (ref 31.4–37.4)
MCV RBC AUTO: 75 FL (ref 82–98)
PLATELET # BLD AUTO: 262 THOUSANDS/UL (ref 149–390)
PMV BLD AUTO: 10.4 FL (ref 8.9–12.7)
POTASSIUM SERPL-SCNC: 3.9 MMOL/L (ref 3.5–5.3)
RBC # BLD AUTO: 4.31 MILLION/UL (ref 3.81–5.12)
SODIUM SERPL-SCNC: 140 MMOL/L (ref 135–147)
WBC # BLD AUTO: 9.99 THOUSAND/UL (ref 4.31–10.16)

## 2023-10-04 PROCEDURE — NC001 PR NO CHARGE: Performed by: SURGERY

## 2023-10-04 PROCEDURE — 74240 X-RAY XM UPR GI TRC 1CNTRST: CPT

## 2023-10-04 PROCEDURE — 99221 1ST HOSP IP/OBS SF/LOW 40: CPT | Performed by: INTERNAL MEDICINE

## 2023-10-04 PROCEDURE — 80048 BASIC METABOLIC PNL TOTAL CA: CPT | Performed by: PHYSICIAN ASSISTANT

## 2023-10-04 PROCEDURE — 85027 COMPLETE CBC AUTOMATED: CPT | Performed by: PHYSICIAN ASSISTANT

## 2023-10-04 PROCEDURE — 99024 POSTOP FOLLOW-UP VISIT: CPT | Performed by: SURGERY

## 2023-10-04 RX ORDER — BUDESONIDE AND FORMOTEROL FUMARATE DIHYDRATE 80; 4.5 UG/1; UG/1
2 AEROSOL RESPIRATORY (INHALATION) 2 TIMES DAILY
Status: DISCONTINUED | OUTPATIENT
Start: 2023-10-04 | End: 2023-10-04 | Stop reason: HOSPADM

## 2023-10-04 RX ADMIN — CEFAZOLIN SODIUM 2000 MG: 2 SOLUTION INTRAVENOUS at 04:14

## 2023-10-04 RX ADMIN — BUDESONIDE AND FORMOTEROL FUMARATE DIHYDRATE 2 PUFF: 80; 4.5 AEROSOL RESPIRATORY (INHALATION) at 10:31

## 2023-10-04 RX ADMIN — SODIUM CHLORIDE, SODIUM LACTATE, POTASSIUM CHLORIDE, AND CALCIUM CHLORIDE 100 ML/HR: .6; .31; .03; .02 INJECTION, SOLUTION INTRAVENOUS at 04:25

## 2023-10-04 RX ADMIN — IOHEXOL 50 ML: 350 INJECTION, SOLUTION INTRAVENOUS at 09:41

## 2023-10-04 RX ADMIN — METRONIDAZOLE 500 MG: 500 INJECTION, SOLUTION INTRAVENOUS at 05:38

## 2023-10-04 RX ADMIN — LITHIUM CARBONATE 300 MG: 300 CAPSULE, GELATIN COATED ORAL at 07:54

## 2023-10-04 RX ADMIN — FAMOTIDINE 20 MG: 10 INJECTION INTRAVENOUS at 10:32

## 2023-10-04 RX ADMIN — ACETAMINOPHEN 325MG 975 MG: 325 TABLET ORAL at 05:39

## 2023-10-04 NOTE — ASSESSMENT & PLAN NOTE
Status post bilateral mastectomy. Continue outpatient follow-up with OB/GYN.   Continue self breast checks

## 2023-10-04 NOTE — ASSESSMENT & PLAN NOTE
Follows with outpatient psychiatry, maintained on multiple medications. Mood stable. · Continue with PTA regimen of lithium 450 mg a.m., 600 mg p.m.   Atarax 80 mg 3 times daily as needed for anxiety, Cogentin 1 mg twice daily, Seroquel 100 mg nightly, Remeron 7.5 mg nightly  · Encourage extremely close follow-up with outpatient psychiatry to monitor lithium levels and monitor medications as levels can be affected postoperatively

## 2023-10-04 NOTE — PROGRESS NOTES
Progress Note - Bariatric Surgery   Anna Coe 47 y.o. female MRN: [id number]  Unit/Bed#: E5 -01 Encounter: [id number]      Subjective/Objective     Subjective:  Patient with history obesity s/p laparoscopic gastric band placement now POD1 s/p band removal .  Patient denies fevers, chills, sweats, SOB, CP, calf pain. Pain adequately controlled on oral pain medication. Currently NPO, awaiting upperGI. Objective:    /70 (BP Location: Right leg)   Pulse 65   Temp 98.3 °F (36.8 °C) (Oral)   Resp 16   Ht 5' 2" (1.575 m)   Wt 89.9 kg (198 lb 3.1 oz)   SpO2 94%   BMI 35.67 kg/m²       Intake/Output Summary (Last 24 hours) at 10/4/2023 0842  Last data filed at 10/4/2023 0538  Gross per 24 hour   Intake 2600 ml   Output 1390 ml   Net 1210 ml       Invasive Devices     Peripheral Intravenous Line  Duration           Peripheral IV 10/03/23 Dorsal (posterior); Left Hand <1 day          Drain  Duration           Closed/Suction Drain Lateral RLQ Bulb 19 Fr. <1 day                ROS: 10-point system completed. All negative except see HPI.     Physical Exam    General Appearance:    Alert, cooperative, no distress, appears stated age   Head:    Normocephalic, without obvious abnormality, atraumatic   Lungs:     Respirations unlabored   Heart:    Regular rate and rhythm   Abdomen:     Soft, appropriate tenderness, no masses, no organomegaly, non-distended   Extremities:   Extremities normal, atraumatic, no cyanosis or edema   Neurologic:  Incision:  Psych:   Normal strength and sensation    Clean, dry, and intact, drain with SS output    Normal mood and affect       Lab, Imaging and other studies:  CBC:   Lab Results   Component Value Date    WBC 9.99 10/04/2023    HGB 10.9 (L) 10/04/2023    HCT 32.3 (L) 10/04/2023    MCV 75 (L) 10/04/2023     10/04/2023    RBC 4.31 10/04/2023    MCH 25.3 (L) 10/04/2023    MCHC 33.7 10/04/2023    RDW 14.0 10/04/2023    MPV 10.4 10/04/2023        VTE Mechanical Prophylaxis: sequential compression device    Assessment/Plan  1)  Patient with  obesity s/p laparoscopic gastric band placement now POD1 s/p band removal Patient was found to have severe inflammation and calcified gastric band with concern for band erosion, proceeding with gastric bypass seemed unsafe and will plan for staged gastric bypass once inflammation settles down. - NPO at this time  - Recommend ambulation, use of SCDs when not ambulating, and incentive spirometry. - Upper GI today  - Will plan on drain removal and DC if upperGI is normal  - Plan to D/C patient home today pending anticipated progression    Plan of care was discussed with patient.   Care plan discussed with Dr. Becca Diaz MD  Bariatric Surgery  10/4/2023  8:42 AM

## 2023-10-04 NOTE — ASSESSMENT & PLAN NOTE
Patient status post laparoscopic removal of adjustable gastric band and laparoscopic conversion to Kenneth-en-Y gastric bypass by Dr. Mckenna Morales on 10/3/2023  · Doing well postoperatively. With mild abdominal discomfort. Hemoglobin and creatinine stable. On room air. · Reviewed operative note. Patient has MAHOGANY drain in place.   Management per primary  · AC per primary

## 2023-10-04 NOTE — CONSULTS
[facility]  Consult  Name: Lola Grimes 47 y.o. female I MRN: [id number]  Unit/Bed#: E5 -01 I Date of Admission: 10/3/2023   Date of Service: 10/4/2023 I Hospital Day: 1    Inpatient consult to Internal Medicine  Consult performed by: Shankar Rueda PA-C  Consult ordered by: Lou Gonzalez PA-C          Assessment/Plan   Obesity (BMI 35.0-39.9 without comorbidity)  Assessment & Plan  Patient status post laparoscopic removal of adjustable gastric band and laparoscopic conversion to Kenneth-en-Y gastric bypass by Dr. Suhas Joseph on 10/3/2023  · Doing well postoperatively. With mild abdominal discomfort. Hemoglobin and creatinine stable. On room air. · Reviewed operative note. Patient has MAHOGANY drain in place. Management per primary  · AC per primary    Bipolar 1 disorder [facility])  Assessment & Plan  Follows with outpatient psychiatry, maintained on multiple medications. Mood stable. · Continue with PTA regimen of lithium 450 mg a.m., 600 mg p.m. Atarax 80 mg 3 times daily as needed for anxiety, Cogentin 1 mg twice daily, Seroquel 100 mg nightly, Remeron 7.5 mg nightly  · Encourage extremely close follow-up with outpatient psychiatry to monitor lithium levels and monitor medications as levels can be affected postoperatively    GERD (gastroesophageal reflux disease)  Assessment & Plan  Continue PPI    GINGER (obstructive sleep apnea)  Assessment & Plan  Offer CPAP nightly    Stage 3a chronic kidney disease [facility])  Assessment & Plan  Lab Results   Component Value Date    EGFR 66 10/04/2023    EGFR 51 06/14/2023    EGFR 55 04/04/2022    CREATININE 0.98 10/04/2023    CREATININE 1.22 06/14/2023    CREATININE 1.15 04/04/2022   Creatinine within baseline. Avoid hypotension and nephrotoxins in postoperative. Tobacco abuse  Assessment & Plan  With history of tobacco use, quit in May of this year no further nicotine use.   · Congratulated the patient on cessation and encouraged continual compliance    History of breast cancer  Assessment & Plan  Status post bilateral mastectomy. Continue outpatient follow-up with OB/GYN. Continue self breast checks    Moderate persistent asthma without complication  Assessment & Plan  Not in acute exacerbation, on room air. Continue daily Symbicort and albuterol as needed    Hypertension  Assessment & Plan  Maintained on amlodipine 5 mg daily. Can resume on discharge    Recommendations for Discharge:  · Close follow-up with outpatient psychiatry    Total Time Spent on Date of Encounter in care of patient: 30 mins. This time was spent on one or more of the following: performing physical exam; counseling and coordination of care; obtaining or reviewing history; documenting in the medical record; reviewing/ordering tests, medications or procedures; communicating with other healthcare professionals and discussing with patient's family/caregivers. Collaboration of Care: Were Recommendations Directly Discussed with Primary Treatment Team?     History of Present Illness:  Emperatriz Rdz is a 47 y.o. female who is originally admitted to the bariatric service due to laparoscopic gastric band removal and conversion to Kenneth-en-Y. We are consulted for medical management. Patient has a past medical history of bipolar, anxiety, CKD stage III, history of breast cancer in remission after status post bilateral mastectomy, hypertension, moderate persistent asthma without complication, obstructive sleep apnea, tobacco use in remission and obesity. Reports she is feeling well today, does have some soreness in her right side. Denies any fever, chills, chest pain, shortness of breath, nausea or vomiting. She has a mild appetite but has not eaten yet today. She is urinating without difficulty. Does report that she is getting reimaged of her stomach. Feels like she is going to start passing gas and getting her bowels moving. Denies any lower leg swelling or pain.   She does not drink and quit smoking in May. She notes she follows closely with her OB and psychiatry. Review of Systems:  Review of Systems   Constitutional: Negative for chills and fever. Eyes: Negative for pain and visual disturbance. Respiratory: Negative for cough and shortness of breath. Cardiovascular: Negative for chest pain, palpitations and leg swelling. Gastrointestinal: Positive for abdominal pain. Negative for abdominal distention, constipation, diarrhea, nausea and vomiting. Genitourinary: Negative for decreased urine volume, difficulty urinating and dysuria. Skin: Negative for rash. Neurological: Negative for dizziness, light-headedness and headaches. Psychiatric/Behavioral: Negative for sleep disturbance. The patient is not nervous/anxious. All other systems reviewed and are negative.       Past Medical and Surgical History:   Past Medical History:   Diagnosis Date   • Arthritis    • Asthma    • Bipolar 1 disorder ([address])    • Cancer ([address])     right breast   • Constipation    • CPAP (continuous positive airway pressure) dependence    • Depression    • GERD (gastroesophageal reflux disease)    • Hiatal hernia    • HTN (hypertension)    • Hypertension    • Mental health problem    • Obesity (BMI 35.0-39.9 without comorbidity)    • Sleep apnea    • Wears glasses        Past Surgical History:   Procedure Laterality Date   • BREAST SURGERY Bilateral 2014    mastectomy - R side cancerous, L side prophylactic   • COLONOSCOPY     • COSMETIC SURGERY      breast reconstruction   • EGD     • GASTRIC BYPASS LAPAROSCOPIC N/A 10/3/2023    Procedure: LAPAROSCOPIC REMOVAL OF ADJUSTABLE GASTRIC BAND W/ ROBOT & INTROPERATIVE EGD;  Surgeon: Aquiles Mcmanus MD;  Location: [facility];  Service: Bariatrics   • HYSTERECTOMY     • LAPAROSCOPIC GASTRIC BANDING     • MASTECTOMY      R side cancerous, L side prophylactic (per pt)       Meds/Allergies:  all medications and allergies reviewed    Allergies: No Known Allergies    Social History:  Marital Status: Single  Substance Use History:   Social History     Substance and Sexual Activity   Alcohol Use Not Currently     Social History     Tobacco Use   Smoking Status Former   • Packs/day: 0.25   • Types: Cigarettes   • Quit date: 2023   • Years since quittin.5   • Passive exposure: Past   Smokeless Tobacco Never     Social History     Substance and Sexual Activity   Drug Use Not Currently       Family History:  History reviewed. No pertinent family history. Physical Exam:   Vitals:   Blood Pressure: 120/70 (10/04/23 06)  Pulse: 65 (10/04/23 0642)  Temperature: 98.3 °F (36.8 °C) (10/04/23 0642)  Temp Source: Oral (10/04/23 3502)  Respirations: 16 (10/04/23 0642)  Height: 5' 2" (157.5 cm) (23 0737)  Weight - Scale: 89.9 kg (198 lb 3.1 oz) (10/03/23 1134)  SpO2: 94 % (10/04/23 5065)    Physical Exam  Vitals and nursing note reviewed. Constitutional:       General: She is not in acute distress. Appearance: Normal appearance. She is well-developed. She is obese. She is not ill-appearing, toxic-appearing or diaphoretic. HENT:      Head: Normocephalic and atraumatic. Nose: Nose normal.   Eyes:      Extraocular Movements: Extraocular movements intact. Conjunctiva/sclera: Conjunctivae normal.   Cardiovascular:      Rate and Rhythm: Normal rate and regular rhythm. Heart sounds: Normal heart sounds. No murmur heard. Pulmonary:      Effort: Pulmonary effort is normal. No respiratory distress. Breath sounds: No stridor. No wheezing, rhonchi or rales. Abdominal:      General: Bowel sounds are normal. There is no distension. Palpations: Abdomen is soft. Tenderness: There is abdominal tenderness. Comments: MAHOGANY drain in place draining bloody serosanguineous fluid   Musculoskeletal:         General: No tenderness. Right lower leg: No edema. Left lower leg: No edema. Skin:     General: Skin is warm and dry. Neurological:      General: No focal deficit present. Mental Status: She is alert and oriented to person, place, and time. Psychiatric:         Mood and Affect: Mood normal.         Behavior: Behavior normal.        Additional Data:   Lab Results:    Results from last 7 days   Lab Units 10/04/23  0425   WBC Thousand/uL 9.99   HEMOGLOBIN g/dL 10.9*   HEMATOCRIT % 32.3*   PLATELETS Thousands/uL 262     Results from last 7 days   Lab Units 10/04/23  0425   SODIUM mmol/L 140   POTASSIUM mmol/L 3.9   CHLORIDE mmol/L 111*   CO2 mmol/L 22   BUN mg/dL 11   CREATININE mg/dL 0.98   ANION GAP mmol/L 7   CALCIUM mg/dL 9.2   GLUCOSE RANDOM mg/dL 108     Lab Results   Component Value Date/Time    HGBA1C 5.2 06/14/2023 09:21 AM    HGBA1C 5.3 04/04/2022 07:20 AM     Imaging: No pertinent imaging reviewed. XR abdomen 1 view kub    (Results Pending)   FL upper GI UGI    (Results Pending)       EKG, Pathology, and Other Studies Reviewed on Admission:   · EKG: No EKG obtained. ** Please Note: This note may have been constructed using a voice recognition system.  **

## 2023-10-04 NOTE — ASSESSMENT & PLAN NOTE
Lab Results   Component Value Date    EGFR 66 10/04/2023    EGFR 51 06/14/2023    EGFR 55 04/04/2022    CREATININE 0.98 10/04/2023    CREATININE 1.22 06/14/2023    CREATININE 1.15 04/04/2022   Creatinine within baseline. Avoid hypotension and nephrotoxins in postoperative.

## 2023-10-04 NOTE — QUICK NOTE
MAHOGANY drain removed without complication. Patient tolerated well. Covered site with clean, dry gauze and tape. Patient to follow up in one week as scheduled.

## 2023-10-04 NOTE — DISCHARGE SUMMARY
Discharge Summary - Maisha Gracy 47 y.o. female MRN: [id number]    Unit/Bed#: E5 -01 Encounter: [id number]      Pre-Operative Diagnosis: Pre-Op Diagnosis Codes:     * Gastroesophageal reflux disease [K21.9]     * Vomiting [R11.10]     * Abdominal pain [R10.9]     * Dysphagia [R13.10]     * Status post bariatric surgery [Z98.84]    Post-Operative Diagnosis: Post-Op Diagnosis Codes:     * Gastroesophageal reflux disease [K21.9]     * Vomiting [R11.10]     * Abdominal pain [R10.9]     * Dysphagia [R13.10]     * Status post bariatric surgery [Z98.84]    Procedures Performed:  Procedure(s):  LAPAROSCOPIC REMOVAL OF ADJUSTABLE GASTRIC BAND W/ ROBOT & INTROPERATIVE EGD    Surgeon: Stephen Leiva MD    See H & P for full details of admission and Operative Note for full details of operations performed. Patient tolerated surgery well without complications. Underwent gastric band removal though gastric bypass aborted given the concern for gastric band erosion into stomach. Will plan for gastric bypass in near future once inflammation settles. Otherwise did well on postop day 1. Following normal uppergi, patient was started on a liquid diet and deemed fit for discharge. Patient was seen and examined prior to discharge. Provisions for Follow-Up Care:  See After Visit Summary/Discharge Instructions for information related to follow-up care and home orders. Disposition: Home, in stable condition. Planned Readmission: No    Discharge Medications:  See After Visit Summary/Discharge Instructions for reconciled discharge medications provided to patient and family. Post Operative instructions: Reviewed with patient and/or family.     Signature:   Josef Fernandez MD  Date: 10/4/2023 Time: 11:15 AM

## 2023-10-04 NOTE — ASSESSMENT & PLAN NOTE
With history of tobacco use, quit in May of this year no further nicotine use.   · Congratulated the patient on cessation and encouraged continual compliance

## 2023-10-04 NOTE — PLAN OF CARE
Problem: PAIN - ADULT  Goal: Verbalizes/displays adequate comfort level or baseline comfort level  Description: Interventions:  - Encourage patient to monitor pain and request assistance  - Assess pain using appropriate pain scale  - Administer analgesics based on type and severity of pain and evaluate response  - Implement non-pharmacological measures as appropriate and evaluate response  - Consider cultural and social influences on pain and pain management  - Notify physician/advanced practitioner if interventions unsuccessful or patient reports new pain  10/4/2023 0759 by Cortes Stinson  Outcome: Progressing  10/3/2023 1910 by Cortes Stinson  Outcome: Progressing     Problem: INFECTION - ADULT  Goal: Absence or prevention of progression during hospitalization  Description: INTERVENTIONS:  - Assess and monitor for signs and symptoms of infection  - Monitor lab/diagnostic results  - Monitor all insertion sites, i.e. indwelling lines, tubes, and drains  - Monitor endotracheal if appropriate and nasal secretions for changes in amount and color  - [place] appropriate cooling/warming therapies per order  - Administer medications as ordered  - Instruct and encourage patient and family to use good hand hygiene technique  - Identify and instruct in appropriate isolation precautions for identified infection/condition  10/4/2023 0759 by Cortes Stinson  Outcome: Progressing  10/3/2023 1910 by Cortes Stinson  Outcome: Progressing     Problem: SAFETY ADULT  Goal: Patient will remain free of falls  Description: INTERVENTIONS:  - Educate patient/family on patient safety including physical limitations  - Instruct patient to call for assistance with activity   - Consult OT/PT to assist with strengthening/mobility   - Keep Call bell within reach  - Keep bed low and locked with side rails adjusted as appropriate  - Keep care items and personal belongings within reach  - Initiate and maintain comfort rounds  - Make Fall Risk Sign visible to staff  - Apply yellow socks and bracelet for high fall risk patients  - Consider moving patient to room near nurses station  10/4/2023 0759 by Cecil Huber  Outcome: Progressing  10/3/2023 1910 by Cecil Huber  Outcome: Progressing  Goal: Maintain or return to baseline ADL function  Description: INTERVENTIONS:  -  Assess patient's ability to carry out ADLs; assess patient's baseline for ADL function and identify physical deficits which impact ability to perform ADLs (bathing, care of mouth/teeth, toileting, grooming, dressing, etc.)  - Assess/evaluate cause of self-care deficits   - Assess range of motion  - Assess patient's mobility; develop plan if impaired  - Assess patient's need for assistive devices and provide as appropriate  - Encourage maximum independence but intervene and supervise when necessary  - Involve family in performance of ADLs  - Assess for home care needs following discharge   - Consider OT consult to assist with ADL evaluation and planning for discharge  - Provide patient education as appropriate  10/4/2023 0759 by Cecil Huber  Outcome: Progressing  10/3/2023 1910 by Cecil Huber  Outcome: Progressing  Goal: Maintains/Returns to pre admission functional level  Description: INTERVENTIONS:  - Perform BMAT or MOVE assessment daily.   - Set and communicate daily mobility goal to care team and patient/family/caregiver. - Collaborate with rehabilitation services on mobility goals if consulted  - Perform Range of Motion 3 times a day. - Reposition patient every 2 hours.   - Dangle patient 3 times a day  - Stand patient 3 times a day  - Ambulate patient 3 times a day  - Out of bed to chair 3 times a day   - Out of bed for meals 3 times a day  - Out of bed for toileting  - Record patient progress and toleration of activity level   10/4/2023 0759 by Cecil Huber  Outcome: Progressing  10/3/2023 1910 by Cecil Huber  Outcome: Progressing     Problem: DISCHARGE PLANNING  Goal: Discharge to home or other facility with appropriate resources  Description: INTERVENTIONS:  - Identify barriers to discharge w/patient and caregiver  - Arrange for needed discharge resources and transportation as appropriate  - Identify discharge learning needs (meds, wound care, etc.)  - Arrange for interpretive services to assist at discharge as needed  - Refer to Case Management Department for coordinating discharge planning if the patient needs post-hospital services based on physician/advanced practitioner order or complex needs related to functional status, cognitive ability, or social support system  10/4/2023 0759 by Marty Arnold  Outcome: Progressing  10/3/2023 1910 by Marty Arnold  Outcome: Progressing     Problem: Knowledge Deficit  Goal: Patient/family/caregiver demonstrates understanding of disease process, treatment plan, medications, and discharge instructions  Description: Complete learning assessment and assess knowledge base.   Interventions:  - Provide teaching at level of understanding  - Provide teaching via preferred learning methods  10/4/2023 0759 by Marty Arnold  Outcome: Progressing  10/3/2023 1910 by Marty Arnold  Outcome: Progressing     Problem: MOBILITY - ADULT  Goal: Maintain or return to baseline ADL function  Description: INTERVENTIONS:  -  Assess patient's ability to carry out ADLs; assess patient's baseline for ADL function and identify physical deficits which impact ability to perform ADLs (bathing, care of mouth/teeth, toileting, grooming, dressing, etc.)  - Assess/evaluate cause of self-care deficits   - Assess range of motion  - Assess patient's mobility; develop plan if impaired  - Assess patient's need for assistive devices and provide as appropriate  - Encourage maximum independence but intervene and supervise when necessary  - Involve family in performance of ADLs  - Assess for home care needs following discharge   - Consider OT consult to assist with ADL evaluation and planning for discharge  - Provide patient education as appropriate  10/4/2023 0759 by Cecil Huber  Outcome: Progressing  10/3/2023 1910 by Cecil Huber  Outcome: Progressing  Goal: Maintains/Returns to pre admission functional level  Description: INTERVENTIONS:  - Perform BMAT or MOVE assessment daily.   - Set and communicate daily mobility goal to care team and patient/family/caregiver. - Collaborate with rehabilitation services on mobility goals if consulted  - Perform Range of Motion 3 times a day. - Reposition patient every 2 hours.   - Dangle patient 3 times a day  - Stand patient 3 times a day  - Ambulate patient 3 times a day  - Out of bed to chair 3 times a day   - Out of bed for meals 3 times a day  - Out of bed for toileting  - Record patient progress and toleration of activity level   10/4/2023 0759 by Cecil Huber  Outcome: Progressing  10/3/2023 1910 by Cecil Huber  Outcome: Progressing

## 2023-10-04 NOTE — DISCHARGE INSTR - AVS FIRST PAGE
Bariatric/Weight Loss Surgery  Hospital Discharge Instructions  ACTIVITY:  Progress as feels comfortable - a good rule is:  if you are doing something and it begins to hurt, stop doing the activity. Walk every hour while at home. You may walk stairs if you do so slowly  You may shower 48 hours after surgery. Use your incentive spirometer 10 times per hour while awake for 1 week  Do NOT drive for 48 hours after surgery. No driving 24 hours after taking certain prescription pain medications . Examples of such medication are Percocet, Darvocet, Oxycodone, Tylenol #3, and Tylenol with Codeine. DIET  Start with a liquid diet today (10/4/2023). You can advance to your normal diet starting tomorrow (10/5/2023)    MEDICATIONS:  The abdominal nerve block will wear off during the first 1-2 days that you are home, and you may become sore. Continue to take your Tylenol and your pain medication as instructed. Anti-acid Medication as per prescription. Other medications as indicated on the Physician Patient Discharge Instructions form given to you at the time of discharge. Make sure that you are splitting your pill or tablet medications in halves or fourths or even crushing them before you take them. Capsules should be opened and mixed with water or jello. You need to do this for at least 4 weeks after surgery. Eventually you will be able to take your medications the regular way as they were prescribed. DO NOT TAKE BIRTH CONTROL(BC) MEDICATIONS, INSERT BC VAGINAL RINGS, OR PLACE IUD OR ANY OTHER BC METHODS UNTIL 31 DAYS FROM DAY OF DISCHARGE FROM HOSPITAL. THIS PLACES YOU AT HIGH RISK FOR A POTENTIALLY LIFE THREATENING BLOOD CLOT. Remember to always use barrier methods for birth control and speak to your GYN about using two forms of birth control to start 31 days after surgery. It is very important to avoid pregnancy until at least 18-24 months after surgery.        INCISION CARE  You may shower and get incisions wet 2 days after surgery. No soaking tub baths or swimming for 30 days after surgery. Keep abdominal area and incisions clean. Use soap and water to create a good lather and rinse off. Do not scrub incisions. FOLLOW-UP APPOINTMENT should be made for one week after discharge. Call surgeon’s office at [phone] to schedule an appointment.     CALL YOUR DOCTOR FOR:  pain not controlled by pain medications, a temperature greater than 101.5° F, any increase or change in drainage or redness from any incision, any vomiting or inability to keep liquids down, shortness of breath, shoulder pain, or bleeding

## 2023-10-04 NOTE — UTILIZATION REVIEW
Initial Clinical Review    Elective inpatient surgical procedure  Age/Sex: 47 y.o. female  Surgery Date: 10/3/2023  Procedure: LAPAROSCOPIC REMOVAL OF ADJUSTABLE GASTRIC BAND W/ ROBOT & INTROPERATIVE EGD LAP CONVERSION TO R-N-Y GASTRIC BYPASS W/ ROBOT  Anesthesia:  General  Operative Findings:   Status post adjustable gastric band with chronic slippage  Gastric band erosion into the left lobe of the liver  Possible gastric erosion  Severe inflammatory reaction around the hiatus probably secondary to subclinical gastric erosion  Multiple interloop adhesions in the distal small bowel loops but the proximal 100 250 cm of the small bowel was free of any adhesions and would be amenable to a Kenneth-en-Y reconstruction in the future     POD#1 Progress Note:    SLIM consult to SLIM HX CKD 3a creatinine at baseline; avoid hypotension & nephrotoxins. MAHOGANY drain in place draining bloody serosanguineous fluid management per Primary     Bariatric    postop day 1. Following normal upper gi, patient was started on a liquid diet and deemed fit for discharge. . MAHOGANY drain removed without complication. Patient tolerated well. Covered site with clean, dry gauze and tape.  Patient to follow up in one week as scheduled  Admission Orders: Date/Time/Statement:   Admission Orders (From admission, onward)     Ordered        10/03/23 1811  Inpatient Admission  Once                   Orders Placed This Encounter   Procedures   • Inpatient Admission     Standing Status:   Standing     Number of Occurrences:   1     Order Specific Question:   Level of Care     Answer:   Med Surg [16]     Order Specific Question:   Estimated length of stay     Answer:   Inpatient Only Surgery     Vital Signs: /70 (BP Location: Right leg)   Pulse 65   Temp 98.3 °F (36.8 °C) (Oral)   Resp 16   Ht 5' 2" (1.575 m)   Wt 89.9 kg (198 lb 3.1 oz)   SpO2 94%   BMI 35.67 kg/m²     Pertinent Labs/Diagnostic Test Results:   FL upper GI UGI   Final Result by Mira Barajas Ga Miguel MD (10/04 0339)   Expected appearance status post removal of gastric band without evidence of leak. XR abdomen 1 view kub    (Results Pending)         Results from last 7 days   Lab Units 10/04/23  0425   WBC Thousand/uL 9.99   HEMOGLOBIN g/dL 10.9*   HEMATOCRIT % 32.3*   PLATELETS Thousands/uL 262         Results from last 7 days   Lab Units 10/04/23  0425   SODIUM mmol/L 140   POTASSIUM mmol/L 3.9   CHLORIDE mmol/L 111*   CO2 mmol/L 22   ANION GAP mmol/L 7   BUN mg/dL 11   CREATININE mg/dL 0.98   EGFR ml/min/1.73sq m 66   CALCIUM mg/dL 9.2             Results from last 7 days   Lab Units 10/04/23  0425   GLUCOSE RANDOM mg/dL 108             No results found for: "BETA-HYDROXYBUTYRATE"                 Results from last 7 days   Lab Units 10/03/23  1526   GRAM STAIN RESULT  No Polys or Bacteria seen     Diet: cl liquid diet   Mobility: up as latosha  DVT Prophylaxis: SCD    Medications/Pain Control:   Scheduled Medications:  acetaminophen, 975 mg, Oral, Q8H   Or  acetaminophen, 975 mg, Oral, Q8H  budesonide-formoterol, 2 puff, Inhalation, BID  famotidine, 20 mg, Intravenous, BID  lithium carbonate, 300 mg, Oral, Daily With Breakfast  lithium, 600 mg, Oral, HS  mirtazapine, 7.5 mg, Oral, HS  QUEtiapine, 100 mg, Oral, HS    Continuous IV Infusions:  lactated ringers, 100 mL/hr, Intravenous, Continuous    PRN Meds:  albuterol, 2 puff, Inhalation, Q4H PRN  hydrOXYzine HCL, 50 mg, Oral, TID PRN  metoclopramide, 10 mg, Intravenous, Q6H PRN  morphine injection, 4 mg, Intravenous, Q4H PRN  ondansetron, 4 mg, Intravenous, Q6H PRN  oxyCODONE, 10 mg, Oral, Q4H PRN  oxyCODONE, 5 mg, Oral, Q4H PRN  phenol, 2 spray, Mouth/Throat, Q2H PRN  promethazine, 25 mg, Intravenous, Q6H PRN  simethicone, 80 mg, Oral, 4x Daily PRN    Network Utilization Review Department  ATTENTION: Please call with any questions or concerns to [phone] and carefully listen to the prompts so that you are directed to the right person.  All voicemails are confidential.   For Discharge needs, contact Care Management DC Support Team at [phone] opt. 2  Send all requests for admission clinical reviews, approved or denied determinations and any other requests to dedicated fax number below belonging to the campus where the patient is receiving treatment.  List of dedicated fax numbers for the Facilities:  [place] DENIALS (Administrative/Medical Necessity) [phone]   DISCHARGE SUPPORT TEAM (NETWORK) [address] [facility] (Maternity/NICU/Pediatrics) [phone]   [address] [place] [address] Drive [address] [place] [address] 1000 [facility] [phone]   1503 [facility] 207 [facility] [address] [address] [facility] [address] [facility] [address] [phone]

## 2023-10-04 NOTE — ASSESSMENT & PLAN NOTE
Jesus EITAN Karina   1/30/2017 4:00 PM   Office Visit    Dept Phone:  [phone]   Encounter #:  [id number]    Provider:  VELMA Sampson   Department:  [facility]                Your Full Care Plan              Today's Medication Changes          These changes are accurate as of: 1/30/17  4:44 PM.  If you have any questions, ask your nurse or doctor.               Medication(s)that have changed:     melatonin 1 MG tablet   Take 1 tablet by mouth Every Night.   What changed:    - how much to take  - when to take this            Where to Get Your Medications      [place] [facility] [place] [address] [address] - [phone] [facility] [phone]   [address] [address], [place] [address]     Phone:  [phone]     melatonin 1 MG tablet    Valproic Acid 250 MG/5ML solution syrup                  Your Updated Medication List          This list is accurate as of: 1/30/17  4:44 PM.  Always use your most recent med list.                ADVAIR DISKUS 100-50 MCG/DOSE DISKUS   Generic drug:  fluticasone-salmeterol       cetirizine 1 MG/ML syrup   Commonly known as:  zyrTEC       melatonin 1 MG tablet   Take 1 tablet by mouth Every Night.       montelukast 5 MG chewable tablet   Commonly known as:  SINGULAIR       polyethylene glycol powder   Commonly known as:  MIRALAX       Valproic Acid 250 MG/5ML solution syrup   Commonly known as:  DEPAKENE   Take 5 mL by mouth Every Night.               You Were Diagnosed With        Codes Comments    Other mixed anxiety disorders    -  Primary ICD-10-CM: F41.3  ICD-9-CM: 300.09     Intermittent explosive disorder     ICD-10-CM: F63.81  ICD-9-CM: 312.34       Instructions     None    Patient Instructions History      Upcoming Appointments     Visit Type Date Time Department    MEDICINE CHECK 1/30/2017  4:00 PM TRACI JIM    PSYCHOTHERAPY 2/27/2017  3:15 PM MGE LUCIANO  Offer CPAP nightly "COR    MEDICINE CHECK 4/24/2017  3:20 PM ANN-MARIE WOLF COR      [facility] Signup     Our records indicate that you do not meet the minimum age required to sign up for [facility].      Parents or legal guardians who would like online access to Jesus's medical record via [employer] should email [email or web address] or call [phone] to talk to our [employer] staff.             Other Info from Your Visit           Your Appointments     Feb 27, 2017  3:15 PM EST   Psychotherapy with Keiry Crystal LCSW   [facility] (--)    [address]  [place] [address]   [phone]            Apr 24, 2017  3:20 PM EDT   Medicine Check with VELMA Sampson   [facility] (--)    [address]  [place] [address]   [phone]              Allergies     No Known Allergies      Vital Signs     Blood Pressure Pulse Height Weight Body Mass Index Smoking Status    101/59 (49 %/ 44 %)* 80 53.5\" (135.9 cm) (50 %, Z= -0.01)† 115 lb 3.2 oz (52.3 kg) (99 %, Z= 2.30)† 28.3 kg/m2 (>99 %, Z= 2.39)† Never Smoker    *BP percentiles are based on NHBPEP's 4th Report    †Growth percentiles are based on [facility] 2-20 Years data.      Problems and Diagnoses Noted     Other mixed anxiety disorders    -  Primary    Intermittent explosive disorder            "

## 2023-10-05 ENCOUNTER — TRANSITIONAL CARE MANAGEMENT (OUTPATIENT)
Dept: FAMILY MEDICINE CLINIC | Facility: CLINIC | Age: 53
End: 2023-10-05

## 2023-10-05 PROCEDURE — 88300 SURGICAL PATH GROSS: CPT | Performed by: PATHOLOGY

## 2023-10-05 NOTE — UTILIZATION REVIEW
NOTIFICATION OF ADMISSION DISCHARGE   This is a Notification of Discharge from [address] [facility]. Please be advised that this patient has been discharge from our facility. Below you will find the admission and discharge date and time including the patient’s disposition. UTILIZATION REVIEW CONTACT:  Jayesh King  Utilization   Network Utilization Review Department  Phone: [phone] carefully listen to the prompts. All voicemails are confidential.  Email: [email or web address]     ADMISSION INFORMATION  PRESENTATION DATE: 10/3/2023 11:03 AM  OBERVATION ADMISSION DATE:   INPATIENT ADMISSION DATE: 10/3/23  2:08 PM   DISCHARGE DATE: 10/4/2023 11:41 AM   DISPOSITION:Home/Self Care    Network Utilization Review Department  ATTENTION: Please call with any questions or concerns to [phone] and carefully listen to the prompts so that you are directed to the right person. All voicemails are confidential.   For Discharge needs, contact Care Management DC Support Team at [phone] opt. 2  Send all requests for admission clinical reviews, approved or denied determinations and any other requests to dedicated fax number below belonging to the campus where the patient is receiving treatment.  List of dedicated fax numbers for the Facilities:  [place] DENIALS (Administrative/Medical Necessity) [phone]   DISCHARGE SUPPORT TEAM (Network) [phone] [facility]) [phone]   [address] [facility] [address] [phone] [address] [facility] [phone]   [address] [place] [address]  [address]  [phone]

## 2023-10-06 ENCOUNTER — TELEPHONE (OUTPATIENT)
Dept: MEDSURG UNIT | Facility: HOSPITAL | Age: 53
End: 2023-10-06

## 2023-10-06 LAB
BACTERIA TISS AEROBE CULT: NO GROWTH
GRAM STN SPEC: NORMAL

## 2023-10-06 NOTE — TELEPHONE ENCOUNTER
Post op follow up phone call attempted. No answer obtained on listed phone number and no option to leave message.

## 2023-10-09 ENCOUNTER — TELEPHONE (OUTPATIENT)
Dept: MEDSURG UNIT | Facility: HOSPITAL | Age: 53
End: 2023-10-09

## 2023-10-09 NOTE — TELEPHONE ENCOUNTER
Post op follow up phone call completed. Pt is tolerating soft diet. Using IS as instructed, reinforced importance of using IS to help prevent pneumonia. Ambulating about home without difficulty. Minimal pain, not using Oxycodone or tylenol. Passing gas and having loose stool each day. Pt stated understanding about discharge instructions and medication adjustments. Follow up appt with surgeon scheduled for next week. Instructed to call with any additional questions or concerns.

## 2023-10-11 ENCOUNTER — OFFICE VISIT (OUTPATIENT)
Dept: FAMILY MEDICINE CLINIC | Facility: CLINIC | Age: 53
End: 2023-10-11

## 2023-10-11 VITALS
RESPIRATION RATE: 18 BRPM | BODY MASS INDEX: 36.44 KG/M2 | DIASTOLIC BLOOD PRESSURE: 76 MMHG | HEART RATE: 97 BPM | TEMPERATURE: 97.9 F | WEIGHT: 198 LBS | HEIGHT: 62 IN | OXYGEN SATURATION: 98 % | SYSTOLIC BLOOD PRESSURE: 118 MMHG

## 2023-10-11 DIAGNOSIS — Z98.84 HX OF BARIATRIC SURGERY: ICD-10-CM

## 2023-10-11 DIAGNOSIS — K95.09 GASTRIC BAND EROSION: Primary | ICD-10-CM

## 2023-10-11 PROCEDURE — 99496 TRANSJ CARE MGMT HIGH F2F 7D: CPT | Performed by: FAMILY MEDICINE

## 2023-10-11 NOTE — ASSESSMENT & PLAN NOTE
-History of gastric band procedure  -Patient is status post removal of gastric band which was complicated by gastric band erosion  -Patient is doing well 1 week postop  -Tolerating p.o. intake well  -Surgical sites clean without any signs of infection  -Patient is planned for gastric bypass procedure in a few months  -Follow-up with bariatric surgery outpatient

## 2023-10-11 NOTE — PROGRESS NOTES
Assessment & Plan     1. Gastric band erosion  Assessment & Plan:  -History of gastric band procedure  -Patient is status post removal of gastric band which was complicated by gastric band erosion  -Patient is doing well 1 week postop  -Tolerating p.o. intake well  -Surgical sites clean without any signs of infection  -Patient is planned for gastric bypass procedure in a few months  -Follow-up with bariatric surgery outpatient      2. Hx of bariatric surgery  Assessment & Plan:  History of lap band procedure a few years ago  Gastric band was recently removed by bariatric surgery but procedure was complicated by gastric band erosions  Patient plans for gastric bypass in the future           Subjective     Transitional Care Management Review:   Gerson Romano is a 47 y.o. female here for TCM follow up. During the TCM phone call patient stated:  TCM Call       Date and time call was made  10/5/2023  8:20 AM    Patient was hospitialized at  [facility]    Date of Admission  10/03/23    Date of discharge  10/04/23    Diagnosis  obesity    Disposition  Home    Current Symptoms  None          TCM Call       Post hospital issues  None    Scheduled for follow up? Yes    Living Arrangements  Family members          72-year-old female with a history of gastric band, hiatal hernia, GERD, and obesity who presents today for hospital follow-up. Patient was recently hospitalized for removal of gastric band. Patient was found to have erosion of gastric band into stomach. The procedure was terminated prematurely. Patient is doing quite well postop. She is tolerating oral intake. She denies any fever, chills, nausea or vomiting. She is following outpatient with bariatric surgery. Review of Systems   Constitutional:  Negative for chills, diaphoresis, fatigue and fever. Eyes:  Negative for visual disturbance. Respiratory:  Negative for shortness of breath and wheezing.     Cardiovascular:  Negative for chest pain, palpitations and leg swelling. Gastrointestinal:  Negative for abdominal pain, diarrhea, nausea and vomiting. Genitourinary:  Negative for dysuria, hematuria and urgency. Musculoskeletal:  Negative for back pain. Skin:  Positive for wound. Neurological:  Negative for seizures, syncope and light-headedness. Objective     /76 (BP Location: Right leg, Patient Position: Sitting, Cuff Size: Standard)   Pulse 97   Temp 97.9 °F (36.6 °C) (Temporal)   Resp 18   Ht 5' 2" (1.575 m)   Wt 89.8 kg (198 lb)   SpO2 98%   BMI 36.21 kg/m²      Physical Exam  Constitutional:       General: She is not in acute distress. Appearance: Normal appearance. She is well-developed. She is not ill-appearing, toxic-appearing or diaphoretic. HENT:      Head: Normocephalic and atraumatic. Right Ear: External ear normal.      Left Ear: External ear normal.   Eyes:      General: No scleral icterus. Right eye: No discharge. Left eye: No discharge. Extraocular Movements: Extraocular movements intact. Pupils: Pupils are equal, round, and reactive to light. Cardiovascular:      Rate and Rhythm: Normal rate and regular rhythm. Heart sounds: Normal heart sounds. No murmur heard. No friction rub. No gallop. Pulmonary:      Effort: Pulmonary effort is normal. No respiratory distress. Breath sounds: Normal breath sounds. No stridor. No wheezing. Abdominal:      General: A surgical scar is present. Bowel sounds are normal. There is no distension. Palpations: Abdomen is soft. There is no mass. Tenderness: There is no abdominal tenderness. There is no guarding. Comments: Surgical scars well-appearing and healing. No signs of any infection. Musculoskeletal:         General: Normal range of motion. Cervical back: Normal range of motion. Skin:     General: Skin is warm. Capillary Refill: Capillary refill takes less than 2 seconds. Neurological:      General: No focal deficit present. Mental Status: She is alert and oriented to person, place, and time. Mental status is at baseline.    Psychiatric:         Mood and Affect: Mood normal.       Medications have been reviewed by provider in current encounter    Michael Rausch MD

## 2023-10-11 NOTE — ASSESSMENT & PLAN NOTE
History of lap band procedure a few years ago  Gastric band was recently removed by bariatric surgery but procedure was complicated by gastric band erosions  Patient plans for gastric bypass in the future

## 2023-10-12 ENCOUNTER — OFFICE VISIT (OUTPATIENT)
Dept: BARIATRICS | Facility: CLINIC | Age: 53
End: 2023-10-12

## 2023-10-12 VITALS
HEIGHT: 63 IN | WEIGHT: 197.5 LBS | SYSTOLIC BLOOD PRESSURE: 126 MMHG | TEMPERATURE: 95.3 F | DIASTOLIC BLOOD PRESSURE: 82 MMHG | BODY MASS INDEX: 34.99 KG/M2 | HEART RATE: 94 BPM | OXYGEN SATURATION: 98 %

## 2023-10-12 DIAGNOSIS — Z09 POSTOP CHECK: ICD-10-CM

## 2023-10-12 DIAGNOSIS — Z98.84 BARIATRIC SURGERY STATUS: ICD-10-CM

## 2023-10-12 DIAGNOSIS — E66.9 OBESITY, CLASS II, BMI 35-39.9: Primary | ICD-10-CM

## 2023-10-12 PROCEDURE — 99024 POSTOP FOLLOW-UP VISIT: CPT | Performed by: SURGERY

## 2023-10-12 NOTE — PROGRESS NOTES
POST OP UP VISIT - BARIATRIC SURGERY  Simona Wright 47 y.o. female MRN: [id number]  Unit/Bed#:  Encounter: [id number]      HPI:  Simona Wright is a 47 y.o. female status post robotic gastric band removal performed by Dr. Gisell Villa on 10/3 returning to office for first post op visit since surgery. Tolerating diet. Denies nausea and vomiting. Tolerating diet    Review of Systems   All other systems reviewed and are negative.       Historical Information   Past Medical History:   Diagnosis Date    Arthritis     Asthma     Bipolar 1 disorder ([address])     Cancer ([address])     right breast    Constipation     CPAP (continuous positive airway pressure) dependence     Depression     GERD (gastroesophageal reflux disease)     Hiatal hernia     HTN (hypertension)     Hypertension     Mental health problem     Obesity (BMI 35.0-39.9 without comorbidity)     Sleep apnea     Wears glasses      Past Surgical History:   Procedure Laterality Date    BREAST SURGERY Bilateral     mastectomy - R side cancerous, L side prophylactic    COLONOSCOPY      COSMETIC SURGERY      breast reconstruction    EGD      GASTRIC BYPASS LAPAROSCOPIC N/A 10/3/2023    Procedure: LAPAROSCOPIC REMOVAL OF ADJUSTABLE GASTRIC BAND W/ ROBOT & INTROPERATIVE EGD;  Surgeon: Yenny Pimentel MD;  Location: [facility];  Service: Bariatrics    HYSTERECTOMY      LAPAROSCOPIC GASTRIC BANDING      MASTECTOMY      R side cancerous, L side prophylactic (per pt)     Social History   Social History     Substance and Sexual Activity   Alcohol Use Not Currently     Social History     Substance and Sexual Activity   Drug Use Not Currently     Social History     Tobacco Use   Smoking Status Former    Packs/day: 0.25    Types: Cigarettes    Quit date: 2023    Years since quittin.6    Passive exposure: Past   Smokeless Tobacco Never     Family History: non-contributory    Meds/Allergies   all medications and allergies reviewed  No Known Allergies    Objective Current Vitals:   Blood Pressure: 126/82 (Per Pt. .. unable to get pressure on lower leg.) (10/12/23 0959)  Pulse: 94 (10/12/23 0959)  Temperature: (!) 95.3 °F (35.2 °C) (10/12/23 0959)  Temp Source: Tympanic (10/12/23 0959)  Height: 5' 2.5" (158.8 cm) (10/12/23 0959)  Weight - Scale: 89.6 kg (197 lb 8 oz) (10/12/23 0959)  SpO2: 98 % (10/12/23 0959)      Invasive Devices       Drain  Duration             Closed/Suction Drain Lateral RLQ Bulb 19 Fr. 8 days                    Physical Exam  Constitutional:       Appearance: Normal appearance. Cardiovascular:      Rate and Rhythm: Normal rate and regular rhythm. Pulses: Normal pulses. Pulmonary:      Effort: Pulmonary effort is normal.   Abdominal:      Palpations: Abdomen is soft. Comments: Incisions clean dry and intact   Skin:     General: Skin is warm. Neurological:      General: No focal deficit present. Mental Status: She is alert and oriented to person, place, and time. Assessment/PLAN:    47 y.o. female status post robotic gastric band removal RNYGB done on 10/2 by Dr. Negra Leong, doing well post op. No major issues and healing well. Increase physical activity slowly as tolerated and instructed. Advance diet as instructed by our dietitians today and as indicated in the binder.   Continue PPI  Will need to schedule patient for robotic gastric bypass          Shannan Francois MD  Bariatric Surgery   10/12/2023  10:11 AM      .

## 2023-10-19 DIAGNOSIS — E66.9 OBESITY, CLASS II, BMI 35-39.9: ICD-10-CM

## 2023-10-23 RX ORDER — OMEPRAZOLE 20 MG/1
20 CAPSULE, DELAYED RELEASE ORAL DAILY
Qty: 90 CAPSULE | Refills: 2 | Status: SHIPPED | OUTPATIENT
Start: 2023-10-23

## 2023-10-30 DIAGNOSIS — Z01.818 PRE-OP TESTING: Primary | ICD-10-CM

## 2023-12-15 ENCOUNTER — OFFICE VISIT (OUTPATIENT)
Dept: LAB | Facility: HOSPITAL | Age: 53
End: 2023-12-15
Payer: COMMERCIAL

## 2023-12-15 DIAGNOSIS — Z01.818 PRE-OP TESTING: ICD-10-CM

## 2023-12-15 LAB
ATRIAL RATE: 103 BPM
P AXIS: 39 DEGREES
PR INTERVAL: 176 MS
QRS AXIS: -9 DEGREES
QRSD INTERVAL: 78 MS
QT INTERVAL: 346 MS
QTC INTERVAL: 453 MS
T WAVE AXIS: 6 DEGREES
VENTRICULAR RATE: 103 BPM

## 2023-12-15 PROCEDURE — 93005 ELECTROCARDIOGRAM TRACING: CPT

## 2023-12-18 DIAGNOSIS — Z01.818 PREOP TESTING: ICD-10-CM

## 2023-12-18 DIAGNOSIS — G47.33 OSA (OBSTRUCTIVE SLEEP APNEA): ICD-10-CM

## 2023-12-18 DIAGNOSIS — K21.9 GERD (GASTROESOPHAGEAL REFLUX DISEASE): Primary | ICD-10-CM

## 2023-12-21 ENCOUNTER — APPOINTMENT (OUTPATIENT)
Dept: LAB | Facility: HOSPITAL | Age: 53
End: 2023-12-21
Payer: COMMERCIAL

## 2023-12-21 DIAGNOSIS — Z01.818 PREOP TESTING: ICD-10-CM

## 2023-12-21 DIAGNOSIS — K21.9 GERD (GASTROESOPHAGEAL REFLUX DISEASE): ICD-10-CM

## 2023-12-21 DIAGNOSIS — G47.33 OSA (OBSTRUCTIVE SLEEP APNEA): ICD-10-CM

## 2023-12-21 LAB
ALBUMIN SERPL BCP-MCNC: 4.1 G/DL (ref 3.5–5)
ALP SERPL-CCNC: 78 U/L (ref 34–104)
ALT SERPL W P-5'-P-CCNC: 21 U/L (ref 7–52)
ANION GAP SERPL CALCULATED.3IONS-SCNC: 4 MMOL/L
AST SERPL W P-5'-P-CCNC: 16 U/L (ref 13–39)
BILIRUB SERPL-MCNC: 0.31 MG/DL (ref 0.2–1)
BUN SERPL-MCNC: 12 MG/DL (ref 5–25)
CALCIUM SERPL-MCNC: 9.6 MG/DL (ref 8.4–10.2)
CHLORIDE SERPL-SCNC: 108 MMOL/L (ref 96–108)
CO2 SERPL-SCNC: 29 MMOL/L (ref 21–32)
CREAT SERPL-MCNC: 1.05 MG/DL (ref 0.6–1.3)
GFR SERPL CREATININE-BSD FRML MDRD: 61 ML/MIN/1.73SQ M
GLUCOSE SERPL-MCNC: 101 MG/DL (ref 65–140)
POTASSIUM SERPL-SCNC: 3.8 MMOL/L (ref 3.5–5.3)
PROT SERPL-MCNC: 7.2 G/DL (ref 6.4–8.4)
SODIUM SERPL-SCNC: 141 MMOL/L (ref 135–147)

## 2023-12-21 PROCEDURE — 36415 COLL VENOUS BLD VENIPUNCTURE: CPT

## 2023-12-21 PROCEDURE — 80053 COMPREHEN METABOLIC PANEL: CPT

## 2023-12-29 ENCOUNTER — OFFICE VISIT (OUTPATIENT)
Dept: DENTISTRY | Facility: CLINIC | Age: 53
End: 2023-12-29

## 2023-12-29 VITALS — HEART RATE: 101 BPM | DIASTOLIC BLOOD PRESSURE: 88 MMHG | TEMPERATURE: 96 F | SYSTOLIC BLOOD PRESSURE: 128 MMHG

## 2023-12-29 DIAGNOSIS — Z91.843 RISK FOR DENTAL CARIES, HIGH: Primary | ICD-10-CM

## 2023-12-29 PROCEDURE — D0191 ASSESSMENT OF A PATIENT: HCPCS | Performed by: DENTIST

## 2023-12-29 NOTE — DENTAL PROCEDURE DETAILS
"Office Visit.  Patient (Garfield Rios) presents for restorative care.  Patient consent treatment.   RMH, no changes.  ASA: II  Pain score: 0  Chief complain: \"I have dark cavities on my bottom left four teeth which I don't like them\".  DX: high caries risk assessment. Generalized cervical and interproximal carious lesions. Patient has proposed treatment plan for restorations by other provider.   The appointment today is for providing dental fillings of teeth #20 and #21 but we were unable to do the work due to the existing generalized calculus and plaque deposit, hyperplastic gum congestion and gum bleeding. Food debris was evident. Informed and demonstrated to patient. Patient is over due for her 3 months recall perio maintenance. Advised patient to vaughn her teeth cleaned first prior proceeding with restoration. Reviewed oral hygiene for teeth brushing, flossing and use of mouth rinse. Patient approved. Patient is high risk of caries assessment due to existing multiple caries lesions. Prescribed Prevdient tooth paste 1.1 % NaFL paste with 2 refills. POI is given. Patient left satisfied and ambulatory.   NV: [facility]. With Hyg as soon as possible prior continuation of restorative care.     "

## 2024-01-04 ENCOUNTER — RA CDI HCC (OUTPATIENT)
Dept: OTHER | Facility: HOSPITAL | Age: 54
End: 2024-01-04

## 2024-01-04 ENCOUNTER — CLINICAL SUPPORT (OUTPATIENT)
Dept: BARIATRICS | Facility: CLINIC | Age: 54
End: 2024-01-04

## 2024-01-04 ENCOUNTER — OFFICE VISIT (OUTPATIENT)
Dept: BARIATRICS | Facility: CLINIC | Age: 54
End: 2024-01-04
Payer: COMMERCIAL

## 2024-01-04 VITALS
HEART RATE: 113 BPM | BODY MASS INDEX: 38.36 KG/M2 | TEMPERATURE: 96.5 F | DIASTOLIC BLOOD PRESSURE: 70 MMHG | SYSTOLIC BLOOD PRESSURE: 113 MMHG | WEIGHT: 216.5 LBS | HEIGHT: 63 IN

## 2024-01-04 DIAGNOSIS — E66.01 MORBID (SEVERE) OBESITY DUE TO EXCESS CALORIES (HCC): ICD-10-CM

## 2024-01-04 DIAGNOSIS — E66.9 OBESITY, CLASS II, BMI 35-39.9: Primary | ICD-10-CM

## 2024-01-04 PROCEDURE — 99213 OFFICE O/P EST LOW 20 MIN: CPT | Performed by: SURGERY

## 2024-01-04 PROCEDURE — RECHECK: Performed by: DIETITIAN, REGISTERED

## 2024-01-04 RX ORDER — ACETAMINOPHEN 10 MG/ML
1000 INJECTION, SOLUTION INTRAVENOUS ONCE
OUTPATIENT
Start: 2024-01-04 | End: 2024-01-04

## 2024-01-04 RX ORDER — CELECOXIB 200 MG/1
200 CAPSULE ORAL ONCE
OUTPATIENT
Start: 2024-01-04 | End: 2024-01-04

## 2024-01-04 RX ORDER — CEFAZOLIN SODIUM 2 G/50ML
2000 SOLUTION INTRAVENOUS ONCE
OUTPATIENT
Start: 2024-01-04 | End: 2024-01-04

## 2024-01-04 RX ORDER — HEPARIN SODIUM 5000 [USP'U]/ML
5000 INJECTION, SOLUTION INTRAVENOUS; SUBCUTANEOUS ONCE
OUTPATIENT
Start: 2024-01-04 | End: 2024-01-04

## 2024-01-04 NOTE — H&P
BARIATRIC HISTORY AND PHYSICAL - BARIATRIC SURGERY  Judy Rios 53 y.o. female MRN: [id number]  Unit/Bed#:  Encounter: [id number]      HPI:  Judy Rios is a 53 y.o. female who presents to review their preoperative workup and see if they are a good candidate to undergo a bariatric procedure.     Hiatal Hernia? Yes, medium type I on 3/15/2023 EGD  Prior bariatric surgery? Lap band removal 10/2023 Keyon Davis    Review of Systems   Constitutional:  Negative for chills and fever.   HENT:  Negative for ear pain and sore throat.    Eyes:  Negative for pain and visual disturbance.   Respiratory:  Negative for cough and shortness of breath.    Cardiovascular:  Negative for chest pain and palpitations.   Gastrointestinal:  Negative for abdominal pain and vomiting.   Genitourinary:  Negative for dysuria and hematuria.   Musculoskeletal:  Negative for arthralgias and back pain.   Skin:  Negative for color change and rash.   Neurological:  Negative for seizures and syncope.   All other systems reviewed and are negative.      Historical Information   Past Medical History:   Diagnosis Date    Arthritis     Asthma     Bipolar 1 disorder (HCC)     Cancer (HCC)     right breast    Constipation     CPAP (continuous positive airway pressure) dependence     Depression     GERD (gastroesophageal reflux disease)     Hiatal hernia     HTN (hypertension)     Hypertension     Mental health problem     Obesity (BMI 35.0-39.9 without comorbidity)     Sleep apnea     Wears glasses      Past Surgical History:   Procedure Laterality Date    BREAST SURGERY Bilateral 2014    mastectomy - R side cancerous, L side prophylactic    COLONOSCOPY      COSMETIC SURGERY      breast reconstruction    EGD      GASTRIC BYPASS LAPAROSCOPIC N/A 10/3/2023    Procedure: LAPAROSCOPIC REMOVAL OF ADJUSTABLE GASTRIC BAND W/ ROBOT & INTROPERATIVE EGD;  Surgeon: Julian Davis MD;  Location: [facility];  Service: Bariatrics    HYSTERECTOMY      LAPAROSCOPIC  "GASTRIC BANDING      MASTECTOMY      R side cancerous, L side prophylactic (per pt)     Social History   Social History     Substance and Sexual Activity   Alcohol Use Not Currently     Social History     Substance and Sexual Activity   Drug Use Not Currently     Social History     Tobacco Use   Smoking Status Former    Current packs/day: 0.00    Types: Cigarettes    Quit date: 2023    Years since quittin.8    Passive exposure: Past   Smokeless Tobacco Never     Family History: non-contributory    Meds/Allergies   all medications and allergies reviewed  No Known Allergies    Objective     Current Vitals:   Blood Pressure: 113/70 (24)  Pulse: (!) 113 (24)  Temperature: (!) 96.5 °F (35.8 °C) (24)  Temp Source: Tympanic (24)  Height: 5' 2.5\" (158.8 cm) (24)  Weight - Scale: 98.2 kg (216 lb 8 oz) (24)  bowel movement  [unfilled]    Invasive Devices       Drain  Duration             Closed/Suction Drain Lateral RLQ Bulb 19 Fr. 92 days                    Physical Exam  Vitals reviewed.   Constitutional:       General: She is not in acute distress.     Appearance: Normal appearance. She is obese. She is not ill-appearing.   HENT:      Head: Normocephalic and atraumatic.      Nose: Nose normal.      Mouth/Throat:      Mouth: Mucous membranes are moist.      Pharynx: Oropharynx is clear.   Eyes:      General: No scleral icterus.  Cardiovascular:      Rate and Rhythm: Normal rate and regular rhythm.   Pulmonary:      Effort: Pulmonary effort is normal. No respiratory distress.      Breath sounds: Normal breath sounds.   Abdominal:      General: There is no distension.      Palpations: Abdomen is soft.      Tenderness: There is no abdominal tenderness.      Hernia: No hernia is present.      Comments: Prior laparoscopic port site incisions well healed.  Lap band port removal site well healed.   Musculoskeletal:         General: Normal range of motion. " "  Skin:     General: Skin is warm and dry.      Capillary Refill: Capillary refill takes less than 2 seconds.   Neurological:      General: No focal deficit present.      Mental Status: She is alert. Mental status is at baseline.   Psychiatric:         Mood and Affect: Mood normal.         Behavior: Behavior normal.         Lab Results: I have personally reviewed pertinent lab results.  , CBC: No results found for: \"WBC\", \"HGB\", \"HCT\", \"MCV\", \"PLT\", \"ADJUSTEDWBC\", \"RBC\", \"MCH\", \"MCHC\", \"RDW\", \"MPV\", \"NRBC\", CMP: No results found for: \"SODIUM\", \"K\", \"CL\", \"CO2\", \"ANIONGAP\", \"BUN\", \"CREATININE\", \"GLUCOSE\", \"CALCIUM\", \"AST\", \"ALT\", \"ALKPHOS\", \"PROT\", \"BILITOT\", \"EGFR\"  Imaging: I have personally reviewed pertinent reports.    EKG, Pathology, and Other Studies: I have personally reviewed pertinent reports.      Code Status: @[facility]@  Advance Directive and Living Will:      Power of :    POLST:        Assessment/Plan:    The patient presented to review the preoperative workup and see if bariatric surgery is appropriate and indicated following the extensive preoperative workup and the enrollment in our weight loss program.  Preoperative workup was complete. Results were reviewed with the patient including the blood work results and the endoscopy findings and the biopsy results. We also reviewed the cardiology evaluation.    The patient was determined to be a good candidate for robotic dandre-en-Y gastric bypass with possible hiatal hernia repair.  ----------------------------------------------------------------------  Smoking: No  Blood thinner use: No  Home pain medication use and who manages it: Tylenol PRN  CPAP use: Yes (If yes, sleep study obtained and reminded pt to bring CPAP to hospital)  History of blood clots: PE that resolved 10+ years ago  Previous abdominal surgeries: lap band and removal  Cardiac clearance obtained: Yes   EKG performed: Yes  EGD prior to surgery: 3/15/2023  Screening Labs " obtained (CBC, CMP): Yes  H. Pylori status: No  Medication allergies: NKDA  SLIM consult Post-Op: no  Reminded patient about 2 week pre-op liquid diet: Yes  10% body weight loss pre-operatively met/discussed: Yes  Consent signed: Yes  Pre-Op ERAS Orders placed: Yes  -----------------------------------------------------------------------  Risks and benefits explained one more time to the patient. Alternatives to surgery and alternative forms of surgery were also explained. Post-surgical commitment and after care programs were explained.  Consent was signed. Questions were answered and concerns were addressed. Patient will need to start the 2 week liquid diet prior to surgery.  Patient wishes to proceed. As per [facility] guidelines, I had a discussion with the patient regarding their CODE STATUS in the perioperative period and the patient is level 1 or FULL CODE STATUS.    Giles Jay MD  Bariatric Surgery   1/4/2024  1:17 PM

## 2024-01-04 NOTE — H&P (VIEW-ONLY)
BARIATRIC HISTORY AND PHYSICAL - BARIATRIC SURGERY  Judy Rios 53 y.o. female MRN: [id number]  Unit/Bed#:  Encounter: [id number]      HPI:  Judy Rios is a 53 y.o. female who presents to review their preoperative workup and see if they are a good candidate to undergo a bariatric procedure.     Hiatal Hernia? Yes, medium type I on 3/15/2023 EGD  Prior bariatric surgery? Lap band removal 10/2023 [facility]on Davis    Review of Systems   Constitutional:  Negative for chills and fever.   HENT:  Negative for ear pain and sore throat.    Eyes:  Negative for pain and visual disturbance.   Respiratory:  Negative for cough and shortness of breath.    Cardiovascular:  Negative for chest pain and palpitations.   Gastrointestinal:  Negative for abdominal pain and vomiting.   Genitourinary:  Negative for dysuria and hematuria.   Musculoskeletal:  Negative for arthralgias and back pain.   Skin:  Negative for color change and rash.   Neurological:  Negative for seizures and syncope.   All other systems reviewed and are negative.      Historical Information   Past Medical History:   Diagnosis Date    Arthritis     Asthma     Bipolar 1 disorder (HCC)     Cancer (HCC)     right breast    Constipation     CPAP (continuous positive airway pressure) dependence     Depression     GERD (gastroesophageal reflux disease)     Hiatal hernia     HTN (hypertension)     Hypertension     Mental health problem     Obesity (BMI 35.0-39.9 without comorbidity)     Sleep apnea     Wears glasses      Past Surgical History:   Procedure Laterality Date    BREAST SURGERY Bilateral 2014    mastectomy - R side cancerous, L side prophylactic    COLONOSCOPY      COSMETIC SURGERY      breast reconstruction    EGD      GASTRIC BYPASS LAPAROSCOPIC N/A 10/3/2023    Procedure: LAPAROSCOPIC REMOVAL OF ADJUSTABLE GASTRIC BAND W/ ROBOT & INTROPERATIVE EGD;  Surgeon: Julian Davis MD;  Location: [facility];  Service: Bariatrics    HYSTERECTOMY      LAPAROSCOPIC  "GASTRIC BANDING      MASTECTOMY      R side cancerous, L side prophylactic (per pt)     Social History   Social History     Substance and Sexual Activity   Alcohol Use Not Currently     Social History     Substance and Sexual Activity   Drug Use Not Currently     Social History     Tobacco Use   Smoking Status Former    Current packs/day: 0.00    Types: Cigarettes    Quit date: 2023    Years since quittin.8    Passive exposure: Past   Smokeless Tobacco Never     Family History: non-contributory    Meds/Allergies   all medications and allergies reviewed  No Known Allergies    Objective     Current Vitals:   Blood Pressure: 113/70 (24)  Pulse: (!) 113 (24)  Temperature: (!) 96.5 °F (35.8 °C) (24)  Temp Source: Tympanic (24)  Height: 5' 2.5\" (158.8 cm) (24)  Weight - Scale: 98.2 kg (216 lb 8 oz) (24)  bowel movement  [unfilled]    Invasive Devices       Drain  Duration             Closed/Suction Drain Lateral RLQ Bulb 19 Fr. 92 days                    Physical Exam  Vitals reviewed.   Constitutional:       General: She is not in acute distress.     Appearance: Normal appearance. She is obese. She is not ill-appearing.   HENT:      Head: Normocephalic and atraumatic.      Nose: Nose normal.      Mouth/Throat:      Mouth: Mucous membranes are moist.      Pharynx: Oropharynx is clear.   Eyes:      General: No scleral icterus.  Cardiovascular:      Rate and Rhythm: Normal rate and regular rhythm.   Pulmonary:      Effort: Pulmonary effort is normal. No respiratory distress.      Breath sounds: Normal breath sounds.   Abdominal:      General: There is no distension.      Palpations: Abdomen is soft.      Tenderness: There is no abdominal tenderness.      Hernia: No hernia is present.      Comments: Prior laparoscopic port site incisions well healed.  Lap band port removal site well healed.   Musculoskeletal:         General: Normal range of motion. " "  Skin:     General: Skin is warm and dry.      Capillary Refill: Capillary refill takes less than 2 seconds.   Neurological:      General: No focal deficit present.      Mental Status: She is alert. Mental status is at baseline.   Psychiatric:         Mood and Affect: Mood normal.         Behavior: Behavior normal.         Lab Results: I have personally reviewed pertinent lab results.  , CBC: No results found for: \"WBC\", \"HGB\", \"HCT\", \"MCV\", \"PLT\", \"ADJUSTEDWBC\", \"RBC\", \"MCH\", \"MCHC\", \"RDW\", \"MPV\", \"NRBC\", CMP: No results found for: \"SODIUM\", \"K\", \"CL\", \"CO2\", \"ANIONGAP\", \"BUN\", \"CREATININE\", \"GLUCOSE\", \"CALCIUM\", \"AST\", \"ALT\", \"ALKPHOS\", \"PROT\", \"BILITOT\", \"EGFR\"  Imaging: I have personally reviewed pertinent reports.    EKG, Pathology, and Other Studies: I have personally reviewed pertinent reports.      Code Status: @[facility]@  Advance Directive and Living Will:      Power of :    POLST:        Assessment/Plan:    The patient presented to review the preoperative workup and see if bariatric surgery is appropriate and indicated following the extensive preoperative workup and the enrollment in our weight loss program.  Preoperative workup was complete. Results were reviewed with the patient including the blood work results and the endoscopy findings and the biopsy results. We also reviewed the cardiology evaluation.    The patient was determined to be a good candidate for robotic dandre-en-Y gastric bypass with possible hiatal hernia repair.  ----------------------------------------------------------------------  Smoking: No  Blood thinner use: No  Home pain medication use and who manages it: Tylenol PRN  CPAP use: Yes (If yes, sleep study obtained and reminded pt to bring CPAP to hospital)  History of blood clots: PE that resolved 10+ years ago  Previous abdominal surgeries: lap band and removal  Cardiac clearance obtained: Yes   EKG performed: Yes  EGD prior to surgery: 3/15/2023  Screening Labs " obtained (CBC, CMP): Yes  H. Pylori status: No  Medication allergies: NKDA  SLIM consult Post-Op: no  Reminded patient about 2 week pre-op liquid diet: Yes  10% body weight loss pre-operatively met/discussed: Yes  Consent signed: Yes  Pre-Op ERAS Orders placed: Yes  -----------------------------------------------------------------------  Risks and benefits explained one more time to the patient. Alternatives to surgery and alternative forms of surgery were also explained. Post-surgical commitment and after care programs were explained.  Consent was signed. Questions were answered and concerns were addressed. Patient will need to start the 2 week liquid diet prior to surgery.  Patient wishes to proceed. As per [facility] guidelines, I had a discussion with the patient regarding their CODE STATUS in the perioperative period and the patient is level 1 or FULL CODE STATUS.    Giles Jay MD  Bariatric Surgery   1/4/2024  1:17 PM

## 2024-01-05 DIAGNOSIS — E66.9 OBESITY, CLASS II, BMI 35-39.9: Primary | ICD-10-CM

## 2024-01-05 RX ORDER — OMEPRAZOLE 20 MG/1
20 CAPSULE, DELAYED RELEASE ORAL DAILY
Qty: 90 CAPSULE | Refills: 1 | Status: SHIPPED | OUTPATIENT
Start: 2024-01-05

## 2024-01-05 RX ORDER — OXYCODONE HYDROCHLORIDE 5 MG/1
5 TABLET ORAL EVERY 4 HOURS PRN
Qty: 10 TABLET | Refills: 0 | Status: SHIPPED | OUTPATIENT
Start: 2024-01-17

## 2024-01-05 NOTE — PROGRESS NOTES
Pt attended pre-op education session. Standardized packet of information for bariatric surgery was provided and reviewed with pt. Importance of lifestyle change and development of regular exercise routine stressed.   Pt. educated on two-week pre operative liquid protein liver shrinking diet.  Pt understands that the diet needs to be followed for 2 weeks prior to surgery. Handout reviewed.   Emphasized the need to drink 80 ounces of fluid per day while on the diet and to contact PCP to adjust any diabetes or blood pressure medicines prior to starting the diet.  Patient will not eat any solid food for 2 days prior to surgery, including 2 cups of non starchy vegetables to ensure that the stomach will be empty day of surgery. Reviewed Ensure pre-surgery ERAS drink instructions, protein supplement suggestions, post-operative clear liquid, full liquid, and pureed diet stages, post-operative nutrition rules and facts, and post-operative bariatric multivitamin/mineral recommendations and brand comparison. Reviewed instructions for stopping or tapering anti-obesity medications prior to surgery.      Pt given the opportunity to ask questions. Questions were answered. Pt verbalized understanding of all information provided. Pt appeared prepared for upcoming surgery. Contact information provided for any questions/concerns.

## 2024-01-05 NOTE — PRE-PROCEDURE INSTRUCTIONS
Pre-Surgery Instructions:   Medication Instructions    acetaminophen (TYLENOL) 500 mg tablet Uses PRN- OK to take day of surgery    albuterol (PROVENTIL HFA,VENTOLIN HFA) 90 mcg/act inhaler Uses PRN- OK to take day of surgery    amLODIPine (NORVASC) 5 mg tablet Take day of surgery.    benztropine (COGENTIN) 1 mg tablet Take day of surgery.    hydrOXYzine HCL (ATARAX) 50 mg tablet Uses PRN- OK to take day of surgery    mirtazapine (REMERON) 7.5 MG tablet Take night before surgery    multivitamin (THERAGRAN) TABS Stop taking 7 days prior to surgery.    omeprazole (PriLOSEC) 20 mg delayed release capsule Take day of surgery.    polyethylene glycol (MIRALAX) 17 g packet Hold day of surgery.    QUEtiapine (SEROquel) 100 mg tablet Take night before surgery    Sodium Fluoride 1.1 % PSTE Take day of surgery.    Symbicort 80-4.5 MCG/ACT inhaler Take day of surgery.    Medication instructions for day surgery reviewed. Please use only a sip of water to take your instructed medications. Avoid all over the counter vitamins, supplements and NSAIDS for one week prior to surgery per anesthesia guidelines. Tylenol is ok to take as needed.     You will receive a call one business day prior to surgery with an arrival time and hospital directions. If your surgery is scheduled on a Monday, the hospital will be calling you on the Friday prior to your surgery. If you have not heard from anyone by 8pm, please call the hospital supervisor through the hospital  at [phone]. ([place] [phone]).    Do not eat or drink anything after midnight the night before your surgery, including candy, mints, lifesavers, or chewing gum. Do not drink alcohol 24hrs before your surgery. Try not to smoke at least 24hrs before your surgery.       Follow the pre surgery showering instructions as listed in the “My Surgical Experience Booklet” or otherwise provided by your surgeon's office. Do not use a blade to shave the surgical area 1 week  before surgery. It is okay to use a clean electric clippers up to 24 hours before surgery. Do not apply any lotions, creams, including makeup, cologne, deodorant, or perfumes after showering on the day of your surgery. Do not use dry shampoo, hair spray, hair gel, or any type of hair products.     No contact lenses, eye make-up, or artificial eyelashes. Remove nail polish, including gel polish, and any artificial, gel, or acrylic nails if possible. Remove all jewelry including rings and body piercing jewelry.     Wear causal clothing that is easy to take on and off. Consider your type of surgery.    Keep any valuables, jewelry, piercings at home. Please bring any specially ordered equipment (sling, braces) if indicated.    Arrange for a responsible person to drive you to and from the hospital on the day of your surgery. Visitor Guidelines discussed.     Call the surgeon's office with any new illnesses, exposures, or additional questions prior to surgery.    Please reference your “My Surgical Experience Booklet” for additional information to prepare for your upcoming surgery.    Pt has surgical soap.  ERAS reviewed.

## 2024-01-05 NOTE — TELEPHONE ENCOUNTER
Po med, Laparoscopic CHEN-EN-Y Gastric Bypass Wit Robotics and Intraoperative EGD.  SX 1/16/2024 Keyon Clemens

## 2024-01-11 ENCOUNTER — OFFICE VISIT (OUTPATIENT)
Dept: FAMILY MEDICINE CLINIC | Facility: CLINIC | Age: 54
End: 2024-01-11

## 2024-01-11 VITALS
RESPIRATION RATE: 18 BRPM | OXYGEN SATURATION: 95 % | HEART RATE: 111 BPM | TEMPERATURE: 98 F | WEIGHT: 209 LBS | SYSTOLIC BLOOD PRESSURE: 130 MMHG | BODY MASS INDEX: 38.46 KG/M2 | HEIGHT: 62 IN | DIASTOLIC BLOOD PRESSURE: 82 MMHG

## 2024-01-11 DIAGNOSIS — F31.70 BIPOLAR DISORDER IN PARTIAL REMISSION, MOST RECENT EPISODE UNSPECIFIED TYPE (HCC): ICD-10-CM

## 2024-01-11 DIAGNOSIS — J45.40 MODERATE PERSISTENT ASTHMA WITHOUT COMPLICATION: Primary | ICD-10-CM

## 2024-01-11 DIAGNOSIS — N18.2 CKD (CHRONIC KIDNEY DISEASE), STAGE II: ICD-10-CM

## 2024-01-11 DIAGNOSIS — I10 PRIMARY HYPERTENSION: ICD-10-CM

## 2024-01-11 PROCEDURE — 99214 OFFICE O/P EST MOD 30 MIN: CPT | Performed by: FAMILY MEDICINE

## 2024-01-11 RX ORDER — ALBUTEROL SULFATE 90 UG/1
2 AEROSOL, METERED RESPIRATORY (INHALATION) EVERY 6 HOURS PRN
Qty: 18 G | Refills: 5 | Status: SHIPPED | OUTPATIENT
Start: 2024-01-11

## 2024-01-11 NOTE — ASSESSMENT & PLAN NOTE
Lab Results   Component Value Date    EGFR 61 12/21/2023    EGFR 66 10/04/2023    EGFR 51 06/14/2023    CREATININE 1.05 12/21/2023    CREATININE 0.98 10/04/2023    CREATININE 1.22 06/14/2023   Stable  Creatinine at baseline

## 2024-01-11 NOTE — PROGRESS NOTES
Name: Judy Rios      : 1970      MRN: [id number]  Encounter Provider: Steve Gaspar MD  Encounter Date: 2024   Encounter department: [facility] TIA    Assessment & Plan     1. Moderate persistent asthma without complication  Assessment & Plan:  -Well-controlled  -Continue Symbicort and albuterol PRN      Orders:  -     albuterol (PROVENTIL HFA,VENTOLIN HFA) 90 mcg/act inhaler; Inhale 2 puffs every 6 (six) hours as needed for wheezing    2. Primary hypertension  Assessment & Plan:  Well-controlled  Continue amlodipine daily  Recommend low-sodium diet      3. Bipolar disorder in partial remission, most recent episode unspecified type (HCC)  Assessment & Plan:  Currently stable  Follows up outpatient with Psychiatry  Current regimen includes lithium, Atarax ,Cogentin twice daily, Seroquel nightly, and Remeron  nightly       4. CKD (chronic kidney disease), stage II  Assessment & Plan:  Lab Results   Component Value Date    EGFR 61 2023    EGFR 66 10/04/2023    EGFR 51 2023    CREATININE 1.05 2023    CREATININE 0.98 10/04/2023    CREATININE 1.22 2023   Stable  Creatinine at baseline             Subjective      54-year-old female with a history of bipolar disorder, hypertension, and GERD who presents today for follow-up.  She is doing quite well.  She has no new concerns today.  She is scheduled to undergo Kenneth-en-Y gastric bypass.  She is excited about the upcoming procedure.  She wants to get a knee injection in the next few weeks for her arthritis.      Review of Systems   Constitutional:  Negative for chills, diaphoresis, fatigue and fever.   Eyes:  Negative for visual disturbance.   Respiratory:  Negative for shortness of breath and wheezing.    Cardiovascular:  Negative for chest pain and palpitations.   Gastrointestinal:  Negative for abdominal pain, nausea and vomiting.   Endocrine: Negative for polydipsia, polyphagia and  polyuria.   Musculoskeletal:  Negative for back pain.   Skin:  Negative for rash.   Neurological:  Negative for dizziness, syncope and light-headedness.   Psychiatric/Behavioral:  Negative for agitation and confusion.        Current Outpatient Medications on File Prior to Visit   Medication Sig    acetaminophen (TYLENOL) 500 mg tablet Take 500 mg by mouth every 6 (six) hours as needed for mild pain    amLODIPine (NORVASC) 5 mg tablet TAKE 1 TABLET (5 MG TOTAL) BY MOUTH DAILY.    benztropine (COGENTIN) 1 mg tablet 1 mg 2 (two) times a day    hydrOXYzine HCL (ATARAX) 50 mg tablet Take 50 mg by mouth 3 (three) times a day as needed    lithium 300 MG tablet Take 1 tablet by mouth once daily and 2 tablets at bedtime (Patient not taking: Reported on 1/4/2024)    lithium 600 MG capsule Take 600 mg by mouth daily at bedtime (Patient not taking: Reported on 1/4/2024)    mirtazapine (REMERON) 7.5 MG tablet daily at bedtime    multivitamin (THERAGRAN) TABS Take 1 tablet by mouth daily    omeprazole (PriLOSEC) 20 mg delayed release capsule TAKE 1 CAPSULE BY MOUTH EVERY DAY    omeprazole (PriLOSEC) 20 mg delayed release capsule Take 1 capsule (20 mg total) by mouth daily    oxyCODONE (Roxicodone) 5 immediate release tablet Take 1 tablet (5 mg total) by mouth every 4 (four) hours as needed for moderate pain Max Daily Amount: 30 mg Do not start before October 4, 2023. (Patient not taking: Reported on 10/12/2023)    [START ON 1/17/2024] oxyCODONE (Roxicodone) 5 immediate release tablet Take 1 tablet (5 mg total) by mouth every 4 (four) hours as needed for moderate pain Max Daily Amount: 30 mg Do not start before January 17, 2024.    polyethylene glycol (MIRALAX) 17 g packet Take 17 g by mouth daily    QUEtiapine (SEROquel) 100 mg tablet Take 1 tablet (100 mg total) by mouth daily at bedtime    Sodium Fluoride 1.1 % PSTE Brush teeth one time before bedtime, don't rinse but expectorate excess.    Symbicort 80-4.5 MCG/ACT inhaler  "INHALE 2 PUFFS BY MOUTH TWICE A DAY RINSE MOUTH AFTER USE    [DISCONTINUED] albuterol (PROVENTIL HFA,VENTOLIN HFA) 90 mcg/act inhaler INHALE 2 PUFFS EVERY 6 HOURS AS NEEDED FOR WHEEZING       Objective     /82 (BP Location: Left arm, Patient Position: Sitting, Cuff Size: Large)   Pulse (!) 111   Temp 98 °F (36.7 °C) (Temporal)   Resp 18   Ht 5' 2\" (1.575 m)   Wt 94.8 kg (209 lb)   SpO2 95%   BMI 38.23 kg/m²     Physical Exam  Vitals reviewed.   Constitutional:       General: She is not in acute distress.     Appearance: Normal appearance. She is well-developed. She is not ill-appearing, toxic-appearing or diaphoretic.   HENT:      Head: Normocephalic and atraumatic.      Right Ear: External ear normal.      Left Ear: External ear normal.      Nose: Nose normal.      Mouth/Throat:      Mouth: Mucous membranes are moist.      Pharynx: No oropharyngeal exudate or posterior oropharyngeal erythema.   Eyes:      General: No scleral icterus.        Right eye: No discharge.         Left eye: No discharge.      Extraocular Movements: Extraocular movements intact.      Conjunctiva/sclera: Conjunctivae normal.   Neck:      Thyroid: No thyromegaly.      Vascular: No JVD.      Trachea: No tracheal deviation.   Cardiovascular:      Rate and Rhythm: Normal rate and regular rhythm.      Heart sounds: Normal heart sounds. No murmur heard.     No friction rub. No gallop.   Pulmonary:      Effort: Pulmonary effort is normal. No respiratory distress.      Breath sounds: Normal breath sounds. No stridor. No wheezing or rhonchi.   Abdominal:      General: Bowel sounds are normal. There is no distension.      Palpations: Abdomen is soft.      Tenderness: There is no abdominal tenderness. There is no guarding.   Musculoskeletal:         General: Normal range of motion.      Cervical back: Normal range of motion.   Skin:     General: Skin is warm.      Capillary Refill: Capillary refill takes less than 2 seconds. "   Neurological:      General: No focal deficit present.      Mental Status: She is alert and oriented to person, place, and time.      Motor: No abnormal muscle tone.      Gait: Gait normal.   Psychiatric:         Mood and Affect: Mood normal.         Behavior: Behavior normal.       Steve Gaspar MD

## 2024-01-11 NOTE — ASSESSMENT & PLAN NOTE
Currently stable  Follows up outpatient with Psychiatry  Current regimen includes lithium, Atarax ,Cogentin twice daily, Seroquel nightly, and Remeron  nightly

## 2024-01-15 ENCOUNTER — ANESTHESIA EVENT (OUTPATIENT)
Dept: PERIOP | Facility: HOSPITAL | Age: 54
DRG: 328 | End: 2024-01-15
Payer: COMMERCIAL

## 2024-01-16 ENCOUNTER — ANESTHESIA (OUTPATIENT)
Dept: PERIOP | Facility: HOSPITAL | Age: 54
DRG: 328 | End: 2024-01-16
Payer: COMMERCIAL

## 2024-01-16 ENCOUNTER — HOSPITAL ENCOUNTER (INPATIENT)
Facility: HOSPITAL | Age: 54
LOS: 1 days | Discharge: HOME | DRG: 328 | End: 2024-01-17
Attending: SURGERY | Admitting: SURGERY
Payer: COMMERCIAL

## 2024-01-16 DIAGNOSIS — R11.10 VOMITING: ICD-10-CM

## 2024-01-16 DIAGNOSIS — R13.10 DYSPHAGIA: ICD-10-CM

## 2024-01-16 DIAGNOSIS — E66.9 OBESITY (BMI 35.0-39.9 WITHOUT COMORBIDITY): Primary | ICD-10-CM

## 2024-01-16 DIAGNOSIS — Z98.84 STATUS POST BARIATRIC SURGERY: ICD-10-CM

## 2024-01-16 DIAGNOSIS — K21.9 GASTROESOPHAGEAL REFLUX DISEASE, UNSPECIFIED WHETHER ESOPHAGITIS PRESENT: ICD-10-CM

## 2024-01-16 DIAGNOSIS — K95.09 GASTRIC BAND EROSION: ICD-10-CM

## 2024-01-16 DIAGNOSIS — I10 ESSENTIAL (PRIMARY) HYPERTENSION: ICD-10-CM

## 2024-01-16 DIAGNOSIS — R10.9 ABDOMINAL PAIN: ICD-10-CM

## 2024-01-16 DIAGNOSIS — K21.9 GASTROESOPHAGEAL REFLUX DISEASE: ICD-10-CM

## 2024-01-16 PROCEDURE — 0DJ08ZZ INSPECTION OF UPPER INTESTINAL TRACT, VIA NATURAL OR ARTIFICIAL OPENING ENDOSCOPIC: ICD-10-PCS | Performed by: SURGERY

## 2024-01-16 PROCEDURE — 0DNW4ZZ RELEASE PERITONEUM, PERCUTANEOUS ENDOSCOPIC APPROACH: ICD-10-PCS | Performed by: SURGERY

## 2024-01-16 PROCEDURE — C9290 INJ, BUPIVACAINE LIPOSOME: HCPCS | Performed by: STUDENT IN AN ORGANIZED HEALTH CARE EDUCATION/TRAINING PROGRAM

## 2024-01-16 PROCEDURE — C1781 MESH (IMPLANTABLE): HCPCS | Performed by: SURGERY

## 2024-01-16 PROCEDURE — 8E0W4CZ ROBOTIC ASSISTED PROCEDURE OF TRUNK REGION, PERCUTANEOUS ENDOSCOPIC APPROACH: ICD-10-PCS | Performed by: SURGERY

## 2024-01-16 PROCEDURE — 0D164ZA BYPASS STOMACH TO JEJUNUM, PERCUTANEOUS ENDOSCOPIC APPROACH: ICD-10-PCS | Performed by: SURGERY

## 2024-01-16 PROCEDURE — 88307 TISSUE EXAM BY PATHOLOGIST: CPT | Performed by: STUDENT IN AN ORGANIZED HEALTH CARE EDUCATION/TRAINING PROGRAM

## 2024-01-16 DEVICE — SEAMGUARD STPL REINF INTUITIVE SUREFORM 60 BLACK: Type: IMPLANTABLE DEVICE | Site: STOMACH | Status: FUNCTIONAL

## 2024-01-16 RX ORDER — ONDANSETRON 2 MG/ML
4 INJECTION INTRAMUSCULAR; INTRAVENOUS EVERY 6 HOURS PRN
Status: DISCONTINUED | OUTPATIENT
Start: 2024-01-16 | End: 2024-01-16

## 2024-01-16 RX ORDER — LIDOCAINE HYDROCHLORIDE 20 MG/ML
INJECTION, SOLUTION EPIDURAL; INFILTRATION; INTRACAUDAL; PERINEURAL AS NEEDED
Status: DISCONTINUED | OUTPATIENT
Start: 2024-01-16 | End: 2024-01-16

## 2024-01-16 RX ORDER — PHENYLEPHRINE HCL IN 0.9% NACL 1 MG/10 ML
SYRINGE (ML) INTRAVENOUS AS NEEDED
Status: DISCONTINUED | OUTPATIENT
Start: 2024-01-16 | End: 2024-01-16

## 2024-01-16 RX ORDER — ACETAMINOPHEN 10 MG/ML
1000 INJECTION, SOLUTION INTRAVENOUS ONCE
Status: COMPLETED | OUTPATIENT
Start: 2024-01-16 | End: 2024-01-16

## 2024-01-16 RX ORDER — ONDANSETRON 2 MG/ML
4 INJECTION INTRAMUSCULAR; INTRAVENOUS ONCE AS NEEDED
Status: DISCONTINUED | OUTPATIENT
Start: 2024-01-16 | End: 2024-01-16 | Stop reason: HOSPADM

## 2024-01-16 RX ORDER — MAGNESIUM HYDROXIDE 1200 MG/15ML
LIQUID ORAL AS NEEDED
Status: DISCONTINUED | OUTPATIENT
Start: 2024-01-16 | End: 2024-01-16 | Stop reason: HOSPADM

## 2024-01-16 RX ORDER — OXYCODONE HCL 5 MG/5 ML
10 SOLUTION, ORAL ORAL EVERY 4 HOURS PRN
Status: DISCONTINUED | OUTPATIENT
Start: 2024-01-16 | End: 2024-01-17 | Stop reason: HOSPADM

## 2024-01-16 RX ORDER — MIDAZOLAM HYDROCHLORIDE 2 MG/2ML
INJECTION, SOLUTION INTRAMUSCULAR; INTRAVENOUS AS NEEDED
Status: DISCONTINUED | OUTPATIENT
Start: 2024-01-16 | End: 2024-01-16

## 2024-01-16 RX ORDER — LITHIUM CARBONATE 300 MG/1
300 CAPSULE ORAL 3 TIMES DAILY
Status: DISCONTINUED | OUTPATIENT
Start: 2024-01-16 | End: 2024-01-17 | Stop reason: HOSPADM

## 2024-01-16 RX ORDER — ESMOLOL HYDROCHLORIDE 10 MG/ML
INJECTION INTRAVENOUS AS NEEDED
Status: DISCONTINUED | OUTPATIENT
Start: 2024-01-16 | End: 2024-01-16

## 2024-01-16 RX ORDER — PROPOFOL 10 MG/ML
INJECTION, EMULSION INTRAVENOUS AS NEEDED
Status: DISCONTINUED | OUTPATIENT
Start: 2024-01-16 | End: 2024-01-16

## 2024-01-16 RX ORDER — SODIUM CHLORIDE, SODIUM LACTATE, POTASSIUM CHLORIDE, CALCIUM CHLORIDE 600; 310; 30; 20 MG/100ML; MG/100ML; MG/100ML; MG/100ML
75 INJECTION, SOLUTION INTRAVENOUS CONTINUOUS
Status: DISCONTINUED | OUTPATIENT
Start: 2024-01-16 | End: 2024-01-17 | Stop reason: HOSPADM

## 2024-01-16 RX ORDER — MORPHINE SULFATE 4 MG/ML
4 INJECTION, SOLUTION INTRAMUSCULAR; INTRAVENOUS EVERY 4 HOURS PRN
Status: DISCONTINUED | OUTPATIENT
Start: 2024-01-16 | End: 2024-01-17 | Stop reason: HOSPADM

## 2024-01-16 RX ORDER — CEFAZOLIN SODIUM 1 G/3ML
INJECTION, POWDER, FOR SOLUTION INTRAMUSCULAR; INTRAVENOUS AS NEEDED
Status: DISCONTINUED | OUTPATIENT
Start: 2024-01-16 | End: 2024-01-16

## 2024-01-16 RX ORDER — ALBUTEROL SULFATE 90 UG/1
2 AEROSOL, METERED RESPIRATORY (INHALATION) EVERY 6 HOURS PRN
Status: DISCONTINUED | OUTPATIENT
Start: 2024-01-16 | End: 2024-01-17 | Stop reason: HOSPADM

## 2024-01-16 RX ORDER — FENTANYL CITRATE/PF 50 MCG/ML
50 SYRINGE (ML) INJECTION
Status: DISCONTINUED | OUTPATIENT
Start: 2024-01-16 | End: 2024-01-16 | Stop reason: HOSPADM

## 2024-01-16 RX ORDER — EPHEDRINE SULFATE 50 MG/ML
INJECTION INTRAVENOUS AS NEEDED
Status: DISCONTINUED | OUTPATIENT
Start: 2024-01-16 | End: 2024-01-16

## 2024-01-16 RX ORDER — ACETAMINOPHEN 10 MG/ML
1000 INJECTION, SOLUTION INTRAVENOUS EVERY 6 HOURS SCHEDULED
Status: DISCONTINUED | OUTPATIENT
Start: 2024-01-16 | End: 2024-01-17 | Stop reason: HOSPADM

## 2024-01-16 RX ORDER — CEFAZOLIN SODIUM 2 G/50ML
2000 SOLUTION INTRAVENOUS ONCE
Status: COMPLETED | OUTPATIENT
Start: 2024-01-16 | End: 2024-01-16

## 2024-01-16 RX ORDER — SIMETHICONE 80 MG
80 TABLET,CHEWABLE ORAL 4 TIMES DAILY PRN
Status: DISCONTINUED | OUTPATIENT
Start: 2024-01-16 | End: 2024-01-17 | Stop reason: HOSPADM

## 2024-01-16 RX ORDER — PROPOFOL 10 MG/ML
INJECTION, EMULSION INTRAVENOUS CONTINUOUS PRN
Status: DISCONTINUED | OUTPATIENT
Start: 2024-01-16 | End: 2024-01-16

## 2024-01-16 RX ORDER — FAMOTIDINE 10 MG/ML
20 INJECTION, SOLUTION INTRAVENOUS EVERY 12 HOURS SCHEDULED
Status: DISCONTINUED | OUTPATIENT
Start: 2024-01-16 | End: 2024-01-17 | Stop reason: HOSPADM

## 2024-01-16 RX ORDER — FENTANYL CITRATE 50 UG/ML
INJECTION, SOLUTION INTRAMUSCULAR; INTRAVENOUS AS NEEDED
Status: DISCONTINUED | OUTPATIENT
Start: 2024-01-16 | End: 2024-01-16

## 2024-01-16 RX ORDER — SODIUM CHLORIDE 9 MG/ML
INJECTION, SOLUTION INTRAVENOUS AS NEEDED
Status: DISCONTINUED | OUTPATIENT
Start: 2024-01-16 | End: 2024-01-16 | Stop reason: HOSPADM

## 2024-01-16 RX ORDER — ROCURONIUM BROMIDE 10 MG/ML
INJECTION, SOLUTION INTRAVENOUS AS NEEDED
Status: DISCONTINUED | OUTPATIENT
Start: 2024-01-16 | End: 2024-01-16

## 2024-01-16 RX ORDER — MIRTAZAPINE 7.5 MG/1
7.5 TABLET, FILM COATED ORAL
Status: DISCONTINUED | OUTPATIENT
Start: 2024-01-16 | End: 2024-01-17 | Stop reason: HOSPADM

## 2024-01-16 RX ORDER — ONDANSETRON 2 MG/ML
4 INJECTION INTRAMUSCULAR; INTRAVENOUS EVERY 6 HOURS PRN
Status: DISCONTINUED | OUTPATIENT
Start: 2024-01-16 | End: 2024-01-17 | Stop reason: HOSPADM

## 2024-01-16 RX ORDER — HEPARIN SODIUM 5000 [USP'U]/ML
5000 INJECTION, SOLUTION INTRAVENOUS; SUBCUTANEOUS ONCE
Status: COMPLETED | OUTPATIENT
Start: 2024-01-16 | End: 2024-01-16

## 2024-01-16 RX ORDER — OXYCODONE HCL 5 MG/5 ML
5 SOLUTION, ORAL ORAL EVERY 4 HOURS PRN
Status: DISCONTINUED | OUTPATIENT
Start: 2024-01-16 | End: 2024-01-17 | Stop reason: HOSPADM

## 2024-01-16 RX ORDER — ONDANSETRON 2 MG/ML
INJECTION INTRAMUSCULAR; INTRAVENOUS AS NEEDED
Status: DISCONTINUED | OUTPATIENT
Start: 2024-01-16 | End: 2024-01-16

## 2024-01-16 RX ORDER — METOPROLOL TARTRATE 1 MG/ML
INJECTION, SOLUTION INTRAVENOUS AS NEEDED
Status: DISCONTINUED | OUTPATIENT
Start: 2024-01-16 | End: 2024-01-16

## 2024-01-16 RX ORDER — LITHIUM CARBONATE 300 MG/1
300 CAPSULE ORAL 3 TIMES DAILY
COMMUNITY
Start: 2024-01-08

## 2024-01-16 RX ORDER — PROMETHAZINE HYDROCHLORIDE 25 MG/ML
25 INJECTION, SOLUTION INTRAMUSCULAR; INTRAVENOUS EVERY 6 HOURS PRN
Status: DISCONTINUED | OUTPATIENT
Start: 2024-01-16 | End: 2024-01-17 | Stop reason: HOSPADM

## 2024-01-16 RX ORDER — KETOROLAC TROMETHAMINE 30 MG/ML
15 INJECTION, SOLUTION INTRAMUSCULAR; INTRAVENOUS EVERY 6 HOURS
Status: DISCONTINUED | OUTPATIENT
Start: 2024-01-16 | End: 2024-01-17 | Stop reason: HOSPADM

## 2024-01-16 RX ORDER — HYDROMORPHONE HCL/PF 1 MG/ML
SYRINGE (ML) INJECTION AS NEEDED
Status: DISCONTINUED | OUTPATIENT
Start: 2024-01-16 | End: 2024-01-16

## 2024-01-16 RX ORDER — CELECOXIB 200 MG/1
200 CAPSULE ORAL ONCE
Status: COMPLETED | OUTPATIENT
Start: 2024-01-16 | End: 2024-01-16

## 2024-01-16 RX ORDER — METOCLOPRAMIDE HYDROCHLORIDE 5 MG/ML
INJECTION INTRAMUSCULAR; INTRAVENOUS AS NEEDED
Status: DISCONTINUED | OUTPATIENT
Start: 2024-01-16 | End: 2024-01-16

## 2024-01-16 RX ORDER — AMLODIPINE BESYLATE 5 MG/1
5 TABLET ORAL DAILY
Status: DISCONTINUED | OUTPATIENT
Start: 2024-01-17 | End: 2024-01-17 | Stop reason: HOSPADM

## 2024-01-16 RX ORDER — BUDESONIDE AND FORMOTEROL FUMARATE DIHYDRATE 80; 4.5 UG/1; UG/1
2 AEROSOL RESPIRATORY (INHALATION) 2 TIMES DAILY
Status: DISCONTINUED | OUTPATIENT
Start: 2024-01-16 | End: 2024-01-17 | Stop reason: HOSPADM

## 2024-01-16 RX ORDER — DEXAMETHASONE SODIUM PHOSPHATE 10 MG/ML
INJECTION, SOLUTION INTRAMUSCULAR; INTRAVENOUS AS NEEDED
Status: DISCONTINUED | OUTPATIENT
Start: 2024-01-16 | End: 2024-01-16

## 2024-01-16 RX ORDER — BUPIVACAINE HYDROCHLORIDE 5 MG/ML
INJECTION, SOLUTION EPIDURAL; INTRACAUDAL AS NEEDED
Status: DISCONTINUED | OUTPATIENT
Start: 2024-01-16 | End: 2024-01-16 | Stop reason: HOSPADM

## 2024-01-16 RX ORDER — QUETIAPINE FUMARATE 100 MG/1
100 TABLET, FILM COATED ORAL
Status: DISCONTINUED | OUTPATIENT
Start: 2024-01-16 | End: 2024-01-17 | Stop reason: HOSPADM

## 2024-01-16 RX ORDER — SODIUM CHLORIDE 9 MG/ML
125 INJECTION, SOLUTION INTRAVENOUS CONTINUOUS
Status: DISCONTINUED | OUTPATIENT
Start: 2024-01-16 | End: 2024-01-17 | Stop reason: HOSPADM

## 2024-01-16 RX ORDER — DIPHENHYDRAMINE HCL 25 MG
25 TABLET ORAL
Status: DISCONTINUED | OUTPATIENT
Start: 2024-01-16 | End: 2024-01-17 | Stop reason: HOSPADM

## 2024-01-16 RX ORDER — HYDROMORPHONE HCL/PF 1 MG/ML
0.5 SYRINGE (ML) INJECTION
Status: DISCONTINUED | OUTPATIENT
Start: 2024-01-16 | End: 2024-01-16 | Stop reason: HOSPADM

## 2024-01-16 RX ORDER — METOCLOPRAMIDE HYDROCHLORIDE 5 MG/ML
10 INJECTION INTRAMUSCULAR; INTRAVENOUS EVERY 6 HOURS PRN
Status: DISCONTINUED | OUTPATIENT
Start: 2024-01-16 | End: 2024-01-17 | Stop reason: HOSPADM

## 2024-01-16 RX ADMIN — EPHEDRINE SULFATE 10 MG: 50 INJECTION, SOLUTION INTRAVENOUS at 11:24

## 2024-01-16 RX ADMIN — SODIUM CHLORIDE 150 MG: 0.9 INJECTION, SOLUTION INTRAVENOUS at 08:49

## 2024-01-16 RX ADMIN — Medication 100 MCG: at 11:34

## 2024-01-16 RX ADMIN — SUGAMMADEX 200 MG: 100 INJECTION, SOLUTION INTRAVENOUS at 16:11

## 2024-01-16 RX ADMIN — Medication 200 MCG: at 11:18

## 2024-01-16 RX ADMIN — FAMOTIDINE 20 MG: 10 INJECTION INTRAVENOUS at 21:10

## 2024-01-16 RX ADMIN — HYDROMORPHONE HYDROCHLORIDE 0.5 MG: 1 INJECTION, SOLUTION INTRAMUSCULAR; INTRAVENOUS; SUBCUTANEOUS at 14:17

## 2024-01-16 RX ADMIN — FENTANYL CITRATE 50 MCG: 50 INJECTION INTRAMUSCULAR; INTRAVENOUS at 11:45

## 2024-01-16 RX ADMIN — ACETAMINOPHEN 1000 MG: 10 INJECTION INTRAVENOUS at 23:59

## 2024-01-16 RX ADMIN — SODIUM CHLORIDE: 0.9 INJECTION, SOLUTION INTRAVENOUS at 13:11

## 2024-01-16 RX ADMIN — ROCURONIUM BROMIDE 10 MG: 10 INJECTION, SOLUTION INTRAVENOUS at 13:58

## 2024-01-16 RX ADMIN — CELECOXIB 200 MG: 200 CAPSULE ORAL at 08:49

## 2024-01-16 RX ADMIN — CEFAZOLIN SODIUM 2000 MG: 2 SOLUTION INTRAVENOUS at 10:57

## 2024-01-16 RX ADMIN — KETOROLAC TROMETHAMINE 15 MG: 30 INJECTION, SOLUTION INTRAMUSCULAR; INTRAVENOUS at 23:59

## 2024-01-16 RX ADMIN — LIDOCAINE HYDROCHLORIDE 100 MG: 20 INJECTION, SOLUTION EPIDURAL; INFILTRATION; INTRACAUDAL at 11:09

## 2024-01-16 RX ADMIN — ROCURONIUM BROMIDE 20 MG: 10 INJECTION, SOLUTION INTRAVENOUS at 11:34

## 2024-01-16 RX ADMIN — METOROPROLOL TARTRATE 2.5 MG: 5 INJECTION, SOLUTION INTRAVENOUS at 15:46

## 2024-01-16 RX ADMIN — DEXAMETHASONE SODIUM PHOSPHATE 10 MG: 10 INJECTION INTRAMUSCULAR; INTRAVENOUS at 11:15

## 2024-01-16 RX ADMIN — ROCURONIUM BROMIDE 20 MG: 10 INJECTION, SOLUTION INTRAVENOUS at 14:31

## 2024-01-16 RX ADMIN — FENTANYL CITRATE 50 MCG: 50 INJECTION INTRAMUSCULAR; INTRAVENOUS at 12:38

## 2024-01-16 RX ADMIN — Medication 100 MCG: at 11:15

## 2024-01-16 RX ADMIN — SODIUM CHLORIDE 125 ML/HR: 0.9 INJECTION, SOLUTION INTRAVENOUS at 08:49

## 2024-01-16 RX ADMIN — MIRTAZAPINE 7.5 MG: 7.5 TABLET, FILM COATED ORAL at 21:09

## 2024-01-16 RX ADMIN — ESMOLOL HYDROCHLORIDE 30 MG: 10 INJECTION, SOLUTION INTRAVENOUS at 13:32

## 2024-01-16 RX ADMIN — ONDANSETRON 4 MG: 2 INJECTION INTRAMUSCULAR; INTRAVENOUS at 15:32

## 2024-01-16 RX ADMIN — KETOROLAC TROMETHAMINE 15 MG: 30 INJECTION, SOLUTION INTRAMUSCULAR; INTRAVENOUS at 17:53

## 2024-01-16 RX ADMIN — METOCLOPRAMIDE 10 MG: 5 INJECTION, SOLUTION INTRAMUSCULAR; INTRAVENOUS at 11:16

## 2024-01-16 RX ADMIN — Medication 100 MCG: at 11:24

## 2024-01-16 RX ADMIN — ESMOLOL HYDROCHLORIDE 30 MG: 10 INJECTION, SOLUTION INTRAVENOUS at 14:16

## 2024-01-16 RX ADMIN — CEFAZOLIN 2000 MG: 1 INJECTION, POWDER, FOR SOLUTION INTRAMUSCULAR; INTRAVENOUS; PARENTERAL at 15:12

## 2024-01-16 RX ADMIN — ROCURONIUM BROMIDE 10 MG: 10 INJECTION, SOLUTION INTRAVENOUS at 12:29

## 2024-01-16 RX ADMIN — ROCURONIUM BROMIDE 50 MG: 10 INJECTION, SOLUTION INTRAVENOUS at 11:10

## 2024-01-16 RX ADMIN — SODIUM CHLORIDE: 0.9 INJECTION, SOLUTION INTRAVENOUS at 11:25

## 2024-01-16 RX ADMIN — ROCURONIUM BROMIDE 10 MG: 10 INJECTION, SOLUTION INTRAVENOUS at 13:09

## 2024-01-16 RX ADMIN — FENTANYL CITRATE 100 MCG: 50 INJECTION INTRAMUSCULAR; INTRAVENOUS at 11:09

## 2024-01-16 RX ADMIN — HEPARIN SODIUM 5000 UNITS: 5000 INJECTION INTRAVENOUS; SUBCUTANEOUS at 08:50

## 2024-01-16 RX ADMIN — MIDAZOLAM 2 MG: 1 INJECTION INTRAMUSCULAR; INTRAVENOUS at 11:00

## 2024-01-16 RX ADMIN — SODIUM CHLORIDE, SODIUM LACTATE, POTASSIUM CHLORIDE, AND CALCIUM CHLORIDE 75 ML/HR: .6; .31; .03; .02 INJECTION, SOLUTION INTRAVENOUS at 17:51

## 2024-01-16 RX ADMIN — ROCURONIUM BROMIDE 10 MG: 10 INJECTION, SOLUTION INTRAVENOUS at 15:09

## 2024-01-16 RX ADMIN — FENTANYL CITRATE 50 MCG: 50 INJECTION INTRAMUSCULAR; INTRAVENOUS at 13:24

## 2024-01-16 RX ADMIN — ALBUTEROL SULFATE 2 PUFF: 90 AEROSOL, METERED RESPIRATORY (INHALATION) at 21:09

## 2024-01-16 RX ADMIN — ESMOLOL HYDROCHLORIDE 30 MG: 10 INJECTION, SOLUTION INTRAVENOUS at 15:25

## 2024-01-16 RX ADMIN — LITHIUM CARBONATE 300 MG: 300 CAPSULE, GELATIN COATED ORAL at 21:08

## 2024-01-16 RX ADMIN — ACETAMINOPHEN 1000 MG: 10 INJECTION INTRAVENOUS at 17:55

## 2024-01-16 RX ADMIN — QUETIAPINE FUMARATE 100 MG: 100 TABLET ORAL at 21:09

## 2024-01-16 RX ADMIN — PROPOFOL 50 MCG/KG/MIN: 10 INJECTION, EMULSION INTRAVENOUS at 11:10

## 2024-01-16 RX ADMIN — FENTANYL CITRATE 50 MCG: 50 INJECTION INTRAMUSCULAR; INTRAVENOUS at 12:18

## 2024-01-16 RX ADMIN — EPHEDRINE SULFATE 5 MG: 50 INJECTION, SOLUTION INTRAVENOUS at 11:22

## 2024-01-16 RX ADMIN — ACETAMINOPHEN 1000 MG: 10 INJECTION INTRAVENOUS at 09:05

## 2024-01-16 RX ADMIN — PROPOFOL 160 MG: 10 INJECTION, EMULSION INTRAVENOUS at 11:09

## 2024-01-16 NOTE — INTERVAL H&P NOTE
H&P reviewed. After examining the patient I find no changes in the patients condition since the H&P had been written.    Vitals:    01/16/24 0910   BP: 112/63   Pulse: 89   Resp: 16   Temp: 97.6 °F (36.4 °C)   SpO2: 96%

## 2024-01-16 NOTE — ANESTHESIA POSTPROCEDURE EVALUATION
Post-Op Assessment Note    CV Status:  Stable    Pain management: adequate       Mental Status:  Alert and awake   Hydration Status:  Euvolemic   PONV Controlled:  Controlled   Airway Patency:  Patent     Post Op Vitals Reviewed: Yes      Staff: Anesthesiologist               /83 (01/16/24 1640)    Temp      Pulse 92 (01/16/24 1640)   Resp 17 (01/16/24 1640)    SpO2 94 % (01/16/24 1640)

## 2024-01-16 NOTE — PLAN OF CARE
Problem: PAIN - ADULT  Goal: Verbalizes/displays adequate comfort level or baseline comfort level  Description: Interventions:  - Encourage patient to monitor pain and request assistance  - Assess pain using appropriate pain scale  - Administer analgesics based on type and severity of pain and evaluate response  - Implement non-pharmacological measures as appropriate and evaluate response  - Consider cultural and social influences on pain and pain management  - Notify physician/advanced practitioner if interventions unsuccessful or patient reports new pain  1/16/2024 1810 by Colleen Hughes-Behler, RN  Outcome: Progressing  1/16/2024 1809 by Colleen Hughes-Behler, RN  Outcome: Progressing  1/16/2024 1809 by Colleen Hughes-Behler, RN  Outcome: Progressing     Problem: INFECTION - ADULT  Goal: Absence or prevention of progression during hospitalization  Description: INTERVENTIONS:  - Assess and monitor for signs and symptoms of infection  - Monitor lab/diagnostic results  - Monitor all insertion sites, i.e. indwelling lines, tubes, and drains  - Monitor endotracheal if appropriate and nasal secretions for changes in amount and color  - [place] appropriate cooling/warming therapies per order  - Administer medications as ordered  - Instruct and encourage patient and family to use good hand hygiene technique  - Identify and instruct in appropriate isolation precautions for identified infection/condition  1/16/2024 1810 by Colleen Hughes-Behler, RN  Outcome: Progressing  1/16/2024 1809 by Colleen Hughes-Behler, RN  Outcome: Progressing  1/16/2024 1809 by Colleen Hughes-Behler, RN  Outcome: Progressing  Goal: Absence of fever/infection during neutropenic period  Description: INTERVENTIONS:  - Monitor WBC    1/16/2024 1810 by Colleen Hughes-Behler, RN  Outcome: Progressing  1/16/2024 1809 by Colleen Hughes-Behler, RN  Outcome: Progressing  1/16/2024 1809 by Colleen Hughes-Behler, RN  Outcome: Progressing     Problem:  SAFETY ADULT  Goal: Patient will remain free of falls  Description: INTERVENTIONS:  - Educate patient/family on patient safety including physical limitations  - Instruct patient to call for assistance with activity   - Consult OT/PT to assist with strengthening/mobility   - Keep Call bell within reach  - Keep bed low and locked with side rails adjusted as appropriate  - Keep care items and personal belongings within reach  - Initiate and maintain comfort rounds  - Make Fall Risk Sign visible to staff  - Apply yellow socks and bracelet for high fall risk patients  - Consider moving patient to room near nurses station  1/16/2024 1810 by Colleen Hughes-Behler, RN  Outcome: Progressing  1/16/2024 1809 by Colleen Hughes-Behler, RN  Outcome: Progressing  1/16/2024 1809 by Colleen Hughes-Behler, RN  Outcome: Progressing  Goal: Maintain or return to baseline ADL function  Description: INTERVENTIONS:  -  Assess patient's ability to carry out ADLs; assess patient's baseline for ADL function and identify physical deficits which impact ability to perform ADLs (bathing, care of mouth/teeth, toileting, grooming, dressing, etc.)  - Assess/evaluate cause of self-care deficits   - Assess range of motion  - Assess patient's mobility; develop plan if impaired  - Assess patient's need for assistive devices and provide as appropriate  - Encourage maximum independence but intervene and supervise when necessary  - Involve family in performance of ADLs  - Assess for home care needs following discharge   - Consider OT consult to assist with ADL evaluation and planning for discharge  - Provide patient education as appropriate  1/16/2024 1810 by Colleen Hughes-Behler, RN  Outcome: Progressing  1/16/2024 1809 by Colleen Hughes-Behler, RN  Outcome: Progressing  1/16/2024 1809 by Colleen Hughes-Behler, RN  Outcome: Progressing  Goal: Maintains/Returns to pre admission functional level  Description: INTERVENTIONS:  - Perform AM-PAC 6 Click Basic  Mobility/ Daily Activity assessment daily.  - Set and communicate daily mobility goal to care team and patient/family/caregiver.   - Collaborate with rehabilitation services on mobility goals if consulted  -- Record patient progress and toleration of activity level   1/16/2024 1810 by Colleen Hughes-Behler, RN  Outcome: Progressing  1/16/2024 1809 by Colleen Hughes-Behler, RN  Outcome: Progressing  1/16/2024 1809 by Colleen Hughes-Behler, RN  Outcome: Progressing     Problem: DISCHARGE PLANNING  Goal: Discharge to home or other facility with appropriate resources  Description: INTERVENTIONS:  - Identify barriers to discharge w/patient and caregiver  - Arrange for needed discharge resources and transportation as appropriate  - Identify discharge learning needs (meds, wound care, etc.)  - Arrange for interpretive services to assist at discharge as needed  - Refer to Case Management Department for coordinating discharge planning if the patient needs post-hospital services based on physician/advanced practitioner order or complex needs related to functional status, cognitive ability, or social support system  1/16/2024 1810 by Colleen Hughes-Behler, RN  Outcome: Progressing  1/16/2024 1809 by Colleen Hughes-Behler, RN  Outcome: Progressing  1/16/2024 1809 by Colleen Hughes-Behler, RN  Outcome: Progressing     Problem: Knowledge Deficit  Goal: Patient/family/caregiver demonstrates understanding of disease process, treatment plan, medications, and discharge instructions  Description: Complete learning assessment and assess knowledge base.  Interventions:  - Provide teaching at level of understanding  - Provide teaching via preferred learning methods  1/16/2024 1810 by Colleen Hughes-Behler, RN  Outcome: Progressing  1/16/2024 1809 by Colleen Hughes-Behler, RN  Outcome: Progressing  1/16/2024 1809 by Colleen Hughes-Behler, RN  Outcome: Progressing     Problem: Nutrition/Hydration-ADULT  Goal: Nutrient/Hydration intake  appropriate for improving, restoring or maintaining nutritional needs  Description: Monitor and assess patient's nutrition/hydration status for malnutrition. Collaborate with interdisciplinary team and initiate plan and interventions as ordered.  Monitor patient's weight and dietary intake as ordered or per policy. Utilize nutrition screening tool and intervene as necessary. Determine patient's food preferences and provide high-protein, high-caloric foods as appropriate.     INTERVENTIONS:  - Monitor oral intake, urinary output, labs, and treatment plans  - Assess nutrition and hydration status and recommend course of action  - Evaluate amount of meals eaten  - Assist patient with eating if necessary   - Allow adequate time for meals  - Recommend/ encourage appropriate diets, oral nutritional supplements, and vitamin/mineral supplements  - Order, calculate, and assess calorie counts as needed  - Recommend, monitor, and adjust tube feedings and TPN/PPN based on assessed needs  - Assess need for intravenous fluids  - Provide specific nutrition/hydration education as appropriate  - Include patient/family/caregiver in decisions related to nutrition  1/16/2024 1810 by Colleen Hughes-Behler, RN  Outcome: Progressing  1/16/2024 1809 by Colleen Hughes-Behler, RN  Outcome: Progressing  1/16/2024 1809 by Colleen Hughes-Behler, RN  Outcome: Progressing

## 2024-01-16 NOTE — OP NOTE
OPERATIVE REPORT  PATIENT NAME: Judy Rios    :  1970  MRN: [id number]  Pt Location: [facility] OR ROOM 08    SURGERY DATE: 2024    Surgeons and Role:     * Julian Davis MD - Primary     * Giles Jay MD - Assisting    Preop Diagnosis:  Gastroesophageal reflux disease [K21.9]  Vomiting [R11.10]  Abdominal pain [R10.9]  Status post bariatric surgery [Z98.84]  Dysphagia [R13.10]  Essential (primary) hypertension [I10]    Post-Op Diagnosis Codes:     * Gastroesophageal reflux disease [K21.9]     * Vomiting [R11.10]     * Abdominal pain [R10.9]     * Status post bariatric surgery [Z98.84]     * Dysphagia [R13.10]     * Essential (primary) hypertension [I10]    Procedure(s):  R-N-Y GASTRIC BYPASS W/ ROBOTICS & INTRAOPERATIVE EGD    Specimen(s):  ID Type Source Tests Collected by Time Destination   1 : Portion of Stomach Tissue Stomach TISSUE EXAM Julian Davis MD 2024 1553        Estimated Blood Loss:   30 mL    Drains:  Closed/Suction Drain Lateral RLQ Bulb 19 Fr. (Active)   Number of days: 105       Anesthesia Type:   General    Operative Indications:  Gastroesophageal reflux disease [K21.9]  Vomiting [R11.10]  Abdominal pain [R10.9]  Status post bariatric surgery [Z98.84]  Dysphagia [R13.10]  Essential (primary) hypertension [I10]      Operative Findings:  Severe inflammatory reaction around the hiatus  Extensive intra-abdominal adhesions  Status post adjustable gastric band with band erosion into the left lobe of the liver and possible gastric erosion  Status post robotic band removal    Complications:   None    Procedure and Technique:  Robotic extensive lysis of adhesions  Robotic partial gastrectomy  Robotic Kenneth-en-Y gastric bypass with IntraOp endoscopy      Please note that this case was very tedious and took a prolonged amount of time that was at least twice as long as a primary Kenneth-en-Y gastric bypass because of the dense inflammatory reaction around the hiatus and a history  of band erosion and possible gastric erosion status post adjustable gastric band.   I was present for the entire procedure.    Patient Disposition:  hemodynamically stable    Indication for the procedure:  Patient is a 53-year-old female who underwent robotic assisted adjustable gastric band removal back in October at the time she was noted to have dense intra-abdominal adhesions band erosion into the left lobe of the liver and possible gastric erosion, patient was consented for a Kenneth-en-Y gastric bypass.  Prior to the procedure patient was informed that given the revision nature of the procedure the procedure is associated with a higher perioperative complication rate.  All risks and benefits were explained to the patient all questions were answered and all concerns were addressed prior to the procedure.    No qualified resident was available  Assistant was necessary for instrument exchange and counter traction and assistance with stapling and intraop endoscopy    Indication:   Patient suffers from morbid obesity and associated co-morbidities  and failed to achieve any meaningful or sustainable weight loss and was therefore consented to undergo a laparoscopic Kenneth en Y Gastric Bypass. Risks and benefits were explained to the patient, patient consented for the procedure.      The patient was brought to the operating room and placed in a supine position. The patient received a dose of IV antibiotics and a dose of subcutaneous Heparin prior to the procedure. The patient was induced under general endotracheal anesthesia. The abdominal wall was prepped and draped under sterile conditions in the usual fashion. The procedure was started by obtaining access to the abdominal cavity using a Veress needle to the left side of the midline around 6 inches from the xiphoid the abdominal cavity was insufflated with CO2 to a pressure of 15 mmHg. After that, the abdomen was entered with an 8 mm trocar using an Optiview trocar under  direct visualization. At that point, an 8 and 12 mm robotic trocars were placed on the right side of the abdominal wall, under direct visualization, and another 8 mm robotic trocar and 12 mm assistant trocar were placed on the left side of the abdominal wall, also under direct visualization. A TAP block was performed using 20 ml of Exparel mixed with saline and 0.5 % Marcaine for a total of 100 ml. The patient was then placed in a reverse Trendelenburg position.  At that point we noticed dense intra-abdominal adhesions between the left lobe of the liver and the anterior wall of the stomach so a Doris liver retractor was not placed, the procedure was started by lifting the omentum in a cephalad fashion. The omentum was then split in half using the vessel sealer. The ligament of Treitz was identified and then the small bowel was transected with a 60 mm stapler using a white load.  The mesentery of the small bowel was then transected using the vessel sealer,   After that, the distal small bowel was run for 100 cm in a way to obtain a 100 cm long Kenneth limb. At that point, two enterotomies were made in the BP limb and the Kenneth limb with hot scissors l, and the jejunojejunostomy was fashioned using a 60 mm stapler with a white load. After firing the stapler, the staple line was inspected and there was no evidence of bleeding and the staple line was well formed. The common enterotomy of the jejunojejunostomy was closed in two layers using 2-0 Vicryl running suture for the first layer and  2-0 V LOC in a running fashion for the second layer. The mesenteric defect of the small bowel was approximated using nonabsorbable suture in a running fashion.  At that point, we turned our attention to the upper portion of the abdomen.  We started by lysing adhesions between the left lobe of the liver and the stomach to be able to expose the site of the previous band, this part of the procedure was very tedious and took a significant  amount of time because of the dense inflammatory reaction in the history of liver erosion, however the adhesions were taken down and the dissection was completed without injuring the liver capsule, dissection was carried all the way up to the hiatus there was dense inflammatory reaction around the hiatus.   Prior to recreating the gastric pouch we dissected the pseudocapsule and also took the gastrogastric imbrication down, however, when I attempted to follow the gastrogastric imbrication posteriorly to make sure that the entire imbrication was taken down prior to creating the gastric pouch and stapling the stomach,  I was not able to visualize the imbrication entirely and identify the extent of the imbrication posteriorly so I decided to mobilize the remnant to make sure that the imbrication was taken down completely, gastric remnant was mobilized with the vessel sealer short gastrics were also taken down with the vessel sealer and the fundus was rotated medially off the left nakul, at that point I was able to make sure that the imbrication was taken down in its entirety and I was also able to dissect the pseudocapsule posteriorly. The devascularized portion of the remnant was then stapled off using Sureform 60 mm stapler with a black cartridge. Staple line of the gastric remnant was imbricated with a running 2-O V lock suture.  We then created the gastric pouch using a perigastric technique again this part of the procedure was also very tedious and took a prolonged amount of time because the lesser sac was tightly adherent to the posterior wall of the stomach and there was dense inflammatory reaction which made the dissection lengthy.  A new gastrojejunostomy anastomosis was then fashioned in an antecolic antegastric fashion in 2 layers. Outer layer was a running layer of 2-0 V lock suture and the inner  layer was a running 2-0 Vicryl suture.  Olivas space was closed using 0 Ethibond suture. Upper endoscopy was  then performed and showed no evidence of bubbles or bleeding at the suture line of the anastomosis or the staple line of the gastric pouch.  The stomach specimen was removed through the 12 mm port. The 12 mm port was closed  with 1-0 Vicryl in a simple fashion.   All the skin edges of the trocar sites were approximated with 4-0 Monocryl in a subcuticular inverted fashion. The patient was extubated and transferred to the PACU in stable condition.    SIGNATURE: Julian Davis MD  DATE: January 16, 2024  TIME: 4:08 PM

## 2024-01-16 NOTE — ANESTHESIA PREPROCEDURE EVALUATION
Procedure:  R-N-Y GASTRIC BYPASS W/ ROBOTICS & INTRAOPERATIVE EGD (Abdomen)    Relevant Problems   CARDIO   (+) Hypertension      GI/HEPATIC   (+) GERD (gastroesophageal reflux disease)   (+) Hiatal hernia      /RENAL   (+) CKD (chronic kidney disease), stage II      MUSCULOSKELETAL   (+) Hiatal hernia   (+) Osteoarthritis of both knees      PULMONARY   (+) Asthma   (+) Moderate persistent asthma without complication   (+) GINGER (obstructive sleep apnea)      Other   (+) Bipolar 1 disorder (HCC)   (+) Hx of bariatric surgery   (+) Obesity (BMI 35.0-39.9 without comorbidity)   (+) Tobacco abuse        Physical Exam    Airway    Mallampati score: III         Dental       Cardiovascular  Rhythm: regular    Pulmonary   Decreased breath sounds    Other Findings  post-pubertal.      Anesthesia Plan  ASA Score- 3     Anesthesia Type- general with ASA Monitors.         Additional Monitors:     Airway Plan:            Plan Factors-Exercise tolerance (METS): >4 METS.    Chart reviewed. EKG reviewed.  Existing labs reviewed. Patient summary reviewed.    Patient is a current smoker. Patient not instructed to abstain from smoking on day of procedure. Patient did not smoke on day of surgery.    Obstructive sleep apnea risk education given perioperatively.        Induction- intravenous.    Postoperative Plan-     Informed Consent- Anesthetic plan and risks discussed with patient.

## 2024-01-17 VITALS
HEART RATE: 96 BPM | SYSTOLIC BLOOD PRESSURE: 117 MMHG | OXYGEN SATURATION: 96 % | BODY MASS INDEX: 37.27 KG/M2 | RESPIRATION RATE: 22 BRPM | TEMPERATURE: 98.6 F | HEIGHT: 63 IN | DIASTOLIC BLOOD PRESSURE: 83 MMHG | WEIGHT: 210.32 LBS

## 2024-01-17 LAB
ANION GAP SERPL CALCULATED.3IONS-SCNC: 5 MMOL/L
BUN SERPL-MCNC: 14 MG/DL (ref 5–25)
CALCIUM SERPL-MCNC: 8.9 MG/DL (ref 8.4–10.2)
CHLORIDE SERPL-SCNC: 115 MMOL/L (ref 96–108)
CO2 SERPL-SCNC: 22 MMOL/L (ref 21–32)
CREAT SERPL-MCNC: 0.99 MG/DL (ref 0.6–1.3)
ERYTHROCYTE [DISTWIDTH] IN BLOOD BY AUTOMATED COUNT: 14.2 % (ref 11.6–15.1)
GFR SERPL CREATININE-BSD FRML MDRD: 65 ML/MIN/1.73SQ M
GLUCOSE SERPL-MCNC: 122 MG/DL (ref 65–140)
HCT VFR BLD AUTO: 35.3 % (ref 34.8–46.1)
HGB BLD-MCNC: 11.9 G/DL (ref 11.5–15.4)
MCH RBC QN AUTO: 24.6 PG (ref 26.8–34.3)
MCHC RBC AUTO-ENTMCNC: 33.7 G/DL (ref 31.4–37.4)
MCV RBC AUTO: 73 FL (ref 82–98)
PLATELET # BLD AUTO: 283 THOUSANDS/UL (ref 149–390)
PMV BLD AUTO: 10.1 FL (ref 8.9–12.7)
POTASSIUM SERPL-SCNC: 4.2 MMOL/L (ref 3.5–5.3)
RBC # BLD AUTO: 4.84 MILLION/UL (ref 3.81–5.12)
SODIUM SERPL-SCNC: 142 MMOL/L (ref 135–147)
WBC # BLD AUTO: 12.44 THOUSAND/UL (ref 4.31–10.16)

## 2024-01-17 PROCEDURE — 99024 POSTOP FOLLOW-UP VISIT: CPT | Performed by: STUDENT IN AN ORGANIZED HEALTH CARE EDUCATION/TRAINING PROGRAM

## 2024-01-17 PROCEDURE — 85027 COMPLETE CBC AUTOMATED: CPT | Performed by: STUDENT IN AN ORGANIZED HEALTH CARE EDUCATION/TRAINING PROGRAM

## 2024-01-17 PROCEDURE — NC001 PR NO CHARGE: Performed by: STUDENT IN AN ORGANIZED HEALTH CARE EDUCATION/TRAINING PROGRAM

## 2024-01-17 PROCEDURE — 80048 BASIC METABOLIC PNL TOTAL CA: CPT | Performed by: STUDENT IN AN ORGANIZED HEALTH CARE EDUCATION/TRAINING PROGRAM

## 2024-01-17 RX ORDER — ACETAMINOPHEN 160 MG/5ML
640 SUSPENSION ORAL EVERY 8 HOURS
Qty: 420 ML | Refills: 0 | Status: SHIPPED | OUTPATIENT
Start: 2024-01-17 | End: 2024-01-25

## 2024-01-17 RX ADMIN — KETOROLAC TROMETHAMINE 15 MG: 30 INJECTION, SOLUTION INTRAMUSCULAR; INTRAVENOUS at 10:43

## 2024-01-17 RX ADMIN — AMLODIPINE BESYLATE 5 MG: 5 TABLET ORAL at 09:43

## 2024-01-17 RX ADMIN — FAMOTIDINE 20 MG: 10 INJECTION INTRAVENOUS at 09:46

## 2024-01-17 RX ADMIN — KETOROLAC TROMETHAMINE 15 MG: 30 INJECTION, SOLUTION INTRAMUSCULAR; INTRAVENOUS at 05:23

## 2024-01-17 RX ADMIN — ACETAMINOPHEN 1000 MG: 10 INJECTION INTRAVENOUS at 11:03

## 2024-01-17 RX ADMIN — BUDESONIDE AND FORMOTEROL FUMARATE DIHYDRATE 2 PUFF: 80; 4.5 AEROSOL RESPIRATORY (INHALATION) at 09:49

## 2024-01-17 RX ADMIN — ACETAMINOPHEN 1000 MG: 10 INJECTION INTRAVENOUS at 05:23

## 2024-01-17 RX ADMIN — LITHIUM CARBONATE 300 MG: 300 CAPSULE, GELATIN COATED ORAL at 09:49

## 2024-01-17 NOTE — DISCHARGE SUMMARY
Discharge Summary - Judy Rios 53 y.o. female MRN: [id number]    Unit/Bed#: E5 -01 Encounter: [id number]      Pre-Operative Diagnosis: Pre-Op Diagnosis Codes:     * Gastroesophageal reflux disease [K21.9]     * Vomiting [R11.10]     * Abdominal pain [R10.9]     * Status post bariatric surgery [Z98.84]     * Dysphagia [R13.10]     * Essential (primary) hypertension [I10]    Post-Operative Diagnosis: Post-Op Diagnosis Codes:     * Gastroesophageal reflux disease [K21.9]     * Vomiting [R11.10]     * Abdominal pain [R10.9]     * Status post bariatric surgery [Z98.84]     * Dysphagia [R13.10]     * Essential (primary) hypertension [I10]    Procedures Performed:  Procedure(s):  R-N-Y GASTRIC BYPASS W/ ROBOTICS & INTRAOPERATIVE EGD    Surgeon: Julian Davis MD    See H & P for full details of admission and Operative Note for full details of operations performed.     Patient tolerated surgery well without complications. In the morning postoperative Day 1, the patient had mild nausea and abdominal pain. Tolerated a clear liquid diet without vomiting. Able to ambulate and voiding independently. Patient was deemed ready for discharge home.     Patient was seen and examined prior to discharge.      Provisions for Follow-Up Care:  See After Visit Summary/Discharge Instructions for information related to follow-up care and home orders.      Disposition: Home, in stable condition.     Planned Readmission: No    Discharge Medications:  See After Visit Summary/Discharge Instructions for reconciled discharge medications provided to patient and family.      Post Operative instructions: Reviewed with patient and/or family.    Signature:   Giles Jay MD  Date: 1/17/2024 Time: 8:01 AM

## 2024-01-17 NOTE — DISCHARGE INSTRUCTIONS
your medications from [facility] in Hospital Lobby   Crush or cut your pills and open capsules, mix with liquid to drink.  Take Tylenol every 8 hours around the clock, unless instructed otherwise  Take your omeprazole daily  It is important to stay hydrated and follow your discharge diet progression   Mild nausea is ok as long as you can drink fluids, sip very slowly and get up and walk during any periods of nausea  You may shower normally after 48 hours, but do not scrub incision sites, blot gently with clean towel to dry incisions  Take home medications as usual unless instructed otherwise while in hospital  Follow up with Dr. Keyon Davis and your PCP within the next week  Sleeve gastrectomy patients ONLY: Complete full course of lovenox injections!

## 2024-01-17 NOTE — PLAN OF CARE
Problem: PAIN - ADULT  Goal: Verbalizes/displays adequate comfort level or baseline comfort level  Description: Interventions:  - Encourage patient to monitor pain and request assistance  - Assess pain using appropriate pain scale  - Administer analgesics based on type and severity of pain and evaluate response  - Implement non-pharmacological measures as appropriate and evaluate response  - Consider cultural and social influences on pain and pain management  - Notify physician/advanced practitioner if interventions unsuccessful or patient reports new pain  Outcome: Adequate for Discharge     Problem: INFECTION - ADULT  Goal: Absence or prevention of progression during hospitalization  Description: INTERVENTIONS:  - Assess and monitor for signs and symptoms of infection  - Monitor lab/diagnostic results  - Monitor all insertion sites, i.e. indwelling lines, tubes, and drains  - Monitor endotracheal if appropriate and nasal secretions for changes in amount and color  - [place] appropriate cooling/warming therapies per order  - Administer medications as ordered  - Instruct and encourage patient and family to use good hand hygiene technique  - Identify and instruct in appropriate isolation precautions for identified infection/condition  Outcome: Adequate for Discharge  Goal: Absence of fever/infection during neutropenic period  Description: INTERVENTIONS:  - Monitor WBC    Outcome: Adequate for Discharge     Problem: SAFETY ADULT  Goal: Patient will remain free of falls  Description: INTERVENTIONS:  - Educate patient/family on patient safety including physical limitations  - Instruct patient to call for assistance with activity   - Consult OT/PT to assist with strengthening/mobility   - Keep Call bell within reach  - Keep bed low and locked with side rails adjusted as appropriate  - Keep care items and personal belongings within reach  - Initiate and maintain comfort rounds  - Make Fall Risk Sign visible to  staff  - Apply yellow socks and bracelet for high fall risk patients  - Consider moving patient to room near nurses station  Outcome: Adequate for Discharge  Goal: Maintain or return to baseline ADL function  Description: INTERVENTIONS:  -  Assess patient's ability to carry out ADLs; assess patient's baseline for ADL function and identify physical deficits which impact ability to perform ADLs (bathing, care of mouth/teeth, toileting, grooming, dressing, etc.)  - Assess/evaluate cause of self-care deficits   - Assess range of motion  - Assess patient's mobility; develop plan if impaired  - Assess patient's need for assistive devices and provide as appropriate  - Encourage maximum independence but intervene and supervise when necessary  - Involve family in performance of ADLs  - Assess for home care needs following discharge   - Consider OT consult to assist with ADL evaluation and planning for discharge  - Provide patient education as appropriate  Outcome: Adequate for Discharge  Goal: Maintains/Returns to pre admission functional level  Description: INTERVENTIONS:  - Perform AM-PAC 6 Click Basic Mobility/ Daily Activity assessment daily.  - Set and communicate daily mobility goal to care team and patient/family/caregiver.   - Collaborate with rehabilitation services on mobility goals if consulted  -- Record patient progress and toleration of activity level   Outcome: Adequate for Discharge     Problem: DISCHARGE PLANNING  Goal: Discharge to home or other facility with appropriate resources  Description: INTERVENTIONS:  - Identify barriers to discharge w/patient and caregiver  - Arrange for needed discharge resources and transportation as appropriate  - Identify discharge learning needs (meds, wound care, etc.)  - Arrange for interpretive services to assist at discharge as needed  - Refer to Case Management Department for coordinating discharge planning if the patient needs post-hospital services based on  physician/advanced practitioner order or complex needs related to functional status, cognitive ability, or social support system  Outcome: Adequate for Discharge     Problem: Knowledge Deficit  Goal: Patient/family/caregiver demonstrates understanding of disease process, treatment plan, medications, and discharge instructions  Description: Complete learning assessment and assess knowledge base.  Interventions:  - Provide teaching at level of understanding  - Provide teaching via preferred learning methods  Outcome: Adequate for Discharge     Problem: Nutrition/Hydration-ADULT  Goal: Nutrient/Hydration intake appropriate for improving, restoring or maintaining nutritional needs  Description: Monitor and assess patient's nutrition/hydration status for malnutrition. Collaborate with interdisciplinary team and initiate plan and interventions as ordered.  Monitor patient's weight and dietary intake as ordered or per policy. Utilize nutrition screening tool and intervene as necessary. Determine patient's food preferences and provide high-protein, high-caloric foods as appropriate.     INTERVENTIONS:  - Monitor oral intake, urinary output, labs, and treatment plans  - Assess nutrition and hydration status and recommend course of action  - Evaluate amount of meals eaten  - Assist patient with eating if necessary   - Allow adequate time for meals  - Recommend/ encourage appropriate diets, oral nutritional supplements, and vitamin/mineral supplements  - Order, calculate, and assess calorie counts as needed  - Recommend, monitor, and adjust tube feedings and TPN/PPN based on assessed needs  - Assess need for intravenous fluids  - Provide specific nutrition/hydration education as appropriate  - Include patient/family/caregiver in decisions related to nutrition  Outcome: Adequate for Discharge

## 2024-01-17 NOTE — DISCHARGE INSTR - AVS FIRST PAGE
Bariatric/Weight Loss Surgery  Hospital Discharge Instructions  ACTIVITY:  Progress as feels comfortable - a good rule is:  if you are doing something and it begins to hurt, stop doing the activity. Walk every hour while at home.  You may walk stairs if you do so slowly  You may shower 48 hours after surgery.  Do not scrub incision sites.  Blot gently with clean towel to dry incisions. (see #4 below)   Use your incentive spirometer 10 times per hour while awake for 1 week after surgery.  Do NOT drive for 48 hours after surgery. No driving 24 hours after taking certain prescription pain medications. Examples of such medication are Percocet, Darvocet, Oxycodone, Tylenol #3, and Tylenol with Codeine.     DIET  Stay on a liquid diet for 7 days after your surgery date, sipping slowly. Refer to your manual for examples of choices. Remember to keep your liquids sugar free or low calorie. You may have protein drinks. Make sure to drink 48 to 64 ounces per day of fluids.   You may advance to a pureed diet one week after surgery as instructed by your diet progression pamphlet. Once you get approval from your surgeon at your first post operative visit, you may advance to the soft diet and remain on soft diet for 8 weeks unless otherwise instructed.    MEDICATIONS:  The abdominal nerve block will wear off during the first 1-2 days that you are home, and you may become sore (especially over incision site/sites where abdominal wall is sutured). This may create a pulling sensation, especially while moving around, and will fade over time.  Continue to take your Tylenol and your pain medication as instructed.   Start vitamins and minerals one week after surgery or when you start stage 3/puree diet.   Anti-acid Medication as per prescription.  Other medications as indicated on the Physician Patient Discharge Instructions form given to you at the time of discharge.  Make sure that you are splitting your pill or tablet medications in  halves or fourths or even crushing them before you take them. Capsules should be opened and mixed with water or jello. You need to do this for at least 4 weeks after surgery. Eventually you will be able to take your medications the regular way as they were prescribed.   You will need to consult with your Family Doctor in regards to all your prescribed medication, particularly those for blood pressure and diabetes.  As you lose weight, medical conditions may change, requiring an alteration or elimination of the drug dose. Monitor blood pressure closely and call PCP with any concerns.   Sleeve Gastrectomy patients ONLY:  Complete full course of lovenox injections!  DO NOT TAKE BIRTH CONTROL(BC) MEDICATIONS, INSERT BC VAGINAL RINGS, OR PLACE IUD OR ANY OTHER BC METHODS UNTIL 31 DAYS FROM DAY OF DISCHARGE FROM HOSPITAL. THIS PLACES YOU AT HIGH RISK FOR A POTENTIALLY LIFE THREATENING BLOOD CLOT. Remember to always use barrier methods for birth control and speak to your GYN about using two forms of birth control to start 31 days after surgery. It is very important to avoid pregnancy until at least 18-24 months after surgery.     INCISION CARE  You may shower and get incisions wet 2 days after surgery. No soaking tub baths or swimming for 30 days after surgery. Keep abdominal area and incisions clean. Use soap and water to create a good lather and rinse off.  Do not scrub incisions.   If you have a drain, empty the drain as the nurses instructed.    FOLLOW-UP APPOINTMENT should be made for one week after discharge. Call surgeon’s office at [phone] to schedule an appointment.    CALL YOUR DOCTOR FOR:  pain not controlled by pain medications, a temperature greater than 101.5° F, any increase or change in drainage or redness from any incision, any vomiting or inability to keep liquids down, shortness of breath, shoulder pain, or bleeding

## 2024-01-17 NOTE — PLAN OF CARE
Problem: PAIN - ADULT  Goal: Verbalizes/displays adequate comfort level or baseline comfort level  Description: Interventions:  - Encourage patient to monitor pain and request assistance  - Assess pain using appropriate pain scale  - Administer analgesics based on type and severity of pain and evaluate response  - Implement non-pharmacological measures as appropriate and evaluate response  - Consider cultural and social influences on pain and pain management  - Notify physician/advanced practitioner if interventions unsuccessful or patient reports new pain  Outcome: Progressing     Problem: INFECTION - ADULT  Goal: Absence or prevention of progression during hospitalization  Description: INTERVENTIONS:  - Assess and monitor for signs and symptoms of infection  - Monitor lab/diagnostic results  - Monitor all insertion sites, i.e. indwelling lines, tubes, and drains  - Monitor endotracheal if appropriate and nasal secretions for changes in amount and color  - [place] appropriate cooling/warming therapies per order  - Administer medications as ordered  - Instruct and encourage patient and family to use good hand hygiene technique  - Identify and instruct in appropriate isolation precautions for identified infection/condition  Outcome: Progressing  Goal: Absence of fever/infection during neutropenic period  Description: INTERVENTIONS:  - Monitor WBC    Outcome: Progressing     Problem: SAFETY ADULT  Goal: Patient will remain free of falls  Description: INTERVENTIONS:  - Educate patient/family on patient safety including physical limitations  - Instruct patient to call for assistance with activity   - Consult OT/PT to assist with strengthening/mobility   - Keep Call bell within reach  - Keep bed low and locked with side rails adjusted as appropriate  - Keep care items and personal belongings within reach  - Initiate and maintain comfort rounds  - Make Fall Risk Sign visible to staff  - Apply yellow socks and bracelet  for high fall risk patients  - Consider moving patient to room near nurses station  Outcome: Progressing  Goal: Maintain or return to baseline ADL function  Description: INTERVENTIONS:  -  Assess patient's ability to carry out ADLs; assess patient's baseline for ADL function and identify physical deficits which impact ability to perform ADLs (bathing, care of mouth/teeth, toileting, grooming, dressing, etc.)  - Assess/evaluate cause of self-care deficits   - Assess range of motion  - Assess patient's mobility; develop plan if impaired  - Assess patient's need for assistive devices and provide as appropriate  - Encourage maximum independence but intervene and supervise when necessary  - Involve family in performance of ADLs  - Assess for home care needs following discharge   - Consider OT consult to assist with ADL evaluation and planning for discharge  - Provide patient education as appropriate  Outcome: Progressing  Goal: Maintains/Returns to pre admission functional level  Description: INTERVENTIONS:  - Perform AM-PAC 6 Click Basic Mobility/ Daily Activity assessment daily.  - Set and communicate daily mobility goal to care team and patient/family/caregiver.   - Collaborate with rehabilitation services on mobility goals if consulted  -- Record patient progress and toleration of activity level   Outcome: Progressing     Problem: DISCHARGE PLANNING  Goal: Discharge to home or other facility with appropriate resources  Description: INTERVENTIONS:  - Identify barriers to discharge w/patient and caregiver  - Arrange for needed discharge resources and transportation as appropriate  - Identify discharge learning needs (meds, wound care, etc.)  - Arrange for interpretive services to assist at discharge as needed  - Refer to Case Management Department for coordinating discharge planning if the patient needs post-hospital services based on physician/advanced practitioner order or complex needs related to functional status,  cognitive ability, or social support system  Outcome: Progressing     Problem: Knowledge Deficit  Goal: Patient/family/caregiver demonstrates understanding of disease process, treatment plan, medications, and discharge instructions  Description: Complete learning assessment and assess knowledge base.  Interventions:  - Provide teaching at level of understanding  - Provide teaching via preferred learning methods  Outcome: Progressing     Problem: Nutrition/Hydration-ADULT  Goal: Nutrient/Hydration intake appropriate for improving, restoring or maintaining nutritional needs  Description: Monitor and assess patient's nutrition/hydration status for malnutrition. Collaborate with interdisciplinary team and initiate plan and interventions as ordered.  Monitor patient's weight and dietary intake as ordered or per policy. Utilize nutrition screening tool and intervene as necessary. Determine patient's food preferences and provide high-protein, high-caloric foods as appropriate.     INTERVENTIONS:  - Monitor oral intake, urinary output, labs, and treatment plans  - Assess nutrition and hydration status and recommend course of action  - Evaluate amount of meals eaten  - Assist patient with eating if necessary   - Allow adequate time for meals  - Recommend/ encourage appropriate diets, oral nutritional supplements, and vitamin/mineral supplements  - Order, calculate, and assess calorie counts as needed  - Recommend, monitor, and adjust tube feedings and TPN/PPN based on assessed needs  - Assess need for intravenous fluids  - Provide specific nutrition/hydration education as appropriate  - Include patient/family/caregiver in decisions related to nutrition  Outcome: Progressing

## 2024-01-17 NOTE — UTILIZATION REVIEW
"Initial Clinical Review    Elective   IP    surgical procedure    Age/Sex: 53 y.o. female    Surgery Date:   1/16/24    Procedure: R-N-Y GASTRIC BYPASS W/ ROBOTICS & INTRAOPERATIVE EGD     Anesthesia:  general    Operative Findings: Severe inflammatory reaction around the hiatus  Extensive intra-abdominal adhesions  Status post adjustable gastric band with band erosion into the left lobe of the liver and possible gastric erosion  Status post robotic band removal       POD#1 Progress Note:   1/17  D/C  home    Admission Orders: Date/Time/Statement:   Admission Orders (From admission, onward)       Ordered        01/16/24 1015  Inpatient Admission  Once                          Orders Placed This Encounter   Procedures    Inpatient Admission     Standing Status:   Standing     Number of Occurrences:   1     Order Specific Question:   Level of Care     Answer:   Med Surg [16]     Order Specific Question:   Estimated length of stay     Answer:   Inpatient Only Surgery     Vital Signs: /83   Pulse 96   Temp 98.6 °F (37 °C)   Resp 22   Ht 5' 2.5\" (1.588 m)   Wt 95.4 kg (210 lb 5.1 oz)   SpO2 96%   BMI 37.85 kg/m²     Pertinent Labs/Diagnostic Test Results:         Results from last 7 days   Lab Units 01/17/24  0508   WBC Thousand/uL 12.44*   HEMOGLOBIN g/dL 11.9   HEMATOCRIT % 35.3   PLATELETS Thousands/uL 283         Results from last 7 days   Lab Units 01/17/24  0508   SODIUM mmol/L 142   POTASSIUM mmol/L 4.2   CHLORIDE mmol/L 115*   CO2 mmol/L 22   ANION GAP mmol/L 5   BUN mg/dL 14   CREATININE mg/dL 0.99   EGFR ml/min/1.73sq m 65   CALCIUM mg/dL 8.9             Results from last 7 days   Lab Units 01/17/24  0508   GLUCOSE RANDOM mg/dL 122             Diet:  bariatric cl liq     Mobility:  OOB as tolerated    DVT Prophylaxis:  SCD's    Medications/Pain Control:   Scheduled Medications:  acetaminophen, 1,000 mg, Intravenous, Q6H JAKE  amLODIPine, 5 mg, Oral, Daily  budesonide-formoterol, 2 puff, Inhalation, " BID  famotidine, 20 mg, Intravenous, Q12H JAKE  ketorolac, 15 mg, Intravenous, Q6H  lithium carbonate, 300 mg, Oral, TID  mirtazapine, 7.5 mg, Oral, HS  QUEtiapine, 100 mg, Oral, HS      Continuous IV Infusions:  lactated ringers, 75 mL/hr, Intravenous, Continuous  sodium chloride, 125 mL/hr, Intravenous, Continuous      PRN Meds:  albuterol, 2 puff, Inhalation, Q6H PRN  diphenhydrAMINE, 25 mg, Oral, HS PRN  metoclopramide, 10 mg, Intravenous, Q6H PRN  morphine injection, 4 mg, Intravenous, Q4H PRN  ondansetron, 4 mg, Intravenous, Q6H PRN  oxyCODONE, 10 mg, Oral, Q4H PRN  oxyCODONE, 5 mg, Oral, Q4H PRN  phenol, 2 spray, Mouth/Throat, Q2H PRN  promethazine, 25 mg, Intravenous, Q6H PRN  simethicone, 80 mg, Oral, 4x Daily PRN        Network Utilization Review Department  ATTENTION: Please call with any questions or concerns to [phone] and carefully listen to the prompts so that you are directed to the right person. All voicemails are confidential.   For Discharge needs, contact Care Management DC Support Team at [phone] opt. 2  Send all requests for admission clinical reviews, approved or denied determinations and any other requests to dedicated fax number below belonging to the campus where the patient is receiving treatment. List of dedicated fax numbers for the Facilities:  FACILITY NAME UR FAX NUMBER   ADMISSION DENIALS (Administrative/Medical Necessity) [phone]   DISCHARGE SUPPORT TEAM (NETWORK) [phone]   PARENT CHILD HEALTH (Maternity/NICU/Pediatrics) [phone]   [facility] [phone]   [facility] [phone]   [facility] [phone]   [facility] [phone]   [facility] [phone]   [facility] [phone]   [facility] [phone]   [facility] [phone]   [facility]  [facility] [phone]   [facility] [phone]   [facility] [phone]

## 2024-01-17 NOTE — PROGRESS NOTES
"Progress Note - Bariatric Surgery   Judy Rios 53 y.o. female MRN: [id number]  Unit/Bed#: E5 -01 Encounter: [id number]    Assessment:  Judy Rios is a 53 y.o. female s/p second stage conversion from lap band to dandre-en-Y gastric bypass       Plan:  Advance to bariatric clear liquid diet  Pain control p.o. meds  Encourage ambulation being out of bed  Incentive spirometer  Labs and vitals were reviewed and are stable  SCDs for VTE prophylaxis  Pepcid for GI prophylaxis  Replace electrolytes as needed  Likely discharge this afternoon    Subjective/Objective   Chief Complaint: No complaints    Subjective: No acute overnight events, patient denies nausea or vomiting.  Patient is tolerating mouth swabs.  Patient is ambulating without issue.  Patient is voiding.     Objective:     Vitals: Blood pressure 118/74, pulse 96, temperature 98.6 °F (37 °C), resp. rate 22, height 5' 2.5\" (1.588 m), weight 95.4 kg (210 lb 5.1 oz), SpO2 96%, not currently breastfeeding.,Body mass index is 37.85 kg/m².      Intake/Output Summary (Last 24 hours) at 1/17/2024 0740  Last data filed at 1/17/2024 0100  Gross per 24 hour   Intake 3600 ml   Output 1280 ml   Net 2320 ml       Invasive Devices       Peripheral Intravenous Line  Duration             Peripheral IV 01/16/24 Dorsal (posterior);Left Hand <1 day                    Physical Exam: /74   Pulse 96   Temp 98.6 °F (37 °C)   Resp 22   Ht 5' 2.5\" (1.588 m)   Wt 95.4 kg (210 lb 5.1 oz)   SpO2 96%   BMI 37.85 kg/m²   General appearance: alert and oriented, in no acute distress  Head: Normocephalic, without obvious abnormality, atraumatic  Nose: no discharge  Lungs:  no apparent respiratory distress on 2L nasal cannula  Abdomen:  soft, nontender, nondistended, laparoscopic port sites clean dry and intact  Extremities: extremities normal, warm and well-perfused; no cyanosis, clubbing, or edema  Skin: Skin color, texture, turgor normal. No rashes or lesions    Lab, " Imaging and other studies: I have personally reviewed pertinent labs.  CBC:   Lab Results   Component Value Date    WBC 12.44 (H) 01/17/2024    HGB 11.9 01/17/2024    HCT 35.3 01/17/2024    MCV 73 (L) 01/17/2024     01/17/2024    RBC 4.84 01/17/2024    MCH 24.6 (L) 01/17/2024    MCHC 33.7 01/17/2024    RDW 14.2 01/17/2024    MPV 10.1 01/17/2024     CMP:   Lab Results   Component Value Date    SODIUM 142 01/17/2024     (H) 01/17/2024    CO2 22 01/17/2024    BUN 14 01/17/2024    CREATININE 0.99 01/17/2024    CALCIUM 8.9 01/17/2024    EGFR 65 01/17/2024     VTE Pharmacologic Prophylaxis: SCDs  VTE Mechanical Prophylaxis: sequential compression device    Giles Jay MD  Bariatric Surgery

## 2024-01-17 NOTE — PLAN OF CARE
Problem: PAIN - ADULT  Goal: Verbalizes/displays adequate comfort level or baseline comfort level  Description: Interventions:  - Encourage patient to monitor pain and request assistance  - Assess pain using appropriate pain scale  - Administer analgesics based on type and severity of pain and evaluate response  - Implement non-pharmacological measures as appropriate and evaluate response  - Consider cultural and social influences on pain and pain management  - Notify physician/advanced practitioner if interventions unsuccessful or patient reports new pain  1/17/2024 1126 by Colleen Hughes-Behler, RN  Outcome: Adequate for Discharge  1/17/2024 1125 by Colleen Hughes-Behler, RN  Outcome: Adequate for Discharge     Problem: INFECTION - ADULT  Goal: Absence or prevention of progression during hospitalization  Description: INTERVENTIONS:  - Assess and monitor for signs and symptoms of infection  - Monitor lab/diagnostic results  - Monitor all insertion sites, i.e. indwelling lines, tubes, and drains  - Monitor endotracheal if appropriate and nasal secretions for changes in amount and color  - Taylor appropriate cooling/warming therapies per order  - Administer medications as ordered  - Instruct and encourage patient and family to use good hand hygiene technique  - Identify and instruct in appropriate isolation precautions for identified infection/condition  1/17/2024 1126 by Colleen Hughes-Behler, RN  Outcome: Adequate for Discharge  1/17/2024 1125 by Colleen Hughes-Behler, RN  Outcome: Adequate for Discharge  Goal: Absence of fever/infection during neutropenic period  Description: INTERVENTIONS:  - Monitor WBC    1/17/2024 1126 by Colleen Hughes-Behler, RN  Outcome: Adequate for Discharge  1/17/2024 1125 by Colleen Hughes-Behler, RN  Outcome: Adequate for Discharge     Problem: SAFETY ADULT  Goal: Patient will remain free of falls  Description: INTERVENTIONS:  - Educate patient/family on patient safety including  physical limitations  - Instruct patient to call for assistance with activity   - Consult OT/PT to assist with strengthening/mobility   - Keep Call bell within reach  - Keep bed low and locked with side rails adjusted as appropriate  - Keep care items and personal belongings within reach  - Initiate and maintain comfort rounds  - Make Fall Risk Sign visible to staff  - Apply yellow socks and bracelet for high fall risk patients  - Consider moving patient to room near nurses station  1/17/2024 1126 by Colleen Hughes-Behler, RN  Outcome: Adequate for Discharge  1/17/2024 1125 by Colleen Hughes-Behler, RN  Outcome: Adequate for Discharge  Goal: Maintain or return to baseline ADL function  Description: INTERVENTIONS:  -  Assess patient's ability to carry out ADLs; assess patient's baseline for ADL function and identify physical deficits which impact ability to perform ADLs (bathing, care of mouth/teeth, toileting, grooming, dressing, etc.)  - Assess/evaluate cause of self-care deficits   - Assess range of motion  - Assess patient's mobility; develop plan if impaired  - Assess patient's need for assistive devices and provide as appropriate  - Encourage maximum independence but intervene and supervise when necessary  - Involve family in performance of ADLs  - Assess for home care needs following discharge   - Consider OT consult to assist with ADL evaluation and planning for discharge  - Provide patient education as appropriate  1/17/2024 1126 by Colleen Hughes-Behler, RN  Outcome: Adequate for Discharge  1/17/2024 1125 by Colleen Hughes-Behler, RN  Outcome: Adequate for Discharge  Goal: Maintains/Returns to pre admission functional level  Description: INTERVENTIONS:  - Perform AM-PAC 6 Click Basic Mobility/ Daily Activity assessment daily.  - Set and communicate daily mobility goal to care team and patient/family/caregiver.   - Collaborate with rehabilitation services on mobility goals if consulted  -- Record patient  progress and toleration of activity level   1/17/2024 1126 by Colleen Hughes-Behler, RN  Outcome: Adequate for Discharge  1/17/2024 1125 by Colleen Hughes-Behler, RN  Outcome: Adequate for Discharge     Problem: DISCHARGE PLANNING  Goal: Discharge to home or other facility with appropriate resources  Description: INTERVENTIONS:  - Identify barriers to discharge w/patient and caregiver  - Arrange for needed discharge resources and transportation as appropriate  - Identify discharge learning needs (meds, wound care, etc.)  - Arrange for interpretive services to assist at discharge as needed  - Refer to Case Management Department for coordinating discharge planning if the patient needs post-hospital services based on physician/advanced practitioner order or complex needs related to functional status, cognitive ability, or social support system  1/17/2024 1126 by Colleen Hughes-Behler, RN  Outcome: Adequate for Discharge  1/17/2024 1125 by Colleen Hughes-Behler, RN  Outcome: Adequate for Discharge     Problem: Knowledge Deficit  Goal: Patient/family/caregiver demonstrates understanding of disease process, treatment plan, medications, and discharge instructions  Description: Complete learning assessment and assess knowledge base.  Interventions:  - Provide teaching at level of understanding  - Provide teaching via preferred learning methods  1/17/2024 1126 by Colleen Hughes-Behler, RN  Outcome: Adequate for Discharge  1/17/2024 1125 by Colleen Hughes-Behler, RN  Outcome: Adequate for Discharge     Problem: Nutrition/Hydration-ADULT  Goal: Nutrient/Hydration intake appropriate for improving, restoring or maintaining nutritional needs  Description: Monitor and assess patient's nutrition/hydration status for malnutrition. Collaborate with interdisciplinary team and initiate plan and interventions as ordered.  Monitor patient's weight and dietary intake as ordered or per policy. Utilize nutrition screening tool and intervene  as necessary. Determine patient's food preferences and provide high-protein, high-caloric foods as appropriate.     INTERVENTIONS:  - Monitor oral intake, urinary output, labs, and treatment plans  - Assess nutrition and hydration status and recommend course of action  - Evaluate amount of meals eaten  - Assist patient with eating if necessary   - Allow adequate time for meals  - Recommend/ encourage appropriate diets, oral nutritional supplements, and vitamin/mineral supplements  - Order, calculate, and assess calorie counts as needed  - Recommend, monitor, and adjust tube feedings and TPN/PPN based on assessed needs  - Assess need for intravenous fluids  - Provide specific nutrition/hydration education as appropriate  - Include patient/family/caregiver in decisions related to nutrition  1/17/2024 1126 by Colleen Hughes-Behler, RN  Outcome: Adequate for Discharge  1/17/2024 1125 by Colleen Hughes-Behler, RN  Outcome: Adequate for Discharge

## 2024-01-18 ENCOUNTER — TELEPHONE (OUTPATIENT)
Dept: MEDSURG UNIT | Facility: HOSPITAL | Age: 54
End: 2024-01-18

## 2024-01-18 PROCEDURE — 88307 TISSUE EXAM BY PATHOLOGIST: CPT | Performed by: STUDENT IN AN ORGANIZED HEALTH CARE EDUCATION/TRAINING PROGRAM

## 2024-01-18 NOTE — UTILIZATION REVIEW
NOTIFICATION OF ADMISSION DISCHARGE   This is a Notification of Discharge from [facility]. Please be advised that this patient has been discharge from our facility. Below you will find the admission and discharge date and time including the patient’s disposition.   UTILIZATION REVIEW CONTACT:  Debo Mccracken MA  Utilization   Network Utilization Review Department  Phone: [phone] x carefully listen to the prompts. All voicemails are confidential.  Email: [email or web address]     ADMISSION INFORMATION  PRESENTATION DATE: 1/16/2024  8:27 AM  OBERVATION ADMISSION DATE:   INPATIENT ADMISSION DATE: 1/16/24 10:15 AM   DISCHARGE DATE: 1/17/2024  2:51 PM   DISPOSITION:Home/Self Care    Network Utilization Review Department  ATTENTION: Please call with any questions or concerns to [phone] and carefully listen to the prompts so that you are directed to the right person. All voicemails are confidential.   For Discharge needs, contact Care Management DC Support Team at [phone] opt. 2  Send all requests for admission clinical reviews, approved or denied determinations and any other requests to dedicated fax number below belonging to the campus where the patient is receiving treatment. List of dedicated fax numbers for the Facilities:  FACILITY NAME UR FAX NUMBER   ADMISSION DENIALS (Administrative/Medical Necessity) [phone]   DISCHARGE SUPPORT TEAM ([facility]) [phone]   PARENT CHILD HEALTH (Maternity/NICU/Pediatrics) [phone]   [facility] [phone]   [facility] [phone]   [facility] [phone]   [facility] [phone]   [facility] [phone]   [facility] [phone]   [facility] [phone]   [facility]  [phone]   [facility] [phone]   [facility] [phone]   [facility] [phone]

## 2024-01-18 NOTE — TELEPHONE ENCOUNTER
Post op follow up phone call completed.  Pt is sipping liquids and has consumed about 24 oz so far today.  Using IS as instructed, reinforced importance of using IS to help prevent pneumonia. Ambulating about home without difficulty.  Pain controlled with analgesia.  Reaffirmed examples of liquid diet over the next week.  Started miralax, passing gas, no bm.  Pt states she has one incision oozing pinkish red drainage.  Placed guaze but has only needed to change once/per day.  Instructed to call with increased drainage and/or fever.  Pt stated understanding about discharge instructions and medication adjustments.  Follow up appt with surgeon scheduled for next week.   Instructed to call with any additional questions or concerns.

## 2024-01-19 ENCOUNTER — TRANSITIONAL CARE MANAGEMENT (OUTPATIENT)
Dept: FAMILY MEDICINE CLINIC | Facility: CLINIC | Age: 54
End: 2024-01-19

## 2024-01-24 ENCOUNTER — OFFICE VISIT (OUTPATIENT)
Dept: FAMILY MEDICINE CLINIC | Facility: CLINIC | Age: 54
End: 2024-01-24

## 2024-01-24 VITALS
BODY MASS INDEX: 35.97 KG/M2 | HEIGHT: 63 IN | WEIGHT: 203 LBS | DIASTOLIC BLOOD PRESSURE: 78 MMHG | HEART RATE: 111 BPM | OXYGEN SATURATION: 97 % | TEMPERATURE: 97.2 F | SYSTOLIC BLOOD PRESSURE: 110 MMHG | RESPIRATION RATE: 18 BRPM

## 2024-01-24 DIAGNOSIS — Z98.84 STATUS POST BARIATRIC SURGERY: Primary | ICD-10-CM

## 2024-01-24 DIAGNOSIS — F11.20 CONTINUOUS OPIOID DEPENDENCE (HCC): ICD-10-CM

## 2024-01-24 DIAGNOSIS — N18.2 CKD (CHRONIC KIDNEY DISEASE), STAGE II: ICD-10-CM

## 2024-01-24 DIAGNOSIS — Z23 ENCOUNTER FOR IMMUNIZATION: ICD-10-CM

## 2024-01-24 DIAGNOSIS — F31.9 BIPOLAR 1 DISORDER (HCC): ICD-10-CM

## 2024-01-24 PROBLEM — N18.31 CHRONIC KIDNEY DISEASE, STAGE 3A (HCC): Status: RESOLVED | Noted: 2024-01-24 | Resolved: 2024-01-24

## 2024-01-24 PROBLEM — N18.31 CHRONIC KIDNEY DISEASE, STAGE 3A (HCC): Status: ACTIVE | Noted: 2024-01-24

## 2024-01-24 PROCEDURE — G0008 ADMIN INFLUENZA VIRUS VAC: HCPCS | Performed by: FAMILY MEDICINE

## 2024-01-24 PROCEDURE — 90686 IIV4 VACC NO PRSV 0.5 ML IM: CPT | Performed by: FAMILY MEDICINE

## 2024-01-24 PROCEDURE — 99496 TRANSJ CARE MGMT HIGH F2F 7D: CPT | Performed by: FAMILY MEDICINE

## 2024-01-24 NOTE — PROGRESS NOTES
Assessment & Plan     1. Status post bariatric surgery  Assessment & Plan:  Patient had gastric band which was recently removed due to gastric band erosions  She recently had Kenneth-en-Y gastric bypass a week ago and doing well postop  Pain overall well-controlled with just Tylenol  Patient is ambulating  She is tolerating puréed diet  Continue postop wound care and home  Follow-up with bariatric surgery outpatient      2. Bipolar 1 disorder (HCC)  Assessment & Plan:  Mood remains stable  Continue current management  Follow-up with psychiatry outpatient      3. Continuous opioid dependence (HCC)  Assessment & Plan:  Patient was prescribed oxycodone for postop pain but she does not feel that she needs the medication.  She is managing pain well with just Tylenol.      4. CKD (chronic kidney disease), stage II  Assessment & Plan:  Lab Results   Component Value Date    EGFR 65 01/17/2024    EGFR 61 12/21/2023    EGFR 66 10/04/2023    CREATININE 0.99 01/17/2024    CREATININE 1.05 12/21/2023    CREATININE 0.98 10/04/2023   Stable  Continue to monitor renal function and avoid nephrotoxic agent      5. Encounter for immunization  -     influenza vaccine, quadrivalent, 0.5 mL, preservative-free, for adult and pediatric patients 6 mos+ (AFLURIA, FLUARIX, FLULAVAL, FLUZONE)         Subjective     Transitional Care Management Review:   Judy Rios is a 54 y.o. female here for TCM follow up.     During the TCM phone call patient stated:  TCM Call       Date and time call was made  1/19/2024  3:44 PM    Hospital care reviewed  Records reviewed    Patient was hospitialized at  [facility]    Date of Admission  01/16/24    Date of discharge  01/17/24    Diagnosis  OBESITY    Disposition  Home    Were the patients medications reviewed and updated  No    Current Symptoms  None          TCM Call       Post hospital issues  None    Scheduled for follow up?  Yes    Did you obtain your prescribed medications  Yes    I have  "advised the patient to call PCP with any new or worsening symptoms  NIKI GRESHAM    Living Arrangements  Family members    Counseling  Patient          54-year-old female with a history of obesity, bipolar disorder, sleep apnea, and GERD who presents today for postop follow-up.  Patient was recently admitted to the hospital for Kenneth-en-Y gastric bypass procedure with intraoperative EGD.  She tolerated procedure well.  She is 1 week postop.  She is tolerating puréed diet well.  She denies any nausea or vomiting.  She has regular bowel movements and passing flatus.  She is ambulating well.  She does experience some mild discomfort at the site of her surgical incision but overall the wound is healing well.  She denies any purulent drainage.  She denies any fever, chills, chest pain or shortness of breath.  She has an appointment to see her surgeon tomorrow.          Review of Systems   Constitutional:  Negative for chills, diaphoresis, fatigue and fever.   Respiratory:  Negative for shortness of breath.    Cardiovascular:  Negative for chest pain and palpitations.   Gastrointestinal:  Negative for abdominal pain, constipation, diarrhea, nausea and vomiting.   Genitourinary:  Negative for dysuria, frequency and urgency.   Musculoskeletal:  Positive for arthralgias. Negative for back pain.   Skin:  Negative for rash.   Neurological:  Negative for syncope and headaches.   Psychiatric/Behavioral:  Negative for confusion, dysphoric mood and hallucinations.        Objective     /78 (BP Location: Left arm, Patient Position: Sitting, Cuff Size: Large)   Pulse (!) 111   Temp (!) 97.2 °F (36.2 °C) (Temporal)   Resp 18   Ht 5' 2.5\" (1.588 m)   Wt 92.1 kg (203 lb)   SpO2 97%   BMI 36.54 kg/m²      Physical Exam  Constitutional:       General: She is not in acute distress.     Appearance: Normal appearance. She is well-developed. She is not ill-appearing, toxic-appearing or diaphoretic.   HENT:      Head: " Normocephalic and atraumatic.      Right Ear: External ear normal.      Left Ear: External ear normal.      Mouth/Throat:      Pharynx: No oropharyngeal exudate.   Eyes:      General: No scleral icterus.        Right eye: No discharge.         Left eye: No discharge.      Extraocular Movements: Extraocular movements intact.      Pupils: Pupils are equal, round, and reactive to light.   Cardiovascular:      Rate and Rhythm: Normal rate and regular rhythm.      Heart sounds: Normal heart sounds. No murmur heard.     No friction rub. No gallop.   Pulmonary:      Effort: Pulmonary effort is normal. No respiratory distress.      Breath sounds: Normal breath sounds. No stridor. No wheezing or rhonchi.   Abdominal:      General: Bowel sounds are normal. There is no distension.      Palpations: Abdomen is soft.      Tenderness: There is no guarding or rebound.          Comments: Surgical incision site healing well, no surrounding erythema or drainages, mild tenderness on palpation   Musculoskeletal:         General: Normal range of motion.      Cervical back: Normal range of motion.   Lymphadenopathy:      Cervical: No cervical adenopathy.   Skin:     General: Skin is warm.      Capillary Refill: Capillary refill takes less than 2 seconds.   Neurological:      General: No focal deficit present.      Mental Status: She is alert and oriented to person, place, and time.      Cranial Nerves: No cranial nerve deficit.      Motor: No weakness.      Gait: Gait normal.   Psychiatric:         Mood and Affect: Mood normal.         Behavior: Behavior normal.       Medications have been reviewed by provider in current encounter    Steve Gaspar MD

## 2024-01-24 NOTE — ASSESSMENT & PLAN NOTE
Patient had gastric band which was recently removed due to gastric band erosions  She recently had Kenneth-en-Y gastric bypass a week ago and doing well postop  Pain overall well-controlled with just Tylenol  Patient is ambulating  She is tolerating puréed diet  Continue postop wound care and home  Follow-up with bariatric surgery outpatient

## 2024-01-24 NOTE — ASSESSMENT & PLAN NOTE
Lab Results   Component Value Date    EGFR 65 01/17/2024    EGFR 61 12/21/2023    EGFR 66 10/04/2023    CREATININE 0.99 01/17/2024    CREATININE 1.05 12/21/2023    CREATININE 0.98 10/04/2023

## 2024-01-24 NOTE — ASSESSMENT & PLAN NOTE
Patient was prescribed oxycodone for postop pain but she does not feel that she needs the medication.  She is managing pain well with just Tylenol.

## 2024-01-24 NOTE — ASSESSMENT & PLAN NOTE
Lab Results   Component Value Date    EGFR 65 01/17/2024    EGFR 61 12/21/2023    EGFR 66 10/04/2023    CREATININE 0.99 01/17/2024    CREATININE 1.05 12/21/2023    CREATININE 0.98 10/04/2023   Stable  Continue to monitor renal function and avoid nephrotoxic agent

## 2024-01-24 NOTE — ASSESSMENT & PLAN NOTE
Patient prescribed oxycodone for postop pain  She does not feel like she needs oxycodone  She is only taken Tylenol for pain relief

## 2024-01-25 ENCOUNTER — OFFICE VISIT (OUTPATIENT)
Dept: BARIATRICS | Facility: CLINIC | Age: 54
End: 2024-01-25

## 2024-01-25 VITALS
HEART RATE: 74 BPM | DIASTOLIC BLOOD PRESSURE: 78 MMHG | TEMPERATURE: 98.8 F | BODY MASS INDEX: 35.97 KG/M2 | SYSTOLIC BLOOD PRESSURE: 110 MMHG | WEIGHT: 203 LBS | HEIGHT: 63 IN

## 2024-01-25 DIAGNOSIS — Z98.84 BARIATRIC SURGERY STATUS: Primary | ICD-10-CM

## 2024-01-25 PROCEDURE — RECHECK: Performed by: DIETITIAN, REGISTERED

## 2024-01-25 PROCEDURE — 99024 POSTOP FOLLOW-UP VISIT: CPT | Performed by: SURGERY

## 2024-01-25 RX ORDER — HYDROXYZINE PAMOATE 25 MG/1
CAPSULE ORAL
COMMUNITY
Start: 2023-12-30

## 2024-01-25 NOTE — PROGRESS NOTES
Weight Management Nutrition Note    Bariatric Surgeon: Dr. Julian Davis    Surgery: Gastric Bypass Laparoscopic    Class: First post op note     Topics discussed today include:     fluid goals post op, protein goals post op, chew food well, diet progression, dumping syndrome, protein supplems, vitamin/mineral supplements, and calcium supplements    Patient was able to verbalize basic diet (protein, fluid, vitamin and mineral) recommendations and possible nutrition-related complications. Yes     Provided with sample of bariatric pal 30 day supply of capsules.  Pt instructed to wait 30 days before transitioning to capsule.  Provided with Bariatric Advantage and Celebrate calcium chews .

## 2024-01-25 NOTE — PROGRESS NOTES
POST OP UP VISIT - BARIATRIC SURGERY  Judy Rios 54 y.o. female MRN: [id number]  Unit/Bed#:  Encounter: [id number]      HPI:  Judy Rios is a 54 y.o. female status post robotic second stage band conversion to RNYGB, requiring extensive lysis of adhesions and partial gastrectomy, performed by Dr. Keyon Davis on 1/16/24 returning to office for first post op visit since surgery.    Tolerating diet.  Denies nausea and vomiting.  Taking multivitamins and PPI daily.  Reports tenderness from the right lateral incision.    Review of Systems   Constitutional:  Negative for chills and fever.   HENT:  Negative for ear pain and sore throat.    Eyes:  Negative for pain and visual disturbance.   Respiratory:  Negative for cough and shortness of breath.    Cardiovascular:  Negative for chest pain and palpitations.   Gastrointestinal:  Negative for abdominal pain and vomiting.   Genitourinary:  Negative for dysuria and hematuria.   Musculoskeletal:  Negative for arthralgias and back pain.   Skin:  Negative for color change and rash.   Neurological:  Negative for seizures and syncope.   All other systems reviewed and are negative.      Historical Information   Past Medical History:   Diagnosis Date    Arthritis     Asthma     Back pain     Bipolar 1 disorder (HCC)     Cancer (HCC)     right breast    Constipation     CPAP (continuous positive airway pressure) dependence     Depression     GERD (gastroesophageal reflux disease)     Hiatal hernia     HTN (hypertension)     Hypertension     Mental health problem     Obesity (BMI 35.0-39.9 without comorbidity)     Sleep apnea     Wears glasses      Past Surgical History:   Procedure Laterality Date    ABDOMINOPLASTY      BREAST SURGERY Bilateral 2014    mastectomy - R side cancerous, L side prophylactic    COLONOSCOPY      COSMETIC SURGERY      breast reconstruction    EGD      GASTRIC BYPASS LAPAROSCOPIC N/A 10/03/2023    Procedure: LAPAROSCOPIC REMOVAL OF ADJUSTABLE GASTRIC  "BAND W/ ROBOT & INTROPERATIVE EGD;  Surgeon: Julian Davis MD;  Location: [facility];  Service: Bariatrics    HYSTERECTOMY      LAPAROSCOPIC GASTRIC BANDING      MASTECTOMY      R side cancerous, L side prophylactic (per pt)    RI LAPS GSTR RSTCV PX W/BYP CHEN-EN-Y LIMB <150 CM N/A 2024    Procedure: R-N-Y GASTRIC BYPASS W/ ROBOTICS & INTRAOPERATIVE EGD;  Surgeon: Julian Davis MD;  Location: [facility];  Service: Bariatrics     Social History   Social History     Substance and Sexual Activity   Alcohol Use Not Currently     Social History     Substance and Sexual Activity   Drug Use Not Currently     Social History     Tobacco Use   Smoking Status Former    Current packs/day: 0.00    Types: Cigarettes    Quit date: 2023    Years since quittin.9    Passive exposure: Past   Smokeless Tobacco Never     Family History: non-contributory    Meds/Allergies   all medications and allergies reviewed  No Known Allergies    Objective       Current Vitals:   Blood Pressure: 110/78 (24)  Pulse: 74 (24)  Temperature: 98.8 °F (37.1 °C) (24)  Temp Source: Tympanic (24)  Height: 5' 2.5\" (158.8 cm) (24)  Weight - Scale: 92.1 kg (203 lb) (24)      Invasive Devices       None                   Physical Exam  Vitals reviewed.   Constitutional:       General: She is not in acute distress.     Appearance: Normal appearance. She is not ill-appearing.   HENT:      Head: Normocephalic.      Nose: Nose normal.      Mouth/Throat:      Mouth: Mucous membranes are moist.      Pharynx: Oropharynx is clear.   Eyes:      General: No scleral icterus.  Cardiovascular:      Rate and Rhythm: Normal rate.   Pulmonary:      Effort: Pulmonary effort is normal. No respiratory distress.   Abdominal:      Palpations: Abdomen is soft.      Tenderness: There is no abdominal tenderness.      Hernia: No hernia is present.      Comments: Laparoscopic port sites clean dry and " intact.  Soreness from the right lateral incision, glue removed, light dressing placed.   Musculoskeletal:         General: Normal range of motion.      Cervical back: Normal range of motion.   Skin:     General: Skin is warm and dry.      Capillary Refill: Capillary refill takes less than 2 seconds.   Neurological:      General: No focal deficit present.      Mental Status: She is alert. Mental status is at baseline.   Psychiatric:         Mood and Affect: Mood normal.             Assessment/PLAN:    54 y.o. female status post robotic second stage band conversion to RNYGB requiring extensive lysis of adhesions and partial gastrectomy done on 1/16/24 by Dr. Keyon Davis, doing well post op. No major issues and healing well.       Increase physical activity slowly as tolerated and instructed.  Advance diet as instructed by our dietitians today and as indicated in the binder.  Continue PPI    Follow up in one month as scheduled.          Giles Jay MD  Bariatric Surgery   1/25/2024  9:48 AM      .

## 2024-02-28 ENCOUNTER — CLINICAL SUPPORT (OUTPATIENT)
Dept: BARIATRICS | Facility: CLINIC | Age: 54
End: 2024-02-28

## 2024-02-28 DIAGNOSIS — Z98.84 STATUS POST BARIATRIC SURGERY: ICD-10-CM

## 2024-02-28 DIAGNOSIS — E66.9 OBESITY (BMI 35.0-39.9 WITHOUT COMORBIDITY): Primary | ICD-10-CM

## 2024-02-28 PROCEDURE — RECHECK: Performed by: DIETITIAN, REGISTERED

## 2024-02-28 NOTE — PROGRESS NOTES
Weight Management Nutrition Class     Diagnosis: Morbid Obesity    Bariatric Surgeon: Dr. Julian Davis    Surgery: Gastric Bypass Laparoscopic    Class: 5 week post op     Topics discussed today include:     fluid goals post op, protein goals post op, constipation, chew food well, exercise, avoidance of alcohol, PPI use, diet progression, hypoglycemia, dumping syndrome, protein supplems, vitamin/mineral supplements, calcium supplements, and iron supplements    Patient was able to verbalize basic diet (protein, fluid, vitamin and mineral) recommendations and possible nutrition-related complications. Yes

## 2024-03-03 ENCOUNTER — RA CDI HCC (OUTPATIENT)
Dept: OTHER | Facility: HOSPITAL | Age: 54
End: 2024-03-03

## 2024-03-08 DIAGNOSIS — I10 PRIMARY HYPERTENSION: ICD-10-CM

## 2024-03-08 RX ORDER — AMLODIPINE BESYLATE 5 MG/1
5 TABLET ORAL DAILY
Qty: 90 TABLET | Refills: 1 | Status: SHIPPED | OUTPATIENT
Start: 2024-03-08 | End: 2024-06-06

## 2024-03-11 ENCOUNTER — PROCEDURE VISIT (OUTPATIENT)
Dept: FAMILY MEDICINE CLINIC | Facility: CLINIC | Age: 54
End: 2024-03-11

## 2024-03-11 VITALS
SYSTOLIC BLOOD PRESSURE: 110 MMHG | HEIGHT: 62 IN | OXYGEN SATURATION: 96 % | HEART RATE: 98 BPM | DIASTOLIC BLOOD PRESSURE: 80 MMHG | TEMPERATURE: 98 F | RESPIRATION RATE: 16 BRPM | WEIGHT: 192 LBS | BODY MASS INDEX: 35.33 KG/M2

## 2024-03-11 DIAGNOSIS — M17.0 OSTEOARTHRITIS OF BOTH KNEES, UNSPECIFIED OSTEOARTHRITIS TYPE: Primary | ICD-10-CM

## 2024-03-11 PROCEDURE — 20610 DRAIN/INJ JOINT/BURSA W/O US: CPT | Performed by: FAMILY MEDICINE

## 2024-03-11 PROCEDURE — 99214 OFFICE O/P EST MOD 30 MIN: CPT | Performed by: FAMILY MEDICINE

## 2024-03-11 RX ORDER — LIDOCAINE HYDROCHLORIDE 10 MG/ML
3 INJECTION, SOLUTION INFILTRATION; PERINEURAL
Status: COMPLETED | OUTPATIENT
Start: 2024-03-11 | End: 2024-03-11

## 2024-03-11 RX ORDER — TRIAMCINOLONE ACETONIDE 40 MG/ML
40 INJECTION, SUSPENSION INTRA-ARTICULAR; INTRAMUSCULAR
Status: COMPLETED | OUTPATIENT
Start: 2024-03-11 | End: 2024-03-11

## 2024-03-11 RX ADMIN — TRIAMCINOLONE ACETONIDE 40 MG: 40 INJECTION, SUSPENSION INTRA-ARTICULAR; INTRAMUSCULAR at 14:00

## 2024-03-11 RX ADMIN — LIDOCAINE HYDROCHLORIDE 3 ML: 10 INJECTION, SOLUTION INFILTRATION; PERINEURAL at 14:00

## 2024-03-11 NOTE — PROGRESS NOTES
Name: Judy Rios      : 1970      MRN: [id number]  Encounter Provider: Steve Gaspar MD  Encounter Date: 3/11/2024   Encounter department: [facility]    Assessment & Plan     1. Osteoarthritis of both knees, unspecified osteoarthritis type  Assessment & Plan:  -Chronic bilateral knee pain  -Received bilateral knee cortisone injection today with significant improvement in symptoms  -We discussed that the anesthetic portion of injection will wear off in the next few hours and steroid will take effect within the next 24 hours. We discussed that  any hot swollen knee should be evaluated emergently  -Continue home physical therapy             Subjective      54-year-old female with a history of asthma, GINGER, obesity with prior bariatric surgery who presents today for knee injection.  Patient has a history of chronic bilateral knee pain.  She reports that her knee pain has been bothering her more recently.  She is taken extra strength Tylenol for pain relief.  She has not gone to formal physical therapy recently.  She is doing a lot of the exercises at home to help with her knee.  She has got any injection for the past which was very beneficial to her      Review of Systems   Constitutional:  Negative for chills, diaphoresis, fatigue and fever.   Eyes:  Negative for visual disturbance.   Respiratory:  Negative for shortness of breath and wheezing.    Cardiovascular:  Negative for chest pain, palpitations and leg swelling.   Gastrointestinal:  Negative for abdominal pain, diarrhea, nausea and vomiting.   Musculoskeletal:  Positive for arthralgias. Negative for back pain.   Skin:  Negative for rash.   Neurological:  Negative for dizziness, seizures, syncope, numbness and headaches.   Psychiatric/Behavioral:  Negative for confusion.        Current Outpatient Medications on File Prior to Visit   Medication Sig    acetaminophen (TYLENOL) 500 mg tablet Take 500 mg by  "mouth every 6 (six) hours as needed for mild pain    albuterol (PROVENTIL HFA,VENTOLIN HFA) 90 mcg/act inhaler Inhale 2 puffs every 6 (six) hours as needed for wheezing    amLODIPine (NORVASC) 5 mg tablet TAKE 1 TABLET (5 MG TOTAL) BY MOUTH DAILY.    benztropine (COGENTIN) 1 mg tablet 1 mg 2 (two) times a day    hydrOXYzine HCL (ATARAX) 50 mg tablet Take 50 mg by mouth 3 (three) times a day as needed    hydrOXYzine pamoate (VISTARIL) 25 mg capsule TAKE 1 CAPSULE BY MOUTH ONCE A DAY AS NEEDED FOR INCREASE IN ANXIETY    lithium carbonate 300 mg capsule Take 300 mg by mouth 3 (three) times a day    mirtazapine (REMERON) 7.5 MG tablet daily at bedtime    multivitamin (THERAGRAN) TABS Take 1 tablet by mouth daily    omeprazole (PriLOSEC) 20 mg delayed release capsule Take 1 capsule (20 mg total) by mouth daily    oxyCODONE (Roxicodone) 5 immediate release tablet Take 1 tablet (5 mg total) by mouth every 4 (four) hours as needed for moderate pain Max Daily Amount: 30 mg Do not start before January 17, 2024. (Patient not taking: Reported on 1/25/2024)    polyethylene glycol (MIRALAX) 17 g packet Take 17 g by mouth daily    QUEtiapine (SEROquel) 100 mg tablet Take 1 tablet (100 mg total) by mouth daily at bedtime    Sodium Fluoride 1.1 % PSTE Brush teeth one time before bedtime, don't rinse but expectorate excess.    Symbicort 80-4.5 MCG/ACT inhaler INHALE 2 PUFFS BY MOUTH TWICE A DAY RINSE MOUTH AFTER USE       Objective     /80 (BP Location: Right arm, Patient Position: Sitting, Cuff Size: Large)   Pulse 98   Temp 98 °F (36.7 °C) (Temporal)   Resp 16   Ht 5' 2\" (1.575 m)   Wt 87.1 kg (192 lb)   SpO2 96%   BMI 35.12 kg/m²     Physical Exam  Vitals reviewed.   Constitutional:       General: She is not in acute distress.     Appearance: Normal appearance. She is well-developed. She is obese. She is not ill-appearing, toxic-appearing or diaphoretic.   HENT:      Head: Normocephalic and atraumatic.      Right Ear: " External ear normal.      Left Ear: External ear normal.      Nose: Nose normal.      Mouth/Throat:      Mouth: Mucous membranes are moist.      Pharynx: No oropharyngeal exudate or posterior oropharyngeal erythema.   Eyes:      General: No scleral icterus.        Right eye: No discharge.         Left eye: No discharge.      Extraocular Movements: Extraocular movements intact.      Conjunctiva/sclera: Conjunctivae normal.   Neck:      Thyroid: No thyromegaly.      Vascular: No JVD.      Trachea: No tracheal deviation.   Cardiovascular:      Rate and Rhythm: Normal rate and regular rhythm.      Heart sounds: Normal heart sounds. No murmur heard.     No friction rub. No gallop.   Pulmonary:      Effort: Pulmonary effort is normal. No respiratory distress.      Breath sounds: Normal breath sounds. No stridor. No wheezing or rhonchi.   Abdominal:      General: Bowel sounds are normal. There is no distension.      Palpations: Abdomen is soft. There is no mass.      Tenderness: There is no abdominal tenderness. There is no guarding or rebound.      Hernia: No hernia is present.   Musculoskeletal:         General: Normal range of motion.      Cervical back: Normal range of motion and neck supple.      Right knee: Crepitus present. No swelling, deformity, effusion, erythema or bony tenderness. Normal range of motion. No tenderness. Normal meniscus.      Left knee: Crepitus present. No swelling, deformity, effusion, erythema or bony tenderness. Normal range of motion. No tenderness. Normal meniscus.      Right lower leg: No edema.      Left lower leg: No edema.   Skin:     General: Skin is warm.      Capillary Refill: Capillary refill takes less than 2 seconds.   Neurological:      General: No focal deficit present.      Mental Status: She is alert and oriented to person, place, and time.      Cranial Nerves: No cranial nerve deficit.      Motor: No weakness or abnormal muscle tone.      Gait: Gait normal.   Psychiatric:          Mood and Affect: Mood normal.         Behavior: Behavior normal.         Large joint arthrocentesis: bilateral knee  Universal Protocol:  Consent: Verbal consent obtained.  Risks and benefits: risks, benefits and alternatives were discussed  Consent given by: patient  Patient understanding: patient states understanding of the procedure being performed  Procedure consent: procedure consent matches procedure scheduled  Relevant documents: relevant documents present and verified  Site marked: the operative site was marked  Patient identity confirmed: verbally with patient  Supporting Documentation  Indications: pain   Procedure Details  Location: knee - bilateral knee  Needle size: 25 G  Ultrasound guidance: no  Approach: lateral    Medications (Right): 3 mL lidocaine 1 %; 40 mg triamcinolone acetonide 40 mg/mLMedications (Left): 3 mL lidocaine 1 %; 40 mg triamcinolone acetonide 40 mg/mL   Patient tolerance: patient tolerated the procedure well with no immediate complications  Dressing:  Sterile dressing applied          Steve Gaspar MD

## 2024-03-18 ENCOUNTER — OFFICE VISIT (OUTPATIENT)
Dept: DENTISTRY | Facility: CLINIC | Age: 54
End: 2024-03-18

## 2024-03-18 VITALS — DIASTOLIC BLOOD PRESSURE: 79 MMHG | TEMPERATURE: 98.2 F | HEART RATE: 89 BPM | SYSTOLIC BLOOD PRESSURE: 109 MMHG

## 2024-03-18 DIAGNOSIS — K05.6 PERIODONTAL DISEASE: Primary | ICD-10-CM

## 2024-03-18 DIAGNOSIS — Z01.20 ENCOUNTER FOR DENTAL EXAMINATION: ICD-10-CM

## 2024-03-18 PROCEDURE — D0120 PERIODIC ORAL EVALUATION - ESTABLISHED PATIENT: HCPCS

## 2024-03-18 PROCEDURE — D4910 PERIODONTAL MAINTENANCE: HCPCS

## 2024-03-18 NOTE — DENTAL PROCEDURE DETAILS
Judy Rios presents for a Periodic exam. Verbal consent for treatment given in addition to the forms.     Reviewed health history - Patient is ASA II  Consents signed: Yes    CHIPPED # 8 ( AGAIN) AND FILLING CAME OUT OF # 12     Perio: Slight bleeding and Recession  Pain Scale: 3  Caries Assessment: High  Radiographs: None     Oral Hygiene instruction reviewed and given.  Recommended Hygiene recall visits with the Judy.     Treatment Plan:  1.  Infection control:   2.  Periodontal therapy:  PERMA  3.  Caries control: as charted  start # 12  sierra fell out and she is concerned about tooth fracturing    Discussed extensive decal.  She used to drink a lot of soda !!  Drinks mainly water and some unsweetened iced tea now  STRESSED using PREVIDENT DAILY AND RINSING WITH ADJUNCTIVE FL2     Prognosis is Good.  Referrals needed: No  Next Visit:  perma 4 MOS    Nv 1  SIERRA # 12    CONTINUE WITH GENERIC SIERRA AS CHARTED    PERIODONTAL MAINTENANCE     Reviewed medical history   ASA  2     Periodontal therapy includes removal of bacterial plaque and calculus from supragingival and subgingival regions.  Site specific scaling and root planing where indicated, and polishing the teeth.   ULTRASONIC AND HAND SCALED  Periodontal therapy recommendation to continue 4 months intervals.   Exam  CHRISTIANNE

## 2024-03-28 ENCOUNTER — OFFICE VISIT (OUTPATIENT)
Dept: DENTISTRY | Facility: CLINIC | Age: 54
End: 2024-03-28

## 2024-03-28 ENCOUNTER — TELEPHONE (OUTPATIENT)
Dept: PSYCHIATRY | Facility: CLINIC | Age: 54
End: 2024-03-28

## 2024-03-28 VITALS — HEART RATE: 99 BPM | DIASTOLIC BLOOD PRESSURE: 79 MMHG | SYSTOLIC BLOOD PRESSURE: 104 MMHG | TEMPERATURE: 97.1 F

## 2024-03-28 DIAGNOSIS — K02.9 DENTAL DECAY: Primary | ICD-10-CM

## 2024-03-28 PROCEDURE — D2392 RESIN-BASED COMPOSITE - 2 SURFACES, POSTERIOR: HCPCS

## 2024-03-28 NOTE — DENTAL PROCEDURE DETAILS
Composite Restoration #12 MO    Judy Rios 54 y.o. female presents with self to [facility] for composite restoration  PMH reviewed, no changes, ASA II. Significant medical history: asthma, hypertension. Significant allergies: none known. Significant medications: albuterol, amlodipine.  Pain level 0/10    Diagnosis:  Caries #12 MO, it appears a previous restoration dislodged and allowed caries to redevelop.    Prognosis:  fair    Consent:  Reviewed diagnosis of caries and tx plan for composite restorations.  Risks of specific procedure: need for RCT if pulp exposure occurs or in future if pulp is inflamed, need to revise tx plan based on extent of decay, damage to adjacent tooth and/or restoration.  Risks of any dental procedure: post procedural pain or sensitivity, local anesthetic side effects, allergic reaction to dental materials and medications, breakage of local anesthetic needle, aspiration of small dental tools, injury to nearby hard and soft tissues and anatomical structures.  Benefits: prevent further breakdown of tooth and its sequelae.  Alternatives: no tx.  Patient understands and consents.    Anesthesia:  Topical 20% benzocaine.  1 carps 2% Lidocaine 1:100k epi via buccal infiltration.    Procedure details:  Isolation: Cotton rolls and High volume suction  Prepped teeth #12 MO with high speed handpiece.  Caries removed with round carbide on slow speed.  Placed Palencia matrix and wedge.   Etch with 37% H2PO4 15 seconds. Rinsed and suctioned.  Applied Ivoclar Adhesive universal  with 20 second scrub, air dried, and light cured.  Restored with Ivoclar Tetric Evoceram and Tetric Evoflow Shade A2 and light cured.  Checked occlusion and adjusted with finishing burs.  Checked contacts with floss  Polished with enhance point.  Verified occlusion and contacts.    Patient dismissed ambulatory and alert.    Attending: Dr. Cervantes was present in clinic.    NV: #8 MIFL composite

## 2024-03-28 NOTE — TELEPHONE ENCOUNTER
Patient has been added to the Medication Management and Talk Therapy wait list without a referral.    Insurance: Starla   Insurance Type:    Commercial []   Medicaid []   County (if applicable)   Medicare [x]  Location Preference: [facility]  Provider Preference: No preference  Virtual: Yes [x] No []  Were outside resources sent: Yes [] No [x]    Present Problem: Bipolar

## 2024-04-11 ENCOUNTER — OFFICE VISIT (OUTPATIENT)
Dept: DENTISTRY | Facility: CLINIC | Age: 54
End: 2024-04-11

## 2024-04-11 VITALS — DIASTOLIC BLOOD PRESSURE: 78 MMHG | SYSTOLIC BLOOD PRESSURE: 108 MMHG | HEART RATE: 106 BPM | TEMPERATURE: 96 F

## 2024-04-11 DIAGNOSIS — K02.9 CARIES: Primary | ICD-10-CM

## 2024-04-11 DIAGNOSIS — Z01.20 ENCOUNTER FOR DENTAL EXAMINATION: Primary | ICD-10-CM

## 2024-04-11 DIAGNOSIS — S02.5XXA CLOSED FRACTURE OF TOOTH, INITIAL ENCOUNTER: ICD-10-CM

## 2024-04-11 PROCEDURE — D2335 RESIN-BASED COMPOSITE - 4 OR MORE SURFACES OR INVOLVING INCISAL ANGLE (ANTERIOR): HCPCS

## 2024-04-15 NOTE — DENTAL PROCEDURE DETAILS
Composite Restoration #7 MIL and #8 MIFL, ML, DL    Judy Rios 54 y.o. female presents with self to [facility] for composite restoration  PMH reviewed, no changes, ASA II. Significant medical history: asthma, hypertension. Significant allergies: none known. Significant medications: albuterol, amlodipine.  Pain level 0/10    Diagnosis:  Caries #7 M, #8 M and D  Incisal chip on #8 MI angle    Prognosis:  good    Consent:  Reviewed diagnosis of caries and tx plan for composite restorations.  Risks of specific procedure: need for RCT if pulp exposure occurs or in future if pulp is inflamed, need to revise tx plan based on extent of decay, damage to adjacent tooth and/or restoration, fracture of #8 MIFL restoration due to occlusal mechanics.  Risks of any dental procedure: post procedural pain or sensitivity, local anesthetic side effects, allergic reaction to dental materials and medications, breakage of local anesthetic needle, aspiration of small dental tools, injury to nearby hard and soft tissues and anatomical structures.  Benefits: prevent further breakdown of tooth and its sequelae.  Alternatives: no tx.  Patient understands and consents.    Anesthesia:  Topical 20% benzocaine.  1 carps 2% Lidocaine 1:100k epi via buccal infiltration.    Procedure details:  Isolation: Cotton rolls and High volume suction  Prepped teeth #8 DL, #8 ML, #8 MIL with high speed handpiece. Beveled cavosurface margins around #8 MI angle fracture.  Caries removed with round carbide on slow speed.  Placed Mylar strip and wedge.   Etch with 37% H2PO4 15 seconds. Rinsed and suctioned.  Applied Ivoclar Adhesive universal  with 20 second scrub, air dried, and light cured.  Restored with Ivoclar Tetric Evoceram and Tetric Evoflow Shade A3.5 and light cured.  Checked occlusion and adjusted with finishing burs.  Checked contacts with floss  Polished with enhance point.  Verified occlusion and contacts.    Patient dismissed ambulatory  and alert.    Attending: Dr. Mejia was present in clinic.    NV: #9 and #10 resins.

## 2024-04-24 ENCOUNTER — RA CDI HCC (OUTPATIENT)
Dept: OTHER | Facility: HOSPITAL | Age: 54
End: 2024-04-24

## 2024-04-24 ENCOUNTER — TELEPHONE (OUTPATIENT)
Age: 54
End: 2024-04-24

## 2024-04-24 NOTE — PROGRESS NOTES
HCC coding opportunities       Chart reviewed, no opportunity found: CHART REVIEWED, NO OPPORTUNITY FOUND        Patients Insurance     Medicare Insurance: [employer]vantage          
Mother/Patient

## 2024-04-24 NOTE — TELEPHONE ENCOUNTER
Patient called to confirm who her appt on 4/25/24 was with and how long it will be. Information provided to Patient.

## 2024-04-25 ENCOUNTER — TELEPHONE (OUTPATIENT)
Age: 54
End: 2024-04-25

## 2024-04-25 ENCOUNTER — OFFICE VISIT (OUTPATIENT)
Dept: DENTISTRY | Facility: CLINIC | Age: 54
End: 2024-04-25

## 2024-04-25 ENCOUNTER — OFFICE VISIT (OUTPATIENT)
Dept: BARIATRICS | Facility: CLINIC | Age: 54
End: 2024-04-25
Payer: COMMERCIAL

## 2024-04-25 VITALS
BODY MASS INDEX: 33.49 KG/M2 | TEMPERATURE: 97.5 F | WEIGHT: 189 LBS | HEIGHT: 63 IN | HEART RATE: 104 BPM | SYSTOLIC BLOOD PRESSURE: 108 MMHG | DIASTOLIC BLOOD PRESSURE: 78 MMHG

## 2024-04-25 VITALS — HEART RATE: 98 BPM | TEMPERATURE: 97.7 F | SYSTOLIC BLOOD PRESSURE: 102 MMHG | DIASTOLIC BLOOD PRESSURE: 70 MMHG

## 2024-04-25 DIAGNOSIS — K02.9 DENTAL CARIES: Primary | ICD-10-CM

## 2024-04-25 DIAGNOSIS — Z12.31 ENCOUNTER FOR SCREENING MAMMOGRAM FOR MALIGNANT NEOPLASM OF BREAST: ICD-10-CM

## 2024-04-25 DIAGNOSIS — G47.33 OSA (OBSTRUCTIVE SLEEP APNEA): ICD-10-CM

## 2024-04-25 DIAGNOSIS — Z48.815 ENCOUNTER FOR SURGICAL AFTERCARE FOLLOWING SURGERY OF DIGESTIVE SYSTEM: Primary | ICD-10-CM

## 2024-04-25 DIAGNOSIS — E66.9 OBESITY, CLASS I, BMI 30-34.9: ICD-10-CM

## 2024-04-25 DIAGNOSIS — Z98.84 BARIATRIC SURGERY STATUS: ICD-10-CM

## 2024-04-25 DIAGNOSIS — K91.2 POSTSURGICAL MALABSORPTION: ICD-10-CM

## 2024-04-25 DIAGNOSIS — Z12.39 BREAST CANCER SCREENING: ICD-10-CM

## 2024-04-25 PROCEDURE — 99214 OFFICE O/P EST MOD 30 MIN: CPT | Performed by: NURSE PRACTITIONER

## 2024-04-25 PROCEDURE — D2335 RESIN-BASED COMPOSITE - 4 OR MORE SURFACES OR INVOLVING INCISAL ANGLE (ANTERIOR): HCPCS

## 2024-04-25 RX ORDER — OMEPRAZOLE 20 MG/1
20 CAPSULE, DELAYED RELEASE ORAL DAILY
Qty: 90 CAPSULE | Refills: 1 | Status: SHIPPED | OUTPATIENT
Start: 2024-04-25

## 2024-04-25 RX ORDER — ARIPIPRAZOLE 400 MG
KIT INTRAMUSCULAR
COMMUNITY
Start: 2024-03-07 | End: 2024-05-01

## 2024-04-25 NOTE — PROGRESS NOTES
Assessment/Plan:     Patient ID: Judy Rios is a 54 y.o. female.     Bariatric Surgery Status/bmi 34   status post robotic second stage band conversion to RNYGB, requiring extensive lysis of adhesions and partial gastrectomy, performed by Dr. Keyon Davis on 1/16/24. Presents to the office for 2nd post op visit. Overall doing well - noticed she had a weight stall the past 2 weeks. Diet has not change much since her surgery. Eats very small portions and 3 meals per day. Tolerating a regular diet. Denies having any abdominal pain, N/V/D/C, regurgitation, reflux or dysphagia. Taking her MVI and PPI twice a week.     PLAN:     - suspect not eating enough. Advised to try to increase by 200 calories by adding snacks to see if this can help with her weight stall. Follow up with our RD as needed.   - continue with omeprazole for now. Will plan to taper off at next visit.   - Routine follow up in 3 months for 3rd post op visit.   - Continue with healthy lifestyle, adequate protein intake of 60 gm, fluid intake of at least 64 oz.   - Continue with MVI daily.   - Activity as tolerated.   - Labs ordered and will adjust accordingly if any deficiency.   - Follow up with RD and SW as needed.      GINGER - continues to use her CPAP machine. Advised to continue and f/u with sleep medicine as scheduled      Continued/Maintain healthy weight loss with good nutrition intakes.  Adequate hydration with at least 64oz. fluid intake.  Follow diet as discussed.  Follow vitamin and mineral recommendations as reviewed with you.  Exercise as tolerated.    Colonoscopy referral made: UTD - due in 2026  Mammogram - due - ordered.     Follow-up in 3 months for 3rd post op visit. We kindly ask that your arrive 15 minutes before your scheduled appointment time with your provider to allow our staff to room you, get your vital signs and update your chart.    Get lab work done prior to annual visit. Please call the office if you need a script.  It is  recommended to check with your insurance BEFORE getting labs done to make sure they are covered by your policy.      Call our office if you have any problems with abdominal pain especially associated with fever, chills, nausea, vomiting or any other concerns.    All  Post-bariatric surgery patients should be aware that very small quantities of any alcohol can cause impairment and it is very possible not to feel the effect. The effect can be in the system for several hours.  It is also a stomach irritant.     It is advised to AVOID alcohol, Nonsteroidal antiinflammatory drugs (NSAIDS) and nicotine of all forms . Any of these can cause stomach irritation/pain.    Discussed the effects of alcohol on a bariatric patient and the increased impairment risk.     Keep up the good work!     Postsurgical Malabsorption   -At risk for malabsorption of vitamins/minerals secondary to malabsorption and restriction of intake from bariatric surgery  -Currently taking adequate postop bariatric surgery vitamin supplementation  -Next set of bariatric labs ordered for approximately 2 weeks  -Patient received education about the importance of adhering to a lifelong supplementation regimen to avoid vitamin/mineral deficiencies      Diagnoses and all orders for this visit:    Encounter for surgical aftercare following surgery of digestive system  -     CBC; Future  -     Comprehensive metabolic panel; Future  -     Folate; Future  -     Iron Panel (Includes Ferritin, Iron Sat%, Iron, and TIBC); Future  -     PTH, intact; Future  -     Vitamin A; Future  -     Vitamin B1, whole blood; Future  -     Vitamin B12; Future  -     Vitamin D 25 hydroxy; Future  -     Zinc; Future    Bariatric surgery status  -     CBC; Future  -     Comprehensive metabolic panel; Future  -     Folate; Future  -     Iron Panel (Includes Ferritin, Iron Sat%, Iron, and TIBC); Future  -     PTH, intact; Future  -     Vitamin A; Future  -     Vitamin B1, whole blood;  Future  -     Vitamin B12; Future  -     Vitamin D 25 hydroxy; Future  -     Zinc; Future  -     omeprazole (PriLOSEC) 20 mg delayed release capsule; Take 1 capsule (20 mg total) by mouth daily    Postsurgical malabsorption  -     CBC; Future  -     Comprehensive metabolic panel; Future  -     Folate; Future  -     Iron Panel (Includes Ferritin, Iron Sat%, Iron, and TIBC); Future  -     PTH, intact; Future  -     Vitamin A; Future  -     Vitamin B1, whole blood; Future  -     Vitamin B12; Future  -     Vitamin D 25 hydroxy; Future  -     Zinc; Future    Obesity, Class I, BMI 30-34.9  -     CBC; Future  -     Comprehensive metabolic panel; Future  -     Folate; Future  -     Iron Panel (Includes Ferritin, Iron Sat%, Iron, and TIBC); Future  -     PTH, intact; Future  -     Vitamin A; Future  -     Vitamin B1, whole blood; Future  -     Vitamin B12; Future  -     Vitamin D 25 hydroxy; Future  -     Zinc; Future    BMI 34.0-34.9,adult  -     CBC; Future  -     Comprehensive metabolic panel; Future  -     Folate; Future  -     Iron Panel (Includes Ferritin, Iron Sat%, Iron, and TIBC); Future  -     PTH, intact; Future  -     Vitamin A; Future  -     Vitamin B1, whole blood; Future  -     Vitamin B12; Future  -     Vitamin D 25 hydroxy; Future  -     Zinc; Future    GINGER (obstructive sleep apnea)  -     CBC; Future  -     Comprehensive metabolic panel; Future  -     Folate; Future  -     Iron Panel (Includes Ferritin, Iron Sat%, Iron, and TIBC); Future  -     PTH, intact; Future  -     Vitamin A; Future  -     Vitamin B1, whole blood; Future  -     Vitamin B12; Future  -     Vitamin D 25 hydroxy; Future  -     Zinc; Future    Breast cancer screening  -     Mammo screening bilateral w 3d & cad; Future    Encounter for screening mammogram for malignant neoplasm of breast  -     Mammo screening bilateral w 3d & cad; Future    Other orders  -     Abilify Maintena 400 MG injection         Subjective:      Patient ID: Judy  "Gabriel is a 54 y.o. female.     status post robotic second stage band conversion to RNYGB, requiring extensive lysis of adhesions and partial gastrectomy, performed by Dr. Keyon Davis on 1/16/24. Presents to the office for 2nd post op visit. Overall doing well - noticed she had a weight stall the past 2 weeks. Diet has not change much since her surgery. Eats very small portions and 3 meals per day. Tolerating a regular diet. Denies having any abdominal pain, N/V/D/C, regurgitation, reflux or dysphagia. Taking her MVI and PPI twice a week.     Initial: 320 lbs (prior to band); 217 lbs (before RNYGB)  Current: 189 lbs  EWL: (Weight loss is ahead of schedule at this post surgical period.)  Nish: 160 lbs (after the band); 185 lbs   Current BMI is Body mass index is 34.02 kg/m².    Tolerating a regular diet-yes  Eating at least 60 grams of protein per day-yes  Following 30/60 minute rule with liquids-yes  Drinking at least 64 ounces of fluid per day-yes  Drinking carbonated beverages-no  Sufficient exercise-yes - walking   Using NSAIDs regularly-no  Using nicotine-no  Using alcohol-no  Supplements: Multivitamins, Iron, and Calcium  - 3 calcium      EWL is 19%, which does not places the patient ahead of schedule for expected post surgical weight loss at this time.     The following portions of the patient's history were reviewed and updated as appropriate: allergies, current medications, past family history, past medical history, past social history, past surgical history and problem list.    Review of Systems   Constitutional: Negative.    Respiratory: Negative.     Cardiovascular: Negative.    Gastrointestinal: Negative.    Musculoskeletal: Negative.    Neurological: Negative.    Psychiatric/Behavioral: Negative.           Objective:    /78   Pulse 104   Temp 97.5 °F (36.4 °C) (Tympanic)   Ht 5' 2.5\" (1.588 m)   Wt 85.7 kg (189 lb)   BMI 34.02 kg/m²      Physical Exam  Vitals and nursing note reviewed. "   Constitutional:       Appearance: Normal appearance. She is obese.   Cardiovascular:      Rate and Rhythm: Normal rate and regular rhythm.      Pulses: Normal pulses.      Heart sounds: Normal heart sounds.   Pulmonary:      Effort: Pulmonary effort is normal.      Breath sounds: Normal breath sounds.   Abdominal:      General: Bowel sounds are normal.      Palpations: Abdomen is soft.      Comments: Keloid scarring of incisions = all C/D/I   Musculoskeletal:         General: Normal range of motion.   Skin:     General: Skin is warm and dry.   Neurological:      General: No focal deficit present.      Mental Status: She is alert and oriented to person, place, and time.   Psychiatric:         Mood and Affect: Mood normal.         Behavior: Behavior normal.         Thought Content: Thought content normal.         Judgment: Judgment normal.

## 2024-04-25 NOTE — DENTAL PROCEDURE DETAILS
Composite Restoration #9 [facility], #10 [facility]    Judy Rios 54 y.o. female presents with self to [facility] for composite restoration  PMH reviewed, no changes, ASA III.   Pain level 0/10    Diagnosis:  Caries #9,10    Prognosis:  guarded due to size of existing restorations and edge to edge occlusion    Consent:  Reviewed diagnosis of caries and tx plan for composite restorations.  Risks of specific procedure: need for RCT if pulp exposure occurs or in future if pulp is inflamed, need to revise tx plan based on extent of decay, damage to adjacent tooth and/or restoration,   Risks of any dental procedure: post procedural pain or sensitivity, local anesthetic side effects, allergic reaction to dental materials and medications, breakage of local anesthetic needle, aspiration of small dental tools, injury to nearby hard and soft tissues and anatomical structures.  Benefits: prevent further breakdown of tooth and its sequelae,   Alternatives:  no tx.  Patient understands and consents.    Anesthesia:  Topical 20% benzocaine.  1 carps 2% Lidocaine 1:100k epi via infiltration.    Procedure details:  Isolation: Cotton rolls and High volume suction  Prepped teeth #9,10 with high speed handpiece.  Caries removed with round carbide on slow speed.  Placed Mylar strip and wedge.   Etch with 37% H2PO4 15 seconds. Rinsed and suctioned.  Applied Ivoclar Adhesive universal  with 20 second scrub, air dried, and light cured.  Restored with Ivoclar Tetric Evoceram Shade A2 and light cured.  Checked occlusion and adjusted with finishing burs.  Checked contacts with floss  Polished with enhance point.  Verified occlusion and contacts.    Informed pt that these teeth will need crowns in the future as they are high risk of fracturing due to size of existing restorations and edge to edge occlusion right on incisal edge where composite lies. Pt understood.     Patient dismissed ambulatory and alert.    Attending: Roberto    NV:  continue resins

## 2024-05-01 ENCOUNTER — TELEPHONE (OUTPATIENT)
Age: 54
End: 2024-05-01

## 2024-05-01 ENCOUNTER — APPOINTMENT (OUTPATIENT)
Dept: LAB | Facility: CLINIC | Age: 54
End: 2024-05-01
Payer: COMMERCIAL

## 2024-05-01 ENCOUNTER — OFFICE VISIT (OUTPATIENT)
Dept: FAMILY MEDICINE CLINIC | Facility: CLINIC | Age: 54
End: 2024-05-01

## 2024-05-01 VITALS
SYSTOLIC BLOOD PRESSURE: 109 MMHG | WEIGHT: 188.8 LBS | TEMPERATURE: 97.3 F | RESPIRATION RATE: 18 BRPM | BODY MASS INDEX: 33.45 KG/M2 | OXYGEN SATURATION: 96 % | DIASTOLIC BLOOD PRESSURE: 77 MMHG | HEART RATE: 103 BPM | HEIGHT: 63 IN

## 2024-05-01 DIAGNOSIS — Z48.815 ENCOUNTER FOR SURGICAL AFTERCARE FOLLOWING SURGERY OF DIGESTIVE SYSTEM: ICD-10-CM

## 2024-05-01 DIAGNOSIS — G47.33 OSA (OBSTRUCTIVE SLEEP APNEA): ICD-10-CM

## 2024-05-01 DIAGNOSIS — E66.9 OBESITY, CLASS I, BMI 30-34.9: ICD-10-CM

## 2024-05-01 DIAGNOSIS — K91.2 POSTSURGICAL MALABSORPTION: ICD-10-CM

## 2024-05-01 DIAGNOSIS — I10 PRIMARY HYPERTENSION: ICD-10-CM

## 2024-05-01 DIAGNOSIS — J45.40 MODERATE PERSISTENT ASTHMA WITHOUT COMPLICATION: ICD-10-CM

## 2024-05-01 DIAGNOSIS — Z98.84 BARIATRIC SURGERY STATUS: ICD-10-CM

## 2024-05-01 DIAGNOSIS — F31.9 BIPOLAR 1 DISORDER (HCC): Primary | ICD-10-CM

## 2024-05-01 LAB
25(OH)D3 SERPL-MCNC: 17.8 NG/ML (ref 30–100)
ERYTHROCYTE [DISTWIDTH] IN BLOOD BY AUTOMATED COUNT: 16.1 % (ref 11.6–15.1)
FERRITIN SERPL-MCNC: 59 NG/ML (ref 11–307)
FOLATE SERPL-MCNC: >22.3 NG/ML
HCT VFR BLD AUTO: 40.5 % (ref 34.8–46.1)
HGB BLD-MCNC: 13.8 G/DL (ref 11.5–15.4)
IRON SATN MFR SERPL: 44 % (ref 15–50)
IRON SERPL-MCNC: 153 UG/DL (ref 50–212)
MCH RBC QN AUTO: 25 PG (ref 26.8–34.3)
MCHC RBC AUTO-ENTMCNC: 34.1 G/DL (ref 31.4–37.4)
MCV RBC AUTO: 73 FL (ref 82–98)
PLATELET # BLD AUTO: 274 THOUSANDS/UL (ref 149–390)
PMV BLD AUTO: 9.6 FL (ref 8.9–12.7)
PTH-INTACT SERPL-MCNC: 47.6 PG/ML (ref 12–88)
RBC # BLD AUTO: 5.53 MILLION/UL (ref 3.81–5.12)
TIBC SERPL-MCNC: 349 UG/DL (ref 250–450)
UIBC SERPL-MCNC: 196 UG/DL (ref 155–355)
VIT B12 SERPL-MCNC: 273 PG/ML (ref 180–914)
WBC # BLD AUTO: 8 THOUSAND/UL (ref 4.31–10.16)

## 2024-05-01 PROCEDURE — 3074F SYST BP LT 130 MM HG: CPT | Performed by: FAMILY MEDICINE

## 2024-05-01 PROCEDURE — 82607 VITAMIN B-12: CPT

## 2024-05-01 PROCEDURE — 83550 IRON BINDING TEST: CPT

## 2024-05-01 PROCEDURE — 84590 ASSAY OF VITAMIN A: CPT

## 2024-05-01 PROCEDURE — 85027 COMPLETE CBC AUTOMATED: CPT

## 2024-05-01 PROCEDURE — 84425 ASSAY OF VITAMIN B-1: CPT

## 2024-05-01 PROCEDURE — 82306 VITAMIN D 25 HYDROXY: CPT

## 2024-05-01 PROCEDURE — 99214 OFFICE O/P EST MOD 30 MIN: CPT | Performed by: FAMILY MEDICINE

## 2024-05-01 PROCEDURE — 82728 ASSAY OF FERRITIN: CPT

## 2024-05-01 PROCEDURE — 83970 ASSAY OF PARATHORMONE: CPT

## 2024-05-01 PROCEDURE — 84630 ASSAY OF ZINC: CPT

## 2024-05-01 PROCEDURE — 82746 ASSAY OF FOLIC ACID SERUM: CPT

## 2024-05-01 PROCEDURE — 3078F DIAST BP <80 MM HG: CPT | Performed by: FAMILY MEDICINE

## 2024-05-01 PROCEDURE — 83540 ASSAY OF IRON: CPT

## 2024-05-01 PROCEDURE — G2211 COMPLEX E/M VISIT ADD ON: HCPCS | Performed by: FAMILY MEDICINE

## 2024-05-01 PROCEDURE — 80053 COMPREHEN METABOLIC PANEL: CPT

## 2024-05-01 PROCEDURE — 36415 COLL VENOUS BLD VENIPUNCTURE: CPT

## 2024-05-01 RX ORDER — ARIPIPRAZOLE 5 MG/1
5 TABLET ORAL DAILY
Qty: 90 TABLET | Refills: 1 | Status: SHIPPED | OUTPATIENT
Start: 2024-05-01

## 2024-05-01 RX ORDER — ARIPIPRAZOLE 5 MG/1
5 TABLET ORAL DAILY
COMMUNITY
Start: 2024-02-05 | End: 2024-05-01 | Stop reason: SDUPTHER

## 2024-05-01 RX ORDER — QUETIAPINE FUMARATE 100 MG/1
200 TABLET, FILM COATED ORAL
Qty: 180 TABLET | Refills: 1 | Status: SHIPPED | OUTPATIENT
Start: 2024-05-01 | End: 2024-07-30

## 2024-05-01 RX ORDER — HYDROXYZINE 50 MG/1
50 TABLET, FILM COATED ORAL 3 TIMES DAILY PRN
Qty: 90 TABLET | Refills: 1 | Status: SHIPPED | OUTPATIENT
Start: 2024-05-01

## 2024-05-01 RX ORDER — MIRTAZAPINE 7.5 MG/1
7.5 TABLET, FILM COATED ORAL
Qty: 90 TABLET | Refills: 1 | Status: SHIPPED | OUTPATIENT
Start: 2024-05-01

## 2024-05-01 NOTE — TELEPHONE ENCOUNTER
Patient called in regards to a referral from [facility] for a mammogram. Patient states she has implants and wants to know what she should do as she was told she cannot have a mammogram due to this. Please call the patient back at [phone] to advise.

## 2024-05-01 NOTE — ASSESSMENT & PLAN NOTE
Stable  No acute manic episode  Continue with Abilify, Remeron, hydroxyzine, and Seroquel  Follow-up with psychiatry outpatient

## 2024-05-01 NOTE — PROGRESS NOTES
Name: Judy Rios      : 1970      MRN: [id number]  Encounter Provider: Steve Gaspar MD  Encounter Date: 2024   Encounter department: [facility]    Assessment & Plan     1. Bipolar 1 disorder (HCC)  Assessment & Plan:  Stable  No acute manic episode  Continue with Abilify, Remeron, hydroxyzine, and Seroquel  Follow-up with psychiatry outpatient        Orders:  -     QUEtiapine (SEROquel) 100 mg tablet; Take 2 tablets (200 mg total) by mouth daily at bedtime  -     ARIPiprazole (ABILIFY) 5 mg tablet; Take 1 tablet (5 mg total) by mouth daily  -     mirtazapine (REMERON) 7.5 MG tablet; Take 1 tablet (7.5 mg total) by mouth daily at bedtime  -     hydrOXYzine HCL (ATARAX) 50 mg tablet; Take 1 tablet (50 mg total) by mouth 3 (three) times a day as needed for anxiety    2. Moderate persistent asthma without complication  Assessment & Plan:  -Well-controlled  -Continue Symbicort and albuterol PRN        3. Primary hypertension  Assessment & Plan:  Well-controlled  Continue amlodipine daily  Recommend low-sodium diet             Subjective      54-year-old female with a history of hypertension, obesity, and bipolar disorder who presents today to discuss her psychiatric medications.  Patient reports that her insurance plan is no longer covered at her current psychiatric office.  She is in process of establishing care at a different psychiatrist office.  Patient reports that she will need a 3 months refill of her psychiatric medications.  She reports that she is stable from a mental health perspective.  She has been losing weight from her recent bariatric surgery.  She is taking her blood pressure medication regularly.  She is following a low low sodium diet.          Review of Systems   Constitutional:  Negative for chills, diaphoresis, fatigue and fever.   Eyes:  Negative for visual disturbance.   Respiratory:  Negative for shortness of breath and wheezing.     Cardiovascular:  Negative for chest pain, palpitations and leg swelling.   Gastrointestinal:  Negative for abdominal pain, diarrhea, nausea and vomiting.   Musculoskeletal:  Negative for back pain.   Skin:  Negative for rash.   Neurological:  Negative for dizziness, seizures, syncope, numbness and headaches.   Psychiatric/Behavioral:  Negative for confusion, dysphoric mood, hallucinations, self-injury and suicidal ideas. The patient is not nervous/anxious.        Current Outpatient Medications on File Prior to Visit   Medication Sig    [DISCONTINUED] ARIPiprazole (ABILIFY) 5 mg tablet Take 5 mg by mouth daily    acetaminophen (TYLENOL) 500 mg tablet Take 500 mg by mouth every 6 (six) hours as needed for mild pain    albuterol (PROVENTIL HFA,VENTOLIN HFA) 90 mcg/act inhaler Inhale 2 puffs every 6 (six) hours as needed for wheezing    amLODIPine (NORVASC) 5 mg tablet TAKE 1 TABLET (5 MG TOTAL) BY MOUTH DAILY.    benztropine (COGENTIN) 1 mg tablet 1 mg 2 (two) times a day (Patient not taking: Reported on 3/18/2024)    multivitamin (THERAGRAN) TABS Take 1 tablet by mouth daily    omeprazole (PriLOSEC) 20 mg delayed release capsule Take 1 capsule (20 mg total) by mouth daily    oxyCODONE (Roxicodone) 5 immediate release tablet Take 1 tablet (5 mg total) by mouth every 4 (four) hours as needed for moderate pain Max Daily Amount: 30 mg Do not start before January 17, 2024. (Patient not taking: Reported on 1/25/2024)    polyethylene glycol (MIRALAX) 17 g packet Take 17 g by mouth daily    Sodium Fluoride 1.1 % PSTE Brush teeth one time before bedtime, don't rinse but expectorate excess.    Symbicort 80-4.5 MCG/ACT inhaler INHALE 2 PUFFS BY MOUTH TWICE A DAY RINSE MOUTH AFTER USE    [DISCONTINUED] Abilify Maintena 400 MG injection     [DISCONTINUED] hydrOXYzine HCL (ATARAX) 50 mg tablet Take 50 mg by mouth 3 (three) times a day as needed    [DISCONTINUED] hydrOXYzine pamoate (VISTARIL) 25 mg capsule TAKE 1 CAPSULE BY MOUTH  "ONCE A DAY AS NEEDED FOR INCREASE IN ANXIETY    [DISCONTINUED] lithium carbonate 300 mg capsule Take 300 mg by mouth 3 (three) times a day (Patient not taking: Reported on 3/18/2024)    [DISCONTINUED] mirtazapine (REMERON) 7.5 MG tablet daily at bedtime    [DISCONTINUED] QUEtiapine (SEROquel) 100 mg tablet Take 1 tablet (100 mg total) by mouth daily at bedtime       Objective     /77 (BP Location: Left arm, Patient Position: Sitting, Cuff Size: Large)   Pulse 103   Temp (!) 97.3 °F (36.3 °C) (Temporal)   Resp 18   Ht 5' 2.5\" (1.588 m)   Wt 85.6 kg (188 lb 12.8 oz)   SpO2 96%   BMI 33.98 kg/m²     Physical Exam  Vitals reviewed.   Constitutional:       General: She is not in acute distress.     Appearance: Normal appearance. She is well-developed. She is not ill-appearing, toxic-appearing or diaphoretic.   HENT:      Head: Normocephalic and atraumatic.      Right Ear: External ear normal.      Left Ear: External ear normal.      Nose: Nose normal.      Mouth/Throat:      Mouth: Mucous membranes are moist.      Pharynx: No oropharyngeal exudate or posterior oropharyngeal erythema.   Eyes:      General: No scleral icterus.        Right eye: No discharge.         Left eye: No discharge.      Extraocular Movements: Extraocular movements intact.      Conjunctiva/sclera: Conjunctivae normal.   Neck:      Thyroid: No thyromegaly.      Vascular: No JVD.      Trachea: No tracheal deviation.   Cardiovascular:      Rate and Rhythm: Normal rate and regular rhythm.      Heart sounds: Normal heart sounds. No murmur heard.     No friction rub. No gallop.   Pulmonary:      Effort: Pulmonary effort is normal. No respiratory distress.      Breath sounds: Normal breath sounds. No stridor. No wheezing or rhonchi.   Abdominal:      General: Bowel sounds are normal. There is no distension.      Palpations: Abdomen is soft.      Tenderness: There is no abdominal tenderness. There is no guarding.   Musculoskeletal:         " General: Normal range of motion.      Cervical back: Normal range of motion.      Right lower leg: No edema.      Left lower leg: No edema.   Skin:     General: Skin is warm.      Capillary Refill: Capillary refill takes less than 2 seconds.      Findings: No rash.   Neurological:      General: No focal deficit present.      Mental Status: She is alert and oriented to person, place, and time.      Cranial Nerves: No cranial nerve deficit.      Motor: No weakness or abnormal muscle tone.      Gait: Gait normal.   Psychiatric:         Mood and Affect: Mood normal.         Behavior: Behavior normal.       Steve Gaspar MD

## 2024-05-04 LAB — ZINC SERPL-MCNC: 78 UG/DL (ref 44–115)

## 2024-05-06 LAB
VIT A SERPL-MCNC: 51 UG/DL (ref 20.1–62)
VIT B1 BLD-SCNC: 90.7 NMOL/L (ref 66.5–200)

## 2024-05-08 ENCOUNTER — OFFICE VISIT (OUTPATIENT)
Dept: DENTISTRY | Facility: CLINIC | Age: 54
End: 2024-05-08

## 2024-05-08 VITALS — HEART RATE: 98 BPM | DIASTOLIC BLOOD PRESSURE: 77 MMHG | SYSTOLIC BLOOD PRESSURE: 105 MMHG

## 2024-05-08 DIAGNOSIS — K02.9 DENTAL CARIES: Primary | ICD-10-CM

## 2024-05-08 LAB
ALBUMIN SERPL BCP-MCNC: 4.1 G/DL (ref 3.5–5)
ALP SERPL-CCNC: 85 U/L (ref 34–104)
ALT SERPL W P-5'-P-CCNC: 33 U/L (ref 7–52)
ANION GAP SERPL CALCULATED.3IONS-SCNC: 10 MMOL/L (ref 4–13)
AST SERPL W P-5'-P-CCNC: 21 U/L (ref 13–39)
BILIRUB SERPL-MCNC: 0.78 MG/DL (ref 0.2–1)
BUN SERPL-MCNC: 11 MG/DL (ref 5–25)
CALCIUM SERPL-MCNC: 9.1 MG/DL (ref 8.4–10.2)
CHLORIDE SERPL-SCNC: 105 MMOL/L (ref 96–108)
CO2 SERPL-SCNC: 27 MMOL/L (ref 21–32)
CREAT SERPL-MCNC: 1.03 MG/DL (ref 0.6–1.3)
GFR SERPL CREATININE-BSD FRML MDRD: 62 ML/MIN/1.73SQ M
GLUCOSE P FAST SERPL-MCNC: 105 MG/DL (ref 65–99)
POTASSIUM SERPL-SCNC: 3.6 MMOL/L (ref 3.5–5.3)
PROT SERPL-MCNC: 7 G/DL (ref 6.4–8.4)
SODIUM SERPL-SCNC: 142 MMOL/L (ref 135–147)

## 2024-05-08 PROCEDURE — D2391 RESIN-BASED COMPOSITE - 1 SURFACE, POSTERIOR: HCPCS | Performed by: DENTIST

## 2024-05-08 PROCEDURE — D2330 RESIN-BASED COMPOSITE - 1 SURFACE, ANTERIOR: HCPCS | Performed by: DENTIST

## 2024-05-08 NOTE — DENTAL PROCEDURE DETAILS
Patient presents for a dental restoration and verbally consents for treatment:  Reviewed health history-  Pt is ASA type II  Treatment consents signed: Yes  Perio: Gingivitis  Pain Scale: 0  Caries Assessment: Medium    Radiographs: Films are current  Oral Hygiene instruction reviewed and given  Hygiene recall visits recommended to the patient    Patient agrees with the diagnosis of Caries and the proposed treatment plan for the resin restoration:  Tooth ##27 and #28  Dental Anesthesia:  1.7 ml 3% carbo no epi.  Material:   Etch Ivoclar bond and resin   Shade: Shade A2    Prognosis is Good.   Referrals Needed: No  Next visit: Visit date not found

## 2024-05-08 NOTE — PROGRESS NOTES
Composite Filling # 27/28    Judy Rios presents for composite filling. PMH reviewed, no changes.  CC- none  ASA II  Pain Scale 0    Discussed with patient need for RCT if pulp exposure occurs or in future if pulp is inflamed. Pt understands and consents.  Ex- Soft tissue Neg; no swelling noted;   Applied topical benzocaine, administered carps 2% lido 1:100k epi via infiltration LRQ  TX-Prepped tooth ##28 F and #27 F with 245 carbide on high speed. Caries removed with round carbide on slow speed. Isolation with cotton rolls and dri-angles.  *deep caries on #28 F; pt aware may need RCT if symptomatic?    Etch with 37% H2PO4, rinse, dry. Applied Adhese with 20 second scrub once, gentle air dry and light cured for 10s. Restored with Tetric bulk jaime shade A2 and light cured.    Refined with finishing burs, polished with enhance point. Verified occlusion and contacts. Pt tolerated procedure well; left alert and oriented.  NV- Restorative

## 2024-05-26 DIAGNOSIS — F31.9 BIPOLAR 1 DISORDER (HCC): ICD-10-CM

## 2024-05-28 RX ORDER — HYDROXYZINE 50 MG/1
50 TABLET, FILM COATED ORAL 3 TIMES DAILY PRN
Qty: 270 TABLET | Refills: 1 | Status: SHIPPED | OUTPATIENT
Start: 2024-05-28

## 2024-05-30 ENCOUNTER — OFFICE VISIT (OUTPATIENT)
Dept: DENTISTRY | Facility: CLINIC | Age: 54
End: 2024-05-30

## 2024-05-30 VITALS — SYSTOLIC BLOOD PRESSURE: 104 MMHG | TEMPERATURE: 97.5 F | HEART RATE: 103 BPM | DIASTOLIC BLOOD PRESSURE: 75 MMHG

## 2024-05-30 DIAGNOSIS — K91.2 POSTSURGICAL MALABSORPTION: ICD-10-CM

## 2024-05-30 DIAGNOSIS — Z98.84 STATUS POST BARIATRIC SURGERY: ICD-10-CM

## 2024-05-30 DIAGNOSIS — E55.9 VITAMIN D DEFICIENCY: ICD-10-CM

## 2024-05-30 DIAGNOSIS — Z98.84 BARIATRIC SURGERY STATUS: ICD-10-CM

## 2024-05-30 DIAGNOSIS — E53.8 VITAMIN B12 DEFICIENCY: Primary | ICD-10-CM

## 2024-05-30 DIAGNOSIS — E66.9 OBESITY, CLASS I, BMI 30-34.9: Primary | ICD-10-CM

## 2024-05-30 DIAGNOSIS — K02.9 TOOTH DECAY: Primary | ICD-10-CM

## 2024-05-30 DIAGNOSIS — E53.8 VITAMIN B12 DEFICIENCY: ICD-10-CM

## 2024-05-30 PROCEDURE — D2331 RESIN-BASED COMPOSITE - 2 SURFACES, ANTERIOR: HCPCS

## 2024-05-30 PROCEDURE — D2330 RESIN-BASED COMPOSITE - 1 SURFACE, ANTERIOR: HCPCS

## 2024-05-30 NOTE — PROGRESS NOTES
Composite Filling    Judy Rios presents for composite filling. PMH reviewed, no changes. ASA2    Discussed with patient need for RCT if pulp exposure occurs or in future if pulp is inflamed. Pt understands and consents.    Applied topical benzocaine, administered 1.5 carps 4% articaine 1:100k epi via mental block and mandibular infiltrations     Prepped tooth #22(V)I and #23V with 245 carbide on high speed. Caries removed with round carbide on slow speed. Placed tofflemire/palodent matrix. Isolation with cotton rolls and dri-angles    Etch with 37% H2PO4, rinse, dry. Applied Adhese with 20 second scrub once, gentle air dry and light cured for 10s. Restored with flowable shade A2 and light cured.    Refined with finishing burs, polished with enhance point. Verified occlusion and contacts. Pt left satisfied.    NV: #20V and #21V sierra

## 2024-06-05 ENCOUNTER — OFFICE VISIT (OUTPATIENT)
Dept: DENTISTRY | Facility: CLINIC | Age: 54
End: 2024-06-05

## 2024-06-05 VITALS — DIASTOLIC BLOOD PRESSURE: 75 MMHG | HEART RATE: 112 BPM | SYSTOLIC BLOOD PRESSURE: 108 MMHG | TEMPERATURE: 97.5 F

## 2024-06-05 DIAGNOSIS — K02.9 DENTAL CARIES: Primary | ICD-10-CM

## 2024-06-05 PROCEDURE — D2391 RESIN-BASED COMPOSITE - 1 SURFACE, POSTERIOR: HCPCS

## 2024-06-05 NOTE — DENTAL PROCEDURE DETAILS
Composite Restoration #20 B, #21 B    Judy Rios 54 y.o. female presents with self to [facility] for composite restoration  PMH reviewed, no changes, ASA III.   Pain level 0/10    Diagnosis:  Caries #20 b #21 B    Prognosis:  good    Consent:  Reviewed diagnosis of caries and tx plan for composite restorations.  Risks of specific procedure: need for RCT if pulp exposure occurs or in future if pulp is inflamed, need to revise tx plan based on extent of decay, damage to adjacent tooth and/or restoration,   Risks of any dental procedure: post procedural pain or sensitivity, local anesthetic side effects, allergic reaction to dental materials and medications, breakage of local anesthetic needle, aspiration of small dental tools, injury to nearby hard and soft tissues and anatomical structures.  Benefits: prevent further breakdown of tooth and its sequelae,   Alternatives:  no tx.  Patient understands and consents.    Anesthesia:  Topical 20% benzocaine.  1 carps 2% Lidocaine 1:100k epi via mental nerve block.    Procedure details:  Isolation: Cotton rolls, Dry angles, and High volume suction  Prepped teeth #20,21 with high speed handpiece.  Caries removed with round carbide on slow speed.  Etch with 37% H2PO4 15 seconds. Rinsed and suctioned.  GLUMA scrubbed in preps 60 seconds and air thinned  Applied Ivoclar Adhesive universal  with 20 second scrub, air dried, and light cured.  Restored with Ivoclar Tetric Evoceram and Tetric Evoflow Shade A3 and light cured.  Checked occlusion and adjusted with finishing burs.  Polished with enhance point.  Pt accepted esthetics of restoration in mirror    Patient dismissed ambulatory and alert.    Attending: Christy    NV: continue resins

## 2024-06-06 ENCOUNTER — OFFICE VISIT (OUTPATIENT)
Dept: DENTISTRY | Facility: CLINIC | Age: 54
End: 2024-06-06

## 2024-06-06 VITALS — DIASTOLIC BLOOD PRESSURE: 61 MMHG | HEART RATE: 114 BPM | SYSTOLIC BLOOD PRESSURE: 94 MMHG | TEMPERATURE: 98 F

## 2024-06-06 DIAGNOSIS — K02.9 TOOTH DECAY: Primary | ICD-10-CM

## 2024-06-06 PROCEDURE — D2391 RESIN-BASED COMPOSITE - 1 SURFACE, POSTERIOR: HCPCS

## 2024-06-06 NOTE — PROGRESS NOTES
Composite Filling    Judy Rios presents for composite filling. PMH reviewed, no changes. ASA2    Discussed with patient need for RCT if pulp exposure occurs or in future if pulp is inflamed. Pt understands and consents.    Applied topical benzocaine, administered 1 carps 4% articaine 1:100k epi via maxillary infiltration    Prepped tooth #5B with 245 carbide on high speed. Caries removed with round carbide on slow speed. Placed tofflemire/palodent matrix. Isolation with cotton rolls and dri-angles    Etch with 37% H2PO4, rinse, dry. Applied Adhese with 20 second scrub once, gentle air dry and light cured for 10s. Restored with flowable shade A2 and light cured.    Refined with finishing burs, polished with enhance point. Verified occlusion and contacts.     Pt reports staining on #24F. Pt does drink coffee, smoke, and red wine. Informed pt that these can cause staining and hopefully cleaning can help remove stain.     Pt left satisfied.    NV: #32O sierra

## 2024-06-11 ENCOUNTER — OFFICE VISIT (OUTPATIENT)
Dept: FAMILY MEDICINE CLINIC | Facility: CLINIC | Age: 54
End: 2024-06-11

## 2024-06-11 VITALS
RESPIRATION RATE: 18 BRPM | HEIGHT: 62 IN | BODY MASS INDEX: 34.78 KG/M2 | HEART RATE: 108 BPM | SYSTOLIC BLOOD PRESSURE: 106 MMHG | TEMPERATURE: 98 F | DIASTOLIC BLOOD PRESSURE: 75 MMHG | WEIGHT: 189 LBS | OXYGEN SATURATION: 98 %

## 2024-06-11 DIAGNOSIS — R00.2 PALPITATIONS: Primary | ICD-10-CM

## 2024-06-11 DIAGNOSIS — F31.9 BIPOLAR 1 DISORDER (HCC): ICD-10-CM

## 2024-06-11 DIAGNOSIS — G47.33 OSA (OBSTRUCTIVE SLEEP APNEA): ICD-10-CM

## 2024-06-11 PROCEDURE — G2211 COMPLEX E/M VISIT ADD ON: HCPCS | Performed by: FAMILY MEDICINE

## 2024-06-11 PROCEDURE — 99214 OFFICE O/P EST MOD 30 MIN: CPT | Performed by: FAMILY MEDICINE

## 2024-06-11 RX ORDER — ARIPIPRAZOLE 10 MG/1
10 TABLET, ORALLY DISINTEGRATING ORAL DAILY
Qty: 30 TABLET | Refills: 1 | Status: SHIPPED | OUTPATIENT
Start: 2024-06-11

## 2024-06-11 RX ORDER — MIRTAZAPINE 15 MG/1
15 TABLET, FILM COATED ORAL
Qty: 90 TABLET | Refills: 1 | Status: SHIPPED | OUTPATIENT
Start: 2024-06-11

## 2024-06-11 NOTE — PROGRESS NOTES
Ambulatory Visit  Name: Judy Rios      : 1970      MRN: [id number]  Encounter Provider: Steve Gaspar MD  Encounter Date: 2024   Encounter department: [facility] TIA    Assessment & Plan   1. Palpitations  Assessment & Plan:  Intermittent nighttime palpitations for the past 1 week  Limit caffeine consumption  Recommend labs but she currently declines since she recently did lab test from bariatric surgery team  Check Holter monitor    Orders:  -     Holter monitor; Future; Expected date: 2024  2. Bipolar 1 disorder (HCC)  Assessment & Plan:  Stable  Continue current medications  Follow-up with psychiatry outpatient    Orders:  -     mirtazapine (REMERON) 15 mg tablet; Take 1 tablet (15 mg total) by mouth daily at bedtime  -     ARIPiprazole (ABILIFY DISCMELT) 10 mg dispersible tablet; Take 1 tablet (10 mg total) by mouth daily  3. GINGER (obstructive sleep apnea)  Assessment & Plan:  Continue using CPAP nightly  Continue to work on weight loss       History of Present Illness     54-year-old female with a history of bipolar disorder, hypertension, asthma, and GINGER who presents today for medication evaluation.  Patient reports that she needs a prescription for Remeron 15 mg which was the dose that was prescribed by her psychiatrist.  She is in the process of establishing care with a new psychiatrist.  She needs her medications refilled until she can establish care with a new psychiatrist.  She is currently stable with her medication regimen.  Patient reports intermittent palpitations for the past week.  She has had 3 episodes of nighttime palpitations this past week.  Each episode lasted about 4 minutes.  Each episode is associated with anxiety and shortness of breath.  She denies any chest pain, lightheadedness, seizure or syncope.        Review of Systems   Constitutional:  Negative for chills, diaphoresis, fatigue and fever.   Respiratory:  Negative  "for shortness of breath and wheezing.    Cardiovascular:  Positive for palpitations. Negative for chest pain and leg swelling.   Gastrointestinal:  Negative for abdominal pain, nausea and vomiting.   Musculoskeletal:  Negative for back pain.   Skin:  Negative for rash.   Neurological:  Negative for seizures, syncope, weakness, numbness and headaches.   Psychiatric/Behavioral:  Negative for behavioral problems, confusion, hallucinations and suicidal ideas. The patient is not nervous/anxious.        Objective     /75 (BP Location: Left arm, Patient Position: Sitting, Cuff Size: Large)   Pulse (!) 108   Temp 98 °F (36.7 °C) (Temporal)   Resp 18   Ht 5' 2\" (1.575 m)   Wt 85.7 kg (189 lb)   SpO2 98%   BMI 34.57 kg/m²     Physical Exam  Constitutional:       General: She is not in acute distress.     Appearance: Normal appearance. She is well-developed. She is not ill-appearing, toxic-appearing or diaphoretic.   HENT:      Head: Normocephalic and atraumatic.      Right Ear: External ear normal.      Left Ear: External ear normal.      Nose: Nose normal.      Mouth/Throat:      Pharynx: No oropharyngeal exudate.   Eyes:      General: No scleral icterus.        Right eye: No discharge.         Left eye: No discharge.      Extraocular Movements: Extraocular movements intact.      Pupils: Pupils are equal, round, and reactive to light.   Cardiovascular:      Rate and Rhythm: Normal rate and regular rhythm.      Heart sounds: Normal heart sounds. No murmur heard.     No friction rub. No gallop.   Pulmonary:      Effort: Pulmonary effort is normal. No respiratory distress.      Breath sounds: Normal breath sounds. No stridor. No wheezing or rhonchi.   Abdominal:      General: Bowel sounds are normal. There is no distension.      Palpations: Abdomen is soft. There is no mass.      Tenderness: There is no abdominal tenderness. There is no guarding.      Hernia: No hernia is present.   Musculoskeletal:         " General: Normal range of motion.      Cervical back: Normal range of motion.      Right lower leg: No edema.      Left lower leg: No edema.   Skin:     General: Skin is warm.      Capillary Refill: Capillary refill takes less than 2 seconds.   Neurological:      General: No focal deficit present.      Mental Status: She is alert and oriented to person, place, and time.      Cranial Nerves: No cranial nerve deficit.      Motor: No weakness.      Gait: Gait normal.       Administrative Statements

## 2024-06-11 NOTE — ASSESSMENT & PLAN NOTE
Intermittent nighttime palpitations for the past 1 week  Limit caffeine consumption  Recommend labs but she currently declines since she recently did lab test from bariatric surgery team  Check Holter monitor

## 2024-06-17 ENCOUNTER — TELEPHONE (OUTPATIENT)
Dept: PSYCHIATRY | Facility: CLINIC | Age: 54
End: 2024-06-17

## 2024-06-18 ENCOUNTER — HOSPITAL ENCOUNTER (OUTPATIENT)
Dept: NON INVASIVE DIAGNOSTICS | Facility: HOSPITAL | Age: 54
Discharge: HOME | End: 2024-06-18
Attending: FAMILY MEDICINE
Payer: COMMERCIAL

## 2024-06-18 DIAGNOSIS — R00.2 PALPITATIONS: ICD-10-CM

## 2024-06-18 PROCEDURE — 93226 XTRNL ECG REC<48 HR SCAN A/R: CPT

## 2024-06-18 PROCEDURE — 93225 XTRNL ECG REC<48 HRS REC: CPT

## 2024-06-25 PROCEDURE — 93227 XTRNL ECG REC<48 HR R&I: CPT | Performed by: STUDENT IN AN ORGANIZED HEALTH CARE EDUCATION/TRAINING PROGRAM

## 2024-06-27 ENCOUNTER — OFFICE VISIT (OUTPATIENT)
Dept: DENTISTRY | Facility: CLINIC | Age: 54
End: 2024-06-27

## 2024-06-27 VITALS — HEART RATE: 96 BPM | SYSTOLIC BLOOD PRESSURE: 100 MMHG | TEMPERATURE: 97.7 F | DIASTOLIC BLOOD PRESSURE: 72 MMHG

## 2024-06-27 DIAGNOSIS — K02.9 CARIES: Primary | ICD-10-CM

## 2024-06-27 PROCEDURE — D2393 RESIN-BASED COMPOSITE - 3 SURFACES, POSTERIOR: HCPCS | Performed by: DENTIST

## 2024-06-27 NOTE — PROGRESS NOTES
Comprehensive Exam    Composite Filling    Judy Rios presents for composite filling. PMH reviewed, no changes.  Pt is wearing Cpap machine  CC- none  Asa II  Pain Scale 0    Discussed with patient need for RCT if pulp exposure occurs or in future if pulp is inflamed. Pt understands and consents.  Soft tissue : neg; no swelling.    Applied topical benzocaine, administered 1x carp 2% lido 1:100k epi via infiltration and IA block LRQ    Prepped tooth #32 DOL with 245 carbide on high speed. Caries removed with round carbide on slow speed. Placed tofflemire/palodent matrix. Isolation with cotton rolls and dri-angles    Etch with 37% H2PO4, rinse, dry. Applied Adhese with 20 second scrub once, gentle air dry and light cured for 10s. Restored with Tetric bulk jaime shade A2 and light cured.    Refined with finishing burs, polished with enhance point. Verified occlusion and contacts. Pt left satisfied.  NV- Recare/update radiographs

## 2024-06-28 ENCOUNTER — RA CDI HCC (OUTPATIENT)
Dept: OTHER | Facility: HOSPITAL | Age: 54
End: 2024-06-28

## 2024-07-09 ENCOUNTER — OFFICE VISIT (OUTPATIENT)
Dept: FAMILY MEDICINE CLINIC | Facility: CLINIC | Age: 54
End: 2024-07-09

## 2024-07-09 VITALS
HEIGHT: 62 IN | WEIGHT: 190.2 LBS | BODY MASS INDEX: 35 KG/M2 | TEMPERATURE: 98.5 F | HEART RATE: 86 BPM | OXYGEN SATURATION: 98 % | SYSTOLIC BLOOD PRESSURE: 102 MMHG | DIASTOLIC BLOOD PRESSURE: 72 MMHG | RESPIRATION RATE: 18 BRPM

## 2024-07-09 DIAGNOSIS — F31.9 BIPOLAR 1 DISORDER (HCC): ICD-10-CM

## 2024-07-09 DIAGNOSIS — F31.70 BIPOLAR DISORDER IN PARTIAL REMISSION, MOST RECENT EPISODE UNSPECIFIED TYPE (HCC): ICD-10-CM

## 2024-07-09 DIAGNOSIS — R00.2 PALPITATIONS: Primary | ICD-10-CM

## 2024-07-09 DIAGNOSIS — M17.0 OSTEOARTHRITIS OF BOTH KNEES, UNSPECIFIED OSTEOARTHRITIS TYPE: ICD-10-CM

## 2024-07-09 PROCEDURE — G2211 COMPLEX E/M VISIT ADD ON: HCPCS | Performed by: FAMILY MEDICINE

## 2024-07-09 PROCEDURE — 99214 OFFICE O/P EST MOD 30 MIN: CPT | Performed by: FAMILY MEDICINE

## 2024-07-09 RX ORDER — QUETIAPINE FUMARATE 200 MG/1
200 TABLET, FILM COATED ORAL 2 TIMES DAILY
Qty: 180 TABLET | Refills: 1 | Status: SHIPPED | OUTPATIENT
Start: 2024-07-09 | End: 2024-10-07

## 2024-07-09 NOTE — ASSESSMENT & PLAN NOTE
Stable  Continue Abilify, remeron, congetin, atarax and Seroquel  Can use Seroquel 200mg in am and 200mg in pm  She is on a waiting list for psychiatrist

## 2024-07-09 NOTE — ASSESSMENT & PLAN NOTE
Intermittent palpitations  Reviewed Holter results with patient which was unremarkable  Likely related to anxiety  Will continue to monitor and consider extended rhythm monitoring if symptoms continues to occur

## 2024-07-09 NOTE — PROGRESS NOTES
Ambulatory Visit  Name: Judy Rios      : 1970      MRN: [id number]  Encounter Provider: Steve Gaspar MD  Encounter Date: 2024   Encounter department: [facility] TIA    Assessment & Plan   1. Palpitations  Assessment & Plan:  Intermittent palpitations  Reviewed Holter results with patient which was unremarkable  Likely related to anxiety  Will continue to monitor and consider extended rhythm monitoring if symptoms continues to occur  2. Bipolar 1 disorder (HCC)  -     QUEtiapine (SEROquel) 200 mg tablet; Take 1 tablet (200 mg total) by mouth 2 (two) times a day  3. Bipolar disorder in partial remission, most recent episode unspecified type (HCC)  Assessment & Plan:  Stable  Continue Abilify, remeron, congetin, atarax and Seroquel  Can use Seroquel 200mg in am and 200mg in pm  She is on a waiting list for psychiatrist    4. Osteoarthritis of both knees, unspecified osteoarthritis type  Assessment & Plan:  Chronic bilateral knee pain  Recommend physical therapy but she currently declines  Continue Tylenol and topical NSAID  Follow-up in 2 weeks for cortisone injection       History of Present Illness     54-year-old female with a history of anxiety, bipolar disorder, hypertension, and asthma who presents today for follow-up.  Patient reports intermittent palpitations.  She was experiencing a lot of palpitations a couple weeks ago.  Her palpitation tends to last a few minutes.  She denies any associated chest pain, shortness of breath, and syncope.  She has not had any episode of palpitation in about 2 weeks.  She recently had a Holter monitor and wants to  review the results.  She has bilateral knee pain from her arthritis.  Her left knee has been bothering her a lot more lately.  Her knee pain is worse when she walks a long distance.  She denies any recent fall.  She has benefited from cortisone injection in the past.  She states that she is doing okay  "from a mental health perspective.  She is still on a waiting list for psychiatrist.  She would like to increase the dose of her Seroquel.  She was previously on 600 mg of Seroquel at 1 point.  She states that she has been taking 200 mg of Seroquel in the morning and 200 mg in the evening.  She states that this dose tends to help stabilize her mood better.          Review of Systems   Constitutional:  Negative for appetite change, chills, diaphoresis, fatigue and fever.   Eyes:  Negative for visual disturbance.   Respiratory:  Negative for cough, shortness of breath and wheezing.    Cardiovascular:  Positive for palpitations. Negative for chest pain and leg swelling.   Gastrointestinal:  Negative for abdominal pain, nausea and vomiting.   Musculoskeletal:  Positive for arthralgias.   Skin:  Negative for rash.   Neurological:  Negative for seizures, syncope, weakness, numbness and headaches.   Psychiatric/Behavioral:  Positive for sleep disturbance. Negative for confusion, hallucinations and suicidal ideas.        Objective     /72 (BP Location: Right arm, Patient Position: Sitting, Cuff Size: Standard)   Pulse 86   Temp 98.5 °F (36.9 °C) (Temporal)   Resp 18   Ht 5' 2\" (1.575 m)   Wt 86.3 kg (190 lb 3.2 oz)   SpO2 98%   BMI 34.79 kg/m²     Physical Exam  Constitutional:       General: She is not in acute distress.     Appearance: Normal appearance. She is well-developed. She is not ill-appearing, toxic-appearing or diaphoretic.   HENT:      Head: Normocephalic and atraumatic.      Right Ear: External ear normal.      Left Ear: External ear normal.      Mouth/Throat:      Pharynx: No oropharyngeal exudate.   Eyes:      General:         Right eye: No discharge.         Left eye: No discharge.      Pupils: Pupils are equal, round, and reactive to light.   Cardiovascular:      Rate and Rhythm: Normal rate and regular rhythm.      Heart sounds: Normal heart sounds. No murmur heard.     No friction rub. No " gallop.   Pulmonary:      Effort: Pulmonary effort is normal. No respiratory distress.      Breath sounds: No stridor. No wheezing.   Abdominal:      General: Bowel sounds are normal.      Palpations: Abdomen is soft. There is no mass.      Tenderness: There is no abdominal tenderness. There is no guarding.   Musculoskeletal:         General: No swelling or deformity. Normal range of motion.      Cervical back: Normal range of motion.      Right knee: Crepitus present. No swelling or deformity. Normal range of motion.      Left knee: Crepitus present. No swelling or deformity. Normal range of motion.   Lymphadenopathy:      Cervical: No cervical adenopathy.   Skin:     General: Skin is warm.      Capillary Refill: Capillary refill takes less than 2 seconds.   Neurological:      General: No focal deficit present.      Mental Status: She is alert and oriented to person, place, and time.      Cranial Nerves: No cranial nerve deficit.      Motor: No weakness.      Gait: Gait normal.       Administrative Statements

## 2024-07-09 NOTE — ASSESSMENT & PLAN NOTE
Chronic bilateral knee pain  Recommend physical therapy but she currently declines  Continue Tylenol and topical NSAID  Follow-up in 2 weeks for cortisone injection

## 2024-07-23 ENCOUNTER — OFFICE VISIT (OUTPATIENT)
Dept: DENTISTRY | Facility: CLINIC | Age: 54
End: 2024-07-23

## 2024-07-23 VITALS — SYSTOLIC BLOOD PRESSURE: 105 MMHG | TEMPERATURE: 98.2 F | DIASTOLIC BLOOD PRESSURE: 75 MMHG | HEART RATE: 108 BPM

## 2024-07-23 DIAGNOSIS — K05.6 PERIODONTAL DISEASE: Primary | ICD-10-CM

## 2024-07-23 PROCEDURE — D4910 PERIODONTAL MAINTENANCE: HCPCS | Performed by: DENTAL HYGIENIST

## 2024-07-23 NOTE — DENTAL PROCEDURE DETAILS
3 Month Periodontal Maintenance      REVIEWED MED HX: meds, allergies, health changes reviewed in EPIC  CHIEF CONCERN:  none   PAIN SCALE: 0  ASA CLASS:  ASA 2 - Patient with mild systemic disease with no functional limitations  PLAQUE:  mild  CALCULUS:  Light  BLEEDING:  light  STAIN: Light  PERIO: Localized mild bone loss, mild recession;  Pt's home care good    Hygiene Procedures: Scaled, Polished, Flossed and Used Cavitron    Oral Hygiene Instruction: Brushing minimum 2x daily for 2 minutes, daily flossing, Listerine, and Recommended soft toothbrush only    Visual and Tactile Intraoral/ Extraoral evaluation: Oral and Oropharyngeal cancer evaluation. No findings     REFERRALS: None    EXAM:   none  CARIES FINDINGS:  none    Next Recall:   Ex, Perio main - 50 min - 3 months from 7/23/24    Last BWX:  7/14/23  Last FMX :   7/11/22

## 2024-07-24 ENCOUNTER — PROCEDURE VISIT (OUTPATIENT)
Dept: FAMILY MEDICINE CLINIC | Facility: CLINIC | Age: 54
End: 2024-07-24

## 2024-07-24 VITALS
BODY MASS INDEX: 35.68 KG/M2 | WEIGHT: 193.9 LBS | SYSTOLIC BLOOD PRESSURE: 99 MMHG | HEART RATE: 98 BPM | RESPIRATION RATE: 18 BRPM | TEMPERATURE: 97.5 F | OXYGEN SATURATION: 95 % | HEIGHT: 62 IN | DIASTOLIC BLOOD PRESSURE: 71 MMHG

## 2024-07-24 DIAGNOSIS — Z23 ENCOUNTER FOR IMMUNIZATION: Primary | ICD-10-CM

## 2024-07-24 DIAGNOSIS — M17.0 OSTEOARTHRITIS OF BOTH KNEES, UNSPECIFIED OSTEOARTHRITIS TYPE: ICD-10-CM

## 2024-07-24 PROCEDURE — 99214 OFFICE O/P EST MOD 30 MIN: CPT | Performed by: FAMILY MEDICINE

## 2024-07-24 PROCEDURE — 20610 DRAIN/INJ JOINT/BURSA W/O US: CPT | Performed by: FAMILY MEDICINE

## 2024-07-24 RX ORDER — LIDOCAINE HYDROCHLORIDE 10 MG/ML
3 INJECTION, SOLUTION INFILTRATION; PERINEURAL
Status: COMPLETED | OUTPATIENT
Start: 2024-07-24 | End: 2024-07-24

## 2024-07-24 RX ORDER — TRIAMCINOLONE ACETONIDE 40 MG/ML
40 INJECTION, SUSPENSION INTRA-ARTICULAR; INTRAMUSCULAR
Status: COMPLETED | OUTPATIENT
Start: 2024-07-24 | End: 2024-07-24

## 2024-07-24 RX ORDER — ZOSTER VACCINE RECOMBINANT, ADJUVANTED 50 MCG/0.5
0.5 KIT INTRAMUSCULAR ONCE
Qty: 1 EACH | Refills: 1 | Status: SHIPPED | OUTPATIENT
Start: 2024-07-24 | End: 2024-07-24

## 2024-07-24 RX ADMIN — TRIAMCINOLONE ACETONIDE 40 MG: 40 INJECTION, SUSPENSION INTRA-ARTICULAR; INTRAMUSCULAR at 10:00

## 2024-07-24 RX ADMIN — LIDOCAINE HYDROCHLORIDE 3 ML: 10 INJECTION, SOLUTION INFILTRATION; PERINEURAL at 10:00

## 2024-07-24 NOTE — PROGRESS NOTES
"Ambulatory Visit  Name: Judy Rios      : 1970      MRN: [id number]  Encounter Provider: Steve Gaspar MD  Encounter Date: 2024   Encounter department: [facility] TIA    Assessment & Plan   1. Encounter for immunization  -     Zoster Vac Recomb Adjuvanted (Shingrix) 50 MCG/0.5ML SUSR; Inject 0.5 mL into a muscle once for 1 dose Repeat dose in 2 to 6 months  2. Osteoarthritis of both knees, unspecified osteoarthritis type  Assessment & Plan:  Chronic bilateral knee pain  Reviewed prior knee x-ray which confirmed arthritis  Recommend physical therapy but she currently declines  Continue Tylenol and topical NSAID  Patient received bilateral corticosteroid injection into the knee with immediate symptom relief.       History of Present Illness     54-year-old female with a history of bilateral knee pain.  She has been experiencing knee pain for the past couple years.  She denies any recent injury.  She has pain going up and down stairs.  She has received cortisone injection in the past which she found beneficial.  She currently manage her arthritis with topical anti-inflammatory medication as well as oral anti-inflammatory medication.        Review of Systems   Constitutional:  Negative for chills, diaphoresis and fever.   Respiratory:  Negative for cough, shortness of breath and wheezing.    Cardiovascular:  Negative for chest pain and palpitations.   Gastrointestinal:  Negative for abdominal pain, nausea and vomiting.   Musculoskeletal:  Positive for arthralgias.   Skin:  Negative for rash.   Neurological:  Negative for syncope.       Objective     BP 99/71 (BP Location: Right arm, Patient Position: Sitting, Cuff Size: Large)   Pulse 98   Temp 97.5 °F (36.4 °C) (Temporal)   Resp 18   Ht 5' 2\" (1.575 m)   Wt 88 kg (193 lb 14.4 oz)   SpO2 95%   BMI 35.46 kg/m²     Physical Exam  Constitutional:       General: She is not in acute distress.     " Appearance: Normal appearance. She is well-developed. She is not ill-appearing, toxic-appearing or diaphoretic.   HENT:      Head: Normocephalic and atraumatic.      Right Ear: External ear normal.      Left Ear: External ear normal.      Mouth/Throat:      Pharynx: No oropharyngeal exudate.   Eyes:      General:         Right eye: No discharge.         Left eye: No discharge.      Pupils: Pupils are equal, round, and reactive to light.   Cardiovascular:      Rate and Rhythm: Normal rate and regular rhythm.      Heart sounds: Normal heart sounds. No murmur heard.     No friction rub. No gallop.   Pulmonary:      Effort: Pulmonary effort is normal. No respiratory distress.      Breath sounds: No stridor. No wheezing.   Abdominal:      General: Bowel sounds are normal.      Palpations: Abdomen is soft. There is no mass.      Tenderness: There is no abdominal tenderness. There is no guarding.   Musculoskeletal:         General: No swelling or deformity. Normal range of motion.      Cervical back: Normal range of motion.      Right knee: Crepitus present. No swelling, deformity, effusion or erythema. Normal range of motion.      Left knee: Crepitus present. No swelling, deformity, effusion or erythema. Normal range of motion.   Lymphadenopathy:      Cervical: No cervical adenopathy.   Skin:     General: Skin is warm.      Capillary Refill: Capillary refill takes less than 2 seconds.   Neurological:      General: No focal deficit present.      Mental Status: She is alert and oriented to person, place, and time.      Cranial Nerves: No cranial nerve deficit.      Motor: No weakness.      Gait: Gait normal.         Large joint arthrocentesis: bilateral knee  Universal Protocol:  Consent: Verbal consent obtained.  Risks and benefits: risks, benefits and alternatives were discussed  Consent given by: patient  Patient understanding: patient states understanding of the procedure being performed  Patient consent: the patient's  understanding of the procedure matches consent given  Procedure consent: procedure consent matches procedure scheduled  Relevant documents: relevant documents present and verified  Site marked: the operative site was marked  Radiology Images displayed and confirmed. If images not available, report reviewed: imaging studies available  Patient identity confirmed: verbally with patient  Supporting Documentation  Indications: pain   Procedure Details  Location: knee - bilateral knee  Needle size: 25 G  Ultrasound guidance: no  Approach: lateral    Medications (Right): 3 mL lidocaine 1 %; 40 mg triamcinolone acetonide 40 mg/mLMedications (Left): 3 mL lidocaine 1 %; 40 mg triamcinolone acetonide 40 mg/mL   Patient tolerance: patient tolerated the procedure well with no immediate complications  Dressing:  Sterile dressing applied          Administrative Statements

## 2024-07-24 NOTE — ASSESSMENT & PLAN NOTE
Chronic bilateral knee pain  Reviewed prior knee x-ray which confirmed arthritis  Recommend physical therapy but she currently declines  Continue Tylenol and topical NSAID  Patient received bilateral corticosteroid injection into the knee with immediate symptom relief.

## 2024-08-02 DIAGNOSIS — F31.9 BIPOLAR 1 DISORDER (HCC): ICD-10-CM

## 2024-08-05 RX ORDER — ARIPIPRAZOLE 10 MG/1
10 TABLET, ORALLY DISINTEGRATING ORAL DAILY
Qty: 30 TABLET | Refills: 1 | Status: SHIPPED | OUTPATIENT
Start: 2024-08-05

## 2024-08-07 ENCOUNTER — OFFICE VISIT (OUTPATIENT)
Dept: BARIATRICS | Facility: CLINIC | Age: 54
End: 2024-08-07
Payer: COMMERCIAL

## 2024-08-07 VITALS
TEMPERATURE: 98.7 F | SYSTOLIC BLOOD PRESSURE: 108 MMHG | WEIGHT: 192.5 LBS | OXYGEN SATURATION: 97 % | BODY MASS INDEX: 34.11 KG/M2 | HEIGHT: 63 IN | DIASTOLIC BLOOD PRESSURE: 70 MMHG | HEART RATE: 102 BPM

## 2024-08-07 DIAGNOSIS — E66.9 OBESITY, CLASS I, BMI 30-34.9: ICD-10-CM

## 2024-08-07 DIAGNOSIS — K91.2 POSTSURGICAL MALABSORPTION: ICD-10-CM

## 2024-08-07 DIAGNOSIS — E53.8 VITAMIN B12 DEFICIENCY: ICD-10-CM

## 2024-08-07 DIAGNOSIS — Z48.815 ENCOUNTER FOR SURGICAL AFTERCARE FOLLOWING SURGERY OF DIGESTIVE SYSTEM: Primary | ICD-10-CM

## 2024-08-07 DIAGNOSIS — G47.33 OSA (OBSTRUCTIVE SLEEP APNEA): ICD-10-CM

## 2024-08-07 DIAGNOSIS — E55.9 VITAMIN D DEFICIENCY: ICD-10-CM

## 2024-08-07 DIAGNOSIS — Z98.84 BARIATRIC SURGERY STATUS: ICD-10-CM

## 2024-08-07 PROCEDURE — 99214 OFFICE O/P EST MOD 30 MIN: CPT | Performed by: NURSE PRACTITIONER

## 2024-08-07 RX ORDER — ERGOCALCIFEROL 1.25 MG/1
50000 CAPSULE ORAL 2 TIMES WEEKLY
Qty: 24 CAPSULE | Refills: 0 | Status: SHIPPED | OUTPATIENT
Start: 2024-08-08

## 2024-08-07 RX ORDER — ACETAMINOPHEN 160 MG
2000 TABLET,DISINTEGRATING ORAL DAILY
Qty: 90 CAPSULE | Refills: 3 | Status: SHIPPED | OUTPATIENT
Start: 2024-08-07

## 2024-08-07 RX ORDER — OMEPRAZOLE 20 MG/1
20 CAPSULE, DELAYED RELEASE ORAL DAILY
Qty: 30 CAPSULE | Refills: 0 | Status: SHIPPED | OUTPATIENT
Start: 2024-08-07

## 2024-08-07 RX ORDER — LANOLIN ALCOHOL/MO/W.PET/CERES
1000 CREAM (GRAM) TOPICAL DAILY
Qty: 90 TABLET | Refills: 3 | Status: SHIPPED | OUTPATIENT
Start: 2024-08-07

## 2024-08-07 NOTE — PATIENT INSTRUCTIONS
- vitamin B12 level is low. Please start on vitamin B12 1000 mcg once daily.   - Vitamin D level is low. Please start on vitamin D prescription - 50,000 IU twice a week. After you've completed this, please switch to a  vitamin D3 2000 IU daily.   - repeat labs in 3 months after you've completed prescription dose of vitamin D.   - follow up with our dietitian.   - follow up 3 months for post op support.   - Will make an appt with medical.   - start to taper off omeprazole - every other day and every 2 days then stop.

## 2024-08-07 NOTE — PROGRESS NOTES
Date of surgery: 01/06/2024  Procedure: RNY  Performing surgeon: Dr. Keyon Davis    Initial Weight - 200.5  Current Weight -192.5  Total Body Weight Loss (EWL)- 7.9  EWL% - 13  TWB % - 4

## 2024-08-07 NOTE — PROGRESS NOTES
Assessment/Plan:     Patient ID: Judy Rios is a 54 y.o. female.     Bariatric Surgery Status/BMI 34  status post robotic second stage band conversion to RNYGB, requiring extensive lysis of adhesions and partial gastrectomy, performed by Dr. Keyon Davis on 1/16/24. Here for 3rd post op visit. Overall doing fair - she is frustrated she is gaining weight. She has increased her caloric intake but has not seen any changes. , tolerating a regular diet. Denies having any abdominal pain, N/V/D/C, regurgitation, reflux or dyspahgia. Taking her MVI daily. Taking omeprazole once daily.     Vitamin D deficiency/Vitamin B12 deficiency - reviewed labs with patient. She is currently taking her bariatric multivitamins and calcium. She is deficient in vitamin D and vitamin B12. Advised to start on prescription dose vitamin D then take daily vitamin D3 2000 IU daily. Start on vitamin B12 1000 mcg once daily. Repeat labs in 3 months.       GINGER - continues to use her CPAP machine. Advised to continue and f/u with sleep medicine as scheduled     PLAN:     - taper off omeprazole.   - recommended to see our RD for now. Follow up in 3 months to reevalaute weight. Will make an appt to see MWM in the case she needs to have a consult.   - Continue with healthy lifestyle, adequate protein intake of 60 gm, fluid intake of at least 64 oz.   - Continue with MVI daily.   - Activity as tolerated.   - Labs ordered and will adjust accordingly if any deficiency.   - Follow up with RD and SW as needed.     Continued/Maintain healthy weight loss with good nutrition intakes.  Adequate hydration with at least 64oz. fluid intake.  Follow diet as discussed.  Follow vitamin and mineral recommendations as reviewed with you.  Exercise as tolerated.    Colonoscopy referral made: utd - DUE IN 2026  Mammogram - Due - she is working on this.     Follow-up in 3 months for post op support. We kindly ask that your arrive 15 minutes before your scheduled  appointment time with your provider to allow our staff to room you, get your vital signs and update your chart.    Get lab work done prior to annual visit. Please call the office if you need a script.  It is recommended to check with your insurance BEFORE getting labs done to make sure they are covered by your policy.      Call our office if you have any problems with abdominal pain especially associated with fever, chills, nausea, vomiting or any other concerns.    All  Post-bariatric surgery patients should be aware that very small quantities of any alcohol can cause impairment and it is very possible not to feel the effect. The effect can be in the system for several hours.  It is also a stomach irritant.     It is advised to AVOID alcohol, Nonsteroidal antiinflammatory drugs (NSAIDS) and nicotine of all forms . Any of these can cause stomach irritation/pain.    Discussed the effects of alcohol on a bariatric patient and the increased impairment risk.     Keep up the good work!     Postsurgical Malabsorption   -At risk for malabsorption of vitamins/minerals secondary to malabsorption and restriction of intake from bariatric surgery  - Currently taking adequate postop bariatric surgery vitamin supplementation  -Last set of bariatric labs completed on 05/24 and showed low vitamin D and low b12.   -Next set of bariatric labs ordered for approximately 3 months  -Patient received education about the importance of adhering to a lifelong supplementation regimen to avoid vitamin/mineral deficiencies      Diagnoses and all orders for this visit:    Encounter for surgical aftercare following surgery of digestive system    Bariatric surgery status  -     Vitamin D 25 hydroxy; Future  -     Vitamin B12; Future  -     Methylmalonic acid, serum; Future  -     omeprazole (PriLOSEC) 20 mg delayed release capsule; Take 1 capsule (20 mg total) by mouth daily    Postsurgical malabsorption  -     Vitamin D 25 hydroxy; Future  -      Vitamin B12; Future  -     Methylmalonic acid, serum; Future    Vitamin B12 deficiency  -     Vitamin B12; Future  -     Methylmalonic acid, serum; Future  -     vitamin B-12 (VITAMIN B-12) 1,000 mcg tablet; Take 1 tablet (1,000 mcg total) by mouth daily    Vitamin D deficiency  -     Vitamin D 25 hydroxy; Future  -     ergocalciferol (VITAMIN D2) 50,000 units; Take 1 capsule (50,000 Units total) by mouth 2 (two) times a week  -     Cholecalciferol (Vitamin D3) 50 MCG (2000 UT) capsule; Take 1 capsule (2,000 Units total) by mouth daily    Obesity, Class I, BMI 30-34.9    BMI 34.0-34.9,adult    GINGER (obstructive sleep apnea)         Subjective:      Patient ID: Judy Rios is a 54 y.o. female.    status post robotic second stage band conversion to RNYGB, requiring extensive lysis of adhesions and partial gastrectomy, performed by Dr. Keyon Davis on 1/16/24. Here for 3rd post op visit. Overall doing fair - she is frustrated she is gaining weight. She has increased her caloric intake but has not seen any changes. , tolerating a regular diet. Denies having any abdominal pain, N/V/D/C, regurgitation, reflux or dyspahgia. Taking her MVI daily. Taking omeprazole once daily.     Vitamin D deficiency/Vitamin B12 deficiency - reviewed labs with patient. She is currently taking her bariatric multivitamins and calcium. She is deficient in vitamin D and vitamin B12. Advised to start on prescription dose vitamin D then take daily vitamin D3 2000 IU daily. Start on vitamin B12 1000 mcg once daily. Repeat labs in 3 months.       GINGER - continues to use her CPAP machine. Advised to continue and f/u with sleep medicine as scheduled       Initial: 320 lbs (prior to band); 217 lbs (before RNYGB)   Current: 192.5 lbs  EWL: (Weight loss is ahead of schedule at this post surgical period.)  Nish 160 lbs (after the band); 185 lbs   Current BMI is Body mass index is 34.65 kg/m².    Tolerating a regular diet-yes  Eating at least 60 grams of  "protein per day-yes  Following 30/60 minute rule with liquids-yes  Drinking at least 64 ounces of fluid per day-yes  Drinking carbonated beverages- yes - OCCASIONALLY AND IS DIET  Sufficient exercise-yes - swimming twice a week; and recently joined the gym.   Using NSAIDs regularly-no  Using nicotine-no  Using alcohol-no  Supplements:  Multivitamins, Iron, and Calcium  - 3 calcium    EWL is 13%, which places the patient ahead of schedule for expected post surgical weight loss at this time.     The following portions of the patient's history were reviewed and updated as appropriate: allergies, current medications, past family history, past medical history, past social history, past surgical history and problem list.    Review of Systems   Constitutional:  Positive for unexpected weight change.   Respiratory: Negative.     Cardiovascular: Negative.    Gastrointestinal: Negative.    Musculoskeletal: Negative.    Neurological: Negative.    Psychiatric/Behavioral: Negative.           Objective:    /70   Pulse 102   Temp 98.7 °F (37.1 °C)   Ht 5' 2.5\" (1.588 m)   Wt 87.3 kg (192 lb 8 oz)   SpO2 97%   BMI 34.65 kg/m²      Physical Exam  Vitals and nursing note reviewed.   Constitutional:       Appearance: Normal appearance. She is obese.   Cardiovascular:      Rate and Rhythm: Normal rate and regular rhythm.      Pulses: Normal pulses.      Heart sounds: Normal heart sounds.   Pulmonary:      Effort: Pulmonary effort is normal.      Breath sounds: Normal breath sounds.   Abdominal:      General: Bowel sounds are normal.      Palpations: Abdomen is soft.      Tenderness: There is no abdominal tenderness.   Musculoskeletal:         General: Normal range of motion.   Skin:     General: Skin is warm and dry.   Neurological:      General: No focal deficit present.      Mental Status: She is alert and oriented to person, place, and time.   Psychiatric:         Mood and Affect: Mood normal.         Behavior: Behavior " normal.         Thought Content: Thought content normal.         Judgment: Judgment normal.

## 2024-08-12 NOTE — ASSESSMENT & PLAN NOTE
Internal Medicine A2  Progress Note    PCP: Rashard Cordero MD    SUBJECTIVE     Agitation overnight, 1 mg of Ativan given. Patient seen and examined at bedside this morning. She complains of back pain at this time, which seems to be chronic. Denies fevers, chills, nausea, vomiting, diarrhea, constipation.    OBJECTIVE     CURRENT MEDS  Current Facility-Administered Medications   Medication    apixaBAN (ELIQUIS) tablet 10 mg    [START ON 8/19/2024] apixaBAN (ELIQUIS) tablet 5 mg    docusate sodium-sennosides (SENOKOT S) 50-8.6 MG 1 tablet    lidocaine (LIDOCARE) 4 % patch 1 patch    LORazepam (ATIVAN) injection 1 mg    OLANZapine (ZyPREXA) tablet 2.5 mg    polyethylene glycol (MIRALAX) packet 17 g    thiamine (VITAMIN B-1) injection 100 mg    dextrose 50 % injection 25 g    dextrose 50 % injection 12.5 g    glucagon (GLUCAGEN) injection 1 mg    dextrose (GLUTOSE) 40 % gel 15 g    dextrose (GLUTOSE) 40 % gel 30 g    [Held by provider] sertraline (ZOLOFT) tablet 25 mg    sodium chloride 0.9 % flush bag 25 mL    sodium chloride 0.9 % injection 2 mL    sodium chloride (NORMAL SALINE) 0.9 % bolus 500 mL    acetaminophen (TYLENOL) tablet 650 mg    Or    acetaminophen (TYLENOL) suppository 650 mg    ondansetron (ZOFRAN ODT) disintegrating tablet 4 mg    Or    ondansetron (ZOFRAN) injection 4 mg    melatonin tablet 3 mg    atorvastatin (LIPITOR) tablet 20 mg    levothyroxine (SYNTHROID, LEVOTHROID) tablet 50 mcg        VITAL SIGNS    Vital Last Value 24 Hour Range   Temperature 97.7 °F (36.5 °C) (08/12/24 1512) Temp  Min: 97.5 °F (36.4 °C)  Max: 98.4 °F (36.9 °C)   Pulse 94 (08/12/24 1512) Pulse  Min: 89  Max: 99   Respiratory 16 (08/12/24 1104) Resp  Min: 16  Max: 17   Non-Invasive  Blood Pressure 93/53 (08/12/24 1512) BP  Min: 93/53  Max: 143/79   Pulse Oximetry 93 % (08/12/24 1512) SpO2  Min: 93 %  Max: 97 %     PHYSICAL EXAM  HENT:      Head: Normocephalic.   Cardiovascular:      Rate and Rhythm: Normal rate and regular  -Chronic bilateral knee pain  -Received bilateral knee cortisone injection today with significant improvement in symptoms  -We discussed that the anesthetic portion of injection will wear off in the next few hours and steroid will take effect within the next 24 hours. We discussed that  any hot swollen knee should be evaluated emergently  -Continue home physical therapy   rhythm.      Heart sounds: Murmur heard.  Pulmonary:      Breath sounds: Normal breath sounds.   Musculoskeletal:      Right lower leg: No edema.      Left lower leg: No edema.      Comments: Tenderness in the back on palpation.  Neurological:      Mental Status: She is alert.      Comments: Aox3. Globally weak but no focal findings.    INTAKE/OUTPUT    Intake/Output Summary (Last 24 hours) at 8/12/2024 1522  Last data filed at 8/11/2024 2154  Gross per 24 hour   Intake 132.01 ml   Output --   Net 132.01 ml       LABS  CBC  Recent Labs     08/10/24  1658 08/11/24  0637 08/12/24  0632   WBC 10.2 9.7 14.8*   RBC 3.47* 3.40* 3.33*   HGB 11.6* 11.4* 11.3*   HCT 34.7* 33.7* 34.4*   .0 99.1 103.3*   MCH 33.4 33.5 33.9   MCHC 33.4 33.8 32.8    233 277       CMP  Recent Labs     08/11/24  0637 08/11/24  1812 08/12/24  0632   SODIUM 136 141 141   POTASSIUM 4.2 3.6 3.8   CHLORIDE 103 105 104   CO2 30 30 34*   ANIONGAP 7 10 7   BUN 26* 24* 24*   CREATININE 0.44* 0.43* 0.52   GLUCOSE 104* 170* 80   CALCIUM 8.8 8.8 8.4       UA  Lab Results   Component Value Date    UWBC Negative 07/30/2024    URBC Negative 07/30/2024        MICRO  No components found for: \"CURINE\"  No components found for: \"CBLOOD\", \"CFUNGUSBL\"    IMAGING  No results found.      ASSESSMENT AND PLAN     78 year old female with a PMHx of hodgkin's lymphoma, right thyroid mass and provoked pulmonary embolus who is presenting with AMS.     #Acute bilateral PE- incidental   #History of provoked PE (2012)  #RV thrombus at outflow tract  -patient does not endorse any SOB, chest pain, VSS, saturating well on room air  -troponin negative NTproBNP 667  -PE was noted incidentally on CTA C/A/P that was ordered for occult malignancy rule out in the setting of suspicion for paraneoplastic syndrome, no malignancy noted but found to have acute bilateral segmental and smaller branches PE with possible RV thrombus at the outflow tract close to pulmonary valve  (likely mixing artifact)  Plan:  -heparin gtt  -Cardiology on consult: likely no plan for invasive procedure given age, co-morbidities and current functional status  -plan to transition to Eliquis 5-19 mg BID  -would recommend discussion with family and patient regarding risk and benefits including fall/bleeds        #Altered mental status  #Acute encephalopathy  - Ddx: metabolic vs polymedication vs stroke vs drug abuse   - A&Ox3, poor historian    - Labs: Na 134, TSH (7/2) 2.1  - CT head and CXR with no acute findings  - TSH, ammonia, B12, folate wnl  - HIV, RPR negative, Utox negative  - Blood cultures 8/1:  NGTD  - bMRI unremarkable for any etiology  - LP cell counts not remarkable for any infection, rapid meningitis/encephalitis panel negative  - Lyme and WNF negative, VDRL negative  -TPO negative  Plan:  - F/u CJD 14-3-3, paraneoplastic panel  - unable to process paraneoplastic panel due to insufficient CSF sample- lab sent out for [facility] paraneoplastic serum/blood work-up  - Neurology on board: empiric solumedrol 1000 mg (8/5-8/9) daily for 5 day course for paraneoplastic syndrome or autoimmune encephalitis   -given the steroid treatment is complete, and low likelihood of CJD ok for dc from neurology standpoint with follow-up if needed based on results of send-out lab to [facility]  - Delirium precautions  - PT/OT     #Leukocytosis  -WBC-14.8 which up trended from 9.7  -Patient did get 5-day course of IV steroids.     #Severe Malnutrition  - BMI 16.76  Plan  - Nutrition on board  - Dysphagia diet with protein supplementations but minimal PO intake  - Palliative conducted GOC discussion with son and patient: continue current intervention of AMS improvement  -consider re-eval and discussion with palliative care given above noted encephalopathy and PE and generalized de-conditioning and co-morbidities     #lumbar back pain  #Hx of hip fracture in 2018  - Pt refers lumbar back pain  - O/E Tenderness to  palpation over lumbar spinal and paraspinal regions  - 8/12/24 XR lumbar spine: No acute findings. Chronic degenerative changes.  Plan  - IM Toradol 15 mg given today and scheduled at 9 pm for another dose.    #Severe malnutrition in the context of chronic illness   - BMI: 15.42  Plan  - Nutrition on board  - Dysphagia diet with protein supplementations but minimal PO intake  - Palliative conducted GOC discussion with son and patient: continue current intervention of AMS improvement     #Hypothyroidism  -TSH 7/31 wnl  - continue Synthroid 50 mcg QD      #Hypokalemia- resolved  - replace K prn      #HLD  - Continue Lipitor 30 mg QD     #Dysphagia  #Candida esophagitis  #Thyroid mass s/p lobectomy on 11/2023   #Hodgkin lymphoma sp chemo and radiation in 1980s  - s/p fluconazole for 21 days (7/11-8/1)  - Continue with dysphagia diet     #Anxiety  #Insomnia  - patient not on olanzapine 5 mg at NH, was on sertraline 25 mg daily- will continue to hold these medications for now given labile mentation  - avoid hydroxyzine and ativan if possible- however, patient at times gets agitated at night   - good sleep hygiene and lights on/off to avoid environmental triggers        #Checklist  F: None  E: Replete PRN  N: IDSSI 4 diet  VTE Prophylaxis: Eliquis 5-10 mg  GI prophylaxis: None  PCP: Rashard Cordero MD  CODE STATUS:   Code Status: Selective Treatment/DNR    Disposition: GLORIA    Discussed with [facility] and Attending: Dr. Baldwin  Please see attending physician addendum/note for final recommendations.    Solomon Baldwin  Internal Medicine PGY-1    8/12/20243:22 PM
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
## 2024-08-29 ENCOUNTER — CLINICAL SUPPORT (OUTPATIENT)
Dept: BARIATRICS | Facility: CLINIC | Age: 54
End: 2024-08-29

## 2024-08-29 VITALS — WEIGHT: 192.1 LBS | BODY MASS INDEX: 34.58 KG/M2

## 2024-08-29 DIAGNOSIS — E66.9 OBESITY, CLASS I, BMI 30-34.9: Primary | ICD-10-CM

## 2024-08-29 DIAGNOSIS — K91.2 POSTSURGICAL MALABSORPTION: ICD-10-CM

## 2024-08-29 DIAGNOSIS — Z98.84 STATUS POST BARIATRIC SURGERY: ICD-10-CM

## 2024-08-29 PROCEDURE — RECHECK: Performed by: DIETITIAN, REGISTERED

## 2024-08-31 DIAGNOSIS — Z98.84 BARIATRIC SURGERY STATUS: ICD-10-CM

## 2024-09-02 DIAGNOSIS — J45.40 MODERATE PERSISTENT ASTHMA WITHOUT COMPLICATION: ICD-10-CM

## 2024-09-02 DIAGNOSIS — I10 PRIMARY HYPERTENSION: ICD-10-CM

## 2024-09-03 RX ORDER — ALBUTEROL SULFATE 90 UG/1
AEROSOL, METERED RESPIRATORY (INHALATION)
Qty: 18 G | Refills: 5 | Status: SHIPPED | OUTPATIENT
Start: 2024-09-03

## 2024-09-03 RX ORDER — AMLODIPINE BESYLATE 5 MG/1
5 TABLET ORAL DAILY
Qty: 90 TABLET | Refills: 1 | Status: SHIPPED | OUTPATIENT
Start: 2024-09-03 | End: 2025-03-02

## 2024-09-13 DIAGNOSIS — F31.9 BIPOLAR 1 DISORDER (HCC): ICD-10-CM

## 2024-09-16 DIAGNOSIS — F31.9 BIPOLAR 1 DISORDER (HCC): ICD-10-CM

## 2024-09-16 RX ORDER — MIRTAZAPINE 15 MG/1
15 TABLET, FILM COATED ORAL
Qty: 90 TABLET | Refills: 1 | Status: SHIPPED | OUTPATIENT
Start: 2024-09-16

## 2024-09-16 RX ORDER — ARIPIPRAZOLE 10 MG/1
10 TABLET, ORALLY DISINTEGRATING ORAL DAILY
Qty: 30 TABLET | Refills: 1 | Status: SHIPPED | OUTPATIENT
Start: 2024-09-16

## 2024-09-17 ENCOUNTER — TELEPHONE (OUTPATIENT)
Dept: BARIATRICS | Facility: CLINIC | Age: 54
End: 2024-09-17

## 2024-09-17 DIAGNOSIS — E55.9 VITAMIN D DEFICIENCY: ICD-10-CM

## 2024-09-17 RX ORDER — ERGOCALCIFEROL 1.25 MG/1
50000 CAPSULE, LIQUID FILLED ORAL 2 TIMES WEEKLY
Qty: 24 CAPSULE | Refills: 0 | Status: SHIPPED | OUTPATIENT
Start: 2024-09-19

## 2024-09-17 NOTE — TELEPHONE ENCOUNTER
LVM - told pt that the provider sent a script of vitamin d to his pharmacy and if he want he need to take them for 3 months and then go get lab work in 4 months.

## 2024-09-18 ENCOUNTER — TELEPHONE (OUTPATIENT)
Age: 54
End: 2024-09-18

## 2024-09-18 NOTE — TELEPHONE ENCOUNTER
"Behavioral Health Outpatient Intake Questions    Referred By   :     Please advise interviewee that they need to answer all questions truthfully to allow for best care, and any misrepresentations of information may affect their ability to be seen at this clinic   => Was this discussed? Yes     If Minor Child (under age 18)    Who is/are the legal guardian(s) of the child?     Is there a custody agreement? No     If \"YES\"- Custody orders must be obtained prior to scheduling the first appointment  In addition, Consent to Treatment must be signed by all legal guardians prior to scheduling the first appointment    If \"NO\"- Consent to Treatment must be signed by all legal guardians prior to scheduling the first appointment    Behavioral Health Outpatient Intake History -     Presenting Problem (in patient's own words): bipolar, scizoaffective disorder, anxiety, depression    Are there any communication barriers for this patient?     No                                               If yes, please describe barriers:     If there is a unique situation, please refer to [facility] Kincaid/Nata Salgado for final determination.    Are you taking any psychiatric medications? Yes     If \"YES\" -What are they  aripiprazole      If \"YES\" -Who prescribes?     Has the Patient previously received outpatient Talk Therapy or Medication Management from [facility]  No        If \"YES\"- When, Where and with Whom?          If \"NO\" -Has Patient received these services elsewhere?       If \"YES\" -When, Where, and with Whom?     Has the Patient abused alcohol or other substances in the last 6 months ? No  No concerns of substance abuse are reported.     If \"YES\" -What substance, How much, How often?      If illegal substance: Refer to [facility] (for TRUDY) or [facility] Offices.   If Alcohol in excess of 10 drinks per week:  Refer to [facility] (for TRUDY) or [facility] Offices    Legal History-     Is this treatment court ordered?    If \"yes \"send " "to :  Talk Therapy : Send to Kvng Salgado for final determination   Med Management: Send to Dr Dang for final determination     Has the Patient been convicted of a felony?  No   If \"Yes\" send to -When, What?   Talk Therapy: Send to Kvng Salgado for final determination   Med Management: Send to Dr Dang for final determination     ACCEPTED as a patient Yes  If \"Yes\" Appointment Date: 12/23    Referred Elsewhere? No  If “Yes” - (Where? Ex: [facility], [facility], [facility], [facility], etc.)        Name of Insurance Co:[employer]   Insurance ID#   Insurance Phone #   If ins is primary or secondary?primary  If patient is a minor, parents information such as Name, D.O.B of guarantor.  "

## 2024-09-19 ENCOUNTER — PATIENT OUTREACH (OUTPATIENT)
Dept: OTHER | Facility: OTHER | Age: 54
End: 2024-09-19

## 2024-09-19 ENCOUNTER — PATIENT OUTREACH (OUTPATIENT)
Dept: FAMILY MEDICINE CLINIC | Facility: CLINIC | Age: 54
End: 2024-09-19

## 2024-09-19 DIAGNOSIS — Z78.9 NEEDS ASSISTANCE WITH COMMUNITY RESOURCES: Primary | ICD-10-CM
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
## 2024-09-24 ENCOUNTER — OFFICE VISIT (OUTPATIENT)
Dept: FAMILY MEDICINE CLINIC | Facility: CLINIC | Age: 54
End: 2024-09-24

## 2024-09-24 ENCOUNTER — TELEPHONE (OUTPATIENT)
Dept: PSYCHIATRY | Facility: CLINIC | Age: 54
End: 2024-09-24

## 2024-09-24 VITALS
HEART RATE: 95 BPM | RESPIRATION RATE: 18 BRPM | DIASTOLIC BLOOD PRESSURE: 80 MMHG | SYSTOLIC BLOOD PRESSURE: 104 MMHG | OXYGEN SATURATION: 97 % | BODY MASS INDEX: 33.13 KG/M2 | WEIGHT: 187 LBS | TEMPERATURE: 97.6 F | HEIGHT: 63 IN

## 2024-09-24 DIAGNOSIS — M17.0 OSTEOARTHRITIS OF BOTH KNEES, UNSPECIFIED OSTEOARTHRITIS TYPE: Primary | ICD-10-CM

## 2024-09-24 DIAGNOSIS — Z98.84 STATUS POST BARIATRIC SURGERY: ICD-10-CM

## 2024-09-24 DIAGNOSIS — Z23 NEED FOR COVID-19 VACCINE: ICD-10-CM

## 2024-09-24 DIAGNOSIS — F31.9 BIPOLAR 1 DISORDER (HCC): ICD-10-CM

## 2024-09-24 DIAGNOSIS — G47.33 OSA (OBSTRUCTIVE SLEEP APNEA): ICD-10-CM

## 2024-09-24 DIAGNOSIS — Z72.0 TOBACCO ABUSE: ICD-10-CM

## 2024-09-24 DIAGNOSIS — I10 PRIMARY HYPERTENSION: ICD-10-CM

## 2024-09-24 DIAGNOSIS — Z23 ENCOUNTER FOR IMMUNIZATION: ICD-10-CM

## 2024-09-24 PROCEDURE — G0008 ADMIN INFLUENZA VIRUS VAC: HCPCS | Performed by: FAMILY MEDICINE

## 2024-09-24 PROCEDURE — 90673 RIV3 VACCINE NO PRESERV IM: CPT | Performed by: FAMILY MEDICINE

## 2024-09-24 PROCEDURE — 91320 SARSCV2 VAC 30MCG TRS-SUC IM: CPT | Performed by: FAMILY MEDICINE

## 2024-09-24 PROCEDURE — 90480 ADMN SARSCOV2 VAC 1/ONLY CMP: CPT | Performed by: FAMILY MEDICINE

## 2024-09-24 PROCEDURE — G0439 PPPS, SUBSEQ VISIT: HCPCS | Performed by: FAMILY MEDICINE

## 2024-09-24 PROCEDURE — 99214 OFFICE O/P EST MOD 30 MIN: CPT | Performed by: FAMILY MEDICINE

## 2024-09-24 RX ORDER — ZOSTER VACCINE RECOMBINANT, ADJUVANTED 50 MCG/0.5
0.5 KIT INTRAMUSCULAR ONCE
Qty: 1 EACH | Refills: 1 | Status: SHIPPED | OUTPATIENT
Start: 2024-09-24 | End: 2024-09-24

## 2024-09-24 NOTE — TELEPHONE ENCOUNTER
One week follow up call for New Patient appointment with   Orlin Mares MD   on 12/23/2024 was made on 09/24/2024. Writer informed patient of New Patient paperwork needing to be completed 5 days prior to the appointment. Writer confirmed paperwork has been sent via Circle.    Appointment was made on: 09/18/2024

## 2024-09-24 NOTE — PATIENT INSTRUCTIONS
Medicare Preventive Visit Patient Instructions  Thank you for completing your Welcome to Medicare Visit or Medicare Annual Wellness Visit today. Your next wellness visit will be due in one year (9/25/2025).  The screening/preventive services that you may require over the next 5-10 years are detailed below. Some tests may not apply to you based off risk factors and/or age. Screening tests ordered at today's visit but not completed yet may show as past due. Also, please note that scanned in results may not display below.  Preventive Screenings:  Service Recommendations Previous Testing/Comments   Colorectal Cancer Screening  * Colonoscopy    * Fecal Occult Blood Test (FOBT)/Fecal Immunochemical Test (FIT)  * Fecal DNA/Cologuard Test  * Flexible Sigmoidoscopy Age: 45-75 years old   Colonoscopy: every 10 years (may be performed more frequently if at higher risk)  OR  FOBT/FIT: every 1 year  OR  Cologuard: every 3 years  OR  Sigmoidoscopy: every 5 years  Screening may be recommended earlier than age 45 if at higher risk for colorectal cancer. Also, an individualized decision between you and your healthcare provider will decide whether screening between the ages of 76-85 would be appropriate. Colonoscopy: 04/25/2022  FOBT/FIT: Not on file  Cologuard: Not on file  Sigmoidoscopy: Not on file    Screening Current     Breast Cancer Screening Age: 40+ years old  Frequency: every 1-2 years  Not required if history of left and right mastectomy Mammogram: Not on file        Cervical Cancer Screening Between the ages of 21-29, pap smear recommended once every 3 years.   Between the ages of 30-65, can perform pap smear with HPV co-testing every 5 years.   Recommendations may differ for women with a history of total hysterectomy, cervical cancer, or abnormal pap smears in past. Pap Smear: Not on file        Hepatitis C Screening Once for adults born between 1945 and 1965  More frequently in patients at high risk for Hepatitis C Hep  C Antibody: 04/04/2022    Screening Current   Diabetes Screening 1-2 times per year if you're at risk for diabetes or have pre-diabetes Fasting glucose: 105 mg/dL (5/1/2024)  A1C: 5.2 % (6/14/2023)  Screening Current   Cholesterol Screening Once every 5 years if you don't have a lipid disorder. May order more often based on risk factors. Lipid panel: 06/14/2023    Screening Current     Other Preventive Screenings Covered by Medicare:  Abdominal Aortic Aneurysm (AAA) Screening: covered once if your at risk. You're considered to be at risk if you have a family history of AAA.  Lung Cancer Screening: covers low dose CT scan once per year if you meet all of the following conditions: (1) Age 55-77; (2) No signs or symptoms of lung cancer; (3) Current smoker or have quit smoking within the last 15 years; (4) You have a tobacco smoking history of at least 20 pack years (packs per day multiplied by number of years you smoked); (5) You get a written order from a healthcare provider.  Glaucoma Screening: covered annually if you're considered high risk: (1) You have diabetes OR (2) Family history of glaucoma OR (3)  aged 50 and older OR (4)  American aged 65 and older  Osteoporosis Screening: covered every 2 years if you meet one of the following conditions: (1) You're estrogen deficient and at risk for osteoporosis based off medical history and other findings; (2) Have a vertebral abnormality; (3) On glucocorticoid therapy for more than 3 months; (4) Have primary hyperparathyroidism; (5) On osteoporosis medications and need to assess response to drug therapy.   Last bone density test (DXA Scan): Not on file.  HIV Screening: covered annually if you're between the age of 15-65. Also covered annually if you are younger than 15 and older than 65 with risk factors for HIV infection. For pregnant patients, it is covered up to 3 times per pregnancy.    Immunizations:  Immunization Recommendations   Influenza  Vaccine Annual influenza vaccination during flu season is recommended for all persons aged >= 6 months who do not have contraindications   Pneumococcal Vaccine   * Pneumococcal conjugate vaccine = PCV13 (Prevnar 13), PCV15 (Vaxneuvance), PCV20 (Prevnar 20)  * Pneumococcal polysaccharide vaccine = PPSV23 (Pneumovax) Adults 19-65 yo with certain risk factors or if 65+ yo  If never received any pneumonia vaccine: recommend Prevnar 20 (PCV20)  Give PCV20 if previously received 1 dose of PCV13 or PPSV23   Hepatitis B Vaccine 3 dose series if at intermediate or high risk (ex: diabetes, end stage renal disease, liver disease)   Respiratory syncytial virus (RSV) Vaccine - COVERED BY MEDICARE PART D  * RSVPreF3 (Arexvy) CDC recommends that adults 60 years of age and older may receive a single dose of RSV vaccine using shared clinical decision-making (SCDM)   Tetanus (Td) Vaccine - COST NOT COVERED BY MEDICARE PART B Following completion of primary series, a booster dose should be given every 10 years to maintain immunity against tetanus. Td may also be given as tetanus wound prophylaxis.   Tdap Vaccine - COST NOT COVERED BY MEDICARE PART B Recommended at least once for all adults. For pregnant patients, recommended with each pregnancy.   Shingles Vaccine (Shingrix) - COST NOT COVERED BY MEDICARE PART B  2 shot series recommended in those 19 years and older who have or will have weakened immune systems or those 50 years and older     Health Maintenance Due:      Topic Date Due   • Breast Cancer Screening: Mammogram  Never done   • Colorectal Cancer Screening  04/25/2032   • HIV Screening  Completed   • Hepatitis C Screening  Completed     Immunizations Due:      Topic Date Due   • COVID-19 Vaccine (4 - 2023-24 season) 09/01/2024   • Influenza Vaccine (1) 09/01/2024     Advance Directives   What are advance directives?  Advance directives are legal documents that state your wishes and plans for medical care. These plans are  made ahead of time in case you lose your ability to make decisions for yourself. Advance directives can apply to any medical decision, such as the treatments you want, and if you want to donate organs.   What are the types of advance directives?  There are many types of advance directives, and each state has rules about how to use them. You may choose a combination of any of the following:  Living will:  This is a written record of the treatment you want. You can also choose which treatments you do not want, which to limit, and which to stop at a certain time. This includes surgery, medicine, IV fluid, and tube feedings.   Durable power of  for healthcare (DPAHC):  This is a written record that states who you want to make healthcare choices for you when you are unable to make them for yourself. This person, called a proxy, is usually a family member or a friend. You may choose more than 1 proxy.  Do not resuscitate (DNR) order:  A DNR order is used in case your heart stops beating or you stop breathing. It is a request not to have certain forms of treatment, such as CPR. A DNR order may be included in other types of advance directives.  Medical directive:  This covers the care that you want if you are in a coma, near death, or unable to make decisions for yourself. You can list the treatments you want for each condition. Treatment may include pain medicine, surgery, blood transfusions, dialysis, IV or tube feedings, and a ventilator (breathing machine).  Values history:  This document has questions about your views, beliefs, and how you feel and think about life. This information can help others choose the care that you would choose.  Why are advance directives important?  An advance directive helps you control your care. Although spoken wishes may be used, it is better to have your wishes written down. Spoken wishes can be misunderstood, or not followed. Treatments may be given even if you do not want them.  An advance directive may make it easier for your family to make difficult choices about your care.   Weight Management   Why it is important to manage your weight:  Being overweight increases your risk of health conditions such as heart disease, high blood pressure, type 2 diabetes, and certain types of cancer. It can also increase your risk for osteoarthritis, sleep apnea, and other respiratory problems. Aim for a slow, steady weight loss. Even a small amount of weight loss can lower your risk of health problems.  How to lose weight safely:  A safe and healthy way to lose weight is to eat fewer calories and get regular exercise. You can lose up about 1 pound a week by decreasing the number of calories you eat by 500 calories each day.   Healthy meal plan for weight management:  A healthy meal plan includes a variety of foods, contains fewer calories, and helps you stay healthy. A healthy meal plan includes the following:  Eat whole-grain foods more often.  A healthy meal plan should contain fiber. Fiber is the part of grains, fruits, and vegetables that is not broken down by your body. Whole-grain foods are healthy and provide extra fiber in your diet. Some examples of whole-grain foods are whole-wheat breads and pastas, oatmeal, brown rice, and bulgur.  Eat a variety of vegetables every day.  Include dark, leafy greens such as spinach, kale, saroj greens, and mustard greens. Eat yellow and orange vegetables such as carrots, sweet potatoes, and winter squash.   Eat a variety of fruits every day.  Choose fresh or canned fruit (canned in its own juice or light syrup) instead of juice. Fruit juice has very little or no fiber.  Eat low-fat dairy foods.  Drink fat-free (skim) milk or 1% milk. Eat fat-free yogurt and low-fat cottage cheese. Try low-fat cheeses such as mozzarella and other reduced-fat cheeses.  Choose meat and other protein foods that are low in fat.  Choose beans or other legumes such as split peas or  lentils. Choose fish, skinless poultry (chicken or turkey), or lean cuts of red meat (beef or pork). Before you cook meat or poultry, cut off any visible fat.   Use less fat and oil.  Try baking foods instead of frying them. Add less fat, such as margarine, sour cream, regular salad dressing and mayonnaise to foods. Eat fewer high-fat foods. Some examples of high-fat foods include french fries, doughnuts, ice cream, and cakes.  Eat fewer sweets.  Limit foods and drinks that are high in sugar. This includes candy, cookies, regular soda, and sweetened drinks.  Exercise:  Exercise at least 30 minutes per day on most days of the week. Some examples of exercise include walking, biking, dancing, and swimming. You can also fit in more physical activity by taking the stairs instead of the elevator or parking farther away from stores. Ask your healthcare provider about the best exercise plan for you.      © Copyright [employer] 2018 Information is for End User's use only and may not be sold, redistributed or otherwise used for commercial purposes. All illustrations and images included in CareNotes® are the copyrighted property of [employer] Inc[employer] or [employer]

## 2024-09-24 NOTE — ASSESSMENT & PLAN NOTE
Stable  Continue Seroquel, Remeron, Cogentin, and Abilify  Patient has psychiatric evaluation coming up december 2024
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
## 2024-09-24 NOTE — ASSESSMENT & PLAN NOTE
Worsening bilateral knee pain  -History of osteoarthritis  -Referral to orthopedics  Orders:    Ambulatory Referral to Orthopedic Surgery; Future

## 2024-09-25 ENCOUNTER — APPOINTMENT (OUTPATIENT)
Dept: RADIOLOGY | Facility: MEDICAL CENTER | Age: 54
End: 2024-09-25
Payer: COMMERCIAL

## 2024-09-25 ENCOUNTER — OFFICE VISIT (OUTPATIENT)
Dept: OBGYN CLINIC | Facility: MEDICAL CENTER | Age: 54
End: 2024-09-25
Payer: COMMERCIAL

## 2024-09-25 VITALS
DIASTOLIC BLOOD PRESSURE: 72 MMHG | HEART RATE: 91 BPM | WEIGHT: 187 LBS | BODY MASS INDEX: 33.13 KG/M2 | HEIGHT: 63 IN | SYSTOLIC BLOOD PRESSURE: 101 MMHG

## 2024-09-25 DIAGNOSIS — M25.561 PAIN IN BOTH KNEES, UNSPECIFIED CHRONICITY: ICD-10-CM

## 2024-09-25 DIAGNOSIS — M25.562 PAIN IN BOTH KNEES, UNSPECIFIED CHRONICITY: ICD-10-CM

## 2024-09-25 DIAGNOSIS — M25.562 PAIN IN BOTH KNEES, UNSPECIFIED CHRONICITY: Primary | ICD-10-CM

## 2024-09-25 DIAGNOSIS — M25.561 PAIN IN BOTH KNEES, UNSPECIFIED CHRONICITY: Primary | ICD-10-CM

## 2024-09-25 DIAGNOSIS — M17.0 OSTEOARTHRITIS OF BOTH KNEES, UNSPECIFIED OSTEOARTHRITIS TYPE: ICD-10-CM

## 2024-09-25 PROCEDURE — 73564 X-RAY EXAM KNEE 4 OR MORE: CPT

## 2024-09-25 PROCEDURE — 99203 OFFICE O/P NEW LOW 30 MIN: CPT | Performed by: ORTHOPAEDIC SURGERY

## 2024-09-25 NOTE — PROGRESS NOTES
Ortho Sports Medicine Knee Visit     Assesment:   bilateral knee right severe medial DJD and left severe lateral DJD with otherwise mild to moderate tricompartmental osteoarthritis    Plan:    Conservative treatment:    Ice to knee for 20 minutes at least 1-2 times daily.  PT for ROM/strengthening to knee, hip and core.    Imaging:    All imaging from today was reviewed by myself and explained to the patient.       Injection:    No Injection planned at this time.  Consider future csi    Surgery:     No surgery is recommended at this point, continue with conservative treatment plan as noted.        History of Present Illness:    The patient is a 54 y.o. female  referred to me by their primary care physician, seen in clinic for consultation of right and left knee pain.      She has had pain for several months.  She has had injections in the past.  Left knee is painful and she has trouble with flexion. Feels like it has fluid.  No injury recently.  She fell on knees in 2010.    She has done cortisone injections in the knee with minimal relief.        Knee Surgical History:  None    Past Medical, Social and Family History:  Past Medical History:   Diagnosis Date    Arthritis     Asthma     Back pain     Bipolar 1 disorder (HCC)     Cancer (HCC)     right breast    Constipation     CPAP (continuous positive airway pressure) dependence     Depression     GERD (gastroesophageal reflux disease)     Hiatal hernia     HTN (hypertension)     Hypertension     Mental health problem     Obesity (BMI 35.0-39.9 without comorbidity)     Sleep apnea     Wears glasses      Past Surgical History:   Procedure Laterality Date    ABDOMINOPLASTY      BREAST SURGERY Bilateral 2014    mastectomy - R side cancerous, L side prophylactic    COLONOSCOPY      COSMETIC SURGERY      breast reconstruction    EGD      GASTRIC BYPASS LAPAROSCOPIC N/A 10/03/2023    Procedure: LAPAROSCOPIC REMOVAL OF ADJUSTABLE GASTRIC BAND W/ ROBOT & INTROPERATIVE EGD;   Surgeon: Julian Davis MD;  Location: [facility];  Service: Bariatrics    HYSTERECTOMY      LAPAROSCOPIC GASTRIC BANDING      MASTECTOMY      R side cancerous, L side prophylactic (per pt)    AK LAPS GSTR RSTCV PX W/BYP CHEN-EN-Y LIMB <150 CM N/A 1/16/2024    Procedure: R-N-Y GASTRIC BYPASS W/ ROBOTICS & INTRAOPERATIVE EGD;  Surgeon: Julian Davis MD;  Location: [facility];  Service: Bariatrics     No Known Allergies  Current Outpatient Medications on File Prior to Visit   Medication Sig Dispense Refill    acetaminophen (TYLENOL) 500 mg tablet Take 500 mg by mouth every 6 (six) hours as needed for mild pain      albuterol (PROVENTIL HFA,VENTOLIN HFA) 90 mcg/act inhaler INHALE 2 PUFFS EVERY 6 HOURS AS NEEDED FOR WHEEZING 18 g 5    amLODIPine (NORVASC) 5 mg tablet TAKE 1 TABLET (5 MG TOTAL) BY MOUTH DAILY. 90 tablet 1    ARIPiprazole (ABILIFY DISCMELT) 10 mg dispersible tablet TAKE 1 TABLET BY MOUTH EVERY DAY 30 tablet 1    benztropine (COGENTIN) 1 mg tablet 1 mg 2 (two) times a day      Cholecalciferol (Vitamin D3) 50 MCG (2000 UT) capsule Take 1 capsule (2,000 Units total) by mouth daily 90 capsule 3    ergocalciferol (VITAMIN D2) 50,000 units Take 1 capsule (50,000 Units total) by mouth 2 (two) times a week 24 capsule 0    hydrOXYzine HCL (ATARAX) 50 mg tablet TAKE 1 TABLET BY MOUTH 3 TIMES A DAY AS NEEDED FOR ANXIETY. 270 tablet 1    mirtazapine (REMERON) 15 mg tablet TAKE 1 TABLET BY MOUTH DAILY AT BEDTIME 90 tablet 1    multivitamin (THERAGRAN) TABS Take 1 tablet by mouth daily      omeprazole (PriLOSEC) 20 mg delayed release capsule TAKE 1 CAPSULE BY MOUTH EVERY DAY 90 capsule 1    polyethylene glycol (MIRALAX) 17 g packet Take 17 g by mouth daily      QUEtiapine (SEROquel) 200 mg tablet Take 1 tablet (200 mg total) by mouth 2 (two) times a day 180 tablet 1    Sodium Fluoride 1.1 % PSTE Brush teeth one time before bedtime, don't rinse but expectorate excess. 100 mL 2    vitamin B-12 (VITAMIN B-12) 1,000  mcg tablet Take 1 tablet (1,000 mcg total) by mouth daily 90 tablet 3    Symbicort 80-4.5 MCG/ACT inhaler INHALE 2 PUFFS BY MOUTH TWICE A DAY RINSE MOUTH AFTER USE (Patient not taking: Reported on 2024) 10.2 g 2    [] Zoster Vac Recomb Adjuvanted (Shingrix) 50 MCG/0.5ML SUSR Inject 0.5 mL into a muscle once for 1 dose Repeat dose in 2 to 6 months 1 each 1     No current facility-administered medications on file prior to visit.     Social History     Socioeconomic History    Marital status: Single     Spouse name: Not on file    Number of children: Not on file    Years of education: Not on file    Highest education level: Not on file   Occupational History    Not on file   Tobacco Use    Smoking status: Former     Current packs/day: 0.00     Types: Cigarettes     Quit date: 2023     Years since quittin.5     Passive exposure: Past    Smokeless tobacco: Never   Vaping Use    Vaping status: Never Used   Substance and Sexual Activity    Alcohol use: Not Currently    Drug use: Not Currently    Sexual activity: Not on file   Other Topics Concern    Not on file   Social History Narrative    Not on file     Social Determinants of Health     Financial Resource Strain: Low Risk  (2024)    Overall Financial Resource Strain (CARDIA)     Difficulty of Paying Living Expenses: Not hard at all   Recent Concern: Financial Resource Strain - High Risk (2024)    Overall Financial Resource Strain (CARDIA)     Difficulty of Paying Living Expenses: Hard   Food Insecurity: No Food Insecurity (2024)    Hunger Vital Sign     Worried About Running Out of Food in the Last Year: Never true     Ran Out of Food in the Last Year: Never true   Recent Concern: Food Insecurity - Food Insecurity Present (2024)    Hunger Vital Sign     Worried About Running Out of Food in the Last Year: Sometimes true     Ran Out of Food in the Last Year: Sometimes true   Transportation Needs: No Transportation Needs (2024)  "   PRAPARE - Transportation     Lack of Transportation (Medical): No     Lack of Transportation (Non-Medical): No   Physical Activity: Not on file   Stress: Not on file   Social Connections: Not on file   Intimate Partner Violence: Not on file   Housing Stability: Low Risk  (9/24/2024)    Housing Stability Vital Sign     Unable to Pay for Housing in the Last Year: No     Number of Times Moved in the Last Year: 1     Homeless in the Last Year: No         I have reviewed the past medical, surgical, social and family history, medications and allergies as documented in the EMR.    Review of systems: ROS is negative other than that noted in the HPI.  Constitutional: Negative for fatigue and fever.   HENT: Negative for sore throat.    Respiratory: Negative for shortness of breath.    Cardiovascular: Negative for chest pain.   Gastrointestinal: Negative for abdominal pain.   Endocrine: Negative for cold intolerance and heat intolerance.   Genitourinary: Negative for flank pain.   Musculoskeletal: Negative for back pain.   Skin: Negative for rash.   Allergic/Immunologic: Negative for immunocompromised state.   Neurological: Negative for dizziness.   Psychiatric/Behavioral: Negative for agitation.      Physical Exam:    Blood pressure 101/72, pulse 91, height 5' 2.5\" (1.588 m), weight 84.8 kg (187 lb), not currently breastfeeding.    General/Constitutional: NAD, well developed, well nourished  HENT: Normocephalic, atraumatic  CV: Intact distal pulses, regular rate  Resp: No respiratory distress or labored breathing  Lymphatic: No lymphadenopathy palpated  Neuro: Alert and Oriented x 3, no focal deficits  Psych: Normal mood, normal affect, normal judgement, normal behavior  Skin: Warm, dry, no rashes, no erythema       Knee Exam (focused):                  RIGHT LEFT   ROM:   0-130 0-130   Palpation: Effusion negative negative     MJL tenderness Positive Positive     LJL tenderness Positive Positive   Instability: Varus " stable stable     Valgus stable stable   Special Tests: Lachman Negative Negative     Posterior drawer Negative Negative     Anterior drawer Negative Negative     Pivot shift not tested not tested     Dial not tested not tested   Patella: Palpation no tenderness no tenderness     Mobility 1/4 1/4     Apprehension Negative Negative   Other: Single leg 1/4 squat not tested not tested      LE NV Exam: +2 DP/PT pulses bilaterally  Sensation intact to light touch L2-S1 bilaterally     Bilateral hip ROM demonstrates no pain actively or passively    No calf tenderness to palpation bilaterally    Knee Imaging    X-rays of the bilateral knee were reviewed, which demonstrate with severe right medial DJD and severe left lateral DJD with moderate tricompartmental to arthritis otherwise.  I have reviewed the radiology report and agree with their impression.

## 2024-09-26 ENCOUNTER — PATIENT OUTREACH (OUTPATIENT)
Dept: FAMILY MEDICINE CLINIC | Facility: CLINIC | Age: 54
End: 2024-09-26

## 2024-09-26 NOTE — PROGRESS NOTES
SASHA COOL did place second call to Pt. SASHA COOL introduced herself, her role and informed reason for call. SASHA COOL could not hear Pt well and she informed Pt that SASHA COOL will place a call again. SASHA COOL called Pt but the VM took the call, detailed VM was left requesting a return call. SASHA COOL will attempt to reach out Pt a later time.    SASHA COOL is remain available for further assistance as needed.

## 2024-10-01 ENCOUNTER — TELEPHONE (OUTPATIENT)
Age: 54
End: 2024-10-01

## 2024-10-07 ENCOUNTER — EVALUATION (OUTPATIENT)
Dept: PHYSICAL THERAPY | Facility: OTHER | Age: 54
End: 2024-10-07
Payer: COMMERCIAL

## 2024-10-07 DIAGNOSIS — M17.0 OSTEOARTHRITIS OF BOTH KNEES, UNSPECIFIED OSTEOARTHRITIS TYPE: ICD-10-CM

## 2024-10-07 PROCEDURE — 97140 MANUAL THERAPY 1/> REGIONS: CPT

## 2024-10-07 PROCEDURE — 97161 PT EVAL LOW COMPLEX 20 MIN: CPT

## 2024-10-07 NOTE — PROGRESS NOTES
PT Evaluation     Today's date: 10/7/2024  Patient name: Judy Rios  : 1970  MRN: [id number]  Referring provider: Adams Lord DO  Dx:   Encounter Diagnosis     ICD-10-CM    1. Osteoarthritis of both knees, unspecified osteoarthritis type  M17.0 Ambulatory Referral to Physical Therapy          Start Time: 1400  Stop Time: 1450  Total time in clinic (min): 50 minutes    Assessment  Impairments: abnormal or restricted ROM, activity intolerance, impaired physical strength, lacks appropriate home exercise program, pain with function, weight-bearing intolerance, poor posture  and poor body mechanics    Assessment details: Judy Rios is a 54 y.o. female presenting to OPPT initial evaluation with chief complaint of bilateral knee pain for the last 2 years, worse over the last 3 months of insidious onset. Pain is described as shooting pain with painful clicking aggravated by descending worse than ascending stairs, STS, standing for >10 minutes, prolonged sitting, walking >20 minutes. Does note knee is most painful upon waking, will get a loud click and have some relief after.  Pt notes pain is alleviated with ice. Has been getting cortisone injections every 3 months; notes last CSI made the knee feel worse. Notes sleep disturbances, hard to get comfortable lying on L side. Does note numbness / tingling in medial L thigh and medial L shin.     Presents with decreased L knee ROM secondary to popliteus & genu articularis muscle spasm, as well as poor neuromuscular control and decreased strength of L quad impacting functional mobility including prolonged walking, standing, transfers and stair negotiation. Pt demonstrating pain relief, improved L Knee ROM and improved L quad NM control following popliteus & genu articularis STM today. Given HEP focused on self-STM, as well as L quad strengthening in order to meet goals of independent stair negotiation.  The patient is a good candidate for physical therapy  to achieve the following goals.        Goals  STG (4 weeks):  Pt will decrease pain to <4/10 at rest in order to facilitate return to transfers / stair negotation.  Pt will demonstrate increased L knee flexion to 120 in order to improve functional mobility  Pt will demonstrate increased L quad strength to 4+/5 in order to improve stair negotation  Pt's self-perceived disability will decrease as demonstrated by a >5-point improvement in FOTO score.    Pt will be independent with HEP as demonstrated by return demo with proper technique and execution of exercises provided.     LTG (12 weeks):  Pt will have absence of pain for 2 consecutive sessions in order to facilitate return to transfers, walking, stairs.  Pt will demonstrate increased L quad strength to 5/5 in order to improve stair negotation  Pt's self-perceived disability will decrease as demonstrated by a FOTO score >60.  Pt will be independent with progressions to HEP as demonstrated by return demo with proper technique and execution of exercises provided.      Plan  Patient would benefit from: skilled physical therapy and PT eval  Planned modality interventions: biofeedback, electrical stimulation/Russian stimulation, TENS, thermotherapy: hydrocollator packs, traction, ultrasound and cryotherapy    Planned therapy interventions: abdominal trunk stabilization, activity modification, balance, balance/weight bearing training, body mechanics training, flexibility, functional ROM exercises, graded activity, graded exercise, graded motor, gait training, home exercise program, therapeutic training, therapeutic activities, therapeutic exercise, stretching, strengthening, joint mobilization, manual therapy, massage, neuromuscular re-education, patient education, postural training, IASTM and kinesiology taping    Frequency: 2x week  Duration in weeks: 12  Plan of Care beginning date: 10/7/2024  Plan of Care expiration date: 1/5/2025  Treatment plan discussed with:  "patient        Subjective Evaluation    History of Present Illness  Mechanism of injury: Judy Rios is a 54 y.o. female presenting to OPPT initial evaluation with chief complaint of bilateral knee pain for the last 2 years, worse over the last 3 months of insidious onset. Pain is described as shooting pain with painful clicking aggravated by descending worse than ascending stairs, STS, standing for >10 minutes, prolonged sitting, walking >20 minutes. Pt notes pain is alleviated with ice. Notes sleep disturbances, hard to get comfortable lying on L side. Does note numbness / tingling in medial L thigh and medial L shin.     Does note knee is most painful upon waking, will get a loud click and have some relief after.  Has been getting cortisone injections every 3 months; notes last CSI made the knee feel worse.   No previous treatment for primary complaint.    Imaging:  \"LEFT KNEE     INDICATION:   Pain in right knee. Pain in left knee.      COMPARISON:  None.     VIEWS:  XR KNEE 4+ VW LEFT INJURY      FINDINGS:     There is no acute fracture or dislocation.     There is a small joint effusion.     Mild to moderate lateral compartment degenerative narrowing. Osteophytes lateral tibial plateau. Minimal valgus angulation. Patellofemoral space is preserved.     No lytic or blastic osseous lesion.     Soft tissues are unremarkable.     IMPRESSION:        Mild to moderate lateral compartment degenerative changes as above.  Minimal valgus angulation.  Small effusion. No fracture\"    \"RIGHT KNEE     INDICATION:   Pain in right knee. Pain in left knee.        COMPARISON:  None.     VIEWS:  XR KNEE 4+ VW RIGHT INJURY      FINDINGS:     There is no acute fracture or dislocation.     There is a small joint effusion.     Severe medial compartment degenerative change. Joint space is virtually obliterated. Varus angulation secondarily. Osteophytes in medial tibial plateau and femoral epicondyle. Patellofemoral space is " "preserved.     No lytic or blastic osseous lesion.     Soft tissues are unremarkable.     IMPRESSION:        Severe medial compartment degenerative changes as above.  Secondary varus angulation.  Small effusion. No fracture\"  Patient Goals  Patient goals for therapy: decreased pain, decreased edema, increased strength and increased motion  Patient goal: Climb stairs, increase walking duration, bike  Pain  Current pain ratin  At best pain ratin  At worst pain ratin  Quality: sharp      Diagnostic Tests  X-ray: abnormal  Treatments  No previous or current treatments        Objective    Observation: Mild swelling posterior L k'    Palpation: Hypertonicity & tenderness L popliteus & genu articularis, TTP L medial joint line    Range of motion:      PROM   L knee 3-110, 0-115 following popliteus MFR   R knee 0-120     Patellar mobility: WNL    Articular mobility:  Tibiofemoral internal rotation WNL  Tibiofemoral external rotation WNL      Strength:   Left  Right   Knee Ext 4+/5 5/5   Knee Flex 4/5; p! 5/5   Hip Flex 4+/5 4+/5   Hip Abd 4+/5 4+/5   Ankle DF 5/5 5/5     Trace quad set, Fair following popliteus MFR    Clinical tests:  (-) Medial Charla  (-) Lateral Charla    Functional Movement:  SLS Balance 30 sec EO B/L  Squat: R shift, offloading painful LLE  Single-leg squat: unable L secondary to medial joint line pain    POC expires Unit limit Auth Expiration date PT/OT + Visit Limit?   24 4 N/a bomn                           Visit/Unit Tracking  AUTH Status:  Date 10/7              No - RE every 10th visit Used 1               Remaining                       Precautions: PMH (+) for GINGER, bipolar 1 disorder, HTN      Visit #: 1 IE            Date: 10/7            Manuals             PROM             STM / MFR             Patellar glides                          Neuro Re-Ed             QS             SAQ HEP            Standing TKE                          Standing clamshells c short foot           "   Lateral band walks             Monster walks                          Eccentric step-downs F/L HEP (OG)                         Ther Ex             LAQ HEP            Standing hamstring curls             Leg press                                                    Ther Activity             Chair squat             Step-ups F/L                                       Gait Training                                       Modalities

## 2024-10-10 ENCOUNTER — OFFICE VISIT (OUTPATIENT)
Dept: PHYSICAL THERAPY | Facility: OTHER | Age: 54
End: 2024-10-10
Payer: COMMERCIAL

## 2024-10-10 ENCOUNTER — PATIENT OUTREACH (OUTPATIENT)
Dept: FAMILY MEDICINE CLINIC | Facility: CLINIC | Age: 54
End: 2024-10-10

## 2024-10-10 DIAGNOSIS — M17.0 OSTEOARTHRITIS OF BOTH KNEES, UNSPECIFIED OSTEOARTHRITIS TYPE: Primary | ICD-10-CM

## 2024-10-10 PROCEDURE — 97140 MANUAL THERAPY 1/> REGIONS: CPT

## 2024-10-10 PROCEDURE — 97110 THERAPEUTIC EXERCISES: CPT

## 2024-10-10 NOTE — LETTER
[address] [place] [address], [address]  [facility] [address] [phone]    Re: care coordination   10/10/2024       Dear Judy,    We tried to reach you by phone on 10/10/2024 and was unfortunately unable to reach you.  It is important that you contact the [facility] TIA at: [phone].    Sincerely,         Rafaelina Reyes

## 2024-10-10 NOTE — PROGRESS NOTES
"Daily Note     Today's date: 10/10/2024  Patient name: Judy Rios  : 1970  MRN: [id number]  Referring provider: Adams Lord DO  Dx:   Encounter Diagnosis     ICD-10-CM    1. Osteoarthritis of both knees, unspecified osteoarthritis type  M17.0           Start Time: 08  Stop Time: 0839  Total time in clinic (min): 23 minutes    Subjective: Pt reports she has been massaging the back of the knee which has helped a lot.      Objective: See treatment diary below      Assessment: Tolerated treatment well focused on soft tissue mobilizaiton and R quad strengthening. Pt demonstrating full B knee ROM today and improving R quad NM control. Still notes pain with L 4\" and 2\" lateral step-down. Patient exhibited good technique with therapeutic exercises and would benefit from continued PT      Plan: Continue per plan of care.      Precautions: PMH (+) for GINGER, bipolar 1 disorder, HTN      Visit #: 1 IE 2           Date: 10/7 10/10           Manuals             PROM             STM / MFR  Popliteus / GA:R    SFP           Patellar glides  SFP                        Neuro Re-Ed             QS             SAQ HEP nv           SLR  2x10           Standing TKE  GTB 20x5\"ea                        Standing clamshells c short foot             Lateral band walks             Monster walks                          Eccentric step-downs F/L HEP (OG) 4\" LSD 3x10:R                        Ther Ex             Heel slides  10x10\":B                        LAQ HEP            Standing hamstring curls             Leg press  nv                                                  Ther Activity             Chair squat             Step-ups F/L                                       Gait Training                                       Modalities                                                "

## 2024-10-10 NOTE — PROGRESS NOTES
SASHA COOL did call Pt for follow-up with no response, was unable to leave VM. SASHA COOL will send Unable to Reach Letter via [employer].    SASHA COOL is closing case today due to unable to contact Pt.  SASHA COOL is remain available for further assistance as needed

## 2024-10-14 ENCOUNTER — APPOINTMENT (OUTPATIENT)
Dept: PHYSICAL THERAPY | Facility: OTHER | Age: 54
End: 2024-10-14
Payer: COMMERCIAL

## 2024-10-14 NOTE — PROGRESS NOTES
"Daily Note     Today's date: 10/14/2024  Patient name: Judy Rios  : 1970  MRN: [id number]  Referring provider: Adams Lord DO  Dx:   No diagnosis found.                 Subjective: Pt reports she has been massaging the back of the knee which has helped a lot.      Objective: See treatment diary below      Assessment: Tolerated treatment well focused on soft tissue mobilizaiton and R quad strengthening. Pt demonstrating full B knee ROM today and improving R quad NM control. Still notes pain with L 4\" and 2\" lateral step-down. Patient exhibited good technique with therapeutic exercises and would benefit from continued PT      Plan: Continue per plan of care.      Precautions: PMH (+) for GINGRE, bipolar 1 disorder, HTN      Visit #: 1 IE 2 3          Date: 10/7 10/10 10/14          Manuals             PROM             STM / MFR  Popliteus / GA:R    SFP Popliteus / GA:R    SFP          Patellar glides  SFP SFP                       Neuro Re-Ed             QS             SAQ HEP nv ***          SLR  2x10           Standing TKE  GTB 20x5\"ea BTB 20x5\"                       Standing clamshells c short foot             Lateral band walks             Monster walks                          Eccentric step-downs F/L HEP (OG) 4\" LSD 3x10:R                        Ther Ex             Bike  X8 min X5 min          Retro TM   X3 min                       Heel slides  10x10\":B                        LAQ HEP  ***          Standing hamstring curls             Leg press  nv ***                                                 Ther Activity             Chair squat             Step-ups F/L                                       Gait Training                                       Modalities                                                "

## 2024-10-17 ENCOUNTER — OFFICE VISIT (OUTPATIENT)
Dept: PHYSICAL THERAPY | Facility: OTHER | Age: 54
End: 2024-10-17
Payer: COMMERCIAL

## 2024-10-17 DIAGNOSIS — M17.0 OSTEOARTHRITIS OF BOTH KNEES, UNSPECIFIED OSTEOARTHRITIS TYPE: Primary | ICD-10-CM

## 2024-10-17 PROCEDURE — 97110 THERAPEUTIC EXERCISES: CPT

## 2024-10-17 NOTE — PROGRESS NOTES
"Daily Note     Today's date: 10/17/2024  Patient name: Judy Rios  : 1970  MRN: [id number]  Referring provider: Adams Lord DO  Dx:   Encounter Diagnosis     ICD-10-CM    1. Osteoarthritis of both knees, unspecified osteoarthritis type  M17.0             Start Time: 946  Stop Time: 1009  Total time in clinic (min): 23 minutes    Subjective: Pt reports feeling \"60%\" back to PLOF.       Objective: See treatment diary below      Assessment: Tolerated treatment well focused on B quad strengthening today with no complaints other than muscle fatigue. Notes very mild tenderness in posterior k' that she has been massaging independently. Patient exhibited good technique with therapeutic exercises and would benefit from continued PT      Plan: Continue per plan of care.      Precautions: PMH (+) for GINGER, bipolar 1 disorder, HTN      Visit #: 1 IE 2 3          Date: 10/7 10/10 10/17          Manuals             PROM             STM / MFR  Popliteus / GA:R    SFP           Patellar glides  SFP                        Neuro Re-Ed             QS             SAQ HEP nv 2.5# 3x15          SLR  2x10           Standing TKE  GTB 20x5\"ea BTB 20x5\"                       Standing clamshells c short foot             Lateral band walks             Monster walks                          Eccentric step-downs F/L HEP (OG) 4\" LSD 3x10:R                        Ther Ex             Bike  X8 min X5 min          Retro TM                          Heel slides  10x10\":B                        LAQ HEP  GTB 3x15:B          Standing hamstring curls             Leg press  nv 50# 4x10                                                 Ther Activity             Chair squat             Step-ups F/L                                       Gait Training                                       Modalities                                                "

## 2024-10-21 ENCOUNTER — APPOINTMENT (OUTPATIENT)
Dept: PHYSICAL THERAPY | Facility: OTHER | Age: 54
End: 2024-10-21
Payer: COMMERCIAL

## 2024-10-24 ENCOUNTER — APPOINTMENT (OUTPATIENT)
Dept: PHYSICAL THERAPY | Facility: OTHER | Age: 54
End: 2024-10-24
Payer: COMMERCIAL

## 2024-10-24 NOTE — PROGRESS NOTES
"Daily Note     Today's date: 10/24/2024  Patient name: Judy Rios  : 1970  MRN: [id number]  Referring provider: Adams Lord DO  Dx:   No diagnosis found.                   Subjective: Pt reports ***      Objective: See treatment diary below      Assessment: Tolerated treatment well focused on B quad strengthening today with no complaints other than muscle fatigue. Notes very mild tenderness in posterior k' that she has been massaging independently. Patient exhibited good technique with therapeutic exercises and would benefit from continued PT      Plan: Continue per plan of care.      Precautions: PMH (+) for GINGER, bipolar 1 disorder, HTN      Visit #: 1 IE 2 3 4         Date: 10/7 10/10 10/17 10/24         Manuals             PROM             STM / MFR  Popliteus / GA:R    SFP           Patellar glides  SFP                        Neuro Re-Ed             QS             SAQ HEP nv 2.5# 3x15 5# 3x15         SLR  2x10           Standing TKE  GTB 20x5\"ea BTB 20x5\" MTB 20x5\"                      Standing clamshells c short foot             Lateral band walks             Monster walks                          Eccentric step-downs F/L HEP (OG) 4\" LSD 3x10:R  ***                      Ther Ex             Bike  X8 min X5 min X5 min         Retro TM                          Heel slides  10x10\":B                        LAQ HEP  GTB 3x15:B          Standing hamstring curls             Leg press  nv 50# 4x10 60# 4x10                                                Ther Activity             Chair squat             Step-ups F/L                                       Gait Training                                       Modalities                                                "

## 2024-10-28 ENCOUNTER — APPOINTMENT (OUTPATIENT)
Dept: PHYSICAL THERAPY | Facility: OTHER | Age: 54
End: 2024-10-28
Payer: COMMERCIAL

## 2024-10-28 NOTE — PROGRESS NOTES
"Daily Note     Today's date: 10/28/2024  Patient name: Judy Rios  : 1970  MRN: [id number]  Referring provider: Adams Lord DO  Dx:   No diagnosis found.                   Subjective: Pt reports ***      Objective: See treatment diary below      Assessment: Tolerated treatment well focused on B quad strengthening today with no complaints other than muscle fatigue. Patient exhibited good technique with therapeutic exercises and would benefit from continued PT      Plan: Continue per plan of care.      Precautions: PMH (+) for GINGER, bipolar 1 disorder, HTN      Visit #: 1 IE 2 3 4         Date: 10/7 10/10 10/17 10/28         Manuals             PROM             STM / MFR  Popliteus / GA:R    SFP           Patellar glides  SFP                        Neuro Re-Ed             QS             SAQ HEP nv 2.5# 3x15 5# 3x15         SLR  2x10           Standing TKE  GTB 20x5\"ea BTB 20x5\" MTB 20x5\"                      Standing clamshells c short foot             Lateral band walks             Monster walks                          Eccentric step-downs F/L HEP (OG) 4\" LSD 3x10:R  ***                      Ther Ex             Bike  X8 min X5 min X5 min         Retro TM                          Heel slides  10x10\":B                        LAQ HEP  GTB 3x15:B          Standing hamstring curls             Leg press  nv 50# 4x10 60# 4x10                                                Ther Activity             Chair squat             Step-ups F/L                                       Gait Training                                       Modalities                                                "

## 2024-10-29 ENCOUNTER — TELEPHONE (OUTPATIENT)
Age: 54
End: 2024-10-29

## 2024-10-29 NOTE — TELEPHONE ENCOUNTER
Received call from patient asking for a New Patient appt for a Skin Check for area on face. Offered first avail 04/2025 in [place]. Patient rejected because it was too far out.

## 2024-10-30 ENCOUNTER — APPOINTMENT (OUTPATIENT)
Dept: LAB | Facility: MEDICAL CENTER | Age: 54
End: 2024-10-30
Payer: COMMERCIAL

## 2024-10-30 ENCOUNTER — OFFICE VISIT (OUTPATIENT)
Dept: OBGYN CLINIC | Facility: MEDICAL CENTER | Age: 54
End: 2024-10-30
Payer: COMMERCIAL

## 2024-10-30 VITALS
HEART RATE: 92 BPM | SYSTOLIC BLOOD PRESSURE: 104 MMHG | BODY MASS INDEX: 32.96 KG/M2 | WEIGHT: 186 LBS | DIASTOLIC BLOOD PRESSURE: 75 MMHG | HEIGHT: 63 IN

## 2024-10-30 DIAGNOSIS — M17.0 OSTEOARTHRITIS OF BOTH KNEES, UNSPECIFIED OSTEOARTHRITIS TYPE: Primary | ICD-10-CM

## 2024-10-30 DIAGNOSIS — Z98.84 BARIATRIC SURGERY STATUS: ICD-10-CM

## 2024-10-30 DIAGNOSIS — E55.9 VITAMIN D DEFICIENCY: ICD-10-CM

## 2024-10-30 DIAGNOSIS — E53.8 VITAMIN B12 DEFICIENCY: ICD-10-CM

## 2024-10-30 DIAGNOSIS — K91.2 POSTSURGICAL MALABSORPTION: ICD-10-CM

## 2024-10-30 LAB
25(OH)D3 SERPL-MCNC: 53.5 NG/ML (ref 30–100)
VIT B12 SERPL-MCNC: 2394 PG/ML (ref 180–914)

## 2024-10-30 PROCEDURE — 82306 VITAMIN D 25 HYDROXY: CPT

## 2024-10-30 PROCEDURE — 83918 ORGANIC ACIDS TOTAL QUANT: CPT

## 2024-10-30 PROCEDURE — 99213 OFFICE O/P EST LOW 20 MIN: CPT | Performed by: ORTHOPAEDIC SURGERY

## 2024-10-30 PROCEDURE — 82607 VITAMIN B-12: CPT

## 2024-10-30 PROCEDURE — 36415 COLL VENOUS BLD VENIPUNCTURE: CPT

## 2024-10-30 NOTE — PROGRESS NOTES
Ortho Sports Medicine Knee Visit     Assesment:   bilateral knee right severe medial DJD and left severe lateral DJD with otherwise mild to moderate tricompartmental osteoarthritis with significant improvement after physical therapy    Plan:    Conservative treatment:    Ice to knee for 20 minutes at least 1-2 times daily.  PT for ROM/strengthening to knee, hip and core.  May transition to home exercise program    Imaging:    All imaging from today was reviewed by myself and explained to the patient.       Injection:    No Injection planned at this time.  Consider future csi    Surgery:     No surgery is recommended at this point, continue with conservative treatment plan as noted.        History of Present Illness:    The patient is a 54 y.o. female  referred to me by their primary care physician, seen in clinic for consultation of right and left knee pain.      She has had pain for several months.  She has had injections in the past.  Left knee is painful and she has trouble with flexion. Feels like it has fluid.  No injury recently.  She fell on knees in 2010.    She has done cortisone injections in the knee with minimal relief.  She has been doing physical therapy and notes significant relief.  She denies any new injury.  No other complaints at this time.  She has minimal pain.      Knee Surgical History:  None    Past Medical, Social and Family History:  Past Medical History:   Diagnosis Date    Arthritis     Asthma     Back pain     Bipolar 1 disorder (HCC)     Cancer (HCC)     right breast    Constipation     CPAP (continuous positive airway pressure) dependence     Depression     GERD (gastroesophageal reflux disease)     Hiatal hernia     HTN (hypertension)     Hypertension     Mental health problem     Obesity (BMI 35.0-39.9 without comorbidity)     Sleep apnea     Wears glasses      Past Surgical History:   Procedure Laterality Date    ABDOMINOPLASTY      BREAST SURGERY Bilateral 2014    mastectomy - R  side cancerous, L side prophylactic    COLONOSCOPY      COSMETIC SURGERY      breast reconstruction    EGD      GASTRIC BYPASS LAPAROSCOPIC N/A 10/03/2023    Procedure: LAPAROSCOPIC REMOVAL OF ADJUSTABLE GASTRIC BAND W/ ROBOT & INTROPERATIVE EGD;  Surgeon: Julian Davis MD;  Location: [facility];  Service: Bariatrics    HYSTERECTOMY      LAPAROSCOPIC GASTRIC BANDING      MASTECTOMY      R side cancerous, L side prophylactic (per pt)    MO LAPS GSTR RSTCV PX W/BYP CHEN-EN-Y LIMB <150 CM N/A 1/16/2024    Procedure: R-N-Y GASTRIC BYPASS W/ ROBOTICS & INTRAOPERATIVE EGD;  Surgeon: Julian Davis MD;  Location: [facility];  Service: Bariatrics     No Known Allergies  Current Outpatient Medications on File Prior to Visit   Medication Sig Dispense Refill    acetaminophen (TYLENOL) 500 mg tablet Take 500 mg by mouth every 6 (six) hours as needed for mild pain      albuterol (PROVENTIL HFA,VENTOLIN HFA) 90 mcg/act inhaler INHALE 2 PUFFS EVERY 6 HOURS AS NEEDED FOR WHEEZING 18 g 5    amLODIPine (NORVASC) 5 mg tablet TAKE 1 TABLET (5 MG TOTAL) BY MOUTH DAILY. 90 tablet 1    ARIPiprazole (ABILIFY DISCMELT) 10 mg dispersible tablet TAKE 1 TABLET BY MOUTH EVERY DAY 30 tablet 1    benztropine (COGENTIN) 1 mg tablet 1 mg 2 (two) times a day      Cholecalciferol (Vitamin D3) 50 MCG (2000 UT) capsule Take 1 capsule (2,000 Units total) by mouth daily 90 capsule 3    ergocalciferol (VITAMIN D2) 50,000 units Take 1 capsule (50,000 Units total) by mouth 2 (two) times a week 24 capsule 0    hydrOXYzine HCL (ATARAX) 50 mg tablet TAKE 1 TABLET BY MOUTH 3 TIMES A DAY AS NEEDED FOR ANXIETY. 270 tablet 1    mirtazapine (REMERON) 15 mg tablet TAKE 1 TABLET BY MOUTH DAILY AT BEDTIME 90 tablet 1    multivitamin (THERAGRAN) TABS Take 1 tablet by mouth daily      omeprazole (PriLOSEC) 20 mg delayed release capsule TAKE 1 CAPSULE BY MOUTH EVERY DAY 90 capsule 1    polyethylene glycol (MIRALAX) 17 g packet Take 17 g by mouth daily      Sodium  Fluoride 1.1 % PSTE Brush teeth one time before bedtime, don't rinse but expectorate excess. 100 mL 2    vitamin B-12 (VITAMIN B-12) 1,000 mcg tablet Take 1 tablet (1,000 mcg total) by mouth daily 90 tablet 3    QUEtiapine (SEROquel) 200 mg tablet Take 1 tablet (200 mg total) by mouth 2 (two) times a day 180 tablet 1    Symbicort 80-4.5 MCG/ACT inhaler INHALE 2 PUFFS BY MOUTH TWICE A DAY RINSE MOUTH AFTER USE (Patient not taking: Reported on 2024) 10.2 g 2     No current facility-administered medications on file prior to visit.     Social History     Socioeconomic History    Marital status: Single     Spouse name: Not on file    Number of children: Not on file    Years of education: Not on file    Highest education level: Not on file   Occupational History    Not on file   Tobacco Use    Smoking status: Former     Current packs/day: 0.00     Types: Cigarettes     Quit date: 2023     Years since quittin.6     Passive exposure: Past    Smokeless tobacco: Never   Vaping Use    Vaping status: Never Used   Substance and Sexual Activity    Alcohol use: Not Currently    Drug use: Not Currently    Sexual activity: Not on file   Other Topics Concern    Not on file   Social History Narrative    Not on file     Social Determinants of Health     Financial Resource Strain: Low Risk  (2024)    Overall Financial Resource Strain (CARDIA)     Difficulty of Paying Living Expenses: Not hard at all   Recent Concern: Financial Resource Strain - High Risk (2024)    Overall Financial Resource Strain (CARDIA)     Difficulty of Paying Living Expenses: Hard   Food Insecurity: No Food Insecurity (2024)    Nursing - Inadequate Food Risk Classification     Worried About Running Out of Food in the Last Year: Never true     Ran Out of Food in the Last Year: Never true     Ran Out of Food in the Last Year: Not on file   Recent Concern: Food Insecurity - Food Insecurity Present (2024)    Hunger Vital Sign     Worried  "About Running Out of Food in the Last Year: Sometimes true     Ran Out of Food in the Last Year: Sometimes true   Transportation Needs: No Transportation Needs (9/24/2024)    PRAPARE - Transportation     Lack of Transportation (Medical): No     Lack of Transportation (Non-Medical): No   Physical Activity: Not on file   Stress: Not on file   Social Connections: Not on file   Intimate Partner Violence: Not on file   Housing Stability: Low Risk  (9/24/2024)    Housing Stability Vital Sign     Unable to Pay for Housing in the Last Year: No     Number of Times Moved in the Last Year: 1     Homeless in the Last Year: No         I have reviewed the past medical, surgical, social and family history, medications and allergies as documented in the EMR.    Review of systems: ROS is negative other than that noted in the HPI.  Constitutional: Negative for fatigue and fever.   HENT: Negative for sore throat.    Respiratory: Negative for shortness of breath.    Cardiovascular: Negative for chest pain.   Gastrointestinal: Negative for abdominal pain.   Endocrine: Negative for cold intolerance and heat intolerance.   Genitourinary: Negative for flank pain.   Musculoskeletal: Negative for back pain.   Skin: Negative for rash.   Allergic/Immunologic: Negative for immunocompromised state.   Neurological: Negative for dizziness.   Psychiatric/Behavioral: Negative for agitation.      Physical Exam:    Blood pressure 104/75, pulse 92, height 5' 2.5\" (1.588 m), weight 84.4 kg (186 lb), not currently breastfeeding.    General/Constitutional: NAD, well developed, well nourished  HENT: Normocephalic, atraumatic  CV: Intact distal pulses, regular rate  Resp: No respiratory distress or labored breathing  Lymphatic: No lymphadenopathy palpated  Neuro: Alert and Oriented x 3, no focal deficits  Psych: Normal mood, normal affect, normal judgement, normal behavior  Skin: Warm, dry, no rashes, no erythema       Knee Exam (focused):                  " RIGHT LEFT   ROM:   0-130 0-130   Palpation: Effusion negative negative     MJL tenderness Positive Positive     LJL tenderness Positive Positive   Instability: Varus stable stable     Valgus stable stable   Special Tests: Lachman Negative Negative     Posterior drawer Negative Negative     Anterior drawer Negative Negative     Pivot shift not tested not tested     Dial not tested not tested   Patella: Palpation no tenderness no tenderness     Mobility 1/4 1/4     Apprehension Negative Negative   Other: Single leg 1/4 squat not tested not tested      LE NV Exam: +2 DP/PT pulses bilaterally  Sensation intact to light touch L2-S1 bilaterally     Bilateral hip ROM demonstrates no pain actively or passively    No calf tenderness to palpation bilaterally    Knee Imaging    X-rays of the bilateral knee were reviewed, which demonstrate with severe right medial DJD and severe left lateral DJD with moderate tricompartmental to arthritis otherwise.  I have reviewed the radiology report and agree with their impression.

## 2024-10-31 ENCOUNTER — OFFICE VISIT (OUTPATIENT)
Dept: DENTISTRY | Facility: CLINIC | Age: 54
End: 2024-10-31

## 2024-10-31 ENCOUNTER — APPOINTMENT (OUTPATIENT)
Dept: PHYSICAL THERAPY | Facility: OTHER | Age: 54
End: 2024-10-31
Payer: COMMERCIAL

## 2024-10-31 VITALS — SYSTOLIC BLOOD PRESSURE: 98 MMHG | HEART RATE: 85 BPM | TEMPERATURE: 96.6 F | DIASTOLIC BLOOD PRESSURE: 75 MMHG

## 2024-10-31 DIAGNOSIS — Z01.20 ENCOUNTER FOR DENTAL EXAMINATION: Primary | ICD-10-CM

## 2024-10-31 PROCEDURE — D0274 BITEWINGS - 4 RADIOGRAPHIC IMAGES: HCPCS | Performed by: DENTAL HYGIENIST

## 2024-10-31 PROCEDURE — D0120 PERIODIC ORAL EVALUATION - ESTABLISHED PATIENT: HCPCS | Performed by: DENTAL HYGIENIST

## 2024-10-31 NOTE — DENTAL PROCEDURE DETAILS
PERIODIC EXAM,  4 BWX, FMP   REVIEWED MED HX: meds, allergies, health changes reviewed in [facility]. All consents signed.  CHIEF CONCERN: none  PAIN SCALE:  0  ASA CLASS:  ASA 2 - Patient with mild systemic disease with no functional limitations  PLAQUE:  moderate  CALCULUS: Moderate  BLEEDING:  light  STAIN : Light     PERIO:  Mild bone loss and recession  ---FMP - 1-7 mm w/ BUP  ---Stage 1, Grade B  ---Previous SRP's completed 9 /2022  ---Recommend 4 quads of SRP's again - will preauth    Hygiene Procedures:  none    Oral Hygiene Instruction: Brushing minimum 2x daily for 2 minutes, daily flossing, Listerine, and Recommended soft toothbrush only    Dispensed:  none    Visual and Tactile Intraoral/ Extraoral evaluation: Oral and Oropharyngeal cancer evaluation. No findings     Dr. Mejia   Reviewed with patient clinical and radiographic findings and patient verbalized understanding. All questions and concerns addressed.     REFERRALS: None    CARIES FINDINGS: Watch areas as charted       TREATMENT  PLAN :   Need to preauth for SRP's  Please give SFS coy   NV1:  SRP's UR/LR - 50 min  NV2:  SRP's UL/LL - 50 min    Last BWX:   10/31/24  Last [facility]        FMX :  7/11/22

## 2024-11-03 LAB — METHYLMALONATE SERPL-SCNC: 96 NMOL/L (ref 0–378)
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
## 2024-11-05 ENCOUNTER — CLINICAL SUPPORT (OUTPATIENT)
Dept: BARIATRICS | Facility: CLINIC | Age: 54
End: 2024-11-05

## 2024-11-05 VITALS — WEIGHT: 185.2 LBS | BODY MASS INDEX: 33.33 KG/M2

## 2024-11-05 DIAGNOSIS — Z98.84 STATUS POST BARIATRIC SURGERY: Primary | ICD-10-CM

## 2024-11-05 DIAGNOSIS — E66.811 OBESITY, CLASS I, BMI 30-34.9: ICD-10-CM

## 2024-11-05 PROCEDURE — RECHECK: Performed by: DIETITIAN, REGISTERED

## 2024-11-07 ENCOUNTER — OFFICE VISIT (OUTPATIENT)
Dept: BARIATRICS | Facility: CLINIC | Age: 54
End: 2024-11-07
Payer: COMMERCIAL

## 2024-11-07 VITALS
BODY MASS INDEX: 32.51 KG/M2 | DIASTOLIC BLOOD PRESSURE: 84 MMHG | HEIGHT: 63 IN | WEIGHT: 183.5 LBS | TEMPERATURE: 97.5 F | SYSTOLIC BLOOD PRESSURE: 110 MMHG | HEART RATE: 84 BPM | OXYGEN SATURATION: 98 %

## 2024-11-07 DIAGNOSIS — Z48.815 ENCOUNTER FOR SURGICAL AFTERCARE FOLLOWING SURGERY OF DIGESTIVE SYSTEM: Primary | ICD-10-CM

## 2024-11-07 DIAGNOSIS — K91.2 POSTSURGICAL MALABSORPTION: ICD-10-CM

## 2024-11-07 DIAGNOSIS — Z98.84 BARIATRIC SURGERY STATUS: ICD-10-CM

## 2024-11-07 DIAGNOSIS — E66.811 OBESITY, CLASS I, BMI 30-34.9: ICD-10-CM

## 2024-11-07 PROCEDURE — 99213 OFFICE O/P EST LOW 20 MIN: CPT | Performed by: NURSE PRACTITIONER

## 2024-11-07 NOTE — PROGRESS NOTES
Date of surgery: 1/16/2024  Procedure: RNY   Performing surgeon: Dr. Keyon Davis     Initial Weight - 200.5 lb   Current Weight - 183.5 lb   Nish Weight - 185.5 lb   Total Body Weight Loss (EWL)- 17.0  EWL% - 28%  TWB % - 8%

## 2024-11-07 NOTE — PROGRESS NOTES
Ambulatory Visit  Name: Judy Rios      : 1970      MRN: [id number]  Encounter Provider: NILS Pryor  Encounter Date: 2024   Encounter department: [facility]    Assessment & Plan  Encounter for surgical aftercare following surgery of digestive system         Bariatric surgery status         Postsurgical malabsorption         Obesity, Class I, BMI 30-34.9         BMI 32.0-32.9,adult       PLAN:     - Routine follow up in 3 months for routine surgical annual.   - congratulated patient with her progress. Continue with healthy habits. Follow up with our RD and SW as needed/scheduled.   - Continue with healthy lifestyle, adequate protein intake of 60 gm, fluid intake of at least 64 oz.   - Continue with MVI daily. Reviewed labs..  - Activity as tolerated.       History of Present Illness     Judy Rios is a 54 y.o. female status post robotic second stage band conversion to RNYGB, requiring extensive lysis of adhesions and partial gastrectomy, performed by Dr. Keyon Davis on 24. Here for post op support/weight check. She has made many healthy changes - lost almost 10 lbs since last seen in office 3 months ago. Started portion control, exercising more, keeping herself active.   Vitamins - taking her multivitamins vitamin d2, vitamin d3 and vitamin b12 (however decreased this due to high levels)      History obtained from : patient  Review of Systems   Constitutional:  Positive for activity change.   Respiratory: Negative.     Cardiovascular: Negative.    Gastrointestinal: Negative.    Musculoskeletal: Negative.    Neurological: Negative.    Psychiatric/Behavioral: Negative.       Current Outpatient Medications on File Prior to Visit   Medication Sig Dispense Refill    acetaminophen (TYLENOL) 500 mg tablet Take 500 mg by mouth every 6 (six) hours as needed for mild pain      albuterol (PROVENTIL HFA,VENTOLIN HFA) 90 mcg/act inhaler INHALE 2 PUFFS EVERY 6 HOURS AS  NEEDED FOR WHEEZING 18 g 5    amLODIPine (NORVASC) 5 mg tablet TAKE 1 TABLET (5 MG TOTAL) BY MOUTH DAILY. 90 tablet 1    ARIPiprazole (ABILIFY DISCMELT) 10 mg dispersible tablet TAKE 1 TABLET BY MOUTH EVERY DAY 30 tablet 1    benztropine (COGENTIN) 1 mg tablet 1 mg 2 (two) times a day      Cholecalciferol (Vitamin D3) 50 MCG (2000 UT) capsule Take 1 capsule (2,000 Units total) by mouth daily 90 capsule 3    ergocalciferol (VITAMIN D2) 50,000 units Take 1 capsule (50,000 Units total) by mouth 2 (two) times a week 24 capsule 0    hydrOXYzine HCL (ATARAX) 50 mg tablet TAKE 1 TABLET BY MOUTH 3 TIMES A DAY AS NEEDED FOR ANXIETY. 270 tablet 1    mirtazapine (REMERON) 15 mg tablet TAKE 1 TABLET BY MOUTH DAILY AT BEDTIME 90 tablet 1    multivitamin (THERAGRAN) TABS Take 1 tablet by mouth daily      omeprazole (PriLOSEC) 20 mg delayed release capsule TAKE 1 CAPSULE BY MOUTH EVERY DAY 90 capsule 1    polyethylene glycol (MIRALAX) 17 g packet Take 17 g by mouth daily      QUEtiapine (SEROquel) 200 mg tablet Take 1 tablet (200 mg total) by mouth 2 (two) times a day 180 tablet 1    Sodium Fluoride 1.1 % PSTE Brush teeth one time before bedtime, don't rinse but expectorate excess. 100 mL 2    vitamin B-12 (VITAMIN B-12) 1,000 mcg tablet Take 1 tablet (1,000 mcg total) by mouth daily 90 tablet 3    Symbicort 80-4.5 MCG/ACT inhaler INHALE 2 PUFFS BY MOUTH TWICE A DAY RINSE MOUTH AFTER USE (Patient not taking: Reported on 2024) 10.2 g 2     No current facility-administered medications on file prior to visit.      Social History     Tobacco Use    Smoking status: Former     Current packs/day: 0.00     Types: Cigarettes     Quit date: 2023     Years since quittin.6     Passive exposure: Past    Smokeless tobacco: Never   Vaping Use    Vaping status: Never Used   Substance and Sexual Activity    Alcohol use: Not Currently    Drug use: Not Currently    Sexual activity: Not on file         Objective     /84 (BP  "Location: Left arm, Patient Position: Sitting, Cuff Size: Adult)   Pulse 84   Temp 97.5 °F (36.4 °C) (Tympanic)   Ht 5' 3\" (1.6 m)   Wt 83.2 kg (183 lb 8 oz)   SpO2 98%   BMI 32.51 kg/m²     Physical Exam  Vitals and nursing note reviewed.   Constitutional:       General: She is not in acute distress.     Appearance: Normal appearance. She is well-developed. She is obese.   HENT:      Head: Normocephalic and atraumatic.   Eyes:      Conjunctiva/sclera: Conjunctivae normal.   Cardiovascular:      Rate and Rhythm: Normal rate and regular rhythm.      Pulses: Normal pulses.      Heart sounds: Normal heart sounds. No murmur heard.  Pulmonary:      Effort: Pulmonary effort is normal. No respiratory distress.      Breath sounds: Normal breath sounds.   Abdominal:      General: Bowel sounds are normal.      Palpations: Abdomen is soft.      Tenderness: There is no abdominal tenderness.   Musculoskeletal:         General: No swelling.      Cervical back: Neck supple.   Skin:     General: Skin is warm and dry.      Capillary Refill: Capillary refill takes less than 2 seconds.   Neurological:      General: No focal deficit present.      Mental Status: She is alert and oriented to person, place, and time.   Psychiatric:         Mood and Affect: Mood normal.         Behavior: Behavior normal.         Thought Content: Thought content normal.         Judgment: Judgment normal.         "

## 2024-11-29 ENCOUNTER — TELEPHONE (OUTPATIENT)
Dept: DENTISTRY | Facility: CLINIC | Age: 54
End: 2024-11-29

## 2024-11-29 NOTE — TELEPHONE ENCOUNTER
Attempted to call patient to schedule SRP appointment after preauth approval. Unable to reach patient left detailed vm on primary phone number. [phone]

## 2024-12-03 ENCOUNTER — OFFICE VISIT (OUTPATIENT)
Dept: DENTISTRY | Facility: CLINIC | Age: 54
End: 2024-12-03

## 2024-12-03 VITALS — DIASTOLIC BLOOD PRESSURE: 73 MMHG | SYSTOLIC BLOOD PRESSURE: 104 MMHG | TEMPERATURE: 97.3 F | HEART RATE: 91 BPM

## 2024-12-03 DIAGNOSIS — K05.6 PERIODONTAL DISEASE: Primary | ICD-10-CM

## 2024-12-03 PROCEDURE — D4341 PERIODONTAL SCALING AND ROOT PLANING - 4 OR MORE TEETH PER QUADRANT: HCPCS | Performed by: DENTAL HYGIENIST

## 2024-12-03 PROCEDURE — D4342 PERIODONTAL SCALING AND ROOT PLANING - 1 TO 3 TEETH PER QUADRANT: HCPCS | Performed by: DENTAL HYGIENIST

## 2024-12-03 NOTE — PROGRESS NOTES
SCALE AND RP UR and LR          1 carpule/s given- Gingicaine topical anesthetic  CHIEF CONCERN: none   PAIN SCALE: 0  ASA CLASS: ASA 2 - Patient with mild systemic disease with no functional limitations  PLAQUE:mild  CALCULUS: Light  Moderate  BLEEDING:    light  STAIN : Light  PERIO:  Mild bone loss and recession    Hand scaled, polished and flossed. Used cavitron    Oral Hygiene Instruction:  recommended brushing 2 x daily for 2 minutes MIN, recommended flossing daily, reviewed dietary precautions. Post op sc/rp instructions placed in AVS, printed and handed/ reviewed with patient.     Soft tissue exam:  soft tissue exam was normal  ExtraOral exam:   Extraoral exam was normal    REFERRALS: no referrals provided         NEXT VISIT:   NV1:  SRP's UL/LL - 50 min  ASAP before the end of the year    Last BWX:  10/31/24  Last Panorex/     FMX :  7/11/22

## 2024-12-05 ENCOUNTER — OFFICE VISIT (OUTPATIENT)
Dept: FAMILY MEDICINE CLINIC | Facility: CLINIC | Age: 54
End: 2024-12-05

## 2024-12-05 VITALS
BODY MASS INDEX: 32.64 KG/M2 | RESPIRATION RATE: 18 BRPM | HEIGHT: 63 IN | SYSTOLIC BLOOD PRESSURE: 102 MMHG | TEMPERATURE: 97.8 F | OXYGEN SATURATION: 97 % | WEIGHT: 184.25 LBS | DIASTOLIC BLOOD PRESSURE: 60 MMHG | HEART RATE: 72 BPM

## 2024-12-05 DIAGNOSIS — E66.09 CLASS 1 OBESITY DUE TO EXCESS CALORIES WITH SERIOUS COMORBIDITY AND BODY MASS INDEX (BMI) OF 32.0 TO 32.9 IN ADULT: ICD-10-CM

## 2024-12-05 DIAGNOSIS — K91.2 POSTSURGICAL MALABSORPTION: ICD-10-CM

## 2024-12-05 DIAGNOSIS — E66.811 CLASS 1 OBESITY DUE TO EXCESS CALORIES WITH SERIOUS COMORBIDITY AND BODY MASS INDEX (BMI) OF 32.0 TO 32.9 IN ADULT: ICD-10-CM

## 2024-12-05 DIAGNOSIS — N18.2 CKD (CHRONIC KIDNEY DISEASE), STAGE II: ICD-10-CM

## 2024-12-05 DIAGNOSIS — I10 PRIMARY HYPERTENSION: Primary | ICD-10-CM

## 2024-12-05 DIAGNOSIS — G47.33 OSA (OBSTRUCTIVE SLEEP APNEA): ICD-10-CM

## 2024-12-05 DIAGNOSIS — F31.9 BIPOLAR 1 DISORDER (HCC): ICD-10-CM

## 2024-12-05 DIAGNOSIS — Z72.0 TOBACCO ABUSE: ICD-10-CM

## 2024-12-05 DIAGNOSIS — J45.40 MODERATE PERSISTENT ASTHMA WITHOUT COMPLICATION: ICD-10-CM

## 2024-12-05 PROCEDURE — G2211 COMPLEX E/M VISIT ADD ON: HCPCS | Performed by: FAMILY MEDICINE

## 2024-12-05 PROCEDURE — 99214 OFFICE O/P EST MOD 30 MIN: CPT | Performed by: FAMILY MEDICINE

## 2024-12-05 RX ORDER — HYDROXYZINE HYDROCHLORIDE 50 MG/1
50 TABLET, FILM COATED ORAL 3 TIMES DAILY PRN
Qty: 30 TABLET | Refills: 0 | Status: SHIPPED | OUTPATIENT
Start: 2024-12-05

## 2024-12-05 RX ORDER — QUETIAPINE FUMARATE 200 MG/1
200 TABLET, FILM COATED ORAL 2 TIMES DAILY
Qty: 60 TABLET | Refills: 0 | Status: SHIPPED | OUTPATIENT
Start: 2024-12-05 | End: 2025-01-04

## 2024-12-05 RX ORDER — BUDESONIDE AND FORMOTEROL FUMARATE DIHYDRATE 80; 4.5 UG/1; UG/1
2 AEROSOL RESPIRATORY (INHALATION) 2 TIMES DAILY
Qty: 10.2 G | Refills: 2 | Status: SHIPPED | OUTPATIENT
Start: 2024-12-05

## 2024-12-05 RX ORDER — ARIPIPRAZOLE 10 MG/1
10 TABLET, ORALLY DISINTEGRATING ORAL DAILY
Qty: 30 TABLET | Refills: 0 | Status: SHIPPED | OUTPATIENT
Start: 2024-12-05

## 2024-12-05 RX ORDER — BENZTROPINE MESYLATE 1 MG/1
1 TABLET ORAL 2 TIMES DAILY
Qty: 60 TABLET | Refills: 0 | Status: SHIPPED | OUTPATIENT
Start: 2024-12-05

## 2024-12-05 RX ORDER — ALBUTEROL SULFATE 90 UG/1
2 INHALANT RESPIRATORY (INHALATION) EVERY 4 HOURS PRN
Qty: 18 G | Refills: 5 | Status: SHIPPED | OUTPATIENT
Start: 2024-12-05

## 2024-12-05 RX ORDER — MIRTAZAPINE 15 MG/1
15 TABLET, FILM COATED ORAL
Qty: 30 TABLET | Refills: 0 | Status: SHIPPED | OUTPATIENT
Start: 2024-12-05

## 2024-12-05 NOTE — ASSESSMENT & PLAN NOTE
Reviewed most recent labs which are unremarkable  Continue multivitamins  Follow follow-up with bariatric surgery outpatient
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
## 2024-12-05 NOTE — ASSESSMENT & PLAN NOTE
Stable  Continue albuterol and Symbicort    Orders:    budesonide-formoterol (Symbicort) 80-4.5 MCG/ACT inhaler; Inhale 2 puffs 2 (two) times a day Rinse mouth after use    albuterol (PROVENTIL HFA,VENTOLIN HFA) 90 mcg/act inhaler; Inhale 2 puffs every 4 (four) hours as needed for wheezing or shortness of breath

## 2024-12-05 NOTE — ASSESSMENT & PLAN NOTE
Well-controlled  Continue amlodipine  Recommend DASH diet and regular exercise    Orders:    Comprehensive metabolic panel; Future    Albumin / creatinine urine ratio; Future
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
## 2024-12-05 NOTE — ASSESSMENT & PLAN NOTE
Lab Results   Component Value Date    EGFR 62 05/01/2024    EGFR 65 01/17/2024    EGFR 61 12/21/2023    CREATININE 1.03 05/01/2024    CREATININE 0.99 01/17/2024    CREATININE 1.05 12/21/2023   Recheck renal function and albumin level    Orders:    Comprehensive metabolic panel; Future    Albumin / creatinine urine ratio; Future

## 2024-12-05 NOTE — ASSESSMENT & PLAN NOTE
Stable  Refill provided for psychiatric medication  Has appointment to see her psychiatrist next month  Orders:    QUEtiapine (SEROquel) 200 mg tablet; Take 1 tablet (200 mg total) by mouth 2 (two) times a day    benztropine (COGENTIN) 1 mg tablet; Take 1 tablet (1 mg total) by mouth 2 (two) times a day    ARIPiprazole (ABILIFY DISCMELT) 10 mg dispersible tablet; Take 1 tablet (10 mg total) by mouth daily    hydrOXYzine HCL (ATARAX) 50 mg tablet; Take 1 tablet (50 mg total) by mouth 3 (three) times a day as needed for anxiety    mirtazapine (REMERON) 15 mg tablet; Take 1 tablet (15 mg total) by mouth daily at bedtime

## 2024-12-20 ENCOUNTER — TELEPHONE (OUTPATIENT)
Age: 54
End: 2024-12-20

## 2024-12-20 ENCOUNTER — TELEPHONE (OUTPATIENT)
Dept: PLASTIC SURGERY | Facility: CLINIC | Age: 54
End: 2024-12-20

## 2024-12-20 ENCOUNTER — OFFICE VISIT (OUTPATIENT)
Dept: SURGICAL ONCOLOGY | Facility: CLINIC | Age: 54
End: 2024-12-20
Payer: COMMERCIAL

## 2024-12-20 VITALS
HEART RATE: 96 BPM | BODY MASS INDEX: 32.6 KG/M2 | DIASTOLIC BLOOD PRESSURE: 78 MMHG | OXYGEN SATURATION: 94 % | HEIGHT: 63 IN | SYSTOLIC BLOOD PRESSURE: 108 MMHG | TEMPERATURE: 97.2 F | WEIGHT: 184 LBS

## 2024-12-20 DIAGNOSIS — Z85.3 HISTORY OF BREAST CANCER: Primary | ICD-10-CM

## 2024-12-20 DIAGNOSIS — Z98.82 HISTORY OF BILATERAL BREAST IMPLANTS: ICD-10-CM

## 2024-12-20 PROCEDURE — 99213 OFFICE O/P EST LOW 20 MIN: CPT

## 2024-12-20 RX ORDER — TRETINOIN 0.25 MG/G
CREAM TOPICAL
COMMUNITY
Start: 2024-11-04

## 2024-12-20 RX ORDER — UREA 40 %
CREAM (GRAM) TOPICAL
COMMUNITY
Start: 2024-11-06

## 2024-12-20 NOTE — ASSESSMENT & PLAN NOTE
- 1 year f/u  Orders:  •  Ambulatory Referral to Oncology Genetics; Future  •  Ambulatory referral to Plastic Surgery; Future  •  MRI breast bilateral w and wo contrast w cad; Future

## 2024-12-20 NOTE — TELEPHONE ENCOUNTER
Spoke to patient and made consultation with Dr. Brown to discuss integrity of saline implants from bilateral mastectomy in 2014.  No records on file as had care in [place] but does not recall providers.

## 2024-12-20 NOTE — PROGRESS NOTES
Name: Judy Rios      : 1970      MRN: [id number]  Encounter Provider: NILS Ruano  Encounter Date: 2024   Encounter department: [facility] [place]  :  Assessment & Plan  History of breast cancer  - 1 year f/u  Orders:  •  Ambulatory Referral to Oncology Genetics; Future  •  Ambulatory referral to Plastic Surgery; Future  •  MRI breast bilateral w and wo contrast w cad; Future    History of bilateral breast implants  Orders:  •  Ambulatory referral to Plastic Surgery; Future  •  MRI breast bilateral w and wo contrast w cad; Future      History of Present Illness     Patient is a 52-year-old female presenting today for an overdue 1 year follow up due to her history of left breast cancer, diagnosed in . Of note, pt does have personal hx of bipolar disorder and follows with family medicine as well as psychiatry. She was treated in [place], although pt is unable to remember where exactly she was treated therefore we do not have records, including breast imaging, pathology, or progress notes. She reports moving to the area in  and was lost to follow up. She is unaware of details regarding her breast cancer diagnosis, however she recalls testing positive for a pathogenic variant. She is s/p bilateral mastectomy with implant reconstruction in . She did not have radiation therapy, chemotherapy, or anti-hormone therapy. She also reports having a prophylactic total hysterectomy with oophorectomy as per the patient. At our last visit together, I placed a referral to genetics for testing however no appmt was made. We discussed importance of confirming if she has a genetic mutation. I am going to place another referral to genetics today and encouraged her to pursue testing. There were no concerns on her clinical breast exam today. She denies breast changes, persistent cough, SOB, headaches, as well as any new or persistent back, bone, or abdominal  "pain. She mentions spontaneous nerve pain on the b/l chest wall. Patient states she has saline implants. I am interested in the integrity of b/l implants based on their shape on exam and possible 10 year lifespan. I am going to refer her to plastics for eval. I am also going to have her get a breast MRI to ensure she remains HARJEET as we do not know her genetic history. I told this patient we will plan to see her in 1 year as a follow up breast exam and we do not order routine breast imaging s/p mastectomy. I encouraged her to be self breast aware and call with issues or concerns. I will see the patient back in 1 year or sooner should the need arise. She was instructed to call with any questions or concerns prior to this time. All questions were answered today.        Review of Systems   Constitutional:  Negative for activity change, appetite change, fatigue, fever and unexpected weight change.   Respiratory:  Negative for cough and shortness of breath.    Gastrointestinal:  Negative for abdominal pain.   Musculoskeletal:  Negative for back pain.   Skin: Negative.    Neurological: Negative.    Psychiatric/Behavioral:  Negative for confusion.     A complete review of systems is negative other than that noted above in the HPI.        Objective   /78 (BP Location: Left arm, Patient Position: Sitting, Cuff Size: Large)   Pulse 96   Temp (!) 97.2 °F (36.2 °C) (Temporal)   Ht 5' 3\" (1.6 m)   Wt 83.5 kg (184 lb)   SpO2 94%   BMI 32.59 kg/m²     ECOG ECOG Performance Status: 0 - Fully active, able to carry on all pre-disease performance without restriction    Physical Exam  Vitals and nursing note reviewed.   Constitutional:       Appearance: Normal appearance. She is normal weight.   Eyes:      General: No scleral icterus.     Conjunctiva/sclera: Conjunctivae normal.   Cardiovascular:      Rate and Rhythm: Normal rate and regular rhythm.   Pulmonary:      Effort: Pulmonary effort is normal.      Breath sounds: " Normal breath sounds.   Chest:      Chest wall: No mass.   Breasts:     Right: Skin change (surgical scar) present. No swelling, bleeding, mass or tenderness.      Left: Skin change (surgical scar) present. No swelling, bleeding, mass or tenderness.          Comments: B/l implants  Lymphadenopathy:      Upper Body:      Right upper body: No supraclavicular, axillary or pectoral adenopathy.      Left upper body: No supraclavicular, axillary or pectoral adenopathy.   Skin:     General: Skin is warm and dry.   Neurological:      General: No focal deficit present.      Mental Status: She is alert and oriented to person, place, and time. Mental status is at baseline.   Psychiatric:         Mood and Affect: Mood normal.         Behavior: Behavior normal.         Thought Content: Thought content normal.         Judgment: Judgment normal.        No visits with results within 1 Month(s) from this visit.   Latest known visit with results is:   Appointment on 10/30/2024   Component Date Value Ref Range Status   • Vit D, 25-Hydroxy 10/30/2024 53.5  30.0 - 100.0 ng/mL Final    Vitamin D guidelines established by Clinical Guidelines Subcommittee  of the Endocrine Society Task Force, 2011    Deficiency <20ng/ml   Insufficiency 20-30ng/ml   Sufficient  ng/ml    • Vitamin B-12 10/30/2024 2,394 (H)  180 - 914 pg/mL Final   • Methylmalonic Acid, S 10/30/2024 96  0 - 378 nmol/L Final

## 2024-12-20 NOTE — TELEPHONE ENCOUNTER
Received call from patient asking for a consult to have her implants checked.     She stated she had then put in 2014 due to breast cancer.    Please call patient to help kanika.

## 2024-12-23 ENCOUNTER — OFFICE VISIT (OUTPATIENT)
Dept: PSYCHIATRY | Facility: CLINIC | Age: 54
End: 2024-12-23
Payer: COMMERCIAL

## 2024-12-23 VITALS — HEIGHT: 63 IN | WEIGHT: 183 LBS | BODY MASS INDEX: 32.43 KG/M2

## 2024-12-23 DIAGNOSIS — F31.9 BIPOLAR 1 DISORDER (HCC): ICD-10-CM

## 2024-12-23 DIAGNOSIS — F10.91 ALCOHOL USE DISORDER IN REMISSION: ICD-10-CM

## 2024-12-23 DIAGNOSIS — F31.9 BIPOLAR 1 DISORDER (HCC): Primary | ICD-10-CM

## 2024-12-23 DIAGNOSIS — F41.1 GENERALIZED ANXIETY DISORDER WITH PANIC ATTACKS: ICD-10-CM

## 2024-12-23 DIAGNOSIS — F14.11 COCAINE ABUSE IN REMISSION (HCC): ICD-10-CM

## 2024-12-23 DIAGNOSIS — F41.0 GENERALIZED ANXIETY DISORDER WITH PANIC ATTACKS: ICD-10-CM

## 2024-12-23 PROCEDURE — 96127 BRIEF EMOTIONAL/BEHAV ASSMT: CPT | Performed by: STUDENT IN AN ORGANIZED HEALTH CARE EDUCATION/TRAINING PROGRAM

## 2024-12-23 PROCEDURE — 90792 PSYCH DIAG EVAL W/MED SRVCS: CPT | Performed by: STUDENT IN AN ORGANIZED HEALTH CARE EDUCATION/TRAINING PROGRAM

## 2024-12-23 RX ORDER — MIRTAZAPINE 15 MG/1
15 TABLET, FILM COATED ORAL
Qty: 30 TABLET | Refills: 0 | Status: SHIPPED | OUTPATIENT
Start: 2024-12-23

## 2024-12-23 RX ORDER — BENZTROPINE MESYLATE 1 MG/1
1 TABLET ORAL 2 TIMES DAILY
Qty: 60 TABLET | Refills: 1 | Status: SHIPPED | OUTPATIENT
Start: 2024-12-23 | End: 2025-02-21

## 2024-12-23 RX ORDER — ARIPIPRAZOLE 15 MG/1
15 TABLET, ORALLY DISINTEGRATING ORAL DAILY
Qty: 30 TABLET | Refills: 1 | Status: SHIPPED | OUTPATIENT
Start: 2024-12-23 | End: 2025-02-21

## 2024-12-23 RX ORDER — HYDROXYZINE HYDROCHLORIDE 50 MG/1
50 TABLET, FILM COATED ORAL 3 TIMES DAILY PRN
Qty: 30 TABLET | Refills: 0 | Status: SHIPPED | OUTPATIENT
Start: 2024-12-23

## 2024-12-23 RX ORDER — QUETIAPINE FUMARATE 200 MG/1
200 TABLET, FILM COATED ORAL
Qty: 30 TABLET | Refills: 1 | Status: SHIPPED | OUTPATIENT
Start: 2024-12-23 | End: 2025-02-21

## 2024-12-23 NOTE — BH CRISIS PLAN
"Client Name: Judy Rios       Client YOB: 1970    [employer] Safety Plan      Creation Date: 12/23/24 Update Date: 12/23/24   Created By: Orlin Mares MD Last Updated By: Orlin Mares MD      Step 1: Warning Signs:   Warning Signs   \"depression is the warning sign\" and \"feeling down\"   crying            Step 2: Internal Coping Strategies:   Internal Coping Strategies   playgin game on the phone   listening to Music            Step 3: People and social settings that provide distraction:   Name Contact Information   Friend (Mikala) saved in cell   AA sponser (Ca) saved in cell    Places   AA meeting           Step 4: People whom I can ask for help during a crisis:      Name Contact Information    Friend (Mikala) saved in cell    AA sponser (Ca) saved in cell      Step 5: Professionals or agencies I can contact during a crisis:      [facility]/Agency Name Phone Emergency Contact    Orlin Mares MD (psychiatrist) [phone]       [facility] Emergency Department Emergency Department Phone Emergency Department Address    [id number] [address], [place] [address]        Crisis Phone Numbers:   Suicide Prevention Lifeline: Call or Text  988 Crisis Text Line: Text HOME to 766-799   Please note: Some [facility] do not have a separate number for Child/Adolescent specific crisis. If your county is not listed under Child/Adolescent, please call the adult number for your county      Adult Crisis Numbers: Child/Adolescent Crisis Numbers   [facility]: [phone] [facility]: [phone]   [facility]: [phone] [facility]: [phone]   [facility]: [phone] [place]: [phone]   [facility]: [phone] [place] [facility]: [phone]   [place] [facility]: [phone]   [facility]: [phone]   [facility]: [phone]   [facility]: [phone] (daytime) " [phone] (after hours, weekends, holidays)      Step 6: Making the environment safer (plan for lethal means safety):   Patient did not identify any lethal methods: Yes     Optional: What is most important to me and worth living for?      Sandi Safety Plan. Talia Coleman and Iban Hogan. Used with permission of the authors.

## 2024-12-23 NOTE — BH TREATMENT PLAN
"TREATMENT PLAN (Medication Management Only)        [facility]    Name and Date of Birth:  Judy Rios 54 y.o. 1970  Date of Treatment Plan: December 23, 2024  Diagnosis/Diagnoses:  No diagnosis found.  Strengths/Personal Resources for Self-Care: \"supportive friend, AA meeting and sponsor, playing video games\".  Area/Areas of need (in own words): \"mood instability, anxiety, insomnia, alcohol and substance abuse (maintain sobriety) \"  1. Long Term Goal:  Improve mood stability, decrease anxiety and improve sleep; maintain sobriety .  Target Date:6 months - 6/23/2025  Person/Persons responsible for completion of goal: Judy  2.  Short Term Objective (s) - How will we reach this goal?:   A. Provider new recommended medication/dosage changes and/or continue medication(s): continue current medications as prescribed.  B. Attend psychotherapy regularly.  C. Attend AA meetings regularly.  Target Date:6 months - 6/23/2025  Person/Persons Responsible for Completion of Goal: Judy  Progress Towards Goals: initiating treatment  Treatment Modality: medication management every 6 months, referral for individual psychotherapy  Review due 180 days from date of this plan: 6 months - 6/23/2025  Expected length of service: maintenance  My Physician/PA/NP and I have developed this plan together and I agree to work on the goals and objectives. I understand the treatment goals that were developed for my treatment.      "

## 2024-12-23 NOTE — ASSESSMENT & PLAN NOTE
Orders:    ARIPiprazole (ABILIFY DISCMELT) 15 mg dispersible tablet; Take 1 tablet (15 mg total) by mouth daily    benztropine (COGENTIN) 1 mg tablet; Take 1 tablet (1 mg total) by mouth 2 (two) times a day    hydrOXYzine HCL (ATARAX) 50 mg tablet; Take 1 tablet (50 mg total) by mouth 3 (three) times a day as needed for anxiety    mirtazapine (REMERON) 15 mg tablet; Take 1 tablet (15 mg total) by mouth daily at bedtime    QUEtiapine (SEROquel) 200 mg tablet; Take 1 tablet (200 mg total) by mouth daily at bedtime
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
## 2024-12-24 RX ORDER — MIRTAZAPINE 15 MG/1
15 TABLET, FILM COATED ORAL
Qty: 90 TABLET | Refills: 1 | OUTPATIENT
Start: 2024-12-24

## 2025-01-13 ENCOUNTER — TELEPHONE (OUTPATIENT)
Dept: PSYCHIATRY | Facility: CLINIC | Age: 55
End: 2025-01-13

## 2025-01-13 NOTE — TELEPHONE ENCOUNTER
One week follow up call for New Patient appointment with   Eitan Pierce LPC   on 04/09/2025 was made on 01/13/2025. Writer informed patient of New Patient paperwork needing to be completed 5 days prior to the appointment. Writer confirmed paperwork has been sent via [employer].    Appointment was made on: 01/06/2025

## 2025-01-16 ENCOUNTER — CLINICAL SUPPORT (OUTPATIENT)
Dept: BARIATRICS | Facility: CLINIC | Age: 55
End: 2025-01-16

## 2025-01-16 VITALS — BODY MASS INDEX: 32.13 KG/M2 | WEIGHT: 181.4 LBS

## 2025-01-16 DIAGNOSIS — Z98.84 STATUS POST BARIATRIC SURGERY: Primary | ICD-10-CM

## 2025-01-16 DIAGNOSIS — E66.811 OBESITY, CLASS I, BMI 30-34.9: ICD-10-CM

## 2025-01-16 PROCEDURE — RECHECK: Performed by: DIETITIAN, REGISTERED
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
## 2025-02-07 ENCOUNTER — OFFICE VISIT (OUTPATIENT)
Dept: BARIATRICS | Facility: CLINIC | Age: 55
End: 2025-02-07
Payer: COMMERCIAL

## 2025-02-07 VITALS
SYSTOLIC BLOOD PRESSURE: 128 MMHG | TEMPERATURE: 97.9 F | BODY MASS INDEX: 31.8 KG/M2 | DIASTOLIC BLOOD PRESSURE: 84 MMHG | OXYGEN SATURATION: 99 % | HEIGHT: 63 IN | HEART RATE: 84 BPM | WEIGHT: 179.5 LBS

## 2025-02-07 DIAGNOSIS — G47.33 OSA (OBSTRUCTIVE SLEEP APNEA): ICD-10-CM

## 2025-02-07 DIAGNOSIS — E66.811 OBESITY, CLASS I, BMI 30-34.9: ICD-10-CM

## 2025-02-07 DIAGNOSIS — Z48.815 ENCOUNTER FOR SURGICAL AFTERCARE FOLLOWING SURGERY OF DIGESTIVE SYSTEM: Primary | ICD-10-CM

## 2025-02-07 DIAGNOSIS — K91.2 POSTSURGICAL MALABSORPTION: ICD-10-CM

## 2025-02-07 DIAGNOSIS — Z98.84 BARIATRIC SURGERY STATUS: ICD-10-CM

## 2025-02-07 PROCEDURE — 99214 OFFICE O/P EST MOD 30 MIN: CPT | Performed by: NURSE PRACTITIONER

## 2025-02-07 NOTE — PATIENT INSTRUCTIONS
- STOP omeprazole. Can take pepcid 20 mg once as needed or tums as needed for heartburn.   - obtain labs - please make sure you are fasting when you get the labs done.
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
## 2025-02-07 NOTE — PROGRESS NOTES
Date of surgery: 1/16/2024  Procedure: RNY   Performing surgeon: Dr. Keyon Davis     Initial Weight -  200.5 lb   Current Weight - 179.5 lb   Nish Weight - 179.5 lb ( current weight)   Total Body Weight Loss (EWL)- 20.9  EWL% - 34%  TWB % - 10%

## 2025-02-16 DIAGNOSIS — F31.9 BIPOLAR 1 DISORDER (HCC): ICD-10-CM

## 2025-02-18 ENCOUNTER — HOSPITAL ENCOUNTER (OUTPATIENT)
Dept: RADIOLOGY | Facility: HOSPITAL | Age: 55
Discharge: HOME | End: 2025-02-18
Payer: COMMERCIAL

## 2025-02-18 DIAGNOSIS — Z98.82 HISTORY OF BILATERAL BREAST IMPLANTS: ICD-10-CM

## 2025-02-18 DIAGNOSIS — Z85.3 HISTORY OF BREAST CANCER: ICD-10-CM

## 2025-02-18 PROCEDURE — A9585 GADOBUTROL INJECTION: HCPCS

## 2025-02-18 PROCEDURE — C8908 MRI W/O FOL W/CONT, BREAST,: HCPCS

## 2025-02-18 PROCEDURE — C8937 CAD BREAST MRI: HCPCS

## 2025-02-18 RX ORDER — ARIPIPRAZOLE 15 MG/1
15 TABLET, ORALLY DISINTEGRATING ORAL DAILY
Qty: 30 TABLET | Refills: 1 | OUTPATIENT
Start: 2025-02-18 | End: 2025-04-19

## 2025-02-18 RX ORDER — GADOBUTROL 604.72 MG/ML
8 INJECTION INTRAVENOUS
Status: COMPLETED | OUTPATIENT
Start: 2025-02-18 | End: 2025-02-18

## 2025-02-18 RX ADMIN — GADOBUTROL 8 ML: 604.72 INJECTION INTRAVENOUS at 18:14

## 2025-02-18 NOTE — TELEPHONE ENCOUNTER
The patient has enough medication until her next appointment and refills could be provided after evaluating the patient.

## 2025-02-19 ENCOUNTER — TELEMEDICINE (OUTPATIENT)
Dept: PSYCHIATRY | Facility: CLINIC | Age: 55
End: 2025-02-19
Payer: COMMERCIAL

## 2025-02-19 ENCOUNTER — RESULTS FOLLOW-UP (OUTPATIENT)
Dept: SURGICAL ONCOLOGY | Facility: CLINIC | Age: 55
End: 2025-02-19

## 2025-02-19 ENCOUNTER — TELEPHONE (OUTPATIENT)
Dept: SURGICAL ONCOLOGY | Facility: CLINIC | Age: 55
End: 2025-02-19

## 2025-02-19 ENCOUNTER — TELEPHONE (OUTPATIENT)
Age: 55
End: 2025-02-19

## 2025-02-19 DIAGNOSIS — F31.9 BIPOLAR 1 DISORDER (HCC): Primary | ICD-10-CM

## 2025-02-19 DIAGNOSIS — F41.0 GENERALIZED ANXIETY DISORDER WITH PANIC ATTACKS: ICD-10-CM

## 2025-02-19 DIAGNOSIS — Z13.21 SCREENING FOR ENDOCRINE, NUTRITIONAL, METABOLIC AND IMMUNITY DISORDER: ICD-10-CM

## 2025-02-19 DIAGNOSIS — Z13.0 SCREENING FOR ENDOCRINE, NUTRITIONAL, METABOLIC AND IMMUNITY DISORDER: ICD-10-CM

## 2025-02-19 DIAGNOSIS — Z13.228 SCREENING FOR ENDOCRINE, NUTRITIONAL, METABOLIC AND IMMUNITY DISORDER: ICD-10-CM

## 2025-02-19 DIAGNOSIS — F41.1 GENERALIZED ANXIETY DISORDER WITH PANIC ATTACKS: ICD-10-CM

## 2025-02-19 DIAGNOSIS — F10.91 ALCOHOL USE DISORDER IN REMISSION: ICD-10-CM

## 2025-02-19 DIAGNOSIS — F14.11 COCAINE ABUSE IN REMISSION (HCC): ICD-10-CM

## 2025-02-19 DIAGNOSIS — Z13.29 SCREENING FOR ENDOCRINE, NUTRITIONAL, METABOLIC AND IMMUNITY DISORDER: ICD-10-CM

## 2025-02-19 PROCEDURE — 99214 OFFICE O/P EST MOD 30 MIN: CPT | Performed by: STUDENT IN AN ORGANIZED HEALTH CARE EDUCATION/TRAINING PROGRAM

## 2025-02-19 PROCEDURE — 90833 PSYTX W PT W E/M 30 MIN: CPT | Performed by: STUDENT IN AN ORGANIZED HEALTH CARE EDUCATION/TRAINING PROGRAM

## 2025-02-19 RX ORDER — MIRTAZAPINE 15 MG/1
15 TABLET, FILM COATED ORAL
Qty: 30 TABLET | Refills: 1 | Status: SHIPPED | OUTPATIENT
Start: 2025-02-19 | End: 2025-04-20

## 2025-02-19 RX ORDER — ARIPIPRAZOLE 15 MG/1
15 TABLET, ORALLY DISINTEGRATING ORAL DAILY
Qty: 30 TABLET | Refills: 1 | Status: SHIPPED | OUTPATIENT
Start: 2025-02-19 | End: 2025-04-20

## 2025-02-19 RX ORDER — HYDROXYZINE HYDROCHLORIDE 50 MG/1
50 TABLET, FILM COATED ORAL 3 TIMES DAILY PRN
Qty: 30 TABLET | Refills: 1 | Status: SHIPPED | OUTPATIENT
Start: 2025-02-19 | End: 2025-04-20

## 2025-02-19 RX ORDER — BENZTROPINE MESYLATE 2 MG/1
2 TABLET ORAL 2 TIMES DAILY
Qty: 60 TABLET | Refills: 1 | Status: SHIPPED | OUTPATIENT
Start: 2025-02-19 | End: 2025-04-20

## 2025-02-19 RX ORDER — QUETIAPINE FUMARATE 200 MG/1
200 TABLET, FILM COATED ORAL
Qty: 30 TABLET | Refills: 1 | Status: SHIPPED | OUTPATIENT
Start: 2025-02-19 | End: 2025-02-21
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
## 2025-02-19 NOTE — TELEPHONE ENCOUNTER
Patient called in to inquire how to access her virtual appointment today.    Writer advised to please log into My Chart 15 minutes prior to the appointment time, to check in.

## 2025-02-19 NOTE — ASSESSMENT & PLAN NOTE
Orders:    ARIPiprazole (ABILIFY DISCMELT) 15 mg dispersible tablet; Take 1 tablet (15 mg total) by mouth daily    hydrOXYzine HCL (ATARAX) 50 mg tablet; Take 1 tablet (50 mg total) by mouth 3 (three) times a day as needed for anxiety    mirtazapine (REMERON) 15 mg tablet; Take 1 tablet (15 mg total) by mouth daily at bedtime    QUEtiapine (SEROquel) 200 mg tablet; Take 1 tablet (200 mg total) by mouth daily at bedtime    benztropine (COGENTIN) 2 mg tablet; Take 1 tablet (2 mg total) by mouth 2 (two) times a day

## 2025-02-20 DIAGNOSIS — R59.0 AXILLARY LYMPHADENOPATHY: Primary | ICD-10-CM

## 2025-02-21 DIAGNOSIS — F31.9 BIPOLAR 1 DISORDER (HCC): ICD-10-CM

## 2025-02-21 RX ORDER — QUETIAPINE FUMARATE 200 MG/1
200 TABLET, FILM COATED ORAL
Qty: 90 TABLET | Refills: 0 | Status: SHIPPED | OUTPATIENT
Start: 2025-02-21 | End: 2025-05-22

## 2025-02-21 RX ORDER — BENZTROPINE MESYLATE 1 MG/1
1 TABLET ORAL 2 TIMES DAILY
Qty: 60 TABLET | Refills: 1 | OUTPATIENT
Start: 2025-02-21

## 2025-02-24 NOTE — PROGRESS NOTES
Message rec'd from From Greg Kebede  regarding recommendation for;    __X___ RIGHT ____x__LEFT      __X___Ultrasound guided  ______Stereotactic breast biopsy ly,ph node.         Blood thinners: No: __X___ Yes: _____ What:     Biopsy teaching sheet given:  _____yes __X__no (All teaching points discussed during call, pt questions answered during office visit)    Pt given name/# for any further questions/needs

## 2025-03-05 ENCOUNTER — OFFICE VISIT (OUTPATIENT)
Dept: FAMILY MEDICINE CLINIC | Facility: CLINIC | Age: 55
End: 2025-03-05

## 2025-03-05 VITALS
RESPIRATION RATE: 16 BRPM | HEART RATE: 101 BPM | BODY MASS INDEX: 33.49 KG/M2 | OXYGEN SATURATION: 95 % | TEMPERATURE: 97.6 F | DIASTOLIC BLOOD PRESSURE: 68 MMHG | SYSTOLIC BLOOD PRESSURE: 100 MMHG | HEIGHT: 62 IN | WEIGHT: 182 LBS

## 2025-03-05 DIAGNOSIS — J45.40 MODERATE PERSISTENT ASTHMA WITHOUT COMPLICATION: ICD-10-CM

## 2025-03-05 DIAGNOSIS — Z00.00 ANNUAL PHYSICAL EXAM: ICD-10-CM

## 2025-03-05 DIAGNOSIS — I10 PRIMARY HYPERTENSION: ICD-10-CM

## 2025-03-05 DIAGNOSIS — E66.9 OBESITY (BMI 30-39.9): ICD-10-CM

## 2025-03-05 DIAGNOSIS — Z23 ENCOUNTER FOR IMMUNIZATION: ICD-10-CM

## 2025-03-05 DIAGNOSIS — Z98.84 STATUS POST BARIATRIC SURGERY: ICD-10-CM

## 2025-03-05 DIAGNOSIS — G47.33 OSA (OBSTRUCTIVE SLEEP APNEA): Primary | ICD-10-CM

## 2025-03-05 PROBLEM — Z72.0 TOBACCO ABUSE: Status: RESOLVED | Noted: 2022-10-13 | Resolved: 2025-03-05

## 2025-03-05 PROBLEM — R33.9 ACUTE ON CHRONIC RETENTION OF URINE: Status: RESOLVED | Noted: 2023-03-13 | Resolved: 2025-03-05

## 2025-03-05 PROCEDURE — 90750 HZV VACC RECOMBINANT IM: CPT

## 2025-03-05 PROCEDURE — 99214 OFFICE O/P EST MOD 30 MIN: CPT | Performed by: FAMILY MEDICINE

## 2025-03-05 PROCEDURE — 99396 PREV VISIT EST AGE 40-64: CPT | Performed by: FAMILY MEDICINE

## 2025-03-05 PROCEDURE — 90471 IMMUNIZATION ADMIN: CPT

## 2025-03-05 RX ORDER — AMLODIPINE BESYLATE 5 MG/1
5 TABLET ORAL DAILY
Qty: 90 TABLET | Refills: 1 | Status: SHIPPED | OUTPATIENT
Start: 2025-03-05 | End: 2025-09-01

## 2025-03-05 RX ORDER — ALBUTEROL SULFATE 90 UG/1
2 INHALANT RESPIRATORY (INHALATION) EVERY 4 HOURS PRN
Qty: 18 G | Refills: 5 | Status: SHIPPED | OUTPATIENT
Start: 2025-03-05

## 2025-03-05 RX ORDER — BUDESONIDE AND FORMOTEROL FUMARATE DIHYDRATE 80; 4.5 UG/1; UG/1
2 AEROSOL RESPIRATORY (INHALATION) 2 TIMES DAILY
Qty: 10.2 G | Refills: 3 | Status: SHIPPED | OUTPATIENT
Start: 2025-03-05

## 2025-03-05 NOTE — ASSESSMENT & PLAN NOTE
History of bariatric surgery  Continue follow-up with weight management  Dietary and exercise counseling provided to patient

## 2025-03-05 NOTE — PATIENT INSTRUCTIONS
"Patient Education     Routine physical for adults   The Basics   Written by the doctors and editors at [facility]   What is a physical? -- A physical is a routine visit, or \"check-up,\" with your doctor. You might also hear it called a \"wellness visit\" or \"preventive visit.\"  During each visit, the doctor will:   Ask about your physical and mental health   Ask about your habits, behaviors, and lifestyle   Do an exam   Give you vaccines if needed   Talk to you about any medicines you take   Give advice about your health   Answer your questions  Getting regular check-ups is an important part of taking care of your health. It can help your doctor find and treat any problems you have. But it's also important for preventing health problems.  A routine physical is different from a \"sick visit.\" A sick visit is when you see a doctor because of a health concern or problem. Since physicals are scheduled ahead of time, you can think about what you want to ask the doctor.  How often should I get a physical? -- It depends on your age and health. In general, for people age 21 years and older:   If you are younger than 50 years, you might be able to get a physical every 3 years.   If you are 50 years or older, your doctor might recommend a physical every year.  If you have an ongoing health condition, like diabetes or high blood pressure, your doctor will probably want to see you more often.  What happens during a physical? -- In general, each visit will include:   Physical exam - The doctor or nurse will check your height, weight, heart rate, and blood pressure. They will also look at your eyes and ears. They will ask about how you are feeling and whether you have any symptoms that bother you.   Medicines - It's a good idea to bring a list of all the medicines you take to each doctor visit. Your doctor will talk to you about your medicines and answer any questions. Tell them if you are having any side effects that bother you. You " "should also tell them if you are having trouble paying for any of your medicines.   Habits and behaviors - This includes:   Your diet   Your exercise habits   Whether you smoke, drink alcohol, or use drugs   Whether you are sexually active   Whether you feel safe at home  Your doctor will talk to you about things you can do to improve your health and lower your risk of health problems. They will also offer help and support. For example, if you want to quit smoking, they can give you advice and might prescribe medicines. If you want to improve your diet or get more physical activity, they can help you with this, too.   Lab tests, if needed - The tests you get will depend on your age and situation. For example, your doctor might want to check your:   Cholesterol   Blood sugar   Iron level   Vaccines - The recommended vaccines will depend on your age, health, and what vaccines you already had. Vaccines are very important because they can prevent certain serious or deadly infections.   Discussion of screening - \"Screening\" means checking for diseases or other health problems before they cause symptoms. Your doctor can recommend screening based on your age, risk, and preferences. This might include tests to check for:   Cancer, such as breast, prostate, cervical, ovarian, colorectal, prostate, lung, or skin cancer   Sexually transmitted infections, such as chlamydia and gonorrhea   Mental health conditions like depression and anxiety  Your doctor will talk to you about the different types of screening tests. They can help you decide which screenings to have. They can also explain what the results might mean.   Answering questions - The physical is a good time to ask the doctor or nurse questions about your health. If needed, they can refer you to other doctors or specialists, too.  Adults older than 65 years often need other care, too. As you get older, your doctor will talk to you about:   How to prevent falling at " home   Hearing or vision tests   Memory testing   How to take your medicines safely   Making sure that you have the help and support you need at home  All topics are updated as new evidence becomes available and our peer review process is complete.  This topic retrieved from [employer] on: May 02, 2024.  Topic 727195 Version 1.0  Release: 32.4.3 - C32.122  © 2024 UpToDate, Inc. and/or its affiliates. All rights reserved.  Consumer Information Use and Disclaimer   Disclaimer: This generalized information is a limited summary of diagnosis, treatment, and/or medication information. It is not meant to be comprehensive and should be used as a tool to help the user understand and/or assess potential diagnostic and treatment options. It does NOT include all information about conditions, treatments, medications, side effects, or risks that may apply to a specific patient. It is not intended to be medical advice or a substitute for the medical advice, diagnosis, or treatment of a health care provider based on the health care provider's examination and assessment of a patient's specific and unique circumstances. Patients must speak with a health care provider for complete information about their health, medical questions, and treatment options, including any risks or benefits regarding use of medications. This information does not endorse any treatments or medications as safe, effective, or approved for treating a specific patient. UpToDate, Inc. and its affiliates disclaim any warranty or liability relating to this information or the use thereof.The use of this information is governed by the Terms of Use, available at https://www.woltersOxford Networksuwer.com/en/know/clinical-effectiveness-terms. 2024© UpToDate, Inc. and its affiliates and/or licensors. All rights reserved.  Copyright   © 2024 UpToDate, Inc. and/or its affiliates. All rights reserved.

## 2025-03-05 NOTE — ASSESSMENT & PLAN NOTE
History of Kenneth-en-Y gastric bypass  Avoid NSAID  Continue multivitamins  Follow-up with weight management outpatient
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
## 2025-03-05 NOTE — ASSESSMENT & PLAN NOTE
Well-controlled  Continue amlodipine  Continue DASH diet    Orders:    amLODIPine (NORVASC) 5 mg tablet; Take 1 tablet (5 mg total) by mouth daily

## 2025-03-05 NOTE — ASSESSMENT & PLAN NOTE
Stable  Continue albuterol and Symbicort  She is up-to-date with pneumococcal/COVID/influenza vaccination  Orders:    budesonide-formoterol (Symbicort) 80-4.5 MCG/ACT inhaler; Inhale 2 puffs 2 (two) times a day Rinse mouth after use    albuterol (PROVENTIL HFA,VENTOLIN HFA) 90 mcg/act inhaler; Inhale 2 puffs every 4 (four) hours as needed for wheezing or shortness of breath

## 2025-03-10 ENCOUNTER — CONSULT (OUTPATIENT)
Age: 55
End: 2025-03-10
Payer: COMMERCIAL

## 2025-03-10 VITALS
SYSTOLIC BLOOD PRESSURE: 112 MMHG | BODY MASS INDEX: 33.23 KG/M2 | DIASTOLIC BLOOD PRESSURE: 70 MMHG | HEIGHT: 61 IN | WEIGHT: 176 LBS

## 2025-03-10 DIAGNOSIS — Z98.890 H/O BREAST RECONSTRUCTION: Primary | ICD-10-CM

## 2025-03-10 PROCEDURE — 99204 OFFICE O/P NEW MOD 45 MIN: CPT | Performed by: STUDENT IN AN ORGANIZED HEALTH CARE EDUCATION/TRAINING PROGRAM

## 2025-03-11 PROBLEM — Z98.890 H/O BREAST RECONSTRUCTION: Status: ACTIVE | Noted: 2025-03-11

## 2025-03-11 NOTE — PROGRESS NOTES
Assessment and Plan:  Ms. Rios is a 55 y.o. female presenting with history of bilateral mastectomy with implant based recon in 2014.    We discussed that she has a significant capsular contracture of her implants.  This can signify an underlying rupture.  However, I do believe her biopsies should take precedence.  If she has any concerns on biopsy that would require surgery, I would advocate for exchange of her implants and capsulectomy at the time of her oncologic surgery - should that be required.  If not, she could consider this at her convenience.  All patient's questions were answered and she voiced understanding.    History of Present Illness:   Ms. Rios is a 55 y.o. female presenting with history of bilateral mastectomy with implant based recon in 2014.  She recently had surveillance/imaging which depicted abnormal lymph nodes for which she is scheduled for biopsy.  She denies nicotine use.  She is unsure as to any information regarding her implants.  She states they have been firm and uncomfortable for years.      Review of Systems:  A 12 point ROS was performed and negative except per HPI.    Past Medical History:  Past Medical History:   Diagnosis Date    Arthritis     Asthma     Back pain     Bipolar 1 disorder (HCC)     Cancer (HCC)     right breast    Constipation     CPAP (continuous positive airway pressure) dependence     Depression     GERD (gastroesophageal reflux disease)     Hiatal hernia     HTN (hypertension)     Hypertension     Mental health problem     Obesity (BMI 35.0-39.9 without comorbidity)     Sleep apnea     Wears glasses        Past Surgical History:  Past Surgical History:   Procedure Laterality Date    ABDOMINOPLASTY      BREAST SURGERY Bilateral 2014    mastectomy - R side cancerous, L side prophylactic    COLONOSCOPY      COSMETIC SURGERY      breast reconstruction    EGD      GASTRIC BYPASS LAPAROSCOPIC N/A 10/03/2023    Procedure: LAPAROSCOPIC REMOVAL OF ADJUSTABLE  GASTRIC BAND W/ ROBOT & INTROPERATIVE EGD;  Surgeon: Julian Davis MD;  Location: [facility];  Service: Bariatrics    HYSTERECTOMY      LAPAROSCOPIC GASTRIC BANDING      MASTECTOMY      R side cancerous, L side prophylactic (per pt)    FL LAPS GSTR RSTCV PX W/BYP CHEN-EN-Y LIMB <150 CM N/A 2024    Procedure: R-N-Y GASTRIC BYPASS W/ ROBOTICS & INTRAOPERATIVE EGD;  Surgeon: Julian Davis MD;  Location: [facility];  Service: Bariatrics    US BREAST NEEDLE LOC LEFT Left 2014       Social History:  Social History     Tobacco Use    Smoking status: Former     Current packs/day: 0.00     Types: Cigarettes     Quit date: 2023     Years since quittin.0     Passive exposure: Past    Smokeless tobacco: Never   Vaping Use    Vaping status: Never Used   Substance Use Topics    Alcohol use: Not Currently    Drug use: Not Currently       Family History:  Family History   Problem Relation Age of Onset    Stroke Mother     Cancer Mother        Allergies:  No Known Allergies    Medications:  Current Outpatient Medications on File Prior to Visit   Medication Sig Dispense Refill    acetaminophen (TYLENOL) 500 mg tablet Take 500 mg by mouth every 6 (six) hours as needed for mild pain      albuterol (PROVENTIL HFA,VENTOLIN HFA) 90 mcg/act inhaler Inhale 2 puffs every 4 (four) hours as needed for wheezing or shortness of breath 18 g 5    amLODIPine (NORVASC) 5 mg tablet Take 1 tablet (5 mg total) by mouth daily 90 tablet 1    ARIPiprazole (ABILIFY DISCMELT) 15 mg dispersible tablet Take 1 tablet (15 mg total) by mouth daily 30 tablet 1    benztropine (COGENTIN) 2 mg tablet Take 1 tablet (2 mg total) by mouth 2 (two) times a day 60 tablet 1    budesonide-formoterol (Symbicort) 80-4.5 MCG/ACT inhaler Inhale 2 puffs 2 (two) times a day Rinse mouth after use 10.2 g 3    Cholecalciferol (Vitamin D3) 50 MCG (2000 UT) capsule Take 1 capsule (2,000 Units total) by mouth daily 90 capsule 3    hydrOXYzine HCL (ATARAX) 50 mg  "tablet Take 1 tablet (50 mg total) by mouth 3 (three) times a day as needed for anxiety 30 tablet 1    mirtazapine (REMERON) 15 mg tablet Take 1 tablet (15 mg total) by mouth daily at bedtime 30 tablet 1    multivitamin (THERAGRAN) TABS Take 1 tablet by mouth daily      polyethylene glycol (MIRALAX) 17 g packet Take 17 g by mouth daily      QUEtiapine (SEROquel) 200 mg tablet Take 1 tablet (200 mg total) by mouth daily at bedtime 90 tablet 0    Sodium Fluoride 1.1 % PSTE Brush teeth one time before bedtime, don't rinse but expectorate excess. 100 mL 2    tretinoin (RETIN-A) 0.025 % cream *PA DENIED* APPLY A PEA-SIZED AMOUNT TO FACE ONCE DAILY AT BEDTIME (Patient not taking: Reported on 2/7/2025)      urea (CARMOL) 40 % APPLY TO FACE TWICE A DAY      vitamin B-12 (VITAMIN B-12) 1,000 mcg tablet Take 1 tablet (1,000 mcg total) by mouth daily 90 tablet 3     No current facility-administered medications on file prior to visit.         Physical Examination:  /70   Ht 5' 1\" (1.549 m)   Wt 79.8 kg (176 lb)   BMI 33.25 kg/m²   Estimated body mass index is 33.25 kg/m² as calculated from the following:    Height as of this encounter: 5' 1\" (1.549 m).    Weight as of this encounter: 79.8 kg (176 lb).  General: NAD, well appearing, AAOx3  HEENT: NCAT, EOMI, MMM, supple  Resp: Nonlabored  Heart: RRR  Abdomen: Soft, ND, NT  Extremities/MSK: no LE edema, no obvious deficits in ROM  Neuro: grossly intact with no obvious deficits  Skin: no obvious lesions or rashes  Breast: no palpable mass, no palpable axillary lymphadenopathy, bilateral Baker III/IV cap con, healed incisions, significant excess of skin/mastectomy flaps      Annabella Brown, DO  Plastic and Reconstructive Surgery    "

## 2025-03-14 ENCOUNTER — HOSPITAL ENCOUNTER (OUTPATIENT)
Dept: ULTRASOUND IMAGING | Facility: CLINIC | Age: 55
Discharge: HOME | End: 2025-03-14
Payer: COMMERCIAL

## 2025-03-14 VITALS
WEIGHT: 176 LBS | HEART RATE: 87 BPM | DIASTOLIC BLOOD PRESSURE: 77 MMHG | BODY MASS INDEX: 33.23 KG/M2 | SYSTOLIC BLOOD PRESSURE: 110 MMHG | HEIGHT: 61 IN

## 2025-03-14 DIAGNOSIS — R59.0 AXILLARY LYMPHADENOPATHY: ICD-10-CM

## 2025-03-14 PROCEDURE — 88307 TISSUE EXAM BY PATHOLOGIST: CPT | Performed by: PATHOLOGY

## 2025-03-14 PROCEDURE — 88312 SPECIAL STAINS GROUP 1: CPT | Performed by: PATHOLOGY

## 2025-03-14 PROCEDURE — 88364 INSITU HYBRIDIZATION (FISH): CPT | Performed by: PATHOLOGY

## 2025-03-14 PROCEDURE — 88184 FLOWCYTOMETRY/ TC 1 MARKER: CPT

## 2025-03-14 PROCEDURE — 38505 NEEDLE BIOPSY LYMPH NODES: CPT

## 2025-03-14 PROCEDURE — 76942 ECHO GUIDE FOR BIOPSY: CPT

## 2025-03-14 PROCEDURE — 76642 ULTRASOUND BREAST LIMITED: CPT

## 2025-03-14 PROCEDURE — 88341 IMHCHEM/IMCYTCHM EA ADD ANTB: CPT | Performed by: PATHOLOGY

## 2025-03-14 PROCEDURE — 88185 FLOWCYTOMETRY/TC ADD-ON: CPT

## 2025-03-14 PROCEDURE — 88360 TUMOR IMMUNOHISTOCHEM/MANUAL: CPT | Performed by: PATHOLOGY

## 2025-03-14 PROCEDURE — 88342 IMHCHEM/IMCYTCHM 1ST ANTB: CPT | Performed by: PATHOLOGY

## 2025-03-14 PROCEDURE — 88365 INSITU HYBRIDIZATION (FISH): CPT | Performed by: PATHOLOGY

## 2025-03-14 RX ORDER — LIDOCAINE HYDROCHLORIDE 10 MG/ML
5 INJECTION, SOLUTION EPIDURAL; INFILTRATION; INTRACAUDAL; PERINEURAL ONCE
Status: COMPLETED | OUTPATIENT
Start: 2025-03-14 | End: 2025-03-14

## 2025-03-14 RX ADMIN — LIDOCAINE HYDROCHLORIDE 5 ML: 10 INJECTION, SOLUTION EPIDURAL; INFILTRATION; INTRACAUDAL; PERINEURAL at 13:29

## 2025-03-14 RX ADMIN — LIDOCAINE HYDROCHLORIDE 5 ML: 10 INJECTION, SOLUTION EPIDURAL; INFILTRATION; INTRACAUDAL; PERINEURAL at 13:20

## 2025-03-14 NOTE — PROGRESS NOTES
Procedure type: SITE # 1    ___x__ultrasound guided _____stereotactic    Breast:    __x___Left _____Right    Location: Axilla    Needle: 16G    # of passes: 3    Clip: Open Coil      Procedure type: SITE # 2    __x___ultrasound guided _____stereotactic    Breast:    _____Left ___x__Right    Location: Axilla    Needle: 16G    # of passes: 4 ( 3 DIF and 1 In RPMI )    Clip: Open Coil    Performed by: Dr. Cai    Pressure held for 5 minutes on each side by: Tana Gonzalez Strips:    ___X__yes _____no    Band aid:    __X___yes_____no    Tolerated procedure:    __X___yes _____no

## 2025-03-17 NOTE — PROGRESS NOTES
Post procedure call completed 3/17/2025 at 8:51    Bleeding: _____yes __X___no    Pain: _____yes ___X___no    Redness/Swelling: ______yes ___X___no    Band aid removed: _____yes ___X__no (discussed removing when she showers)    Steri-Strips intact: ___X___yes _____no (discussed with patient to remove steri strips on .3/19/2025.. if they have not come off on their own)    Pt with no questions at this time, adv will call when results available, adv to call with any questions or concerns, has name/# for contact

## 2025-03-18 ENCOUNTER — TELEPHONE (OUTPATIENT)
Dept: DENTISTRY | Facility: CLINIC | Age: 55
End: 2025-03-18

## 2025-03-19 DIAGNOSIS — F31.9 BIPOLAR 1 DISORDER (HCC): ICD-10-CM

## 2025-03-19 LAB — SCAN RESULT: NORMAL

## 2025-03-19 RX ORDER — BENZTROPINE MESYLATE 2 MG/1
2 TABLET ORAL 2 TIMES DAILY
Qty: 180 TABLET | Refills: 0 | Status: SHIPPED | OUTPATIENT
Start: 2025-03-19 | End: 2025-06-17

## 2025-03-19 RX ORDER — ARIPIPRAZOLE 15 MG/1
15 TABLET, ORALLY DISINTEGRATING ORAL DAILY
Qty: 90 TABLET | Refills: 0 | Status: SHIPPED | OUTPATIENT
Start: 2025-03-19 | End: 2025-06-17

## 2025-03-24 ENCOUNTER — TELEPHONE (OUTPATIENT)
Age: 55
End: 2025-03-24

## 2025-03-24 NOTE — TELEPHONE ENCOUNTER
Spoke with patient who wanted to know if her breast biopsy results have come back yet. I let her know they are still currently in process and the results will notify the provider as [email or web address]. I informed her it can take up to 2 full weeks. She verbalized understanding and agreeable with plan.

## 2025-03-25 ENCOUNTER — TELEPHONE (OUTPATIENT)
Dept: SURGICAL ONCOLOGY | Facility: CLINIC | Age: 55
End: 2025-03-25

## 2025-03-25 ENCOUNTER — TELEPHONE (OUTPATIENT)
Age: 55
End: 2025-03-25

## 2025-03-25 NOTE — TELEPHONE ENCOUNTER
Rec'd call from patient stating her results came in and will like to know the results and when will she go forward with scheduling the procedure?      Please reach out to patient

## 2025-03-25 NOTE — TELEPHONE ENCOUNTER
Called patient to discuss benign results of b/l axillary lymph nodes. Pathology revealed reactive lymph nodes with tattoo pigment and foreign body giant cells. She states she got a tattoo 1 year ago and she mentions she had the shingles vaccine in the beginning on March. She denies recent illness, overall feels well. She states she is interested breast implant exchange. She has already met with Dr. Brown to discuss surgery. I informed the patient that since her biopsies were benign she may move forward with reconstruction at her convenience. I will see her in Dec for routine f/u. All questions were answered.

## 2025-03-26 NOTE — TELEPHONE ENCOUNTER
She can speak with breast surgery about her results.  They ordered the biopsies.  She wasn't interested in surgery at the time of her consult but if she would like to consider it, she can return for a discussion.

## 2025-03-28 NOTE — TELEPHONE ENCOUNTER
Received call from Patient asking for the date and time for her appt with Stephanie. Informed Patient that it is 4/14/25 1:30 pm Stephanie Green. Provided Norma addr.     Patient verbalized understanding.

## 2025-03-31 ENCOUNTER — TELEMEDICINE (OUTPATIENT)
Dept: PSYCHIATRY | Facility: CLINIC | Age: 55
End: 2025-03-31
Payer: COMMERCIAL

## 2025-03-31 DIAGNOSIS — F41.1 GENERALIZED ANXIETY DISORDER WITH PANIC ATTACKS: ICD-10-CM

## 2025-03-31 DIAGNOSIS — F10.91 ALCOHOL USE DISORDER IN REMISSION: ICD-10-CM

## 2025-03-31 DIAGNOSIS — F41.0 GENERALIZED ANXIETY DISORDER WITH PANIC ATTACKS: ICD-10-CM

## 2025-03-31 DIAGNOSIS — F31.9 BIPOLAR 1 DISORDER (HCC): Primary | ICD-10-CM

## 2025-03-31 DIAGNOSIS — F14.11 COCAINE ABUSE IN REMISSION (HCC): ICD-10-CM

## 2025-03-31 PROCEDURE — 99214 OFFICE O/P EST MOD 30 MIN: CPT | Performed by: STUDENT IN AN ORGANIZED HEALTH CARE EDUCATION/TRAINING PROGRAM

## 2025-03-31 PROCEDURE — 90833 PSYTX W PT W E/M 30 MIN: CPT | Performed by: STUDENT IN AN ORGANIZED HEALTH CARE EDUCATION/TRAINING PROGRAM

## 2025-03-31 RX ORDER — MIRTAZAPINE 15 MG/1
15 TABLET, FILM COATED ORAL
Qty: 90 TABLET | Refills: 0 | Status: SHIPPED | OUTPATIENT
Start: 2025-03-31 | End: 2025-06-29

## 2025-03-31 NOTE — PSYCH
MEDICATION MANAGEMENT NOTE    Name: Judy Rios      : 1970      MRN: [id number]  Encounter Provider: Orlin Mares MD  Encounter Date: 3/31/2025   Encounter department: [facility]    Insurance: Payor: [employer] / Plan: [employer] / Product Type: PPO Commercial /      Reason for Visit:   Chief Complaint   Patient presents with    Virtual Regular Visit    Medication Management    Mood Swings    Anxiety    Panic Attack   :  Assessment & Plan  Bipolar 1 disorder (HCC)  - Continue Abilify Discmelt 15 mg daily; doses to be adjusted as indicated and may consider Abilify Maintena given the previous good response  - Continue 200 mg nightly, not interested in any changes at this time but could be cross-titrated completely to Abilify to avoid polypharmacy  - Continue Remeron 15 mg nightly for depression and anxiety  - Continue Atarax 50 mg TID PRN for panic attacks  Orders:    mirtazapine (REMERON) 15 mg tablet; Take 1 tablet (15 mg total) by mouth daily at bedtime    Generalized anxiety disorder with panic attacks  - Management as per principal problem         Alcohol use disorder in remission  - in remission; goes to AA meetings regularly       Cocaine abuse in remission (HCC)  - in remission           Treatment Recommendations:  A 55 y.o. AA female, Single (never ), domiciled w/ a roommate (since 2024), on disability, w/ PMH of HTN, asthma, GINGER (previously on CPAP), hiatal hernia, GERD, s/p bariatric surgery (2024), OA of both knees, LBP, CKD, h/o breast cancer (s/p double mastectomy), vit D and B12 def and PPH of Bipolar Disorder, alcohol and cocaine use in remission (sober for three years and in currently AA), multiple prior psychiatric admissions (in [facility]; last in ), h/o 4-5 prior SA (last in  tried to jump off a balcony), h/o self-injurious behavior via cutting in her early 30's, previously in psychiatric care by Dr. Reese, on  Abilify Discmelt 10 mg daily, Cogentin 1 mg BID, Seroquel 200 mg BID, Remeron 15 mg nightly who presented to the mental health clinic for the initial intake and psychiatric evaluation on 12/23/24. Presented w/ h/o manic episodes and depression as well as AH in the past and paranoid ideations. Also reported anxiety sxs with panic attacks. Denied SI/HI, intent or plan upon direct inquiry at this time. Her current presentation meets criteria for Bipolar I disorder with psychotic features, SEAN w/ panic attacks, alcohol and cocaine use disorder in remission. Abilify increased to 15 mg daily and Seroquel 200 mg BID decreased to 200 mg nightly; doses to be adjusted as indicated. Maintained on Remeron 15 mg nightly and Cogentin 1 mg BID which later increased to 2 mg BID (reportedly helped with EPS and movements in her mouth). Referred for individual therapy and will continue AA meetings.      - f/u labs  - f/u w/ sleep medicine regarding GINGER and restarting CPAP  - Continue Abilify Discmelt 15 mg daily; doses to be adjusted as indicated and may consider Abilify Maintena given the previous good response  - Continue 200 mg nightly, not interested in any changes at this time but could be cross-titrated completely to Abilify to avoid polypharmacy  - Continue Remeron 15 mg nightly for depression and anxiety  - Continue Atarax 50 mg TID PRN for panic attacks  - Continue AA meetings  - Referred for individual therapy  - Educated about healthy life style, risk of falls/sedation and addiction. Patient was receptive to education.  - Medications sent to the patient's pharmacy for 90 day supply    - RTC in 6 weeks  - The patient was educated about 24 hour and weekend coverage for urgent situations accessed by calling [facility] main practice number  - Patient was educated to call National Suicide Prevention Lifeline (6-787-527-AGBF [4373]) for behavioral crisis at anytime or 911 for any safety concerns, or go to  nearest ER if the symptoms become overwhelming or unmanageable.  Educated about diagnosis and treatment modalities. Verbalizes understanding and agreement with the treatment plan.  Discussed self monitoring of symptoms, and symptom monitoring tools.  Discussed medications and if treatment adjustment was needed or desired.  Aware of 24 hour and weekend coverage for urgent situations accessed by calling [facility] main practice number  I am scheduling this patient out for greater than 3 months: No    Medications Risks/Benefits:      Risks, Benefits And Possible Side Effects Of Medications:    Risks, benefits, and possible side effects of medications explained to Judy and she (or legal representative) verbalizes understanding and agreement for treatment.    Controlled Medication Discussion:     Not applicable      History of Present Illness     The patient was visited for Virtual Regular Visit, Medication Management, Mood Swings, Anxiety, and Panic Attack. Presented calm, and cooperative. Reported feeling fine. She reported feeling concerned about the w/u for breast cancer w/u and was thinking about her mother who reportedly  because of breast cancer in , but reportedly the w/u was unremarkable. Endorsed very good sleep, and has been taking Melatonin with good response. Denied any changes in appetite, concentration, energy level, or daily activities.  Denied feelings of anhedonia, hopelessness, helplessness, worthlessness or guilt and appeared to be future oriented.  There was no thought constriction related to death.  Denied SI/HI, intent or plan upon direct inquiry at this time. No intense anxiety sxs, specific phobia or panic attacks reported. Denied AV/H.  Endorsed good compliance with the medications and denied any side effects. Denied smoking cigarettes, drinking alcohol or other illicit substance use. Continues to go to the AA meetings (tries to go there every day).    Given  this presentation, medications are maintained at the same dosage.  Will continue attending AA meetings. The patient was educated to call 911 or go to the nearest emergency room if the symptoms become overwhelming or unable to remain in control. Verbalized understanding and agreed to seek help in case of distress or concern for safety.    Review Of Systems: A review of systems is obtained and is negative except for the pertinent positives listed in HPI/Subjective above.      Current Rating Scores:         Areas of Improvement: reviewed in HPI/Subjective Section and reviewed in Assessment and Plan Section    Past Psychiatric History: (unchanged information from previous note copied and updated)  - No inpatient psychiatric admission since last encounter  - No SA or SIB since last encounter  - No incidence of violent behavior since last encounter    Traumatic History: (unchanged information from previous note copied and updated)  - No new onset of abuse or traumatic events since last encounter      Past Medical History:   Diagnosis Date    Arthritis     Asthma     Back pain     Bipolar 1 disorder (HCC)     Breast cancer (HCC) 2014    left    Cancer (HCC)     right breast    Constipation     CPAP (continuous positive airway pressure) dependence     Depression     GERD (gastroesophageal reflux disease)     Hiatal hernia     HTN (hypertension)     Hypertension     Mental health problem     Obesity (BMI 35.0-39.9 without comorbidity)     Sleep apnea     Wears glasses         Past Surgical History:   Procedure Laterality Date    ABDOMINOPLASTY      AUGMENTATION MAMMAPLASTY      BREAST BIOPSY Bilateral 03/14/2025    Rt and Lt axillary LN - open coil clips placed B/L    BREAST SURGERY Bilateral 2014    mastectomy - R side cancerous, L side prophylactic    COLONOSCOPY      COSMETIC SURGERY      breast reconstruction    EGD      GASTRIC BYPASS LAPAROSCOPIC N/A 10/03/2023    Procedure: LAPAROSCOPIC REMOVAL OF ADJUSTABLE GASTRIC  BAND W/ ROBOT & INTROPERATIVE EGD;  Surgeon: Julian Davis MD;  Location: [facility];  Service: Bariatrics    HYSTERECTOMY      LAPAROSCOPIC GASTRIC BANDING      MASTECTOMY      R side cancerous, L side prophylactic (per pt)    KS LAPS GSTR RSTCV PX W/BYP CHEN-EN-Y LIMB <150 CM N/A 01/16/2024    Procedure: R-N-Y GASTRIC BYPASS W/ ROBOTICS & INTRAOPERATIVE EGD;  Surgeon: Julian Davis MD;  Location: [facility];  Service: Bariatrics    US BREAST NEEDLE LOC LEFT Left 05/01/2014    US GUIDED BREAST LYMPH NODE BIOPSY LEFT Left 3/14/2025    US GUIDED BREAST LYMPH NODE BIOPSY RIGHT Right 3/14/2025     Allergies: No Known Allergies    Current Outpatient Medications   Medication Sig Dispense Refill    acetaminophen (TYLENOL) 500 mg tablet Take 500 mg by mouth every 6 (six) hours as needed for mild pain      albuterol (PROVENTIL HFA,VENTOLIN HFA) 90 mcg/act inhaler Inhale 2 puffs every 4 (four) hours as needed for wheezing or shortness of breath 18 g 5    amLODIPine (NORVASC) 5 mg tablet Take 1 tablet (5 mg total) by mouth daily 90 tablet 1    ARIPiprazole (ABILIFY DISCMELT) 15 mg dispersible tablet Take 1 tablet (15 mg total) by mouth daily 90 tablet 0    benztropine (COGENTIN) 2 mg tablet Take 1 tablet (2 mg total) by mouth 2 (two) times a day 180 tablet 0    budesonide-formoterol (Symbicort) 80-4.5 MCG/ACT inhaler Inhale 2 puffs 2 (two) times a day Rinse mouth after use 10.2 g 3    Cholecalciferol (Vitamin D3) 50 MCG (2000 UT) capsule Take 1 capsule (2,000 Units total) by mouth daily 90 capsule 3    hydrOXYzine HCL (ATARAX) 50 mg tablet Take 1 tablet (50 mg total) by mouth 3 (three) times a day as needed for anxiety 30 tablet 1    mirtazapine (REMERON) 15 mg tablet Take 1 tablet (15 mg total) by mouth daily at bedtime 30 tablet 1    multivitamin (THERAGRAN) TABS Take 1 tablet by mouth daily      polyethylene glycol (MIRALAX) 17 g packet Take 17 g by mouth daily      QUEtiapine (SEROquel) 200 mg tablet Take 1 tablet  (200 mg total) by mouth daily at bedtime 90 tablet 0    Sodium Fluoride 1.1 % PSTE Brush teeth one time before bedtime, don't rinse but expectorate excess. 100 mL 2    tretinoin (RETIN-A) 0.025 % cream *PA DENIED* APPLY A PEA-SIZED AMOUNT TO FACE ONCE DAILY AT BEDTIME (Patient not taking: Reported on 2025)      urea (CARMOL) 40 % APPLY TO FACE TWICE A DAY      vitamin B-12 (VITAMIN B-12) 1,000 mcg tablet Take 1 tablet (1,000 mcg total) by mouth daily 90 tablet 3     No current facility-administered medications for this visit.       Substance Abuse History:    Social History     Substance and Sexual Activity   Alcohol Use Not Currently     Social History     Substance and Sexual Activity   Drug Use Not Currently       Social History:    Social History     Socioeconomic History    Marital status: Single     Spouse name: Not on file    Number of children: Not on file    Years of education: Not on file    Highest education level: Not on file   Occupational History    Not on file   Tobacco Use    Smoking status: Former     Current packs/day: 0.00     Types: Cigarettes     Quit date: 2023     Years since quittin.0     Passive exposure: Past    Smokeless tobacco: Never   Vaping Use    Vaping status: Never Used   Substance and Sexual Activity    Alcohol use: Not Currently    Drug use: Not Currently    Sexual activity: Not on file   Other Topics Concern    Not on file   Social History Narrative    Not on file     Social Drivers of Health     Financial Resource Strain: Low Risk  (2024)    Overall Financial Resource Strain (CARDIA)     Difficulty of Paying Living Expenses: Not hard at all   Recent Concern: Financial Resource Strain - High Risk (2024)    Overall Financial Resource Strain (CARDIA)     Difficulty of Paying Living Expenses: Hard   Food Insecurity: No Food Insecurity (2024)    Nursing - Inadequate Food Risk Classification     Worried About Running Out of Food in the Last Year: Never true  "    Ran Out of Food in the Last Year: Never true     Ran Out of Food in the Last Year: Not on file   Recent Concern: Food Insecurity - Food Insecurity Present (6/26/2024)    Hunger Vital Sign     Worried About Running Out of Food in the Last Year: Sometimes true     Ran Out of Food in the Last Year: Sometimes true   Transportation Needs: No Transportation Needs (9/24/2024)    PRAPARE - Transportation     Lack of Transportation (Medical): No     Lack of Transportation (Non-Medical): No   Physical Activity: Not on file   Stress: Not on file   Social Connections: Not on file   Intimate Partner Violence: Not on file   Housing Stability: Low Risk  (9/24/2024)    Housing Stability Vital Sign     Unable to Pay for Housing in the Last Year: No     Number of Times Moved in the Last Year: 1     Homeless in the Last Year: No       Family Psychiatric History:     Family History   Problem Relation Age of Onset    Stroke Mother     Cancer Mother        Medical History Reviewed by provider this encounter:  Tobacco  Allergies  Meds  Problems  Med Hx  Surg Hx  Fam Hx          Objective   There were no vitals taken for this visit.     Mental Status Evaluation:  Appearance and attitude: appeared as stated age, cooperative and attentive, casually dressed, with good hygiene  Eye contact: good  Motor Function: within normal limits, No PMA/PMR  Gait/station: Not observed  Speech: normal for rate, rhythm, volume, latency, amount  Language: No overt abnormality  Mood/affect: \"better\" / Affect was constricted but reactive, mood congruent  Thought Processes: linear  Thought content: denied suicidal ideations or homicidal ideations, no overt delusions elicited, ruminations  Associations: concrete associations  Perceptual disturbances: denies Auditory/Visual/Tactile Hallucinations  Orientation: oriented to time, person, place and to the situational context  Cognitive Function: intact  Attention/Concentration: attention span and " concentration are age appropriate  Memory: recent and remote memory grossly intact  Fund of knowledge: aware of current events, aware of past history, and vocabulary average  Impulse control: good  Insight/judgment: fair/good          Laboratory Results: I have personally reviewed all pertinent laboratory/tests results    Last Visit Labs:   Hospital Outpatient Visit on 03/14/2025   Component Date Value    Case Report 03/14/2025                      Value:Surgical Pathology Report                         Case: [id number]                                  Authorizing Provider:  Linda Kebede,   Collected:           03/14/2025 1324                                     CRNP                                                                         Ordering Location:     [facility] Received:            03/14/2025 2126                                     Center                                                                       Pathologist:           Francia Diaz MD                                                                  Specimens:   A) - Axillary lymph node, US BX Lt Axillary LN 3 passes 16g Marquee                                 B) - Axillary lymph node, US BX Rt Axillary LN, 3 passes 16g Marquee                       Final Diagnosis 03/14/2025                      Value:A. Axillary lymph node, Left, Biopsy:  - Reactive lymph node with tattoo pigment and foreign body giant cells.  - Special stains for fungus (GMS) and acid fast bacteria (AFB) are negative for organisms.     B. Axillary lymph node, Right, Biopsy:  - Reactive lymph node with tattoo pigment and foreign body giant cells.  - Special stains for fungus (GMS) and acid fast bacteria (AFB) are negative for organisms.        Microscopic Description 03/14/2025                      Value:Immunochemical stains performed on block A1 and B3 with appropriate controls show the germinal centers are positive for CD20, bcl-6, and negative for  bcl-2. CD5, CD3 and bcl-2 are positive for T lymphocytes in the interollicular areas. CD23 highlights the follicular dendritic network of the follicles. Ki67 shows high proliferation rate in reactive germinal centers. CD30 highlights scattered immunoblasts.  highlights scattered plasma cells with polyclonal Kappa and Lambda expression by SPRING. CK-AE1/AE3 is negative. MANI is negative by SPRING. CD10, CD68 and bcl-6 highlight foreign body giant cells, negative for S100, , bcl-1 and CD1a.      Note 03/14/2025                      Value:Intradepartmental consultation in agreement (RSP).    Flow cytometry (Sensorin#: [id number] / [id number], evaluated by Dorie Escoto M.D.):    No flow immunophenotypic evidence of a lymphoproliferative disorder.      Additional Information 03/14/2025                      Value:All reported additional testing was performed with appropriately reactive controls.  These tests were developed and their performance characteristics determined by [facility] or appropriate performing facility, though some tests may be performed on tissues which have not been validated for performance characteristics (such as staining performed on alcohol exposed cell blocks and decalcified tissues).  Results should be interpreted with caution and in the context of the patients’ clinical condition. These tests may not be cleared or approved by the U.S. Food and Drug Administration, though the FDA has determined that such clearance or approval is not necessary. These tests are used for clinical purposes and they should not be regarded as investigational or for research. This laboratory has been approved by CLIA 88, designated as a high-complexity laboratory and is qualified to perform these tests.    Interpretation performed at [facility], [address] [place] [id number]      Gross Description 03/14/2025                      Value:A. The specimen is  "received in formalin, labeled with the patient's name and hospital number, and is designated \"ultrasound biopsy left axillary lymph node, 3 passes, 16-gauge marquee\".  The specimen consists of 3 tan friable tissue cores measuring 0.1-0.2 cm in diameter and ranging from 0.8-1.8 cm in length.  The specimen is entirely submitted between sponges, 3 cassettes.  B. The specimen is received in formalin, labeled with the patient's name and hospital number, and is designated \"ultrasound biopsy right axillary lymph node, 3 passes, 16-gauge marquee\".  The specimen consists of 3 tan friable tissue cores measuring 0.1-0.2 cm in diameter and ranging from 0.6-1.0 cm in length.  The specimen is entirely submitted between sponges, 3 cassettes.    Note: The estimated total formalin fixation time based upon information provided by the submitting clinician and the standard processing schedule is 10 hours.  The cold ischemia time based upon information provided by the submitting                           clinician and receiving staff in the laboratory is within 1 minute.       -Heather Saxena      Clinical Information 03/14/2025                      Value:BILATERAL  1) LYMPH NODE [A]: Targeted ultrasound of the right axillary tissue was performed for evaluation of a rounded lymph node seen on prior bilateral breast MRI dated February 18, 2025.  On the present examination, a rounded node measuring 1.5 x 0.8 x 1.4 cm with no discernible fatty hilum is present.  An additional prominent node with a cortex measuring up to 3 mm is seen.     Targeted ultrasound the left axillary tissue was performed.  One of the nodes demonstrates a focally thickened cortex measuring up to 5 mm. Additional scattered nodes demonstrate cortices up to 1 to 2 mm.     Scan Result 03/14/2025 SEE WRITTEN REPORT FROM Kranem        Suicide/Homicide Risk Assessment:    Risk of Harm to Self:  The following ratings are based on assessment at the time of the " interview  Based on today's assessment, Judy presents the following risk of harm to self: low    Risk of Harm to Others:  The following ratings are based on assessment at the time of the interview  Based on today's assessment, Judy presents the following risk of harm to others: low    The following interventions are recommended:  Pending individual therapy. Continue medication management. Contracts for safety at present - agrees to call Crisis Intervention Service if feeling unsafe. Contracts for safety at present - agrees to go to ED/Urgent Care if feeling unsafe.    Psychotherapy Provided:     Individual psychotherapy provided: Yes    Counseling was provided during the session today for 16 minutes.  Psychoeducation provided to the patient and was educated about the importance of compliance with the medications and psychiatric treatment  Supportive psychotherapy provided to the patient  Solution Focused Brief Therapy (SFBT) provided  Patient's emotions were validated and specific labeled praise provided.   [place] suggestions were offered in a supportive non-critical way.     Treatment Plan:    Completed and signed during the session: Not applicable - Treatment Plan not due at this session    Goals: Progress towards Treatment Plan goals - Yes, progressing, as evidenced by subjective findings in HPI/Subjective Section and in Assessment and Plan Section    Depression Follow-up Plan Completed: Yes    Note Share:    This note was shared with patient.    Administrative Statements   Administrative Statements   Encounter provider Orlin Mares MD    The Patient is located at Home and in the following state in which I hold an active license [place].    The patient was identified by name and date of birth. Judy Rios was informed that this is a telemedicine visit and that the visit is being conducted through the Epic Embedded platform. She agrees to proceed..  My office door was closed. No one else was in the room.   She acknowledged consent and understanding of privacy and security of the video platform. The patient has agreed to participate and understands they can discontinue the visit at any time.    I have spent a total time of 21 minutes in caring for this patient on the day of the visit/encounter including Risks and benefits of tx options, Counseling / Coordination of care, and Obtaining or reviewing history  , not including the time spent for establishing the audio/video connection.    Visit Time  Visit Start Time: 1:55 PM  Visit Stop Time: 2:16 PM  Total Visit Duration:  21 minutes    Orlin Mares MD 03/31/25    This note was completed in part utilizing Dragon dictation Software. Grammatical, translation, syntax errors, random word insertions, spelling mistakes, and incomplete sentences may be an occasional consequence of this system secondary to software limitations with voice recognition, ambient noise, and hardware issues. If you have any questions or concerns about the content, text, or information contained within the body of this dictation, please contact the provider for clarification.

## 2025-03-31 NOTE — ASSESSMENT & PLAN NOTE
- Continue Abilify Discmelt 15 mg daily; doses to be adjusted as indicated and may consider Abilify Maintena given the previous good response  - Continue 200 mg nightly, not interested in any changes at this time but could be cross-titrated completely to Abilify to avoid polypharmacy  - Continue Remeron 15 mg nightly for depression and anxiety  - Continue Atarax 50 mg TID PRN for panic attacks  Orders:    mirtazapine (REMERON) 15 mg tablet; Take 1 tablet (15 mg total) by mouth daily at bedtime

## 2025-04-02 ENCOUNTER — APPOINTMENT (OUTPATIENT)
Dept: LAB | Facility: CLINIC | Age: 55
End: 2025-04-02
Payer: COMMERCIAL

## 2025-04-02 DIAGNOSIS — E66.811 OBESITY, CLASS I, BMI 30-34.9: ICD-10-CM

## 2025-04-02 DIAGNOSIS — Z13.228 SCREENING FOR ENDOCRINE, NUTRITIONAL, METABOLIC AND IMMUNITY DISORDER: ICD-10-CM

## 2025-04-02 DIAGNOSIS — E66.811 CLASS 1 OBESITY DUE TO EXCESS CALORIES WITH SERIOUS COMORBIDITY AND BODY MASS INDEX (BMI) OF 32.0 TO 32.9 IN ADULT: ICD-10-CM

## 2025-04-02 DIAGNOSIS — I10 PRIMARY HYPERTENSION: ICD-10-CM

## 2025-04-02 DIAGNOSIS — Z13.29 SCREENING FOR ENDOCRINE, NUTRITIONAL, METABOLIC AND IMMUNITY DISORDER: ICD-10-CM

## 2025-04-02 DIAGNOSIS — N18.2 CKD (CHRONIC KIDNEY DISEASE), STAGE II: ICD-10-CM

## 2025-04-02 DIAGNOSIS — E66.09 CLASS 1 OBESITY DUE TO EXCESS CALORIES WITH SERIOUS COMORBIDITY AND BODY MASS INDEX (BMI) OF 32.0 TO 32.9 IN ADULT: ICD-10-CM

## 2025-04-02 DIAGNOSIS — Z13.0 SCREENING FOR ENDOCRINE, NUTRITIONAL, METABOLIC AND IMMUNITY DISORDER: ICD-10-CM

## 2025-04-02 DIAGNOSIS — Z13.21 SCREENING FOR ENDOCRINE, NUTRITIONAL, METABOLIC AND IMMUNITY DISORDER: ICD-10-CM

## 2025-04-02 DIAGNOSIS — Z98.84 BARIATRIC SURGERY STATUS: ICD-10-CM

## 2025-04-02 DIAGNOSIS — K91.2 POSTSURGICAL MALABSORPTION: ICD-10-CM

## 2025-04-02 DIAGNOSIS — Z48.815 ENCOUNTER FOR SURGICAL AFTERCARE FOLLOWING SURGERY OF DIGESTIVE SYSTEM: ICD-10-CM

## 2025-04-02 LAB
25(OH)D3 SERPL-MCNC: 36.4 NG/ML (ref 30–100)
ALBUMIN SERPL BCG-MCNC: 4.1 G/DL (ref 3.5–5)
ALP SERPL-CCNC: 98 U/L (ref 34–104)
ALT SERPL W P-5'-P-CCNC: 24 U/L (ref 7–52)
ANION GAP SERPL CALCULATED.3IONS-SCNC: 9 MMOL/L (ref 4–13)
AST SERPL W P-5'-P-CCNC: 23 U/L (ref 13–39)
BILIRUB SERPL-MCNC: 0.49 MG/DL (ref 0.2–1)
BUN SERPL-MCNC: 10 MG/DL (ref 5–25)
CALCIUM SERPL-MCNC: 9.3 MG/DL (ref 8.4–10.2)
CHLORIDE SERPL-SCNC: 104 MMOL/L (ref 96–108)
CHOLEST SERPL-MCNC: 234 MG/DL (ref ?–200)
CO2 SERPL-SCNC: 27 MMOL/L (ref 21–32)
CREAT SERPL-MCNC: 0.92 MG/DL (ref 0.6–1.3)
CREAT UR-MCNC: 198.6 MG/DL
ERYTHROCYTE [DISTWIDTH] IN BLOOD BY AUTOMATED COUNT: 14.7 % (ref 11.6–15.1)
FERRITIN SERPL-MCNC: 79 NG/ML (ref 11–307)
FOLATE SERPL-MCNC: >22.3 NG/ML
GFR SERPL CREATININE-BSD FRML MDRD: 70 ML/MIN/1.73SQ M
GLUCOSE P FAST SERPL-MCNC: 103 MG/DL (ref 65–99)
HCT VFR BLD AUTO: 39.3 % (ref 34.8–46.1)
HDLC SERPL-MCNC: 76 MG/DL
HGB BLD-MCNC: 13.1 G/DL (ref 11.5–15.4)
IRON SATN MFR SERPL: 28 % (ref 15–50)
IRON SERPL-MCNC: 93 UG/DL (ref 50–212)
LDLC SERPL CALC-MCNC: 135 MG/DL (ref 0–100)
MCH RBC QN AUTO: 24.8 PG (ref 26.8–34.3)
MCHC RBC AUTO-ENTMCNC: 33.3 G/DL (ref 31.4–37.4)
MCV RBC AUTO: 74 FL (ref 82–98)
MICROALBUMIN UR-MCNC: 34.5 MG/L
MICROALBUMIN/CREAT 24H UR: 17 MG/G CREATININE (ref 0–30)
NONHDLC SERPL-MCNC: 158 MG/DL
PLATELET # BLD AUTO: 284 THOUSANDS/UL (ref 149–390)
PMV BLD AUTO: 10.2 FL (ref 8.9–12.7)
POTASSIUM SERPL-SCNC: 3.8 MMOL/L (ref 3.5–5.3)
PROT SERPL-MCNC: 6.9 G/DL (ref 6.4–8.4)
PTH-INTACT SERPL-MCNC: 56.3 PG/ML (ref 12–88)
RBC # BLD AUTO: 5.28 MILLION/UL (ref 3.81–5.12)
SODIUM SERPL-SCNC: 140 MMOL/L (ref 135–147)
T4 FREE SERPL-MCNC: 0.66 NG/DL (ref 0.61–1.12)
TIBC SERPL-MCNC: 337.4 UG/DL (ref 250–450)
TRANSFERRIN SERPL-MCNC: 241 MG/DL (ref 203–362)
TRIGL SERPL-MCNC: 117 MG/DL (ref ?–150)
TSH SERPL DL<=0.05 MIU/L-ACNC: 0.21 UIU/ML (ref 0.45–4.5)
UIBC SERPL-MCNC: 244 UG/DL (ref 155–355)
VIT B12 SERPL-MCNC: 1301 PG/ML (ref 180–914)
WBC # BLD AUTO: 6.65 THOUSAND/UL (ref 4.31–10.16)

## 2025-04-02 PROCEDURE — 82043 UR ALBUMIN QUANTITATIVE: CPT

## 2025-04-02 PROCEDURE — 82728 ASSAY OF FERRITIN: CPT

## 2025-04-02 PROCEDURE — 83550 IRON BINDING TEST: CPT

## 2025-04-02 PROCEDURE — 84630 ASSAY OF ZINC: CPT

## 2025-04-02 PROCEDURE — 84443 ASSAY THYROID STIM HORMONE: CPT

## 2025-04-02 PROCEDURE — 83540 ASSAY OF IRON: CPT

## 2025-04-02 PROCEDURE — 82570 ASSAY OF URINE CREATININE: CPT

## 2025-04-02 PROCEDURE — 82607 VITAMIN B-12: CPT

## 2025-04-02 PROCEDURE — 82306 VITAMIN D 25 HYDROXY: CPT

## 2025-04-02 PROCEDURE — 83918 ORGANIC ACIDS TOTAL QUANT: CPT

## 2025-04-02 PROCEDURE — 84590 ASSAY OF VITAMIN A: CPT

## 2025-04-02 PROCEDURE — 83970 ASSAY OF PARATHORMONE: CPT

## 2025-04-02 PROCEDURE — 83036 HEMOGLOBIN GLYCOSYLATED A1C: CPT

## 2025-04-02 PROCEDURE — 36415 COLL VENOUS BLD VENIPUNCTURE: CPT

## 2025-04-02 PROCEDURE — 80053 COMPREHEN METABOLIC PANEL: CPT

## 2025-04-02 PROCEDURE — 80061 LIPID PANEL: CPT

## 2025-04-02 PROCEDURE — 82746 ASSAY OF FOLIC ACID SERUM: CPT

## 2025-04-02 PROCEDURE — 84439 ASSAY OF FREE THYROXINE: CPT

## 2025-04-02 PROCEDURE — 84425 ASSAY OF VITAMIN B-1: CPT

## 2025-04-02 PROCEDURE — 85027 COMPLETE CBC AUTOMATED: CPT

## 2025-04-03 LAB
EST. AVERAGE GLUCOSE BLD GHB EST-MCNC: 123 MG/DL
HBA1C MFR BLD: 5.9 %

## 2025-04-06 LAB — VIT A SERPL-MCNC: 48.3 UG/DL (ref 20.1–62)

## 2025-04-07 LAB — METHYLMALONATE SERPL-SCNC: 79 NMOL/L (ref 0–378)

## 2025-04-08 LAB — ZINC SERPL-MCNC: 75 UG/DL (ref 44–115)

## 2025-04-09 ENCOUNTER — OFFICE VISIT (OUTPATIENT)
Dept: BEHAVIORAL/MENTAL HEALTH CLINIC | Facility: CLINIC | Age: 55
End: 2025-04-09
Payer: COMMERCIAL

## 2025-04-09 DIAGNOSIS — F31.9 BIPOLAR 1 DISORDER (HCC): Primary | ICD-10-CM

## 2025-04-09 LAB — VIT B1 BLD-SCNC: 67.2 NMOL/L (ref 66.5–200)

## 2025-04-09 PROCEDURE — 90791 PSYCH DIAGNOSTIC EVALUATION: CPT | Performed by: COUNSELOR

## 2025-04-09 NOTE — BH TREATMENT PLAN
"Outpatient Behavioral Health Psychotherapy Treatment Plan    Judy Rios  1970     Date of Initial Psychotherapy Assessment: 04/09/2025   Date of Current Treatment Plan: 04/09/25  Treatment Plan Target Date: 10/09/2025  Treatment Plan Expiration Date: 10/09/2025    Diagnosis:   1. Bipolar 1 disorder (HCC)            Area(s) of Need: Coping, self-care, symptom management, depressive care and anxiety care.     Long Term Goal 1 (in the client's own words): \"To be happier with myself and more comfortable with me\"    Stage of Change: Action    Target Date for completion: 10/09/2025     Anticipated therapeutic modalities: CBT, Client Centered Therapy, Solution Focused Therapy, Mindfulness Based Therapy.      People identified to complete this goal: Clinician and client      Objective 1: (identify the means of measuring success in meeting the objective): Client will attend all counseling sessions as scheduled.       Objective 2: (identify the means of measuring success in meeting the objective): Client will attend all medication management appointments as scheduled and take medication as prescribed, client will also communicate with prescriber as needed.     Objective 3: (identify the means of measuring success in meeting the objective): Client will learn and utilize at least 3 new coping skills over the next 6 months.      Objective 4: (identify the means of measuring success in meeting the objective): Client will learn to utilize mindfulness to focus on self-acceptance and self-love over the next 6 months.    Long Term Goal 2 (in the client's own words): \"I'd like to maintain sobriety\"    Stage of Change: Maintenance    Target Date for completion: 10/09/2025     Anticipated therapeutic modalities: CBT, Client Centered Therapy, Solution Focused Therapy, Mindfulness Based Therapy.      People identified to complete this goal: Clinician and client      Objective 1: (identify the means of measuring success in " "meeting the objective): Client will attend all counseling sessions as scheduled.       Objective 2: (identify the means of measuring success in meeting the objective): Client will attend all medication management appointments as scheduled and take medication as prescribed, client will also communicate with prescriber as needed.     Objective 3: (identify the means of measuring success in meeting the objective): Client will continue to go to  at least 4X/Week.      Objective 4: (identify the means of measuring success in meeting the objective): Client will report any drug use to her sponsor within 24 hours.      Objective 5: (identify the means of measuring success in meeting the objective): Client will utilize learned skills to assist her during times of \"cravings\"     My [facility] psychiatric provider is: Orlin Mares MD     I am currently taking psychiatric medications: Yes, as prescribed    I feel that I will be ready for discharge from mental health care when I reach the following (measurable goal/objective): when I reach my goals    For children and adults who have a legal guardian:   Has there been any change to custody orders and/or guardianship status? NA. If yes, attach updated documentation.    I have created my Crisis Plan and have been offered a copy of this plan    Behavioral Health Treatment Plan [facility]: Diagnosis and Treatment Plan explained to Judy Rios acknowledges an understanding of their diagnosis. Judy Rios agrees to this treatment plan.    I have been offered a copy of this Treatment Plan. yes        "
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
## 2025-04-09 NOTE — BH CRISIS PLAN
"Client Name: Judy Rios       Client YOB: 1970    [facility] Safety Plan      Creation Date: 12/23/24 Update Date: 4/9/25   Created By: Orlin Mares MD Last Updated By: Eitan Pierce LPC      Step 1: Warning Signs:   Warning Signs   \"depression is the warning sign\" and \"feeling down\"   crying            Step 2: Internal Coping Strategies:   Internal Coping Strategies   playgin game on the phone   listening to Music            Step 3: People and social settings that provide distraction:   Name Contact Information   Friend (Aniya) saved in cell   AA sponser (Ca) saved in cell    Places   AA meeting           Step 4: People whom I can ask for help during a crisis:      Name Contact Information    Friend (Aniya) saved in cell    AA sponser (Ca) saved in cell      Step 5: Professionals or agencies I can contact during a crisis:      Clinican/Agency Name Phone Emergency Contact    Orlin Mares MD (psychiatrist) [phone]     Eitan Pierce/ [facility] [place] [phone]       [facility] Emergency Department Emergency Department Phone Emergency Department Address    [phone] [address], [place] [address]        Crisis Phone Numbers:   Suicide Prevention Lifeline: Call or Text  467 Crisis Text Line: Text HOME to 445-255   Please note: Some [facility] do not have a separate number for Child/Adolescent specific crisis. If your county is not listed under Child/Adolescent, please call the adult number for your county      Adult Crisis Numbers: Child/Adolescent Crisis Numbers   [facility]: [phone] [facility]: [phone]   [facility]: [phone] [facility]: [phone]   [facility]: [phone] [place]: [phone]   [facility]: [phone] [place] [facility]: [phone]   [place] [facility]: [phone]   [facility]: [phone]   [facility]: [phone] " "  [facility]: [phone] (daytime) [phone] (after hours, weekends, holidays)      Step 6: Making the environment safer (plan for lethal means safety):   Patient did not identify any lethal methods: Yes     Optional: What is most important to me and worth living for?   \"Being clean and being sober\"     Sandi Safety Plan. Talia Coleman and Iban Hogan. Used with permission of the authors.           "

## 2025-04-10 ENCOUNTER — RESULTS FOLLOW-UP (OUTPATIENT)
Dept: BARIATRICS | Facility: CLINIC | Age: 55
End: 2025-04-10

## 2025-04-14 ENCOUNTER — OFFICE VISIT (OUTPATIENT)
Age: 55
End: 2025-04-14
Payer: COMMERCIAL

## 2025-04-14 DIAGNOSIS — Z98.890 H/O BREAST RECONSTRUCTION: Primary | ICD-10-CM

## 2025-04-14 PROCEDURE — 99214 OFFICE O/P EST MOD 30 MIN: CPT | Performed by: STUDENT IN AN ORGANIZED HEALTH CARE EDUCATION/TRAINING PROGRAM

## 2025-04-14 NOTE — PROGRESS NOTES
Assessment and Plan:  Ms. Rios is a 55 y.o. female presenting for revision breast reconstruction    We discussed the procedure, risks, benefits, alternatives and postoperative instructions and expectations.  All patient's questions were answered and she voiced understanding.  Booking form created.    History of Present Illness:   Ms. Rios is a 55 y.o. female presenting with revision breast reconstruction.  Her lymph node biopsy was benign.  She is concerned about the firmness of her implants, the significant projection and the excess skin.  She also would like to keep the tattoo on her right chest.      Review of Systems:  A 12 point ROS was performed and negative except per HPI.    Past Medical History:  Past Medical History:   Diagnosis Date    Arthritis     Asthma     Back pain     Bipolar 1 disorder (HCC)     Breast cancer (HCC) 2014    left    Cancer (HCC)     right breast    Constipation     CPAP (continuous positive airway pressure) dependence     Depression     GERD (gastroesophageal reflux disease)     Hiatal hernia     HTN (hypertension)     Hypertension     Mental health problem     Obesity (BMI 35.0-39.9 without comorbidity)     Sleep apnea     Wears glasses        Past Surgical History:  Past Surgical History:   Procedure Laterality Date    ABDOMINOPLASTY      AUGMENTATION MAMMAPLASTY      BREAST BIOPSY Bilateral 03/14/2025    Rt and Lt axillary LN - open coil clips placed B/L    BREAST SURGERY Bilateral 2014    mastectomy - R side cancerous, L side prophylactic    COLONOSCOPY      COSMETIC SURGERY      breast reconstruction    EGD      GASTRIC BYPASS LAPAROSCOPIC N/A 10/03/2023    Procedure: LAPAROSCOPIC REMOVAL OF ADJUSTABLE GASTRIC BAND W/ ROBOT & INTROPERATIVE EGD;  Surgeon: Julian Davis MD;  Location: [facility];  Service: Bariatrics    HYSTERECTOMY      LAPAROSCOPIC GASTRIC BANDING      MASTECTOMY      R side cancerous, L side prophylactic (per pt)    OK LAPS GSTR RSTCV PX W/BYP CHEN-EN-Y  LIMB <150 CM N/A 2024    Procedure: R-N-Y GASTRIC BYPASS W/ ROBOTICS & INTRAOPERATIVE EGD;  Surgeon: Julian Davis MD;  Location: [facility];  Service: Bariatrics    US BREAST NEEDLE LOC LEFT Left 2014    US GUIDED BREAST LYMPH NODE BIOPSY LEFT Left 3/14/2025    US GUIDED BREAST LYMPH NODE BIOPSY RIGHT Right 3/14/2025       Social History:  Social History     Tobacco Use    Smoking status: Former     Current packs/day: 0.00     Types: Cigarettes     Quit date: 2023     Years since quittin.1     Passive exposure: Past    Smokeless tobacco: Never   Vaping Use    Vaping status: Never Used   Substance Use Topics    Alcohol use: Not Currently    Drug use: Not Currently       Family History:  Family History   Problem Relation Age of Onset    Stroke Mother     Cancer Mother        Allergies:  No Known Allergies    Medications:  Current Outpatient Medications on File Prior to Visit   Medication Sig Dispense Refill    acetaminophen (TYLENOL) 500 mg tablet Take 500 mg by mouth every 6 (six) hours as needed for mild pain      albuterol (PROVENTIL HFA,VENTOLIN HFA) 90 mcg/act inhaler Inhale 2 puffs every 4 (four) hours as needed for wheezing or shortness of breath 18 g 5    amLODIPine (NORVASC) 5 mg tablet Take 1 tablet (5 mg total) by mouth daily 90 tablet 1    ARIPiprazole (ABILIFY DISCMELT) 15 mg dispersible tablet Take 1 tablet (15 mg total) by mouth daily 90 tablet 0    benztropine (COGENTIN) 2 mg tablet Take 1 tablet (2 mg total) by mouth 2 (two) times a day 180 tablet 0    budesonide-formoterol (Symbicort) 80-4.5 MCG/ACT inhaler Inhale 2 puffs 2 (two) times a day Rinse mouth after use 10.2 g 3    Cholecalciferol (Vitamin D3) 50 MCG (2000 UT) capsule Take 1 capsule (2,000 Units total) by mouth daily 90 capsule 3    hydrOXYzine HCL (ATARAX) 50 mg tablet Take 1 tablet (50 mg total) by mouth 3 (three) times a day as needed for anxiety 30 tablet 1    mirtazapine (REMERON) 15 mg tablet Take 1 tablet (15 mg  "total) by mouth daily at bedtime 90 tablet 0    multivitamin (THERAGRAN) TABS Take 1 tablet by mouth daily      polyethylene glycol (MIRALAX) 17 g packet Take 17 g by mouth daily      QUEtiapine (SEROquel) 200 mg tablet Take 1 tablet (200 mg total) by mouth daily at bedtime 90 tablet 0    Sodium Fluoride 1.1 % PSTE Brush teeth one time before bedtime, don't rinse but expectorate excess. 100 mL 2    tretinoin (RETIN-A) 0.025 % cream       urea (CARMOL) 40 % APPLY TO FACE TWICE A DAY      vitamin B-12 (VITAMIN B-12) 1,000 mcg tablet Take 1 tablet (1,000 mcg total) by mouth daily 90 tablet 3     No current facility-administered medications on file prior to visit.         Physical Examination:  There were no vitals taken for this visit.  Estimated body mass index is 33.25 kg/m² as calculated from the following:    Height as of 3/14/25: 5' 1\" (1.549 m).    Weight as of 3/14/25: 79.8 kg (176 lb).  General: NAD, well appearing, AAOx3  HEENT: NCAT, EOMI, MMM, supple  Resp: Nonlabored  Heart: RRR  Abdomen: Soft, ND, NT  Extremities/MSK: no LE edema, no obvious deficits in ROM  Neuro: grossly intact with no obvious deficits  Skin: no obvious lesions or rashes  Breast: no palpable mass, no palpable axillary lymphadenopathy, bilateral Baker III/IV cap con, healed incisions, significant excess of skin/mastectomy flaps     Annabella Brown, DO  Plastic and Reconstructive Surgery    "

## 2025-04-16 ENCOUNTER — TELEPHONE (OUTPATIENT)
Dept: PLASTIC SURGERY | Facility: CLINIC | Age: 55
End: 2025-04-16

## 2025-04-18 ENCOUNTER — PREP FOR PROCEDURE (OUTPATIENT)
Dept: PLASTIC SURGERY | Facility: CLINIC | Age: 55
End: 2025-04-18

## 2025-04-18 ENCOUNTER — OFFICE VISIT (OUTPATIENT)
Dept: DENTISTRY | Facility: CLINIC | Age: 55
End: 2025-04-18

## 2025-04-18 VITALS — HEART RATE: 82 BPM | SYSTOLIC BLOOD PRESSURE: 93 MMHG | TEMPERATURE: 97.9 F | DIASTOLIC BLOOD PRESSURE: 67 MMHG

## 2025-04-18 DIAGNOSIS — K05.6 PERIODONTAL DISEASE: Primary | ICD-10-CM

## 2025-04-18 DIAGNOSIS — Z85.3 HISTORY OF BREAST CANCER: Primary | ICD-10-CM

## 2025-04-18 PROCEDURE — D4341 PERIODONTAL SCALING AND ROOT PLANING - 4 OR MORE TEETH PER QUADRANT: HCPCS | Performed by: DENTAL HYGIENIST

## 2025-04-18 PROCEDURE — D4342 PERIODONTAL SCALING AND ROOT PLANING - 1 TO 3 TEETH PER QUADRANT: HCPCS | Performed by: DENTAL HYGIENIST

## 2025-04-18 NOTE — PROGRESS NOTES
SCALE AND RP UL and LL        1 carpule/s given- Gingicaine topical anesthetic  CHIEF CONCERN: none   PAIN SCALE: 0  ASA CLASS: ASA 2 - Patient with mild systemic disease with no functional limitations  PLAQUE:moderate  CALCULUS: Moderate  BLEEDING:    moderate  STAIN : Light  PERIO:  Mild bone loss and recession    Hand scaled, polished and flossed. Used cavitron    Oral Hygiene Instruction:  recommended brushing 2 x daily for 2 minutes MIN, recommended flossing daily, reviewed dietary precautions. Post op sc/rp instructions placed in AVS, printed and handed/ reviewed with patient.     Soft tissue exam:  soft tissue exam was normal  ExtraOral exam:   Extraoral exam was normal    REFERRALS: no referrals provided         NEXT VISIT:   NV1:  [facility] w/ Exam - 50 min - 3 months from 4/18/25    Last BWX: 10/31/24  Last Panorex:  Last FMX :  7/11/22

## 2025-04-23 NOTE — TELEPHONE ENCOUNTER
Rec'd call from patient requesting that Dr. Brown is aware she would like to keep the same C Cup size for breast recon.    Not sure if this was discussed in last office visit as I didn't see it noted.    KELSEY

## 2025-05-05 ENCOUNTER — DOCUMENTATION (OUTPATIENT)
Dept: GENETICS | Facility: CLINIC | Age: 55
End: 2025-05-05

## 2025-05-06 ENCOUNTER — SOCIAL WORK (OUTPATIENT)
Dept: BEHAVIORAL/MENTAL HEALTH CLINIC | Facility: CLINIC | Age: 55
End: 2025-05-06
Payer: COMMERCIAL

## 2025-05-06 DIAGNOSIS — F10.91 ALCOHOL USE DISORDER IN REMISSION: Primary | ICD-10-CM

## 2025-05-06 DIAGNOSIS — F31.9 BIPOLAR 1 DISORDER (HCC): ICD-10-CM

## 2025-05-06 PROCEDURE — 90834 PSYTX W PT 45 MINUTES: CPT | Performed by: COUNSELOR

## 2025-05-06 NOTE — PSYCH
"Behavioral Health Psychotherapy Progress Note    Psychotherapy Provided: Individual Psychotherapy     1. Alcohol use disorder in remission        2. Bipolar 1 disorder (HCC)            Goals addressed in session: Goal 1 and Goal 2     DATA: Clinician and client explored client's journey through Alcohol [facility] and the steps they are working on in the 12 step program. Client advised she does attempt to go daily and it does help her. Client has recently completed steps 5 and 6 which did cause her to remember a struggle she had with her siblings. Explored her feelings in regard to this, client expressed guilt due to their being . Clinician asked Socratic questions to explore client's level of fault; client was able to note no responsibility to her siblings choices. Discussed emotions and their validity and then the importance of checking with self to explore facts vs opinions. Also discussed client's sponsors and her happiness with them.   During this session, this clinician used the following therapeutic modalities: Cognitive Behavioral Therapy    Substance Abuse was addressed during this session. If the client is diagnosed with a co-occurring substance use disorder, please indicate any changes in the frequency or amount of use: 0, client has not utilized alcohol since . Stage of change for addressing substance use diagnoses: Maintenance    ASSESSMENT:  Judy Rios presents with a Euthymic/ normal mood.     her affect is Normal range and intensity, which is congruent, with her mood and the content of the session. The client has made progress on their goals.    Client continues to remain sober and attends AA meetings almost daily. Judy Rios presents with a none risk of suicide, none risk of self-harm, and none risk of harm to others.    For any risk assessment that surpasses a \"low\" rating, a safety plan must be developed.    A safety plan was indicated: no  If yes, describe in detail " N/A    PLAN: Between sessions, Judy Rios will continue to utilize learned skills. At the next session, the therapist will use Cognitive Behavioral Therapy to address goals.    Behavioral Health Treatment Plan and Discharge Planning: Judy Rios is aware of and agrees to continue to work on their treatment plan. They have identified and are working toward their discharge goals. yes    Depression Follow-up Plan Completed: Not applicable    Visit start and stop times:    05/06/25  Start Time: 1405  Stop Time: 1448  Total Visit Time: 43 minutes

## 2025-05-09 RX ORDER — PHENOL 1.4 %
600 AEROSOL, SPRAY (ML) MUCOUS MEMBRANE 2 TIMES DAILY WITH MEALS
COMMUNITY

## 2025-05-09 NOTE — PRE-PROCEDURE INSTRUCTIONS
Pre-Surgery Instructions:   Medication Instructions    acetaminophen (TYLENOL) 500 mg tablet Uses PRN- OK to take day of surgery    albuterol (PROVENTIL HFA,VENTOLIN HFA) 90 mcg/act inhaler Uses PRN- OK to take day of surgery    amLODIPine (NORVASC) 5 mg tablet Take day of surgery.    ARIPiprazole (ABILIFY DISCMELT) 15 mg dispersible tablet Take day of surgery.    benztropine (COGENTIN) 2 mg tablet Take day of surgery.    budesonide-formoterol (Symbicort) 80-4.5 MCG/ACT inhaler Take day of surgery.    calcium carbonate (OS-CHANDNI) 600 MG tablet Stop taking 7 days prior to surgery.    Cholecalciferol (Vitamin D3) 50 MCG (2000 UT) capsule Stop taking 7 days prior to surgery.    hydrOXYzine HCL (ATARAX) 50 mg tablet Uses PRN- OK to take day of surgery    mirtazapine (REMERON) 15 mg tablet Take night before surgery    multivitamin (THERAGRAN) TABS Stop taking 7 days prior to surgery.    polyethylene glycol (MIRALAX) 17 g packet Uses PRN- DO NOT take day of surgery    QUEtiapine (SEROquel) 200 mg tablet Take night before surgery    tretinoin (RETIN-A) 0.025 % cream Uses PRN- DO NOT take day of surgery    urea (CARMOL) 40 % Uses PRN- DO NOT take day of surgery    vitamin B-12 (VITAMIN B-12) 1,000 mcg tablet Stop taking 7 days prior to surgery.     Spoke with pt via phone.    Medication instructions for day of surgery reviewed. Please take all instructed medications with only a sip of water.       You will receive a call one business day prior to surgery with an arrival time and hospital directions. If your surgery is scheduled on a Monday, the hospital will be calling you on the Friday prior to your surgery. If you have not heard from anyone by 8pm, please call the hospital supervisor through the hospital  at [phone]. ([place] [phone] or [place] [phone]).    Do not eat or drink anything after midnight the night before your surgery, including candy, mints, lifesavers, or chewing gum. Do not drink  alcohol 24hrs before your surgery. Try not to smoke at least 24hrs before your surgery.       Follow the pre surgery showering instructions as listed in the “My Surgical Experience Booklet” or otherwise provided by your surgeon's office. Do not use a blade to shave the surgical area 1 week before surgery. It is okay to use a clean electric clippers up to 24 hours before surgery. Do not apply any lotions, creams, including makeup, cologne, deodorant, or perfumes after showering on the day of your surgery. Do not use dry shampoo, hair spray, hair gel, or any type of hair products.     No contact lenses, eye make-up, or artificial eyelashes. Remove nail polish, including gel polish, and any artificial, gel, or acrylic nails if possible. Remove all jewelry including rings and body piercing jewelry.     Wear causal clothing that is easy to take on and off. Consider your type of surgery.    Keep any valuables, jewelry, piercings at home. Please bring any specially ordered equipment (sling, braces) if indicated.    Arrange for a responsible person to drive you to and from the hospital on the day of your surgery. Please confirm the visitor policy for the day of your procedure when you receive your phone call with an arrival time.     Call the surgeon's office with any new illnesses, exposures, or additional questions prior to surgery.    Please reference your “My Surgical Experience Booklet” for additional information to prepare for your upcoming surgery.

## 2025-05-12 ENCOUNTER — TELEMEDICINE (OUTPATIENT)
Dept: PSYCHIATRY | Facility: CLINIC | Age: 55
End: 2025-05-12
Payer: COMMERCIAL

## 2025-05-12 DIAGNOSIS — Z85.3 HISTORY OF BREAST CANCER: Primary | ICD-10-CM

## 2025-05-12 DIAGNOSIS — F41.0 GENERALIZED ANXIETY DISORDER WITH PANIC ATTACKS: ICD-10-CM

## 2025-05-12 DIAGNOSIS — F31.9 BIPOLAR 1 DISORDER (HCC): Primary | ICD-10-CM

## 2025-05-12 DIAGNOSIS — F14.11 COCAINE ABUSE IN REMISSION (HCC): ICD-10-CM

## 2025-05-12 DIAGNOSIS — F41.1 GENERALIZED ANXIETY DISORDER WITH PANIC ATTACKS: ICD-10-CM

## 2025-05-12 DIAGNOSIS — F10.91 ALCOHOL USE DISORDER IN REMISSION: ICD-10-CM

## 2025-05-12 PROCEDURE — 99214 OFFICE O/P EST MOD 30 MIN: CPT | Performed by: STUDENT IN AN ORGANIZED HEALTH CARE EDUCATION/TRAINING PROGRAM

## 2025-05-12 PROCEDURE — NC001 PR NO CHARGE

## 2025-05-12 PROCEDURE — 90833 PSYTX W PT W E/M 30 MIN: CPT | Performed by: STUDENT IN AN ORGANIZED HEALTH CARE EDUCATION/TRAINING PROGRAM

## 2025-05-12 RX ORDER — QUETIAPINE FUMARATE 200 MG/1
200 TABLET, FILM COATED ORAL
Qty: 90 TABLET | Refills: 0 | Status: SHIPPED | OUTPATIENT
Start: 2025-05-12 | End: 2025-08-10

## 2025-05-12 RX ORDER — OXYCODONE HYDROCHLORIDE 5 MG/1
5 TABLET ORAL EVERY 6 HOURS PRN
Qty: 10 TABLET | Refills: 0 | Status: SHIPPED | OUTPATIENT
Start: 2025-05-12

## 2025-05-12 RX ORDER — ARIPIPRAZOLE 15 MG/1
15 TABLET, ORALLY DISINTEGRATING ORAL DAILY
Qty: 90 TABLET | Refills: 0 | Status: SHIPPED | OUTPATIENT
Start: 2025-05-12 | End: 2025-08-10

## 2025-05-12 RX ORDER — IBUPROFEN 800 MG/1
800 TABLET, FILM COATED ORAL EVERY 8 HOURS SCHEDULED
Qty: 15 TABLET | Refills: 0 | Status: SHIPPED | OUTPATIENT
Start: 2025-05-12 | End: 2025-05-17

## 2025-05-12 RX ORDER — GABAPENTIN 300 MG/1
300 CAPSULE ORAL 3 TIMES DAILY
Qty: 15 CAPSULE | Refills: 0 | Status: SHIPPED | OUTPATIENT
Start: 2025-05-12 | End: 2025-05-17

## 2025-05-12 RX ORDER — MIRTAZAPINE 15 MG/1
15 TABLET, FILM COATED ORAL
Qty: 90 TABLET | Refills: 0 | Status: SHIPPED | OUTPATIENT
Start: 2025-05-12 | End: 2025-08-10

## 2025-05-12 RX ORDER — TRAMADOL HYDROCHLORIDE 50 MG/1
50 TABLET ORAL EVERY 6 HOURS PRN
Qty: 10 TABLET | Refills: 0 | Status: SHIPPED | OUTPATIENT
Start: 2025-05-12

## 2025-05-12 RX ORDER — BENZTROPINE MESYLATE 2 MG/1
2 TABLET ORAL 2 TIMES DAILY
Qty: 180 TABLET | Refills: 0 | Status: SHIPPED | OUTPATIENT
Start: 2025-05-12 | End: 2025-08-10

## 2025-05-12 RX ORDER — CEPHALEXIN 500 MG/1
500 CAPSULE ORAL EVERY 12 HOURS SCHEDULED
Qty: 14 CAPSULE | Refills: 0 | Status: SHIPPED | OUTPATIENT
Start: 2025-05-12 | End: 2025-05-19

## 2025-05-12 RX ORDER — ACETAMINOPHEN 500 MG
500 TABLET ORAL EVERY 6 HOURS
Qty: 20 TABLET | Refills: 0 | Status: SHIPPED | OUTPATIENT
Start: 2025-05-12 | End: 2025-05-17

## 2025-05-12 RX ORDER — HYDROXYZINE HYDROCHLORIDE 50 MG/1
50 TABLET, FILM COATED ORAL 3 TIMES DAILY PRN
Qty: 30 TABLET | Refills: 1 | Status: SHIPPED | OUTPATIENT
Start: 2025-05-12 | End: 2025-07-11

## 2025-05-12 NOTE — PSYCH
MEDICATION MANAGEMENT NOTE    Name: Judy Rios      : 1970      MRN: [id number]  Encounter Provider: Orlin Mares MD  Encounter Date: 2025   Encounter department: [facility]    Insurance: Payor: [facility] REP / Plan: AARP MEDICARE COMPLETE [facility] REP / Product Type: Medicare HMO /      Reason for Visit:   Chief Complaint   Patient presents with    Virtual Regular Visit    Medication Management    Mood Swings    Anxiety    Panic Attack   :  Assessment & Plan  Bipolar 1 disorder (HCC)    Orders:    ARIPiprazole (ABILIFY DISCMELT) 15 mg dispersible tablet; Take 1 tablet (15 mg total) by mouth daily    benztropine (COGENTIN) 2 mg tablet; Take 1 tablet (2 mg total) by mouth 2 (two) times a day    hydrOXYzine HCL (ATARAX) 50 mg tablet; Take 1 tablet (50 mg total) by mouth 3 (three) times a day as needed for anxiety    QUEtiapine (SEROquel) 200 mg tablet; Take 1 tablet (200 mg total) by mouth daily at bedtime    mirtazapine (REMERON) 15 mg tablet; Take 1 tablet (15 mg total) by mouth daily at bedtime    Generalized anxiety disorder with panic attacks  - Management as per principal problem        Alcohol use disorder in remission  - sober for more than 3 years       Cocaine abuse in remission (HCC)  - Clean for more than 3 years           Treatment Recommendations:  A 55 y.o. AA female, Single (never ), domiciled w/ a roommate (since 2024), on disability, w/ PMH of HTN, asthma, GINGER (previously on CPAP), hiatal hernia, GERD, s/p bariatric surgery (2024), OA of both knees, LBP, CKD, h/o breast cancer (s/p double mastectomy), vit D and B12 def and PPH of Bipolar Disorder, alcohol and cocaine use in remission (sober for three years and in currently AA), multiple prior psychiatric admissions (in [facility]; last in ), h/o 4-5 prior SA (last in  tried to jump off a balcony), h/o self-injurious behavior via cutting in her early 30's, previously in psychiatric  care by Dr. Reese, on Abilify Discmelt 10 mg daily, Cogentin 1 mg BID, Seroquel 200 mg BID, Remeron 15 mg nightly who presented to the mental health clinic for the initial intake and psychiatric evaluation on 12/23/24. Presented w/ h/o manic episodes and depression as well as AH in the past and paranoid ideations. Also reported anxiety sxs with panic attacks. Denied SI/HI, intent or plan upon direct inquiry at this time. Her current presentation meets criteria for Bipolar I disorder with psychotic features, SEAN w/ panic attacks, alcohol and cocaine use disorder in remission. Abilify increased to 15 mg daily and Seroquel 200 mg BID decreased to 200 mg nightly; doses to be adjusted as indicated. Maintained on Remeron 15 mg nightly and Cogentin 1 mg BID which later increased to 2 mg BID (reportedly helped with EPS and movements in her mouth). Referred for individual therapy (started on 4/9/25) and will continue AA meetings.      - f/u w/ sleep medicine regarding GINGER and restarting CPAP  - Continue Abilify Discmelt 15 mg daily; doses to be adjusted as indicated and may consider Abilify Maintena given the previous good response  - Continue Seroquel 200 mg nightly, not interested in any changes at this time but could be cross-titrated completely to Abilify to avoid polypharmacy  - Continue Remeron 15 mg nightly for depression and anxiety  - Continue Atarax 50 mg TID PRN for panic attacks  - Continue individual therapy  - Educated about healthy life style, risk of falls/sedation and addiction. Patient was receptive to education.  - Medications sent to the patient's pharmacy for 90 day supply    - RTC in 6 weeks  - The patient was educated about 24 hour and weekend coverage for urgent situations accessed by calling [facility] main practice number  - Patient was educated to call National Suicide Prevention Lifeline (7-725-562-ZMVB [6038]) for behavioral crisis at anytime or 911 for any safety  concerns, or go to nearest ER if the symptoms become overwhelming or unmanageable.  Educated about diagnosis and treatment modalities. Verbalizes understanding and agreement with the treatment plan.  Discussed self monitoring of symptoms, and symptom monitoring tools.  Discussed medications and if treatment adjustment was needed or desired.  Aware of 24 hour and weekend coverage for urgent situations accessed by calling [facility] main practice number  I am scheduling this patient out for greater than 3 months: No    Medications Risks/Benefits:      Risks, Benefits And Possible Side Effects Of Medications:    Risks, benefits, and possible side effects of medications explained to Judy and she (or legal representative) verbalizes understanding and agreement for treatment.    Controlled Medication Discussion:     Not applicable      History of Present Illness     The patient was visited for Virtual Regular Visit, Medication Management, Mood Swings, Anxiety, and Panic Attack. Presented calm, and cooperative. Reported feeling good in general. She reported feeling a little anxious about her upcoming breast implant renewal surgery next week, but noted that the anxiety is manageable. Denied any changes in sleep, appetite, concentration, energy level, or daily activities.  Denied feelings of anhedonia, hopelessness, helplessness, worthlessness or guilt and appeared to be future oriented.  There was no thought constriction related to death.  Denied SI/HI, intent or plan upon direct inquiry at this time. No intense anxiety sxs, specific phobia or panic attacks reported. Denied AV/H.  Endorsed good compliance with the medications and denied any side effects. Denied smoking cigarettes, drinking alcohol or other illicit substance use recently (sober/clean for more than 3 years).    Given this presentation, medications are maintained at the same dosage.  Will continue individual therapy. The patient was  educated to call 911 or go to the nearest emergency room if the symptoms become overwhelming or unable to remain in control. Verbalized understanding and agreed to seek help in case of distress or concern for safety.    Review Of Systems: A review of systems is obtained and is negative except for the pertinent positives listed in HPI/Subjective above.      Current Rating Scores:         Areas of Improvement: reviewed in HPI/Subjective Section and reviewed in Assessment and Plan Section    Past Medical History:   Diagnosis Date    Arthritis     Asthma     Back pain     Bipolar 1 disorder (HCC)     Breast cancer (HCC) 2014    left    Cancer (HCC)     right breast    Constipation     Depression     GERD (gastroesophageal reflux disease)     Hiatal hernia     HTN (hypertension)     Hypertension     Mental health problem     Obesity (BMI 35.0-39.9 without comorbidity)     Sleep apnea     No CPAP use    Wears glasses      Past Surgical History:   Procedure Laterality Date    ABDOMINOPLASTY      AUGMENTATION MAMMAPLASTY      BREAST BIOPSY Bilateral 03/14/2025    Rt and Lt axillary LN - open coil clips placed B/L    BREAST SURGERY Bilateral 2014    mastectomy - R side cancerous, L side prophylactic    COLONOSCOPY      COSMETIC SURGERY      breast reconstruction    EGD      GASTRIC BYPASS LAPAROSCOPIC N/A 10/03/2023    Procedure: LAPAROSCOPIC REMOVAL OF ADJUSTABLE GASTRIC BAND W/ ROBOT & INTROPERATIVE EGD;  Surgeon: Julian Davis MD;  Location: [facility];  Service: Bariatrics    HYSTERECTOMY      LAPAROSCOPIC GASTRIC BANDING      MASTECTOMY      R side cancerous, L side prophylactic (per pt)    VA LAPS GSTR RSTCV PX W/BYP CHEN-EN-Y LIMB <150 CM N/A 01/16/2024    Procedure: R-N-Y GASTRIC BYPASS W/ ROBOTICS & INTRAOPERATIVE EGD;  Surgeon: Julian Davis MD;  Location: [facility];  Service: Bariatrics    US BREAST NEEDLE LOC LEFT Left 05/01/2014    US GUIDED BREAST LYMPH NODE BIOPSY LEFT Left 3/14/2025    US GUIDED BREAST  LYMPH NODE BIOPSY RIGHT Right 3/14/2025     Allergies: No Known Allergies    Current Outpatient Medications   Medication Instructions    acetaminophen (TYLENOL) 500 mg, Every 6 hours PRN    acetaminophen (TYLENOL) 500 mg, Oral, Every 6 hours    albuterol (PROVENTIL HFA,VENTOLIN HFA) 90 mcg/act inhaler 2 puffs, Inhalation, Every 4 hours PRN    amLODIPine (NORVASC) 5 mg, Oral, Daily    ARIPiprazole (ABILIFY DISCMELT) 15 mg, Oral, Daily    benztropine (COGENTIN) 2 mg, Oral, 2 times daily    budesonide-formoterol (Symbicort) 80-4.5 MCG/ACT inhaler 2 puffs, Inhalation, 2 times daily, Rinse mouth after use    calcium carbonate (OS-CHANDNI) 600 mg, 2 times daily with meals    cephalexin (KEFLEX) 500 mg, Oral, Every 12 hours scheduled    gabapentin (NEURONTIN) 300 mg, Oral, 3 times daily    hydrOXYzine HCL (ATARAX) 50 mg, Oral, 3 times daily PRN    ibuprofen (MOTRIN) 800 mg, Oral, Every 8 hours scheduled, Please begin 24 hours after surgery.    mirtazapine (REMERON) 15 mg, Oral, Daily at bedtime    multivitamin (THERAGRAN) TABS 1 tablet, Daily    oxyCODONE (ROXICODONE) 5 mg, Oral, Every 6 hours PRN    polyethylene glycol (MIRALAX) 17 g, Daily    QUEtiapine (SEROQUEL) 200 mg, Oral, Daily at bedtime    Sodium Fluoride 1.1 % PSTE Brush teeth one time before bedtime, don't rinse but expectorate excess.    traMADol (ULTRAM) 50 mg, Oral, Every 6 hours PRN    tretinoin (RETIN-A) 0.025 % cream     urea (CARMOL) 40 % APPLY TO FACE TWICE A DAY    vitamin B-12 (VITAMIN B-12) 1,000 mcg, Oral, Daily    Vitamin D3 2,000 Units, Oral, Daily       Substance Abuse History:    Tobacco, Alcohol and Drug Use History     Tobacco Use    Smoking status: Former     Current packs/day: 0.00     Types: Cigarettes     Quit date: 2023     Years since quittin.2     Passive exposure: Past    Smokeless tobacco: Never   Vaping Use    Vaping status: Never Used   Substance Use Topics    Alcohol use: Not Currently    Drug use: Not Currently          Social  "History:    Social History     Socioeconomic History    Marital status: Single     Spouse name: Not on file    Number of children: Not on file    Years of education: Not on file    Highest education level: Not on file   Occupational History    Not on file   Other Topics Concern    Not on file   Social History Narrative    Not on file        Family Psychiatric History:     Family History   Problem Relation Age of Onset    Stroke Mother     Cancer Mother        Medical History Reviewed by provider this encounter:  Tobacco  Allergies  Meds  Problems  Med Hx  Surg Hx  Fam Hx          Objective   There were no vitals taken for this visit.     Mental Status Evaluation:  Appearance and attitude: appeared as stated age, cooperative and attentive, casually dressed, with good hygiene  Eye contact: good  Motor Function: within normal limits, No PMA/PMR  Gait/station: Not observed  Speech: normal for rate, rhythm, volume, latency, amount  Language: No overt abnormality  Mood/affect: \"good\" / Affect was constricted but reactive, mood congruent  Thought Processes: sequential and goal-directed, linear  Thought content: denied suicidal ideations or homicidal ideations, no overt delusions elicited, negative thinking, ruminations  Associations: concrete associations  Perceptual disturbances: denies Auditory/Visual/Tactile Hallucinations  Orientation: oriented to time, person, place and to the situational context  Cognitive Function: intact  Attention/Concentration: attention span and concentration are age appropriate  Memory: recent and remote memory grossly intact  Fund of knowledge: aware of current events, aware of past history, and vocabulary average  Impulse control: good  Insight/judgment: fair/good          Laboratory Results: I have personally reviewed all pertinent laboratory/tests results    Last Visit Labs:   No visits with results within 1 Month(s) from this visit.   Latest known visit with results is: "   Appointment on 04/02/2025   Component Date Value    Sodium 04/02/2025 140     Potassium 04/02/2025 3.8     Chloride 04/02/2025 104     CO2 04/02/2025 27     ANION GAP 04/02/2025 9     BUN 04/02/2025 10     Creatinine 04/02/2025 0.92     Glucose, Fasting 04/02/2025 103 (H)     Calcium 04/02/2025 9.3     AST 04/02/2025 23     ALT 04/02/2025 24     Alkaline Phosphatase 04/02/2025 98     Total Protein 04/02/2025 6.9     Albumin 04/02/2025 4.1     Total Bilirubin 04/02/2025 0.49     eGFR 04/02/2025 70     Creatinine, Ur 04/02/2025 198.6     Albumin,U,Random 04/02/2025 34.5 (H)     Albumin Creat Ratio 04/02/2025 17     Cholesterol 04/02/2025 234 (H)     Triglycerides 04/02/2025 117     HDL, Direct 04/02/2025 76     LDL Calculated 04/02/2025 135 (H)     Non-HDL-Chol (CHOL-HDL) 04/02/2025 158     WBC 04/02/2025 6.65     RBC 04/02/2025 5.28 (H)     Hemoglobin 04/02/2025 13.1     Hematocrit 04/02/2025 39.3     MCV 04/02/2025 74 (L)     MCH 04/02/2025 24.8 (L)     MCHC 04/02/2025 33.3     RDW 04/02/2025 14.7     Platelets 04/02/2025 284     MPV 04/02/2025 10.2     Folate 04/02/2025 >22.3     Methylmalonic Acid, S 04/02/2025 79     PTH 04/02/2025 56.3     Vitamin A 04/02/2025 48.3     Vitamin B1, Whole Blood 04/02/2025 67.2     Vitamin B-12 04/02/2025 1,301 (H)     Vit D, 25-Hydroxy 04/02/2025 36.4     Zinc 04/02/2025 75     Hemoglobin A1C 04/02/2025 5.9 (H)     EAG 04/02/2025 123     TSH 3RD GENERATON 04/02/2025 0.209 (L)     Iron Saturation 04/02/2025 28     TIBC 04/02/2025 337.4     Iron 04/02/2025 93     Transferrin 04/02/2025 241     UIBC 04/02/2025 244     Ferritin 04/02/2025 79     Free T4 04/02/2025 0.66        Suicide/Homicide Risk Assessment:    Risk of Harm to Self:  The following ratings are based on assessment at the time of the interview  Based on today's assessment, Judy presents the following risk of harm to self: low    Risk of Harm to Others:  The following ratings are based on assessment at the time of  the interview  Based on today's assessment, Judy presents the following risk of harm to others: low    The following interventions are recommended: Continue medication management. No other intervention changes indicated at this time.    Psychotherapy Provided:     Individual psychotherapy provided: Yes    Counseling was provided during the session today for 16 minutes.  Psychoeducation provided to the patient and was educated about the importance of compliance with the medications and psychiatric treatment  Supportive psychotherapy provided to the patient  Solution Focused Brief Therapy (SFBT) provided  Patient's emotions were validated and specific labeled praise provided.   [employer] suggestions were offered in a supportive non-critical way.     Treatment Plan:    Completed and signed during the session: Not applicable - Treatment Plan not due at this session.    Goals: Progress towards Treatment Plan goals - Yes, progressing, as evidenced by subjective findings in HPI/Subjective Section and in Assessment and Plan Section    Depression Follow-up Plan Completed: Yes    Note Share:    This note was shared with patient.    Administrative Statements   Administrative Statements   Encounter provider Orlin Mares MD    The Patient is located at Home and in the following state in which I hold an active license [place].    The patient was identified by name and date of birth. Juyd Rios was informed that this is a telemedicine visit and that the visit is being conducted through the Epic Embedded platform. She agrees to proceed..  My office door was closed. No one else was in the room.  She acknowledged consent and understanding of privacy and security of the video platform. The patient has agreed to participate and understands they can discontinue the visit at any time.    I have spent a total time of 25 minutes in caring for this patient on the day of the visit/encounter including Risks and benefits of tx options,  Counseling / Coordination of care, and Obtaining or reviewing history  , not including the time spent for establishing the audio/video connection.    Visit Time  Visit Start Time: 2:30 PM  Visit Stop Time: 2:55 PM  Total Visit Duration:  25 minutes        Orlin Mares MD 05/12/25

## 2025-05-12 NOTE — H&P
Progress Notes  Annabella Brown DO (Physician)  Plastic Surgery  Expand All Collapse All  Assessment and Plan:  Ms. Rios is a 55 y.o. female presenting for revision breast reconstruction     We discussed the procedure, risks, benefits, alternatives and postoperative instructions and expectations.  All patient's questions were answered and she voiced understanding.  Booking form created.     History of Present Illness:   Ms. Rios is a 55 y.o. female presenting with revision breast reconstruction.  Her lymph node biopsy was benign.  She is concerned about the firmness of her implants, the significant projection and the excess skin.  She also would like to keep the tattoo on her right chest.        Review of Systems:  A 12 point ROS was performed and negative except per HPI.     Past Medical History:  Medical History        Past Medical History:   Diagnosis Date    Arthritis      Asthma      Back pain      Bipolar 1 disorder (HCC)      Breast cancer (HCC) 2014     left    Cancer (HCC)       right breast    Constipation      CPAP (continuous positive airway pressure) dependence      Depression      GERD (gastroesophageal reflux disease)      Hiatal hernia      HTN (hypertension)      Hypertension      Mental health problem      Obesity (BMI 35.0-39.9 without comorbidity)      Sleep apnea      Wears glasses              Past Surgical History:  Surgical History         Past Surgical History:   Procedure Laterality Date    ABDOMINOPLASTY        AUGMENTATION MAMMAPLASTY        BREAST BIOPSY Bilateral 03/14/2025     Rt and Lt axillary LN - open coil clips placed B/L    BREAST SURGERY Bilateral 2014     mastectomy - R side cancerous, L side prophylactic    COLONOSCOPY        COSMETIC SURGERY         breast reconstruction    EGD        GASTRIC BYPASS LAPAROSCOPIC N/A 10/03/2023     Procedure: LAPAROSCOPIC REMOVAL OF ADJUSTABLE GASTRIC BAND W/ ROBOT & INTROPERATIVE EGD;  Surgeon: Julian Davis MD;  Location: [facility];  Service: Bariatrics    HYSTERECTOMY        LAPAROSCOPIC GASTRIC BANDING        MASTECTOMY         R side cancerous, L side prophylactic (per pt)    ID LAPS GSTR RSTCV PX W/BYP CHEN-EN-Y LIMB <150 CM N/A 2024     Procedure: R-N-Y GASTRIC BYPASS W/ ROBOTICS & INTRAOPERATIVE EGD;  Surgeon: Julian Davis MD;  Location: [facility];  Service: Bariatrics    US BREAST NEEDLE LOC LEFT Left 2014    US GUIDED BREAST LYMPH NODE BIOPSY LEFT Left 3/14/2025    US GUIDED BREAST LYMPH NODE BIOPSY RIGHT Right 3/14/2025            Social History:  Social History   Social History            Tobacco Use    Smoking status: Former       Current packs/day: 0.00       Types: Cigarettes       Quit date: 2023       Years since quittin.1       Passive exposure: Past    Smokeless tobacco: Never   Vaping Use    Vaping status: Never Used   Substance Use Topics    Alcohol use: Not Currently    Drug use: Not Currently            Family History:  Family History         Family History   Problem Relation Age of Onset    Stroke Mother      Cancer Mother              Allergies:  Allergies   No Known Allergies        Medications:  Medications Ordered Prior to Encounter          Current Outpatient Medications on File Prior to Visit   Medication Sig Dispense Refill    acetaminophen (TYLENOL) 500 mg tablet Take 500 mg by mouth every 6 (six) hours as needed for mild pain        albuterol (PROVENTIL HFA,VENTOLIN HFA) 90 mcg/act inhaler Inhale 2 puffs every 4 (four) hours as needed for wheezing or shortness of breath 18 g 5    amLODIPine (NORVASC) 5 mg tablet Take 1 tablet (5 mg total) by mouth daily 90 tablet 1    ARIPiprazole (ABILIFY DISCMELT) 15 mg dispersible tablet Take 1 tablet (15 mg total) by mouth daily 90 tablet 0    benztropine (COGENTIN) 2 mg tablet Take 1 tablet (2 mg total) by mouth 2 (two) times a day 180 tablet 0    budesonide-formoterol (Symbicort) 80-4.5 MCG/ACT inhaler Inhale 2 puffs 2 (two) times a day Rinse  "mouth after use 10.2 g 3    Cholecalciferol (Vitamin D3) 50 MCG (2000 UT) capsule Take 1 capsule (2,000 Units total) by mouth daily 90 capsule 3    hydrOXYzine HCL (ATARAX) 50 mg tablet Take 1 tablet (50 mg total) by mouth 3 (three) times a day as needed for anxiety 30 tablet 1    mirtazapine (REMERON) 15 mg tablet Take 1 tablet (15 mg total) by mouth daily at bedtime 90 tablet 0    multivitamin (THERAGRAN) TABS Take 1 tablet by mouth daily        polyethylene glycol (MIRALAX) 17 g packet Take 17 g by mouth daily        QUEtiapine (SEROquel) 200 mg tablet Take 1 tablet (200 mg total) by mouth daily at bedtime 90 tablet 0    Sodium Fluoride 1.1 % PSTE Brush teeth one time before bedtime, don't rinse but expectorate excess. 100 mL 2    tretinoin (RETIN-A) 0.025 % cream          urea (CARMOL) 40 % APPLY TO FACE TWICE A DAY        vitamin B-12 (VITAMIN B-12) 1,000 mcg tablet Take 1 tablet (1,000 mcg total) by mouth daily 90 tablet 3      No current facility-administered medications on file prior to visit.               Physical Examination:  There were no vitals taken for this visit.  Estimated body mass index is 33.25 kg/m² as calculated from the following:    Height as of 3/14/25: 5' 1\" (1.549 m).    Weight as of 3/14/25: 79.8 kg (176 lb).  General: NAD, well appearing, AAOx3  HEENT: NCAT, EOMI, MMM, supple  Resp: Nonlabored  Heart: RRR  Abdomen: Soft, ND, NT  Extremities/MSK: no LE edema, no obvious deficits in ROM  Neuro: grossly intact with no obvious deficits  Skin: no obvious lesions or rashes  Breast: no palpable mass, no palpable axillary lymphadenopathy, bilateral Baker III/IV cap con, healed incisions, significant excess of skin/mastectomy flaps      Annabella Brown,   Plastic and Reconstructive Surgery           Instructions    After Visit Summary (Automatic SnapShot taken 4/14/2025)  Additional Documentation    Encounter Info: Billing Info,     History,     Allergies,     Detailed Report     Media  From " this encounter  Clinical Image - Mobile Device - Scan on 4/14/2025 1:31 PM   Clinical Image - Mobile Device - Scan on 4/14/2025 1:31 PM   Clinical Image - Mobile Device - Scan on 4/14/2025 1:31 PM     Orders Placed    None  Medication Changes      None  Medication List  Visit Diagnoses      H/O breast reconstruction  Problem List

## 2025-05-12 NOTE — ASSESSMENT & PLAN NOTE
Orders:    ARIPiprazole (ABILIFY DISCMELT) 15 mg dispersible tablet; Take 1 tablet (15 mg total) by mouth daily    benztropine (COGENTIN) 2 mg tablet; Take 1 tablet (2 mg total) by mouth 2 (two) times a day    hydrOXYzine HCL (ATARAX) 50 mg tablet; Take 1 tablet (50 mg total) by mouth 3 (three) times a day as needed for anxiety    QUEtiapine (SEROquel) 200 mg tablet; Take 1 tablet (200 mg total) by mouth daily at bedtime    mirtazapine (REMERON) 15 mg tablet; Take 1 tablet (15 mg total) by mouth daily at bedtime

## 2025-05-15 ENCOUNTER — ANESTHESIA (OUTPATIENT)
Dept: PERIOP | Facility: HOSPITAL | Age: 55
End: 2025-05-15
Payer: COMMERCIAL

## 2025-05-15 ENCOUNTER — ANESTHESIA EVENT (OUTPATIENT)
Dept: PERIOP | Facility: HOSPITAL | Age: 55
End: 2025-05-15
Payer: COMMERCIAL

## 2025-05-15 ENCOUNTER — HOSPITAL ENCOUNTER (OUTPATIENT)
Facility: HOSPITAL | Age: 55
Setting detail: OUTPATIENT SURGERY
Discharge: HOME | End: 2025-05-15
Attending: STUDENT IN AN ORGANIZED HEALTH CARE EDUCATION/TRAINING PROGRAM | Admitting: STUDENT IN AN ORGANIZED HEALTH CARE EDUCATION/TRAINING PROGRAM
Payer: COMMERCIAL

## 2025-05-15 VITALS
OXYGEN SATURATION: 100 % | DIASTOLIC BLOOD PRESSURE: 94 MMHG | TEMPERATURE: 97.5 F | SYSTOLIC BLOOD PRESSURE: 131 MMHG | RESPIRATION RATE: 16 BRPM | HEART RATE: 74 BPM

## 2025-05-15 DIAGNOSIS — Z85.3 HISTORY OF BREAST CANCER: ICD-10-CM

## 2025-05-15 PROCEDURE — 19380 REVJ RECONSTRUCTED BREAST: CPT | Performed by: PHYSICIAN ASSISTANT

## 2025-05-15 PROCEDURE — 88305 TISSUE EXAM BY PATHOLOGIST: CPT | Performed by: STUDENT IN AN ORGANIZED HEALTH CARE EDUCATION/TRAINING PROGRAM

## 2025-05-15 PROCEDURE — 88185 FLOWCYTOMETRY/TC ADD-ON: CPT | Performed by: ANESTHESIOLOGY

## 2025-05-15 PROCEDURE — L8600 IMPLANT BREAST SILICONE/EQ: HCPCS | Performed by: STUDENT IN AN ORGANIZED HEALTH CARE EDUCATION/TRAINING PROGRAM

## 2025-05-15 PROCEDURE — C1789 PROSTHESIS, BREAST, IMP: HCPCS | Performed by: STUDENT IN AN ORGANIZED HEALTH CARE EDUCATION/TRAINING PROGRAM

## 2025-05-15 PROCEDURE — 88184 FLOWCYTOMETRY/ TC 1 MARKER: CPT | Performed by: STUDENT IN AN ORGANIZED HEALTH CARE EDUCATION/TRAINING PROGRAM

## 2025-05-15 PROCEDURE — 19342 INSJ/RPLCMT BRST IMPLT SEP D: CPT | Performed by: PHYSICIAN ASSISTANT

## 2025-05-15 PROCEDURE — 19380 REVJ RECONSTRUCTED BREAST: CPT | Performed by: STUDENT IN AN ORGANIZED HEALTH CARE EDUCATION/TRAINING PROGRAM

## 2025-05-15 PROCEDURE — 88188 FLOWCYTOMETRY/READ 9-15: CPT | Performed by: ANESTHESIOLOGY

## 2025-05-15 PROCEDURE — 19342 INSJ/RPLCMT BRST IMPLT SEP D: CPT | Performed by: STUDENT IN AN ORGANIZED HEALTH CARE EDUCATION/TRAINING PROGRAM

## 2025-05-15 PROCEDURE — 88341 IMHCHEM/IMCYTCHM EA ADD ANTB: CPT | Performed by: STUDENT IN AN ORGANIZED HEALTH CARE EDUCATION/TRAINING PROGRAM

## 2025-05-15 PROCEDURE — 88342 IMHCHEM/IMCYTCHM 1ST ANTB: CPT | Performed by: STUDENT IN AN ORGANIZED HEALTH CARE EDUCATION/TRAINING PROGRAM

## 2025-05-15 DEVICE — NATRELLE INSPIRA SCF 560CC FULL PROFILE  SMOOTH ROUND SILICONE
Type: IMPLANTABLE DEVICE | Site: CHEST | Status: FUNCTIONAL
Brand: NATRELLE INSPIRA COHESIVE BREAST IMPLANTS

## 2025-05-15 RX ORDER — EPHEDRINE SULFATE 50 MG/ML
INJECTION INTRAVENOUS AS NEEDED
Status: DISCONTINUED | OUTPATIENT
Start: 2025-05-15 | End: 2025-05-15

## 2025-05-15 RX ORDER — ONDANSETRON 2 MG/ML
INJECTION INTRAMUSCULAR; INTRAVENOUS AS NEEDED
Status: DISCONTINUED | OUTPATIENT
Start: 2025-05-15 | End: 2025-05-15

## 2025-05-15 RX ORDER — HYDROMORPHONE HCL IN WATER/PF 6 MG/30 ML
0.2 PATIENT CONTROLLED ANALGESIA SYRINGE INTRAVENOUS
Status: DISCONTINUED | OUTPATIENT
Start: 2025-05-15 | End: 2025-05-15 | Stop reason: HOSPADM

## 2025-05-15 RX ORDER — GABAPENTIN 300 MG/1
300 CAPSULE ORAL ONCE
Status: COMPLETED | OUTPATIENT
Start: 2025-05-15 | End: 2025-05-15

## 2025-05-15 RX ORDER — ONDANSETRON 2 MG/ML
4 INJECTION INTRAMUSCULAR; INTRAVENOUS ONCE AS NEEDED
Status: DISCONTINUED | OUTPATIENT
Start: 2025-05-15 | End: 2025-05-15 | Stop reason: HOSPADM

## 2025-05-15 RX ORDER — PROPOFOL 10 MG/ML
INJECTION, EMULSION INTRAVENOUS CONTINUOUS PRN
Status: DISCONTINUED | OUTPATIENT
Start: 2025-05-15 | End: 2025-05-15

## 2025-05-15 RX ORDER — CEFAZOLIN SODIUM 2 G/50ML
2000 SOLUTION INTRAVENOUS ONCE
Status: COMPLETED | OUTPATIENT
Start: 2025-05-15 | End: 2025-05-15

## 2025-05-15 RX ORDER — SODIUM CHLORIDE, SODIUM LACTATE, POTASSIUM CHLORIDE, CALCIUM CHLORIDE 600; 310; 30; 20 MG/100ML; MG/100ML; MG/100ML; MG/100ML
INJECTION, SOLUTION INTRAVENOUS CONTINUOUS PRN
Status: DISCONTINUED | OUTPATIENT
Start: 2025-05-15 | End: 2025-05-15

## 2025-05-15 RX ORDER — OXYCODONE HYDROCHLORIDE 5 MG/1
5 TABLET ORAL EVERY 4 HOURS PRN
Status: DISCONTINUED | OUTPATIENT
Start: 2025-05-15 | End: 2025-05-15 | Stop reason: HOSPADM

## 2025-05-15 RX ORDER — DIPHENHYDRAMINE HYDROCHLORIDE 50 MG/ML
INJECTION, SOLUTION INTRAMUSCULAR; INTRAVENOUS AS NEEDED
Status: DISCONTINUED | OUTPATIENT
Start: 2025-05-15 | End: 2025-05-15

## 2025-05-15 RX ORDER — ROCURONIUM BROMIDE 10 MG/ML
INJECTION, SOLUTION INTRAVENOUS AS NEEDED
Status: DISCONTINUED | OUTPATIENT
Start: 2025-05-15 | End: 2025-05-15

## 2025-05-15 RX ORDER — KETAMINE HCL IN NACL, ISO-OSM 100MG/10ML
SYRINGE (ML) INJECTION AS NEEDED
Status: DISCONTINUED | OUTPATIENT
Start: 2025-05-15 | End: 2025-05-15

## 2025-05-15 RX ORDER — DEXAMETHASONE SODIUM PHOSPHATE 10 MG/ML
INJECTION, SOLUTION INTRAMUSCULAR; INTRAVENOUS AS NEEDED
Status: DISCONTINUED | OUTPATIENT
Start: 2025-05-15 | End: 2025-05-15

## 2025-05-15 RX ORDER — PHENYLEPHRINE HCL IN 0.9% NACL 1 MG/10 ML
SYRINGE (ML) INTRAVENOUS AS NEEDED
Status: DISCONTINUED | OUTPATIENT
Start: 2025-05-15 | End: 2025-05-15

## 2025-05-15 RX ORDER — FENTANYL CITRATE 50 UG/ML
INJECTION, SOLUTION INTRAMUSCULAR; INTRAVENOUS AS NEEDED
Status: DISCONTINUED | OUTPATIENT
Start: 2025-05-15 | End: 2025-05-15

## 2025-05-15 RX ORDER — FENTANYL CITRATE/PF 50 MCG/ML
25 SYRINGE (ML) INJECTION
Status: DISCONTINUED | OUTPATIENT
Start: 2025-05-15 | End: 2025-05-15 | Stop reason: HOSPADM

## 2025-05-15 RX ORDER — ACETAMINOPHEN 325 MG/1
975 TABLET ORAL ONCE
Status: COMPLETED | OUTPATIENT
Start: 2025-05-15 | End: 2025-05-15

## 2025-05-15 RX ORDER — MAGNESIUM HYDROXIDE 1200 MG/15ML
LIQUID ORAL AS NEEDED
Status: DISCONTINUED | OUTPATIENT
Start: 2025-05-15 | End: 2025-05-15 | Stop reason: HOSPADM

## 2025-05-15 RX ORDER — MIDAZOLAM HYDROCHLORIDE 2 MG/2ML
INJECTION, SOLUTION INTRAMUSCULAR; INTRAVENOUS AS NEEDED
Status: DISCONTINUED | OUTPATIENT
Start: 2025-05-15 | End: 2025-05-15

## 2025-05-15 RX ORDER — PROPOFOL 10 MG/ML
INJECTION, EMULSION INTRAVENOUS AS NEEDED
Status: DISCONTINUED | OUTPATIENT
Start: 2025-05-15 | End: 2025-05-15

## 2025-05-15 RX ADMIN — SODIUM CHLORIDE, SODIUM LACTATE, POTASSIUM CHLORIDE, AND CALCIUM CHLORIDE: .6; .31; .03; .02 INJECTION, SOLUTION INTRAVENOUS at 07:06

## 2025-05-15 RX ADMIN — CEFAZOLIN SODIUM 2000 MG: 2 SOLUTION INTRAVENOUS at 07:29

## 2025-05-15 RX ADMIN — OXYCODONE HYDROCHLORIDE 5 MG: 5 TABLET ORAL at 12:27

## 2025-05-15 RX ADMIN — ACETAMINOPHEN 975 MG: 325 TABLET, FILM COATED ORAL at 06:16

## 2025-05-15 RX ADMIN — ONDANSETRON 4 MG: 2 INJECTION INTRAMUSCULAR; INTRAVENOUS at 07:39

## 2025-05-15 RX ADMIN — MIDAZOLAM 2 MG: 1 INJECTION INTRAMUSCULAR; INTRAVENOUS at 07:24

## 2025-05-15 RX ADMIN — Medication 50 MCG: at 07:41

## 2025-05-15 RX ADMIN — SODIUM CHLORIDE, SODIUM LACTATE, POTASSIUM CHLORIDE, AND CALCIUM CHLORIDE: .6; .31; .03; .02 INJECTION, SOLUTION INTRAVENOUS at 08:15

## 2025-05-15 RX ADMIN — GABAPENTIN 300 MG: 300 CAPSULE ORAL at 06:16

## 2025-05-15 RX ADMIN — SODIUM CHLORIDE, SODIUM LACTATE, POTASSIUM CHLORIDE, AND CALCIUM CHLORIDE: .6; .31; .03; .02 INJECTION, SOLUTION INTRAVENOUS at 10:56

## 2025-05-15 RX ADMIN — Medication 100 MCG: at 07:45

## 2025-05-15 RX ADMIN — EPHEDRINE SULFATE 15 MG: 50 INJECTION INTRAVENOUS at 07:44

## 2025-05-15 RX ADMIN — FENTANYL CITRATE 25 MCG: 50 INJECTION, SOLUTION INTRAMUSCULAR; INTRAVENOUS at 10:35

## 2025-05-15 RX ADMIN — FENTANYL CITRATE 25 MCG: 50 INJECTION, SOLUTION INTRAMUSCULAR; INTRAVENOUS at 10:25

## 2025-05-15 RX ADMIN — FENTANYL CITRATE 50 MCG: 50 INJECTION, SOLUTION INTRAMUSCULAR; INTRAVENOUS at 07:36

## 2025-05-15 RX ADMIN — EPHEDRINE SULFATE 10 MG: 50 INJECTION INTRAVENOUS at 08:27

## 2025-05-15 RX ADMIN — ROCURONIUM BROMIDE 50 MG: 10 INJECTION INTRAVENOUS at 07:27

## 2025-05-15 RX ADMIN — Medication 100 MCG: at 08:48

## 2025-05-15 RX ADMIN — PROPOFOL 30 MCG/KG/MIN: 10 INJECTION, EMULSION INTRAVENOUS at 07:29

## 2025-05-15 RX ADMIN — SUGAMMADEX 200 MG: 100 INJECTION, SOLUTION INTRAVENOUS at 10:55

## 2025-05-15 RX ADMIN — PROPOFOL 200 MG: 10 INJECTION, EMULSION INTRAVENOUS at 07:27

## 2025-05-15 RX ADMIN — DIPHENHYDRAMINE HYDROCHLORIDE 12.5 MG: 50 INJECTION, SOLUTION INTRAMUSCULAR; INTRAVENOUS at 07:39

## 2025-05-15 RX ADMIN — Medication 20 MG: at 07:29

## 2025-05-15 RX ADMIN — DEXAMETHASONE SODIUM PHOSPHATE 10 MG: 10 INJECTION, SOLUTION INTRAMUSCULAR; INTRAVENOUS at 07:39

## 2025-05-15 NOTE — ANESTHESIA POSTPROCEDURE EVALUATION
Post-Op Assessment Note    CV Status:  Stable  Pain Score: 0    Pain management: adequate       Mental Status:  Alert and awake   Hydration Status:  Euvolemic   PONV Controlled:  Controlled   Airway Patency:  Patent     Post Op Vitals Reviewed: Yes    No anethesia notable event occurred.    Staff: CRNA           Last Filed PACU Vitals:  Vitals Value Taken Time   Temp     Pulse 82 05/15/25 11:11   BP     Resp     SpO2 96 % 05/15/25 11:11   Vitals shown include unfiled device data.

## 2025-05-15 NOTE — OP NOTE
OPERATIVE REPORT  PATIENT NAME: Judy Rios    :  1970  MRN: [id number]  Pt Location:  OR ROOM 12    SURGERY DATE: 5/15/2025    Surgeons and Role:     * Annabella Brown DO - Primary     * Travis Schreiber PA-C - Assisting    Preop Diagnosis:  History of breast cancer [Z85.3]    Post-Op Diagnosis Codes:     * History of breast cancer [Z85.3]    Procedure(s):  Bilateral - BILATERAL REVISION BREAST RECON WITH REPLACEMENT OF IMPLANTS. CAPSULECTOMY. MASTECTOMY FLAP REVISION    Specimen(s):  ID Type Source Tests Collected by Time Destination   1 : right breast implant, workup for breast implant [facility] Tissue Surgical Hardware/Implant TISSUE EXAM, LEUKEMIA/LYMPHOMA FLOW CYTOMETRY Annabella Brown,  5/15/2025  7:56 AM    2 : left breast implant, workup for breast implant [facility] Tissue Surgical Hardware/Implant TISSUE EXAM, LEUKEMIA/LYMPHOMA FLOW CYTOMETRY Annabella Brown,  5/15/2025  7:57 AM    3 : right chest skin and tissue Tissue Soft Tissue, Other TISSUE EXAM Annabella Brown,  5/15/2025  8:57 AM    4 : left chest skin and tissue Tissue Soft Tissue, Other TISSUE EXAM Annabellastephanie Brown DO 5/15/2025 10:16 AM        Estimated Blood Loss:   25 ml    Drains:  Closed/Suction Drain Inferior;Lateral;Right Chest Bulb 10 Fr. (Active)   Number of days: 0       Closed/Suction Drain Inferior;Lateral;Left Chest Bulb 10 Fr. (Active)   Number of days: 0       Anesthesia Type:   General/TIVA    Operative Indications:  56 yo female with history of breast cancer and implant based reconstruction presents for revision.  She has no information regarding her implants.  They have been significantly deformed for quite some time but has not had follow up with her original plastic surgeon.  She understands the complexity of this case given the lack of information from this case and level of deformity.  I will do my best to not effect her panther tattoo on her right chest although the patient  understands this may be effected.  She also understands that her discomfort may be unaffected or worsened by a revision.    Operative Findings:  Removal of bilateral, textured implants with en bloc capsulotomy.  On the back table, the capsule was opened to depict ALLERGAN 600cc TEXTURED IMPLANTS.  There were no gross abnormalities or fluid noted within the pocket.  Pathology was alerted by the OR of these finding and the implications for SIMONA-ALCL.  Placement of right allergan smooth silicone inspira 615cc SCX  Right excision of excess mastectomy flap skin and creation of autodermal flap for implant reinforcement 20x6 cm  Right excision of axillary excess 13x6 cm with closure that communicated with breast closure totaling 30 cm  Placement of left allergan smooth silicone inspira 560cc SCF  Left excision of excess mastectomy flap skin and creation of autodermal flap for implant reinforcement 20x5 cm  Left excision of axillary excess 13x4 cm with closure that communicated with breast closure totaling 27 cm    Complications:   None    Procedure and Technique:  The patient was seen preoperatively.  The procedure, risks, benefits and alternatives were discussed.  Informed consent was obtained.  The patient was site marked preoperatively.  The patient was brought to the operating room where she was positioned supine with all of her pressure points appropriately padded.  Anesthesia commenced.  A timeout was performed and verified.     The patient was prepped and draped in usual sterile fashion.  I first turned my attention to the right side.  I incised just superior to her former incision and dissected down to her former implant.  A small wrent determined this to be textured and this combined with her capsular contracture, I performed an en bloc capsulectomy to remove the capsule and implant.  On the back table, I opened the capsule and it was noted to be a textured allergan implant.  A pec block was performed using  exparel.   Hemostasis was ensured.  The sizer was inserted and the mastectomy flap was redraped, tailor tacked and marked.  A portion of the inferior mastectomy flap was de-epithelialized to created a large autodermal flap and closed using 3-0 monocryl from the deep dermis.  Next, the same procedure was performed on the contralateral side.  Symmetry was confirmed.  The right sizer was removed. The pocket was copiously irrigated with antibiotic and irricept solution.  A 10 Fr MAHOGANY drain was inserted and secured to the skin using 2-0 nylon.  Under sterile steps, the silicone implant was placed into the pocket via the small remaining opening. The remainder of the marked mastectomy flap was de-epithelialized the the entire deep dermis overlying the implant pocket was closed using 3-0 monocryl.  This depicted then the excess axillary tissue.  This was marked and sharply excised.  Hemostasis was obtained.  The remaining layers of the axilla were closed using 3-0 monocryl and the skin of the entire length of the incision was closed using 4-0 monocryl.  The same was performed on the left side.  The incisions were cleansed, glue applied and then steristrips once the glue was dry.  A bra was applied.       The instrument, sponge and needle count was correct an verified prior to completion of the case.     The patient emerged from anesthesia and was transferred to the recovery room in stable condition.     Patient Disposition:  PACU         SIGNATURE: Annabella Brown DO  DATE: May 15, 2025  TIME: 11:08 AM      No qualified plastic surgery resident was available for the case.  The IVETTE provided assistance with retraction and suturing.

## 2025-05-15 NOTE — ANESTHESIA PREPROCEDURE EVALUATION
Procedure:  BILATERAL REVISION BREAST RECON WITH REPLACEMENT OF IMPLANTS, CAPSULOTOMY, MASTECTOMY FLAP REVISION, POSSIBLE LIPOSUCTION TO BILATERAL AXILLA (Bilateral: Breast)    Relevant Problems   CARDIO   (+) Hypertension      GI/HEPATIC   (+) GERD (gastroesophageal reflux disease)   (+) Hiatal hernia      /RENAL   (+) CKD (chronic kidney disease), stage II      MUSCULOSKELETAL   (+) Osteoarthritis of both knees      PULMONARY   (+) Moderate persistent asthma without complication   (+) GINGER (obstructive sleep apnea)                  NPO Status:  Vitals Value Taken Time   Date of last liquid 05/15/25 05/15/25 06:10   Time of last liquid 0610 05/15/25 06:10   Date of last solid 05/14/25 05/15/25 06:10   Time of last solid 1900 05/15/25 06:10

## 2025-05-15 NOTE — INTERVAL H&P NOTE
H&P reviewed. After examining the patient I find no changes in the patients condition since the H&P had been written.    Vitals:    05/15/25 0606   BP: 107/81   Pulse: 96   Resp: 16   Temp: (!) 96.7 °F (35.9 °C)   SpO2: 98%

## 2025-05-15 NOTE — DISCHARGE INSTR - AVS FIRST PAGE
Surgery Date: 5/15/2025                Patient: Juyd Rios  Surgeon: Dr. Brown     Postoperative Instructions   Breast Reconstruction     Dressings:  [x] Skin glue was applied to your incision over absorbable sutures.  You may feel small pieces of suture at the ends of your incision.    [] Remove dressing the second morning following your surgery and bathe as directed.  Please leave tegaderm (clear dressing over your drains) and steristrips in place.  [] No dressings are required but you may cover the incision with gauze for comfort.  [x] Wear your surgical bra at all times except while showering.  [x] Strip and record your MAHOGANY drain output as instructed.  Be sure to bring the output log to your follow up appointment.  [] Other instructions:      Bathing:  [x] Shower 48 hours after surgery.  Allow soap and water to gently wash over the incision.  No scrubbing.  Gently pat dry and apply dressing as needed/instructed above.  [x] No submerging incision in bathtub, pool, hot tub and/or lake.     Activity:  [x] No heavy lifting (> 10lbs).  [x] No strenuous exercise.  [x] Walking is mandatory daily.  [x] Sleeping may be more comfortable with your head elevated.  [x] No driving until off pain medications and you have resumed full range of motion.     Diet and Medication:  [x] Resume diet as tolerated.  High protein diet is important for healing.  Remain well hydrated with water and minimize sodium intake.  [x] Resume preoperative medications.  [x] Pain medications as prescribed.  You may also begin to use ibuprofen 48 hours after surgery.  [x] Finish all antibiotics as prescribed.  [x] Apply ice to area as needed for pain.  Do not place ice directly on skin.  Do not use heat.  [] Other instructions:      It is expected to have some bruising, swelling and mild oozing at the incision site and of the surrounding area.  If there is more than you expect, an enlarging area or you suspect an infection, please call the  office.     Some patients may experience a low-grade fever after surgery.  If it is above 100.4, please call the office.     If you do not have a postoperative office appointment scheduled, please call the office today and let the staff know Dr. Brown needs to see you in 7 days.     Please call [phone] (Dr. Brown's office) or [phone] (Dr. Brown's phone) after hours with any questions, concerns or changes.      MAHOGANY Drain Output Log     Date Time Drain #1 Drain #2

## 2025-05-15 NOTE — ANESTHESIA PREPROCEDURE EVALUATION
Procedure:  BILATERAL REVISION BREAST RECON WITH REPLACEMENT OF IMPLANTS, CAPSULOTOMY, MASTECTOMY FLAP REVISION, POSSIBLE LIPOSUCTION TO BILATERAL AXILLA (Bilateral: Breast)    Relevant Problems   CARDIO   (+) Hypertension      GI/HEPATIC   (+) GERD (gastroesophageal reflux disease)   (+) Hiatal hernia      /RENAL   (+) CKD (chronic kidney disease), stage II      MUSCULOSKELETAL   (+) Osteoarthritis of both knees      PULMONARY   (+) Moderate persistent asthma without complication   (+) GINGER (obstructive sleep apnea)        Physical Exam    Airway     Mallampati score: II  TM Distance: >3 FB  Neck ROM: full  Mouth opening: >= 4 cm      Cardiovascular      Dental        Pulmonary      Neurological    She appears awake, alert and oriented x3.      Other Findings  post-pubertal.      Anesthesia Plan  ASA Score- 2     Anesthesia Type- general with ASA Monitors.         Additional Monitors:     Airway Plan: oral.           Plan Factors-Exercise tolerance (METS): >4 METS.    Chart reviewed.    Patient summary reviewed.    Patient is not a current smoker.  Patient did not smoke on day of surgery.            Induction- intravenous.    Postoperative Plan- Plan for postoperative opioid use.   Monitoring Plan - Monitoring plan - standard ASA monitoring  Post Operative Pain Plan - plan for postoperative opioid use and multimodal analgesia    Perioperative Resuscitation Plan - Level 1 - Full Code.       Informed Consent- Anesthetic plan and risks discussed with patient.  I personally reviewed this patient with the CRNA. Discussed and agreed on the Anesthesia Plan with the CRNA..      NPO Status:  Vitals Value Taken Time   Date of last liquid 05/15/25 05/15/25 06:10   Time of last liquid 0610 05/15/25 06:10   Date of last solid 05/14/25 05/15/25 06:10   Time of last solid 1900 05/15/25 06:10

## 2025-05-15 NOTE — ANESTHESIA POSTPROCEDURE EVALUATION
Post-Op Assessment Note    CV Status:  Stable    Pain management: adequate       Mental Status:  Alert and awake   Hydration Status:  Euvolemic   PONV Controlled:  Controlled   Airway Patency:  Patent     Post Op Vitals Reviewed: Yes    No anethesia notable event occurred.    Staff: Anesthesiologist           Last Filed PACU Vitals:  Vitals Value Taken Time   Temp 97.5 °F (36.4 °C) 05/15/25 11:08   Pulse 73 05/15/25 11:16   /71 05/15/25 11:15   Resp 22 05/15/25 11:16   SpO2 98 % 05/15/25 11:16   Vitals shown include unfiled device data.    Modified Kenna:     Vitals Value Taken Time   Activity 2 05/15/25 11:08   Respiration 2 05/15/25 11:08   Circulation 2 05/15/25 11:08   Consciousness 1 05/15/25 11:08   Oxygen Saturation 1 05/15/25 11:08     Modified Kenna Score: 8

## 2025-05-16 ENCOUNTER — TELEPHONE (OUTPATIENT)
Age: 55
End: 2025-05-16

## 2025-05-16 NOTE — TELEPHONE ENCOUNTER
Patient is calling regarding cancelling an appointment.    Date/Time: 5/23/25 at 9 am    Reason: surgery    Patient was rescheduled: YES [] NO [x]  If yes, when was Patient reschedule for:     Patient requesting call back to reschedule: YES [] NO [x]

## 2025-05-17 ENCOUNTER — NURSE TRIAGE (OUTPATIENT)
Dept: OTHER | Facility: OTHER | Age: 55
End: 2025-05-17

## 2025-05-17 NOTE — TELEPHONE ENCOUNTER
"FOLLOW UP: none needed    REASON FOR CONVERSATION: Post-op Problem    SYMPTOMS: drainage on bra around MAHOGANY    OTHER: today she showered and afterwards she put on her bra. she noticed that she had some drainage on her bra on the L side. She is worried that the MAHOGANY became dislodged.      Per provider- often normal to have drainage around the drain site. As long as the drain is maintaining suction (bulb is not filling with air). Apply gauze as needed around the drain site for drainage     DISPOSITION: Call PCP Now          Answer Assessment - Initial Assessment Questions  1. SYMPTOM: \"What's the main symptom you're concerned about?\" (e.g., pain, fever, vomiting)     L Drain possibly dislodged    2. ONSET: \"When did drainage  start?\"      Noticed after shower today    3. SURGERY: \"What surgery did you have?\"      Bilateral implant exchange    4. DATE of SURGERY: \"When was the surgery?\"       5/15    6. DRAINS: \"Were any drains place in or around the wound?\" (e.g., Hemovac, Prasad-Pandey, Penrose)      2- JPs    7. PAIN: \"Is there any pain?\" If Yes, ask: \"How bad is it?\"  (Scale 1-10; or mild, moderate, severe)      Denies    8. FEVER: \"Do you have a fever?\" If Yes, ask: \"What is your temperature, how was it measured, and when did it start?\"      Denies    9. VOMITING: \"Is there any vomiting?\" If Yes, ask: \"How many times?\"      Denies    10. BLEEDING: \"Is there any bleeding?\" If Yes, ask: \"How much?\" and \"Where?\"      Has a little bit with the drainage      Took a shower and put bra back on and noticed some drainage on bra. A little bit of fluid on L side.    Protocols used: Post-Op Symptoms and Questions-Adult-    "

## 2025-05-19 ENCOUNTER — TELEPHONE (OUTPATIENT)
Age: 55
End: 2025-05-19

## 2025-05-19 LAB
SCAN RESULT: NORMAL
SCAN RESULT: NORMAL

## 2025-05-19 NOTE — TELEPHONE ENCOUNTER
Patient is having drainage issues from sx with Dr. Brown on 5/15/25, please call back as soon as possible. Thank you

## 2025-05-22 ENCOUNTER — OFFICE VISIT (OUTPATIENT)
Age: 55
End: 2025-05-22

## 2025-05-22 DIAGNOSIS — Z98.890 H/O BREAST RECONSTRUCTION: Primary | ICD-10-CM

## 2025-05-22 PROCEDURE — 99024 POSTOP FOLLOW-UP VISIT: CPT

## 2025-05-22 NOTE — PROGRESS NOTES
[facility]  [address], [address], [place] [address]  [phone]    Patient Identification: Judy Rios is a 55 y.o. female     History of Present Illness: The patient is a 55 y.o. female  who presents to the office for routine post-operative evaluation. Patient is 7 days s/p BILATERAL REVISION BREAST RECON WITH REPLACEMENT OF IMPLANTS, CAPSULOlectomy, MASTECTOMY FLAP REVISION - Bilateral  on 5/15/2025 by Dr. Brown.   Patient is doing well with no complaints. She is happy with the results of her surgery thus far. Wearing compressive bra 24/7. Reports adequate pain control. Has small area of clear drainage to lateral portion of left breast incision. Denies fever, chills, nausea, vomiting, bleeding, malaise, swelling, incisional erythema, or signs of infection. Remains compliant with daily drain care. Drains x 2, holding suction, serosang drainage. <30 cc output in 2 consecutive days.     Right implant: Allergan smooth silicone inspira 615cc SCX   Left implant: Allergan smooth silicone inspira 560cc SCF       Prior implants were textured and were sent to pathology to r/o SIMONA-ALCL, skin also sent for eval. Path results as follows:  A. Breast, Right, implant, removal/capsulectomy:  -   Dense fibroconnective tissue with fibrosis, lymphohistiocytic inflammation and foreign body giant cell reaction.      B. Breast, Left, implant, removal/capsulectomy:  -   Dense fibroconnective tissue with fibrosis and lymphohistiocytic inflammation.      C. Breast, Right, right chest skin and tissue, excision:  -   Benign skin and fibroadipose tissue.      D. Breast, Left, left chest skin and tissue, excision:  -   Benign skin and fibroadipose tissue.     Immunohistochemical stains are performed to evaluate the cells lining the capsule and exclude lymphoma in parts A and B:  CD3 highlights T-cells which are predominantly small.  CD20 highlights rare B-cells.  CD30 is negative.  CD68  highlights histiocytes.   CD4 highlights T-cells and histiocytes.          The following portions of the patient's history were reviewed: allergies, current medications, past medical history, past surgical history, and past social history    Past Medical History[1]     Past Surgical History[2]    No Known Allergies     Current Medications[3]      Social History     Socioeconomic History    Marital status: Single     Spouse name: Not on file    Number of children: Not on file    Years of education: Not on file    Highest education level: Not on file   Occupational History    Not on file   Tobacco Use    Smoking status: Former     Current packs/day: 0.00     Types: Cigarettes     Quit date: 2023     Years since quittin.2     Passive exposure: Past    Smokeless tobacco: Never   Vaping Use    Vaping status: Never Used   Substance and Sexual Activity    Alcohol use: Not Currently    Drug use: Not Currently    Sexual activity: Not on file   Other Topics Concern    Not on file   Social History Narrative    Not on file     Social Drivers of Health     Financial Resource Strain: Low Risk  (2024)    Overall Financial Resource Strain (CARDIA)     Difficulty of Paying Living Expenses: Not hard at all   Recent Concern: Financial Resource Strain - High Risk (2024)    Overall Financial Resource Strain (CARDIA)     Difficulty of Paying Living Expenses: Hard   Food Insecurity: No Food Insecurity (2024)    Nursing - Inadequate Food Risk Classification     Worried About Running Out of Food in the Last Year: Never true     Ran Out of Food in the Last Year: Never true     Ran Out of Food in the Last Year: Not on file   Recent Concern: Food Insecurity - Food Insecurity Present (2024)    Hunger Vital Sign     Worried About Running Out of Food in the Last Year: Sometimes true     Ran Out of Food in the Last Year: Sometimes true   Transportation Needs: No Transportation Needs (2024)    PRAPARE -  Transportation     Lack of Transportation (Medical): No     Lack of Transportation (Non-Medical): No   Physical Activity: Not on file   Stress: Not on file   Social Connections: Not on file   Intimate Partner Violence: Not on file   Housing Stability: Low Risk  (9/24/2024)    Housing Stability Vital Sign     Unable to Pay for Housing in the Last Year: No     Number of Times Moved in the Last Year: 1     Homeless in the Last Year: No          Review of Systems  Constitutional: Denies fevers, chills, nausea, vomiting, malaise, or pain.  Skin: Denies any bleeding, warmth, erythema, edema, mucopurulent drainage, or signs of infection. +clear drainage to left lateral breast incision    Physical Exam  General: pleasant and well appearing, in no acute distress.   Skin: surgical incisions C/D/I without erythema, warmth, odors, or gross signs of infection. Steri strips in place. Left lateral breast incision with mild superficial dehiscence and fibrinous tissue/clear drainage. No gross signs of infection at the site. Implants soft. No tenderness to palpation. No edema, obvious hematoma, or seroma. Skin perfusion intact.    SEE MEDIA     Drains:  Drains x 2, anchor suture intact, holding suction, with serosanguinous drainage. No signs of infection at drain sites.     Output:  Drain #1: <30 cc per 24 hours for past 2 days  Drain #2: <30 cc per 24 hours for past 2 days     Drain(s)  meet criteria for drain removal.         Assessment and Plan:  The patient is a 55 y.o. female who presents to the office for routine post-operative evaluation. Patient is 7 days s/p BILATERAL REVISION BREAST RECON WITH REPLACEMENT OF IMPLANTS, CAPSULOlectomy, MASTECTOMY FLAP REVISION - Bilateral  on 5/15/2025 by Dr. Brown      -At today's visit, breasts/incisions inspected - healing appropriately with no signs of infection. Small area of superficial dehiscence to lateral left breast incision, with fibrinous tissue and clear drainage, though no signs  of infection. Can keep area clean and cover with gauze, change as needed. I anticipate this area will heal secondarily.   -Drains x 2 removed today without difficulty. Well tolerated by patient. Gauze dressing applied. Patient should expect minimal drainage from the site(s), wash normally in the shower, and can apply bandages as needed. Informed patient to look out for any breast swelling or significant breast pain, and emphasized importance of compressive bra now hat drains have been removed.   -No evidence of SIMONA-ALCL on pathology results   -Steri strips left in place. Let fall off on their own or may be removed at next visit  -Aquaphor for moisture  -Breast massage   -The patient chooses to return in 2 weeks for interval post-operative evaluation, or sooner if needed.   -Gently wash surgical sites daily with mild soap and water (please use gentle/hypoallergenic soap like Dial, no scrubbing). Pat dry. Do not let direct water pressure from shower hit your incision(s). No swimming or soaking in bathtub for 6-8 weeks post-operatively, or until wound is fully healed.   -Continue monitoring for signs of infection including increased redness, swelling, fever (temperature > 100.4 F), pus or foul-smelling drainage from your wound, incision, or drain site. Severe pain not relieved by your pain medications. Please call immediately with any of these symptoms.   -Reviewed activity restrictions. Ambulation encouraged. Avoid lifting, pushing, pulling > 10 lbs for a minimum of 4-6 weeks post-operatively. Avoid strenuous exercise.   -The patient is to call the office with any questions or concerns. All of the patient's questions were answered at this time and they agree with the plan of care.        Sada Razo PA-C  [facility]          [1]   Past Medical History:  Diagnosis Date    Arthritis     Asthma     Back pain     Bipolar 1 disorder (HCC)     Breast cancer (HCC) 2014    left    Cancer  (HCC)     right breast    Constipation     Depression     GERD (gastroesophageal reflux disease)     Hiatal hernia     HTN (hypertension)     Hypertension     Mental health problem     Obesity (BMI 35.0-39.9 without comorbidity)     Sleep apnea     No CPAP use    Wears glasses    [2]   Past Surgical History:  Procedure Laterality Date    ABDOMINOPLASTY      AUGMENTATION MAMMAPLASTY      BREAST BIOPSY Bilateral 03/14/2025    Rt and Lt axillary LN - open coil clips placed B/L    BREAST SURGERY Bilateral 2014    mastectomy - R side cancerous, L side prophylactic    COLONOSCOPY      COSMETIC SURGERY      breast reconstruction    EGD      GASTRIC BYPASS LAPAROSCOPIC N/A 10/03/2023    Procedure: LAPAROSCOPIC REMOVAL OF ADJUSTABLE GASTRIC BAND W/ ROBOT & INTROPERATIVE EGD;  Surgeon: Julian Davis MD;  Location: [facility];  Service: Bariatrics    HYSTERECTOMY      LAPAROSCOPIC GASTRIC BANDING      MASTECTOMY      R side cancerous, L side prophylactic (per pt)    ND INSJ/RPLCMT BREAST IMPLANT SEP DAY MASTECTOMY Bilateral 5/15/2025    Procedure: BILATERAL REVISION BREAST RECON WITH REPLACEMENT OF IMPLANTS, CAPSULOlectomy, MASTECTOMY FLAP REVISION;  Surgeon: Annabella Brown DO;  Location: [facility];  Service: Plastics    ND LAPS GSTR RSTCV PX W/BYP CHEN-EN-Y LIMB <150 CM N/A 01/16/2024    Procedure: R-N-Y GASTRIC BYPASS W/ ROBOTICS & INTRAOPERATIVE EGD;  Surgeon: Julian Davis MD;  Location: [facility];  Service: Bariatrics    US BREAST NEEDLE LOC LEFT Left 05/01/2014    US GUIDED BREAST LYMPH NODE BIOPSY LEFT Left 3/14/2025    US GUIDED BREAST LYMPH NODE BIOPSY RIGHT Right 3/14/2025   [3]   Current Outpatient Medications:     acetaminophen (TYLENOL) 500 mg tablet, Take 500 mg by mouth every 6 (six) hours as needed for mild pain, Disp: , Rfl:     albuterol (PROVENTIL HFA,VENTOLIN HFA) 90 mcg/act inhaler, Inhale 2 puffs every 4 (four) hours as needed for wheezing or shortness of breath, Disp: 18 g, Rfl: 5     amLODIPine (NORVASC) 5 mg tablet, Take 1 tablet (5 mg total) by mouth daily, Disp: 90 tablet, Rfl: 1    ARIPiprazole (ABILIFY DISCMELT) 15 mg dispersible tablet, Take 1 tablet (15 mg total) by mouth daily, Disp: 90 tablet, Rfl: 0    benztropine (COGENTIN) 2 mg tablet, Take 1 tablet (2 mg total) by mouth 2 (two) times a day, Disp: 180 tablet, Rfl: 0    budesonide-formoterol (Symbicort) 80-4.5 MCG/ACT inhaler, Inhale 2 puffs 2 (two) times a day Rinse mouth after use, Disp: 10.2 g, Rfl: 3    calcium carbonate (OS-CHANDNI) 600 MG tablet, Take 600 mg by mouth in the morning and 600 mg in the evening. Take with meals., Disp: , Rfl:     Cholecalciferol (Vitamin D3) 50 MCG (2000 UT) capsule, Take 1 capsule (2,000 Units total) by mouth daily, Disp: 90 capsule, Rfl: 3    gabapentin (Neurontin) 300 mg capsule, Take 1 capsule (300 mg total) by mouth 3 (three) times a day for 5 days, Disp: 15 capsule, Rfl: 0    hydrOXYzine HCL (ATARAX) 50 mg tablet, Take 1 tablet (50 mg total) by mouth 3 (three) times a day as needed for anxiety, Disp: 30 tablet, Rfl: 1    ibuprofen (MOTRIN) 800 mg tablet, Take 1 tablet (800 mg total) by mouth every 8 (eight) hours for 5 days Please begin 24 hours after surgery., Disp: 15 tablet, Rfl: 0    mirtazapine (REMERON) 15 mg tablet, Take 1 tablet (15 mg total) by mouth daily at bedtime, Disp: 90 tablet, Rfl: 0    multivitamin (THERAGRAN) TABS, Take 1 tablet by mouth in the morning., Disp: , Rfl:     oxyCODONE (Roxicodone) 5 immediate release tablet, Take 1 tablet (5 mg total) by mouth every 6 (six) hours as needed for severe pain Max Daily Amount: 20 mg, Disp: 10 tablet, Rfl: 0    polyethylene glycol (MIRALAX) 17 g packet, Take 17 g by mouth daily, Disp: , Rfl:     QUEtiapine (SEROquel) 200 mg tablet, Take 1 tablet (200 mg total) by mouth daily at bedtime, Disp: 90 tablet, Rfl: 0    Sodium Fluoride 1.1 % PSTE, Brush teeth one time before bedtime, don't rinse but expectorate excess., Disp: 100 mL, Rfl: 2     traMADol (Ultram) 50 mg tablet, Take 1 tablet (50 mg total) by mouth every 6 (six) hours as needed for moderate pain, Disp: 10 tablet, Rfl: 0    tretinoin (RETIN-A) 0.025 % cream, , Disp: , Rfl:     urea (CARMOL) 40 %, , Disp: , Rfl:     vitamin B-12 (VITAMIN B-12) 1,000 mcg tablet, Take 1 tablet (1,000 mcg total) by mouth daily, Disp: 90 tablet, Rfl: 3

## 2025-06-05 ENCOUNTER — OFFICE VISIT (OUTPATIENT)
Age: 55
End: 2025-06-05
Payer: COMMERCIAL

## 2025-06-05 ENCOUNTER — OFFICE VISIT (OUTPATIENT)
Dept: FAMILY MEDICINE CLINIC | Facility: CLINIC | Age: 55
End: 2025-06-05

## 2025-06-05 VITALS
TEMPERATURE: 97.1 F | OXYGEN SATURATION: 98 % | WEIGHT: 189 LBS | RESPIRATION RATE: 14 BRPM | BODY MASS INDEX: 35.68 KG/M2 | SYSTOLIC BLOOD PRESSURE: 104 MMHG | DIASTOLIC BLOOD PRESSURE: 68 MMHG | HEART RATE: 64 BPM | HEIGHT: 61 IN

## 2025-06-05 DIAGNOSIS — T81.30XA WOUND DEHISCENCE: ICD-10-CM

## 2025-06-05 DIAGNOSIS — K91.2 POSTSURGICAL MALABSORPTION: ICD-10-CM

## 2025-06-05 DIAGNOSIS — Z98.890 H/O BREAST RECONSTRUCTION: ICD-10-CM

## 2025-06-05 DIAGNOSIS — I10 PRIMARY HYPERTENSION: ICD-10-CM

## 2025-06-05 DIAGNOSIS — Z85.3 HISTORY OF BREAST CANCER: Primary | ICD-10-CM

## 2025-06-05 DIAGNOSIS — J45.40 MODERATE PERSISTENT ASTHMA WITHOUT COMPLICATION: Primary | ICD-10-CM

## 2025-06-05 PROCEDURE — 99214 OFFICE O/P EST MOD 30 MIN: CPT | Performed by: FAMILY MEDICINE

## 2025-06-05 PROCEDURE — 13101 CMPLX RPR TRUNK 2.6-7.5 CM: CPT | Performed by: PHYSICIAN ASSISTANT

## 2025-06-05 PROCEDURE — 12001 RPR S/N/AX/GEN/TRNK 2.5CM/<: CPT | Performed by: PHYSICIAN ASSISTANT

## 2025-06-05 PROCEDURE — 99024 POSTOP FOLLOW-UP VISIT: CPT | Performed by: PHYSICIAN ASSISTANT

## 2025-06-05 PROCEDURE — 13160 SEC CLSR SURG WND/DEHSN XTN: CPT | Performed by: PHYSICIAN ASSISTANT

## 2025-06-05 NOTE — PROGRESS NOTES
Name: Judy Rios      : 1970      MRN: [id number]  Encounter Provider: Steve Gaspar MD  Encounter Date: 2025   Encounter department: [facility]  :  Assessment & Plan  Moderate persistent asthma without complication  Stable  Continue albuterol and Symbicort  She is up-to-date with pneumococcal/COVID/influenza vaccination          Primary hypertension  Well-controlled  Continue amlodipine  Continue DASH diet            Postsurgical malabsorption  Reviewed most recent labs which are unremarkable  Continue multivitamins  Follow follow-up with bariatric surgery outpatient          Wound dehiscence  Laceration wound dehiscence-Case discussed with plastic surgery team  No signs of infection around the wound site  Wound dressing changed in clinic  Follow-up with plastic surgery team       H/O breast reconstruction  Bilateral breast revision reconstruction surgery with replacement of implants  Postop course complicated by wound dehiscence  Follow-up with plastic surgery outpatient for wound closure                History of Present Illness   55-year-old female with a history of obesity status post bariatric surgery, asthma, and hypertension who presents today for follow-up.  Patient had  breast reconstruction surgery on May 15, 2025.  She reports that her laceration wound on her left side opened up recently.  She reports serosanguineous drainage from the wound.  She denies any redness, purulent drainage, swelling, foul-smelling or significant pain.  She is otherwise doing well..  She is eating and drinking okay.        Review of Systems   Constitutional:  Negative for appetite change, chills, diaphoresis, fatigue and fever.   Respiratory:  Negative for shortness of breath and wheezing.    Cardiovascular:  Negative for chest pain and palpitations.   Gastrointestinal:  Negative for abdominal pain, nausea and vomiting.   Musculoskeletal:  Negative for back pain.  "  Skin:  Positive for wound.   Neurological:  Negative for seizures, syncope and headaches.       Objective   /68 (BP Location: Left arm, Patient Position: Sitting, Cuff Size: Large)   Pulse 64   Temp (!) 97.1 °F (36.2 °C) (Temporal)   Resp 14   Ht 5' 1\" (1.549 m)   Wt 85.7 kg (189 lb)   SpO2 98%   BMI 35.71 kg/m²      Physical Exam  Constitutional:       General: She is not in acute distress.     Appearance: Normal appearance. She is well-developed. She is not ill-appearing, toxic-appearing or diaphoretic.   HENT:      Head: Normocephalic and atraumatic.      Right Ear: External ear normal.      Left Ear: External ear normal.      Nose: Nose normal.      Mouth/Throat:      Pharynx: No oropharyngeal exudate.     Eyes:      General:         Right eye: No discharge.         Left eye: No discharge.      Extraocular Movements: Extraocular movements intact.      Pupils: Pupils are equal, round, and reactive to light.       Cardiovascular:      Rate and Rhythm: Normal rate and regular rhythm.      Heart sounds: Normal heart sounds. No murmur heard.     No friction rub. No gallop.   Pulmonary:      Effort: Pulmonary effort is normal. No respiratory distress.      Breath sounds: Normal breath sounds. No stridor. No wheezing or rhonchi.   Abdominal:      General: Bowel sounds are normal. There is no distension.      Palpations: Abdomen is soft. There is no mass.      Tenderness: There is no abdominal tenderness. There is no guarding.     Musculoskeletal:         General: Normal range of motion.      Cervical back: Normal range of motion.     Skin:     General: Skin is warm.      Comments: See media for left sided laceration wound dehiscence.     Neurological:      Mental Status: She is alert and oriented to person, place, and time.         "

## 2025-06-05 NOTE — ASSESSMENT & PLAN NOTE
Stable  Continue albuterol and Symbicort  She is up-to-date with pneumococcal/COVID/influenza vaccination

## 2025-06-05 NOTE — ASSESSMENT & PLAN NOTE
Laceration wound dehiscence-Case discussed with plastic surgery team  No signs of infection around the wound site  Wound dressing changed in clinic  Follow-up with plastic surgery team

## 2025-06-05 NOTE — PROGRESS NOTES
Assessment/Plan:     Diagnoses and all orders for this visit:    History of breast cancer  Wound dehiscence  Pt s/p breast recon with replacement of implants presents to left lateral incisional dehiscence. Implant does not appear exposed. No signs of infection. Area was sutured. She know to call with any signs of infection. We will see her back on Monday.    -     Laceration repair      Subjective:      Patient ID: Judy Rios is a 55 y.o. female.    HPI    Pt is here for a post-op visit. She underwent bilateral revision breast recon with replacement of implants (right allergan smooth silicone inspira 615 SCX, left allergan smooth silicone inspira 560cc), capsulectomy, mastectomy flap revision on 5/15 by Dr. Brown. She was found to have a small opening along her left lateral incision at her last visit. She saw her PCP today who noticed further dehiscence.     Problem List[1]  No Known Allergies  Current Outpatient Medications on File Prior to Visit   Medication Sig    acetaminophen (TYLENOL) 500 mg tablet Take 500 mg by mouth every 6 (six) hours as needed for mild pain    albuterol (PROVENTIL HFA,VENTOLIN HFA) 90 mcg/act inhaler Inhale 2 puffs every 4 (four) hours as needed for wheezing or shortness of breath    amLODIPine (NORVASC) 5 mg tablet Take 1 tablet (5 mg total) by mouth daily    ARIPiprazole (ABILIFY DISCMELT) 15 mg dispersible tablet Take 1 tablet (15 mg total) by mouth daily    benztropine (COGENTIN) 2 mg tablet Take 1 tablet (2 mg total) by mouth 2 (two) times a day    budesonide-formoterol (Symbicort) 80-4.5 MCG/ACT inhaler Inhale 2 puffs 2 (two) times a day Rinse mouth after use    calcium carbonate (OS-CHANDNI) 600 MG tablet Take 600 mg by mouth in the morning and 600 mg in the evening. Take with meals.    Cholecalciferol (Vitamin D3) 50 MCG (2000 UT) capsule Take 1 capsule (2,000 Units total) by mouth daily    gabapentin (Neurontin) 300 mg capsule Take 1 capsule (300 mg total) by mouth 3 (three)  times a day for 5 days    hydrOXYzine HCL (ATARAX) 50 mg tablet Take 1 tablet (50 mg total) by mouth 3 (three) times a day as needed for anxiety    ibuprofen (MOTRIN) 800 mg tablet Take 1 tablet (800 mg total) by mouth every 8 (eight) hours for 5 days Please begin 24 hours after surgery.    mirtazapine (REMERON) 15 mg tablet Take 1 tablet (15 mg total) by mouth daily at bedtime    multivitamin (THERAGRAN) TABS Take 1 tablet by mouth in the morning.    oxyCODONE (Roxicodone) 5 immediate release tablet Take 1 tablet (5 mg total) by mouth every 6 (six) hours as needed for severe pain Max Daily Amount: 20 mg    polyethylene glycol (MIRALAX) 17 g packet Take 17 g by mouth daily    QUEtiapine (SEROquel) 200 mg tablet Take 1 tablet (200 mg total) by mouth daily at bedtime    Sodium Fluoride 1.1 % PSTE Brush teeth one time before bedtime, don't rinse but expectorate excess.    traMADol (Ultram) 50 mg tablet Take 1 tablet (50 mg total) by mouth every 6 (six) hours as needed for moderate pain    tretinoin (RETIN-A) 0.025 % cream     urea (CARMOL) 40 %     vitamin B-12 (VITAMIN B-12) 1,000 mcg tablet Take 1 tablet (1,000 mcg total) by mouth daily     No current facility-administered medications on file prior to visit.     Family History[2]  Past Medical History[3]  Social History[4]  Past Surgical History[5]      Review of Systems   All other systems reviewed and are negative.        Objective:      There were no vitals taken for this visit.         Physical Exam  Constitutional:       Appearance: Normal appearance. She is well-developed.   HENT:      Head: Normocephalic and atraumatic.     Eyes:      Conjunctiva/sclera: Conjunctivae normal.     Pulmonary:      Effort: Pulmonary effort is normal.      Comments: Bilateral breasts are soft, supple & symmetric. Right breast incision is C/D/I. Left lateral incision is open, measuring 3.5cm with a depth of 2 cm, see photo. Implant appears to be covered.     Musculoskeletal:          General: Normal range of motion.      Cervical back: Normal range of motion.     Skin:     General: Skin is warm and dry.     Neurological:      Mental Status: She is alert and oriented to person, place, and time.     Psychiatric:         Mood and Affect: Mood normal.         Behavior: Behavior normal.       Universal Protocol:  Consent: Verbal consent obtained  Consent given by: patient  Laceration repair    Date/Time: 6/5/2025 10:30 AM    Performed by: Sandrita Carrington PA-C  Authorized by: Sandrita Carrington PA-C  Body area: trunk  Location details: left breast  Anesthesia: local infiltration    Anesthesia:  Local Anesthetic: lidocaine 1% with epinephrine  Anesthetic total: 12 mL    Sedation:  Patient sedated: no      Wound Dehiscence:    Secondary closure or dehiscence: complex    Procedure Details:  Amount of cleaning: area prepped with chloraprep.  Skin closure: 4-0 nylon (Deep closure with 3.0 monocryl)  Technique: simple  Approximation difficulty: simple  Dressing: 4x4 sterile gauze             [1]   Patient Active Problem List  Diagnosis    Hypertension    Bipolar 1 disorder (HCC)    Moderate persistent asthma without complication    Osteoarthritis of both knees    Obesity, morbid (HCC)    Palpitations    History of breast cancer    Status post bariatric surgery    Preoperative cardiovascular examination    CKD (chronic kidney disease), stage II    Suspected sleep apnea    Snoring    GINGER (obstructive sleep apnea)    GERD (gastroesophageal reflux disease)    Hiatal hernia    Obesity (BMI 30-39.9)    Gastric band erosion    Bipolar disorder in partial remission (HCC)    Vitamin D deficiency    Vitamin B12 deficiency    Postsurgical malabsorption    H/O breast reconstruction    Alcohol use disorder in remission   [2]   Family History  Problem Relation Name Age of Onset    Stroke Mother      Cancer Mother     [3]   Past Medical History:  Diagnosis Date    Arthritis     Asthma     Back pain     Bipolar 1 disorder  (HCC)     Breast cancer (HCC)     left    Cancer (HCC)     right breast    Constipation     Depression     GERD (gastroesophageal reflux disease)     Hiatal hernia     HTN (hypertension)     Hypertension     Mental health problem     Obesity (BMI 35.0-39.9 without comorbidity)     Sleep apnea     No CPAP use    Wears glasses    [4]   Social History  Socioeconomic History    Marital status: Single   Tobacco Use    Smoking status: Former     Current packs/day: 0.00     Types: Cigarettes     Quit date: 2023     Years since quittin.2     Passive exposure: Past    Smokeless tobacco: Never   Vaping Use    Vaping status: Never Used   Substance and Sexual Activity    Alcohol use: Not Currently    Drug use: Not Currently     Social Drivers of Health     Financial Resource Strain: Low Risk  (2024)    Overall Financial Resource Strain (CARDIA)     Difficulty of Paying Living Expenses: Not hard at all   Recent Concern: Financial Resource Strain - High Risk (2024)    Overall Financial Resource Strain (CARDIA)     Difficulty of Paying Living Expenses: Hard   Food Insecurity: No Food Insecurity (2024)    Nursing - Inadequate Food Risk Classification     Worried About Running Out of Food in the Last Year: Never true     Ran Out of Food in the Last Year: Never true   Recent Concern: Food Insecurity - Food Insecurity Present (2024)    Hunger Vital Sign     Worried About Running Out of Food in the Last Year: Sometimes true     Ran Out of Food in the Last Year: Sometimes true   Transportation Needs: No Transportation Needs (2024)    PRAPARE - Transportation     Lack of Transportation (Medical): No     Lack of Transportation (Non-Medical): No   Housing Stability: Low Risk  (2024)    Housing Stability Vital Sign     Unable to Pay for Housing in the Last Year: No     Number of Times Moved in the Last Year: 1     Homeless in the Last Year: No   [5]   Past Surgical History:  Procedure Laterality Date     ABDOMINOPLASTY      AUGMENTATION MAMMAPLASTY      BREAST BIOPSY Bilateral 03/14/2025    Rt and Lt axillary LN - open coil clips placed B/L    BREAST SURGERY Bilateral 2014    mastectomy - R side cancerous, L side prophylactic    COLONOSCOPY      COSMETIC SURGERY      breast reconstruction    EGD      GASTRIC BYPASS LAPAROSCOPIC N/A 10/03/2023    Procedure: LAPAROSCOPIC REMOVAL OF ADJUSTABLE GASTRIC BAND W/ ROBOT & INTROPERATIVE EGD;  Surgeon: Julian Davis MD;  Location: [facility];  Service: Bariatrics    HYSTERECTOMY      LAPAROSCOPIC GASTRIC BANDING      MASTECTOMY      R side cancerous, L side prophylactic (per pt)    DE INSJ/RPLCMT BREAST IMPLANT SEP DAY MASTECTOMY Bilateral 5/15/2025    Procedure: BILATERAL REVISION BREAST RECON WITH REPLACEMENT OF IMPLANTS, CAPSULOlectomy, MASTECTOMY FLAP REVISION;  Surgeon: Annabella Brown DO;  Location:  MAIN OR;  Service: Plastics    DE LAPS GSTR RSTCV PX W/BYP CHEN-EN-Y LIMB <150 CM N/A 01/16/2024    Procedure: R-N-Y GASTRIC BYPASS W/ ROBOTICS & INTRAOPERATIVE EGD;  Surgeon: Julian Davis MD;  Location: [facility];  Service: Bariatrics    US BREAST NEEDLE LOC LEFT Left 05/01/2014    US GUIDED BREAST LYMPH NODE BIOPSY LEFT Left 3/14/2025    US GUIDED BREAST LYMPH NODE BIOPSY RIGHT Right 3/14/2025

## 2025-06-05 NOTE — ASSESSMENT & PLAN NOTE
Bilateral breast revision reconstruction surgery with replacement of implants  Postop course complicated by wound dehiscence  Follow-up with plastic surgery outpatient for wound closure

## 2025-06-06 ENCOUNTER — TELEPHONE (OUTPATIENT)
Age: 55
End: 2025-06-06

## 2025-06-06 NOTE — TELEPHONE ENCOUNTER
Received call from Patient via Plastics Triage Line asking if she can wash the sutures and get them wet today, or if she needs to wait.     Yoselyn VALDEZ/[facility] [place]: Please call back Patient: [phone]

## 2025-06-09 ENCOUNTER — OFFICE VISIT (OUTPATIENT)
Age: 55
End: 2025-06-09

## 2025-06-09 DIAGNOSIS — Z98.890 H/O BREAST RECONSTRUCTION: Primary | ICD-10-CM

## 2025-06-09 PROCEDURE — 99024 POSTOP FOLLOW-UP VISIT: CPT

## 2025-06-09 NOTE — PROGRESS NOTES
[facility]  [phone]    Patient Identification: Judy Rios is a 55 y.o. female     History of Present Illness: The patient is a 55 y.o.  year-old female  who presents to the office for post op. Patient is 25 days s/p BILATERAL REVISION BREAST RECON WITH REPLACEMENT OF IMPLANTS, CAPSULOlectomy, MASTECTOMY FLAP REVISION - Bilateral  on 5/15/2025 by Dr. Brown. Patient had an opening of her left lateral incision last week which was repaired in office. She notes some itchiness at the site of new sutures but is otherwise doing well. She has been compressing with supportive bra. She has not been applying Aquaphor to incisions. She denies fevers, chills, erythema, or drainage.    Past Medical History[1]       Review of Systems  Constitutional: Denies fevers, chills or pain.  Breast: + itching at site of sutures on L breast  Skin: Denies any warmth, erythema, edema or drainage.     Physical Exam  General: AAOx3, NAD, well appearing  Breast: Incisions dry and intact. Nylon sutures intact on L lateral breast incision. No surrounding erythema, warmth, or tenderness. Skin surrounding incisions very dry. Implants soft bilaterally. See media    Assessment and Plan:  The patient is an 55 y.o.  year-old female who presents to the office for post op. Patient is 25 days s/p BILATERAL REVISION BREAST RECON WITH REPLACEMENT OF IMPLANTS, CAPSULOlectomy, MASTECTOMY FLAP REVISION - Bilateral  on 5/15/2025 by Dr. Brown.      -At today's visit patient exam reassuring.   -Start moisturizing incisions daily with Aquaphor  -Start scar massage of lateral breasts.  -Continue supportive bra  -The patient is to return in 1 week for suture removal   -The patient is to call the office with any questions or concerns. All of the patient's questions were answered at this time and they agree with the plan of care.      Iman Mays PA-C  [facility]           [1]   Past  Medical History:  Diagnosis Date    Arthritis     Asthma     Back pain     Bipolar 1 disorder (HCC)     Breast cancer (HCC) 2014    left    Cancer (HCC)     right breast    Constipation     Depression     GERD (gastroesophageal reflux disease)     Hiatal hernia     HTN (hypertension)     Hypertension     Mental health problem     Obesity (BMI 35.0-39.9 without comorbidity)     Sleep apnea     No CPAP use    Wears glasses

## 2025-06-16 ENCOUNTER — OFFICE VISIT (OUTPATIENT)
Dept: PLASTIC SURGERY | Facility: CLINIC | Age: 55
End: 2025-06-16

## 2025-06-16 DIAGNOSIS — Z98.890 H/O BREAST RECONSTRUCTION: Primary | ICD-10-CM

## 2025-06-16 PROCEDURE — 99024 POSTOP FOLLOW-UP VISIT: CPT

## 2025-06-16 NOTE — PROGRESS NOTES
[facility]  [address], [address], [place] [address]  [phone]    Patient Identification: Judy Rios is a 55 y.o. female     History of Present Illness: The patient is a 55 y.o. female who presents to the office for routine post-operative evaluation. Patient is 32 days s/p BILATERAL REVISION BREAST RECON WITH REPLACEMENT OF IMPLANTS, CAPSULOlectomy, MASTECTOMY FLAP REVISION - Bilateral  on 5/15/2025 by Dr. Brown.   Had opening of left lateral breast incision that was repaired in office with nylon sutures on . Nylon sutures intact. Area has stayed clean and dry. Continues to wear her compressive bra . Reports adequate pain control. Denies fever, chills, nausea, vomiting, bleeding, malaise, swelling, incisional erythema, or signs of infection.       Right implant: Allergan smooth silicone inspira 615cc SCX   Left implant: Allergan smooth silicone inspira 560cc SCF        The following portions of the patient's history were reviewed: allergies, current medications, past medical history, past surgical history, and past social history    Past Medical History[1]     Past Surgical History[2]    No Known Allergies     Current Medications[3]      Social History     Socioeconomic History    Marital status: Single     Spouse name: Not on file    Number of children: Not on file    Years of education: Not on file    Highest education level: Not on file   Occupational History    Not on file   Tobacco Use    Smoking status: Former     Current packs/day: 0.00     Types: Cigarettes     Quit date: 2023     Years since quittin.2     Passive exposure: Past    Smokeless tobacco: Never   Vaping Use    Vaping status: Never Used   Substance and Sexual Activity    Alcohol use: Not Currently    Drug use: Not Currently    Sexual activity: Not on file   Other Topics Concern    Not on file   Social History Narrative    Not on file     Social Drivers of Health     Financial  Resource Strain: Low Risk  (9/24/2024)    Overall Financial Resource Strain (CARDIA)     Difficulty of Paying Living Expenses: Not hard at all   Recent Concern: Financial Resource Strain - High Risk (6/26/2024)    Overall Financial Resource Strain (CARDIA)     Difficulty of Paying Living Expenses: Hard   Food Insecurity: No Food Insecurity (9/24/2024)    Nursing - Inadequate Food Risk Classification     Worried About Running Out of Food in the Last Year: Never true     Ran Out of Food in the Last Year: Never true     Ran Out of Food in the Last Year: Not on file   Recent Concern: Food Insecurity - Food Insecurity Present (6/26/2024)    Hunger Vital Sign     Worried About Running Out of Food in the Last Year: Sometimes true     Ran Out of Food in the Last Year: Sometimes true   Transportation Needs: No Transportation Needs (9/24/2024)    PRAPARE - Transportation     Lack of Transportation (Medical): No     Lack of Transportation (Non-Medical): No   Physical Activity: Not on file   Stress: Not on file   Social Connections: Not on file   Intimate Partner Violence: Not on file   Housing Stability: Low Risk  (9/24/2024)    Housing Stability Vital Sign     Unable to Pay for Housing in the Last Year: No     Number of Times Moved in the Last Year: 1     Homeless in the Last Year: No          Review of Systems  Constitutional: Denies fevers, chills, nausea, vomiting, malaise, or pain.  Skin: Denies any bleeding, warmth, erythema, edema, mucopurulent drainage, or signs of infection.     Physical Exam  General: pleasant and well appearing, in no acute distress.   Skin: Nylon sutures to left lateral breast incision intact, removed. Surgical incisions C/D/I without erythema, wound dehiscence, warmth, odors, discharge, drainage, or gross signs of infection. Implants soft and symmetrical, no tenderness to palpation. No obvious hematoma or seroma. Skin perfusion intact.     SEE MEDIA       Assessment and Plan:  The patient is a 55  y.o. female who presents to the office for routine post-operative evaluation. Patient is 32 days s/p BILATERAL REVISION BREAST RECON WITH REPLACEMENT OF IMPLANTS, CAPSULOlectomy, MASTECTOMY FLAP REVISION - Bilateral  on 5/15/2025 by Dr. Brown.         -Suture removal performed in clinic today. Covered with steri strip. Can leave incision open to air.   -Discussed scar maturation with patient, which will take approximately 12 months. Discussed scar massage and using silicone-based scar products. Be careful with scar massage and using scar creams to the left lateral breast incision until fully healed  -Aquaphor for moisture to incisions  -Continue with compressive bra  -Reviewed activity restrictions. Ambulation encouraged. Avoid lifting, pushing, pulling > 10 lbs until left lateral incision is fully healed. Avoid strenuous exercise.   -The patient chooses to return in 2 weeks for incision check  -Gently wash surgical sites daily with mild soap and water (please use gentle/hypoallergenic soap like Dial, no scrubbing). Pat dry. Do not let direct water pressure from shower hit your incision(s). No swimming or soaking in bathtub for 6-8 weeks post-operatively, or until wound is fully healed.   -Continue monitoring for signs of infection including increased redness, swelling, fever (temperature > 100.4 F), pus or foul-smelling drainage. Severe pain not relieved by your pain medications. Please call immediately with any of these symptoms.   -The patient is to call the office with any questions or concerns. All of the patient's questions were answered at this time and they agree with the plan of care.        Sada Razo PA-C  [facility]astic [facility]          [1]   Past Medical History:  Diagnosis Date    Arthritis     Asthma     Back pain     Bipolar 1 disorder (HCC)     Breast cancer (HCC) 2014    left    Cancer (HCC)     right breast    Constipation     Depression     GERD (gastroesophageal reflux  disease)     Hiatal hernia     HTN (hypertension)     Hypertension     Mental health problem     Obesity (BMI 35.0-39.9 without comorbidity)     Sleep apnea     No CPAP use    Wears glasses    [2]   Past Surgical History:  Procedure Laterality Date    ABDOMINOPLASTY      AUGMENTATION MAMMAPLASTY      BREAST BIOPSY Bilateral 03/14/2025    Rt and Lt axillary LN - open coil clips placed B/L    BREAST SURGERY Bilateral 2014    mastectomy - R side cancerous, L side prophylactic    COLONOSCOPY      COSMETIC SURGERY      breast reconstruction    EGD      GASTRIC BYPASS LAPAROSCOPIC N/A 10/03/2023    Procedure: LAPAROSCOPIC REMOVAL OF ADJUSTABLE GASTRIC BAND W/ ROBOT & INTROPERATIVE EGD;  Surgeon: Julian Davis MD;  Location: [facility];  Service: Bariatrics    HYSTERECTOMY      LAPAROSCOPIC GASTRIC BANDING      MASTECTOMY      R side cancerous, L side prophylactic (per pt)    MS INSJ/RPLCMT BREAST IMPLANT SEP DAY MASTECTOMY Bilateral 5/15/2025    Procedure: BILATERAL REVISION BREAST RECON WITH REPLACEMENT OF IMPLANTS, CAPSULOlectomy, MASTECTOMY FLAP REVISION;  Surgeon: Annabella Brown DO;  Location: [facility];  Service: Plastics    MS LAPS GSTR RSTCV PX W/BYP CHEN-EN-Y LIMB <150 CM N/A 01/16/2024    Procedure: R-N-Y GASTRIC BYPASS W/ ROBOTICS & INTRAOPERATIVE EGD;  Surgeon: Julian Davis MD;  Location: [facility];  Service: Bariatrics    US BREAST NEEDLE LOC LEFT Left 05/01/2014    US GUIDED BREAST LYMPH NODE BIOPSY LEFT Left 3/14/2025    US GUIDED BREAST LYMPH NODE BIOPSY RIGHT Right 3/14/2025   [3]   Current Outpatient Medications:     acetaminophen (TYLENOL) 500 mg tablet, Take 500 mg by mouth every 6 (six) hours as needed for mild pain, Disp: , Rfl:     albuterol (PROVENTIL HFA,VENTOLIN HFA) 90 mcg/act inhaler, Inhale 2 puffs every 4 (four) hours as needed for wheezing or shortness of breath, Disp: 18 g, Rfl: 5    amLODIPine (NORVASC) 5 mg tablet, Take 1 tablet (5 mg total) by mouth daily, Disp: 90 tablet,  Rfl: 1    ARIPiprazole (ABILIFY DISCMELT) 15 mg dispersible tablet, Take 1 tablet (15 mg total) by mouth daily, Disp: 90 tablet, Rfl: 0    benztropine (COGENTIN) 2 mg tablet, Take 1 tablet (2 mg total) by mouth 2 (two) times a day, Disp: 180 tablet, Rfl: 0    budesonide-formoterol (Symbicort) 80-4.5 MCG/ACT inhaler, Inhale 2 puffs 2 (two) times a day Rinse mouth after use, Disp: 10.2 g, Rfl: 3    calcium carbonate (OS-CHANDNI) 600 MG tablet, Take 600 mg by mouth in the morning and 600 mg in the evening. Take with meals., Disp: , Rfl:     Cholecalciferol (Vitamin D3) 50 MCG (2000 UT) capsule, Take 1 capsule (2,000 Units total) by mouth daily, Disp: 90 capsule, Rfl: 3    gabapentin (Neurontin) 300 mg capsule, Take 1 capsule (300 mg total) by mouth 3 (three) times a day for 5 days, Disp: 15 capsule, Rfl: 0    hydrOXYzine HCL (ATARAX) 50 mg tablet, Take 1 tablet (50 mg total) by mouth 3 (three) times a day as needed for anxiety, Disp: 30 tablet, Rfl: 1    ibuprofen (MOTRIN) 800 mg tablet, Take 1 tablet (800 mg total) by mouth every 8 (eight) hours for 5 days Please begin 24 hours after surgery., Disp: 15 tablet, Rfl: 0    mirtazapine (REMERON) 15 mg tablet, Take 1 tablet (15 mg total) by mouth daily at bedtime, Disp: 90 tablet, Rfl: 0    multivitamin (THERAGRAN) TABS, Take 1 tablet by mouth in the morning., Disp: , Rfl:     oxyCODONE (Roxicodone) 5 immediate release tablet, Take 1 tablet (5 mg total) by mouth every 6 (six) hours as needed for severe pain Max Daily Amount: 20 mg, Disp: 10 tablet, Rfl: 0    polyethylene glycol (MIRALAX) 17 g packet, Take 17 g by mouth daily, Disp: , Rfl:     QUEtiapine (SEROquel) 200 mg tablet, Take 1 tablet (200 mg total) by mouth daily at bedtime, Disp: 90 tablet, Rfl: 0    Sodium Fluoride 1.1 % PSTE, Brush teeth one time before bedtime, don't rinse but expectorate excess., Disp: 100 mL, Rfl: 2    traMADol (Ultram) 50 mg tablet, Take 1 tablet (50 mg total) by mouth every 6 (six) hours as  needed for moderate pain, Disp: 10 tablet, Rfl: 0    tretinoin (RETIN-A) 0.025 % cream, , Disp: , Rfl:     urea (CARMOL) 40 %, , Disp: , Rfl:     vitamin B-12 (VITAMIN B-12) 1,000 mcg tablet, Take 1 tablet (1,000 mcg total) by mouth daily, Disp: 90 tablet, Rfl: 3

## 2025-06-23 ENCOUNTER — TELEMEDICINE (OUTPATIENT)
Dept: PSYCHIATRY | Facility: CLINIC | Age: 55
End: 2025-06-23
Payer: COMMERCIAL

## 2025-06-23 DIAGNOSIS — F31.9 BIPOLAR 1 DISORDER (HCC): Primary | ICD-10-CM

## 2025-06-23 DIAGNOSIS — F41.1 GENERALIZED ANXIETY DISORDER WITH PANIC ATTACKS: ICD-10-CM

## 2025-06-23 DIAGNOSIS — F41.0 GENERALIZED ANXIETY DISORDER WITH PANIC ATTACKS: ICD-10-CM

## 2025-06-23 DIAGNOSIS — F10.91 ALCOHOL USE DISORDER IN REMISSION: ICD-10-CM

## 2025-06-23 DIAGNOSIS — F14.11 COCAINE ABUSE IN REMISSION (HCC): ICD-10-CM

## 2025-06-23 PROCEDURE — 90833 PSYTX W PT W E/M 30 MIN: CPT | Performed by: STUDENT IN AN ORGANIZED HEALTH CARE EDUCATION/TRAINING PROGRAM

## 2025-06-23 PROCEDURE — 99215 OFFICE O/P EST HI 40 MIN: CPT | Performed by: STUDENT IN AN ORGANIZED HEALTH CARE EDUCATION/TRAINING PROGRAM

## 2025-06-23 RX ORDER — GABAPENTIN 300 MG/1
300 CAPSULE ORAL 3 TIMES DAILY
Qty: 90 CAPSULE | Refills: 1 | Status: SHIPPED | OUTPATIENT
Start: 2025-06-23 | End: 2025-08-22

## 2025-06-23 NOTE — PSYCH
MEDICATION MANAGEMENT NOTE    Name: Judy Rios      : 1970      MRN: [id number]  Encounter Provider: Orlin Mares MD  Encounter Date: 2025   Encounter department: [facility]    Insurance: Payor: [facility] REP / Plan: AARP MEDICARE [employer]  REP / Product Type: Medicare HMO /      Reason for Visit:   Chief Complaint   Patient presents with    Virtual Regular Visit    Medication Management    Mood Swings    Anxiety   :  Assessment & Plan  Bipolar 1 disorder (HCC)         Generalized anxiety disorder with panic attacks    Orders:    gabapentin (Neurontin) 300 mg capsule; Take 1 capsule (300 mg total) by mouth 3 (three) times a day    Alcohol use disorder in remission    Orders:    gabapentin (Neurontin) 300 mg capsule; Take 1 capsule (300 mg total) by mouth 3 (three) times a day    Cocaine abuse in remission (HCC)             Treatment Recommendations:  A 55 y.o. AA female, Single (never ), domiciled w/ a roommate (since 2024), on disability, w/ PMH of HTN, asthma, GINGER (previously on CPAP), hiatal hernia, GERD, s/p bariatric surgery (2024), OA of both knees, LBP, CKD, h/o breast cancer (s/p double mastectomy), vit D and B12 def and PPH of Bipolar Disorder, alcohol and cocaine use in remission (sober for three years and in currently AA), multiple prior psychiatric admissions (in [facility]; last in ), h/o 4-5 prior SA (last in  tried to jump off a balcony), h/o self-injurious behavior via cutting in her early 30's, previously in psychiatric care by Dr. Reese, on Abilify Discmelt 10 mg daily, Cogentin 1 mg BID, Seroquel 200 mg BID, Remeron 15 mg nightly who presented to the mental health clinic for the initial intake and psychiatric evaluation on 24. Presented w/ h/o manic episodes and depression as well as AH in the past and paranoid ideations. Also reported anxiety sxs with panic attacks. Denied SI/HI, intent or plan upon direct  inquiry at this time. Her current presentation meets criteria for Bipolar I disorder with psychotic features, SEAN w/ panic attacks, alcohol and cocaine use disorder in remission. Abilify increased to 15 mg daily and Seroquel 200 mg BID decreased to 200 mg nightly; doses to be adjusted as indicated. Maintained on Remeron 15 mg nightly and Cogentin 1 mg BID which later increased to 2 mg BID (reportedly helped with EPS and movements in her mouth). Referred for individual therapy (started on 4/9/25) and will continue AA meetings. Upon f/u on 6/23/25, presented w/ acute decompensation of symptoms in the context of psychosocial stressors and triggers.  Declined referral to higher level of care (inpatient versus PHP).  Started on Neurontin 300 mg 3 times daily and other meds maintain the same dose as the patient is not willing to consider any changes at this time.  Recommended to be more consistent with AA meetings and will continue individual psychotherapy.     - f/u w/ sleep medicine regarding GINGER and restarting CPAP  - Continue Abilify Discmelt 15 mg daily; doses to be adjusted as indicated and may consider Abilify Maintena given the previous good response  - Continue Seroquel 200 mg nightly, not interested in any changes at this time but could be cross-titrated completely to Abilify to avoid polypharmacy  - Continue Remeron 15 mg nightly for depression and anxiety  - Continue Atarax 50 mg TID PRN for panic attacks  - Start Neurontin 300 mg 3 times daily for anxiety and alcohol use disorder; doses to be adjusted as indicated  - Continue individual therapy  - Educated about healthy life style, risk of falls/sedation and addiction. Patient was receptive to education.  - Medications sent to the patient's pharmacy for 90 day supply    - RTC in 5 weeks  - The patient was educated about 24 hour and weekend coverage for urgent situations accessed by calling [facility] main practice number  - Patient was  educated to call National Suicide Prevention Lifeline (2-049-555-DJPE [3762]) for behavioral crisis at anytime or 911 for any safety concerns, or go to nearest ER if the symptoms become overwhelming or unmanageable.  Educated about diagnosis and treatment modalities. Verbalizes understanding and agreement with the treatment plan.  Discussed self monitoring of symptoms, and symptom monitoring tools.  Discussed medications and if treatment adjustment was needed or desired.  Aware of 24 hour and weekend coverage for urgent situations accessed by calling [facility] main practice number  I am scheduling this patient out for greater than 3 months: No    Medications Risks/Benefits:      Risks, Benefits And Possible Side Effects Of Medications:    Risks, benefits, and possible side effects of medications explained to Judy and she (or legal representative) verbalizes understanding and agreement for treatment.    Controlled Medication Discussion:     Not applicable      History of Present Illness     The patient was visited for Virtual Regular Visit, Medication Management, Mood Swings, and Anxiety. Presented calm, and cooperative. Reported feeling fine. She noted that June is a difficult month for her as she had her first relapse and also first mental break in June and also lost her house in June 2011. She missed her last therapy appointment and has an upcoming appointment with her therapist. She reported sleeping about 2 hours and then wakes up and stays up and then dosing off here and there. She goes to AA meeting around 4 PM but missed more of the meetings and only went to 2 meeting over past two weeks. She denied any manic sxs. She spends her free time watching or listening to online materials sent by her sponsor. Denied any changes in appetite, concentration, energy level, or daily activities.  Denied feelings of anhedonia, hopelessness, helplessness, worthlessness or guilt and appeared to be  future oriented.  There was no thought constriction related to death.  Denied SI/HI, intent or plan upon direct inquiry at this time. No intense anxiety sxs, specific phobia or panic attacks reported. Denied AV/H.  Endorsed good compliance with the medications and denied any side effects. Denied smoking cigarettes, drinking alcohol or other illicit substance use (sober/clean 3+ years).    Given this presentation and acute decompensation of sxs, higher level of care including referral to inpatient vs. PHP were discussed with the patient, but she declined at this time. The patient was started on Neurontin 300 mg TID for anxiety, and educated on sleep hygiene; other medications are maintained at the same dosage as the patient is not willing to consider any changes despite psychoeducation on polypharmacy (taking two antipsychotics: Seroquel and Abilify). Recommended to be more consistent with her AA meeting.  Will continue individual psychotherapy. The patient was educated to call 911 or go to the nearest emergency room if the symptoms become overwhelming or unable to remain in control. Verbalized understanding and agreed to seek help in case of distress or concern for safety.    Review Of Systems: A review of systems is obtained and is negative except for the pertinent positives listed in HPI/Subjective above.      Current Rating Scores:     Current PHQ-9   PHQ-2/9 Depression Screening    Little interest or pleasure in doing things: 3 - nearly every day  Feeling down, depressed, or hopeless: 3 - nearly every day  Trouble falling or staying asleep, or sleeping too much: 3 - nearly every day  Feeling tired or having little energy: 3 - nearly every day  Poor appetite or overeating: 3 - nearly every day  Feeling bad about yourself - or that you are a failure or have let yourself or your family down: 3 - nearly every day  Trouble concentrating on things, such as reading the newspaper or watching television: 3 - nearly every  day  Moving or speaking so slowly that other people could have noticed. Or the opposite - being so fidgety or restless that you have been moving around a lot more than usual: 1 - several days  Thoughts that you would be better off dead, or of hurting yourself in some way: 0 - not at all  PHQ-9 Score: 22  PHQ-9 Interpretation: Severe depression       Current SEAN-7   SEAN-7 Flowsheet Screening      Flowsheet Row Most Recent Value   Over the last two weeks, how often have you been bothered by the following problems?     Feeling nervous, anxious, or on edge 1   Not being able to stop or control worrying 1   Worrying too much about different things 3   Trouble relaxing  3   Being so restless that it's hard to sit still 1   Becoming easily annoyed or irritable  0   Feeling afraid as if something awful might happen 3   How difficult have these problems made it for you to do your work, take care of things at home, or get along with other people?  Very difficult   SEAN Score  12            Areas of Improvement: reviewed in HPI/Subjective Section and reviewed in Assessment and Plan Section    Past Medical History[1]  Past Surgical History[2]  Allergies: Allergies[3]    Current Outpatient Medications   Medication Instructions    acetaminophen (TYLENOL) 500 mg, Every 6 hours PRN    albuterol (PROVENTIL HFA,VENTOLIN HFA) 90 mcg/act inhaler 2 puffs, Inhalation, Every 4 hours PRN    amLODIPine (NORVASC) 5 mg, Oral, Daily    ARIPiprazole (ABILIFY DISCMELT) 15 mg, Oral, Daily    benztropine (COGENTIN) 2 mg, Oral, 2 times daily    budesonide-formoterol (Symbicort) 80-4.5 MCG/ACT inhaler 2 puffs, Inhalation, 2 times daily, Rinse mouth after use    calcium carbonate (OS-CHANDNI) 600 mg, 2 times daily with meals    gabapentin (NEURONTIN) 300 mg, Oral, 3 times daily    hydrOXYzine HCL (ATARAX) 50 mg, Oral, 3 times daily PRN    mirtazapine (REMERON) 15 mg, Oral, Daily at bedtime    multivitamin (THERAGRAN) TABS 1 tablet, Daily    polyethylene  glycol (MIRALAX) 17 g, Daily    QUEtiapine (SEROQUEL) 200 mg, Oral, Daily at bedtime    Sodium Fluoride 1.1 % PSTE Brush teeth one time before bedtime, don't rinse but expectorate excess.    tretinoin (RETIN-A) 0.025 % cream     urea (CARMOL) 40 %     vitamin B-12 (VITAMIN B-12) 1,000 mcg, Oral, Daily    Vitamin D3 2,000 Units, Oral, Daily       Substance Abuse History:    Tobacco, Alcohol and Drug Use History     Tobacco Use    Smoking status: Former     Current packs/day: 0.00     Types: Cigarettes     Quit date: 2023     Years since quittin.3     Passive exposure: Past    Smokeless tobacco: Never   Vaping Use    Vaping status: Never Used   Substance Use Topics    Alcohol use: Not Currently    Drug use: Not Currently          Social History:    Social History     Socioeconomic History    Marital status: Single     Spouse name: Not on file    Number of children: Not on file    Years of education: Not on file    Highest education level: Not on file   Occupational History    Not on file   Other Topics Concern    Not on file   Social History Narrative    Not on file        Family Psychiatric History:     Family History[4]    Medical History Reviewed by provider this encounter:  Tobacco  Allergies  Meds  Problems  Med Hx  Surg Hx  Fam Hx          Objective   There were no vitals taken for this visit.     Mental Status Evaluation:  Appearance and attitude: appeared as stated age, cooperative and attentive, casually dressed, wearing eyeglasses, with good hygiene  Eye contact: good  Motor Function: within normal limits, No PMA/PMR  Gait/station: Not observed  Speech: normal for rate, rhythm, volume, latency, amount  Language: No overt abnormality  Mood/affect: depressed, anxious / Affect was constricted  Thought Processes: linear  Thought content: denied suicidal ideations or homicidal ideations, no overt delusions elicited, negative thinking, ruminations  Associations: concrete associations  Perceptual  disturbances: denies Auditory/Visual/Tactile Hallucinations  Orientation: oriented to time, person, place and to the situational context  Cognitive Function: intact  Attention/Concentration: attention span and concentration are age appropriate  Memory: not cooperative with formal MMSE  Intellect: average  Fund of knowledge: aware of current events, aware of past history, and vocabulary average  Impulse control: good  Insight/judgment: fair/fair          Laboratory Results: I have personally reviewed all pertinent laboratory/tests results    Last Visit Labs:   No visits with results within 1 Month(s) from this visit.   Latest known visit with results is:   Admission on 05/15/2025, Discharged on 05/15/2025   Component Date Value    Case Report 05/15/2025                      Value:Surgical Pathology Report                         Case: [id number]                                  Authorizing Provider:  Annabella Brown DO  Collected:           05/15/2025 1016              Ordering Location:     [facility] Received:            05/15/2025 1117                                     Heart Operating Room                                                         Pathologist:           Moe Pedersen MD                                                          Specimens:   A) - Breast, Right, right breast implant, workup for breast implant ALCL                            B) - Breast, Left, left breast implant, workup for breast implant ALCL                              C) - Breast, Right, right chest skin and tissue                                                     D) - Breast, Left, left chest skin and tissue                                              Final Diagnosis 05/15/2025                      Value:A. Breast, Right, implant, removal/capsulectomy:  -   Dense fibroconnective tissue with fibrosis, lymphohistiocytic inflammation and foreign body giant cell reaction.     B. Breast, Left, implant,  removal/capsulectomy:  -   Dense fibroconnective tissue with fibrosis and lymphohistiocytic inflammation.     C. Breast, Right, right chest skin and tissue, excision:  -   Benign skin and fibroadipose tissue.     D. Breast, Left, left chest skin and tissue, excision:  -   Benign skin and fibroadipose tissue.         Note 05/15/2025                      Value:Immunohistochemical stains are performed to evaluate the cells lining the capsule and exclude lymphoma in parts A and B:  CD3 highlights T-cells which are predominantly small.  CD20 highlights rare B-cells.  CD30 is negative.  CD68 highlights histiocytes.   CD4 highlights T-cells and histiocytes.    Flow Cytometry Analysis - Left Breast Implant ([employer] # [id number]; please see separate report for further details):      Flow Cytometry Analysis- Right Breast Implant ([employer] # [id number]; please see separate report for further details):        Additional Information 05/15/2025                      Value:All reported additional testing was performed with appropriately reactive controls.  These tests were developed and their performance characteristics determined by [facility] or appropriate performing facility, though some tests may be performed on tissues which have not been validated for performance characteristics (such as staining performed on alcohol exposed cell blocks and decalcified tissues).  Results should be interpreted with caution and in the context of the patients’ clinical condition. These tests may not be cleared or approved by the U.S. Food and Drug Administration, though the FDA has determined that such clearance or approval is not necessary. These tests are used for clinical purposes and they should not be regarded as investigational or for research. This laboratory has been approved by CLIA 88, designated as a high-complexity laboratory and is qualified to perform these tests.  Interpretation performed at  "[facility]. [address] [address]        Gross Description 05/15/2025                      Value:A. The specimen is received fresh, labeled with the patient's name and hospital number, and is designated \" right breast implant, workup for breast implant ALCL\".  The specimen consists of a grossly intact circular, opaque, silicone breast implant measuring 15.0 cm in diameter by 5.0 cm in thickness.  \"Style 120 LOT [id number] Allergan 600 cc\", is noted on one surface.  The outer surface of the implant is tacky.  A tan-gray fibrous capsule surrounds the entire implant.  Representative sections of the capsule are submitted in 10 cassettes.  A portion of the specimen is sent out for flow cytometry to: [place] [address].  B.  The specimen is received fresh, labeled with the patient's name and hospital number, and is designated \" left breast implant, workup for breast implant ALCL\".  The specimen consists of a grossly intact circular, opaque, silicone breast implant measuring 15.0 cm in diameter by 5.0 cm in thickness.  \"Style 120 LOT [id number] Allergan 600                           cc\", is noted on one surface.  The outer surface of the implant is tacky.  A tan-gray fibrous capsule surrounds the entire implant.  Representative sections of the capsule are submitted in 10 cassettes.  A portion of the specimen is sent out for flow cytometry to: [place] [address].  C. The specimen is received in formalin, labeled with the patient's name and hospital number, and is designated \" right chest skin and tissue\".  The specimen consists of a 206.1 g right breast specimen consisting of 1 fragment of breast parenchyma with attached tan-brown skin measuring 13.6 x 8.5 x 3.0 cm.  A nipple is not present.  The breast parenchyma consists of yellow lobulated grossly unremarkable fibroadipose tissue.  Representative sections are " "submitted in 2 cassettes.  D. The specimen is received in formalin, labeled with the patient's name and hospital number, and is designated \" left chest skin and tissue\".  The specimen consists of a 163.2 g left breast                           specimen consisting of 1 fragment of breast parenchyma with attached tan-brown skin measuring 27.5 x 9.0 x 2.7 cm.  A nipple is not present.  The breast parenchyma consists of yellow lobulated grossly unremarkable fibroadipose tissue.  Representative sections are submitted in 2 cassettes.    Note: The estimated total formalin fixation time based upon information provided by the submitting clinician and the standard processing schedule is 32.75 hours.  The cold ischemia time based upon information provided by the submitting clinician and receiving staff in the laboratory is within 1 minute.  RRavotti      Clinical Information 05/15/2025                      Value:Texture implants noted at time of operation, no prior implants from patient    Scan Result 05/15/2025 SEE WRITTEN REPORT     Scan Result 05/15/2025 SEE WRITTEN REPORT        Suicide/Homicide Risk Assessment:    Risk of Harm to Self:  The following ratings are based on assessment at the time of the interview  Based on today's assessment, Judy presents the following risk of harm to self: low    Risk of Harm to Others:  The following ratings are based on assessment at the time of the interview  Based on today's assessment, Judy presents the following risk of harm to others: low    The following interventions are recommended: Continue medication management. No other intervention changes indicated at this time.    Psychotherapy Provided:     Individual psychotherapy provided: Yes    Counseling was provided during the session today for 16 minutes.  Psychoeducation provided to the patient and was educated about the importance of compliance with the medications and psychiatric treatment  Supportive psychotherapy provided " to the patient  Solution Focused Brief Therapy (SFBT) provided  Patient's emotions were validated and specific labeled praise provided.   [place] suggestions were offered in a supportive non-critical way.     Treatment Plan:    Completed and signed during the session: Not applicable - Treatment Plan to be completed by [facility] therapist.    Goals: Progress towards Treatment Plan goals - Yes, progressing, as evidenced by subjective findings in HPI/Subjective Section and in Assessment and Plan Section    Depression Follow-up Plan Completed: Yes    Note Share:    This note was shared with patient.    Administrative Statements   Administrative Statements   Encounter provider Orlin Mares MD    The Patient is located at Home and in the following state in which I hold an active license [place].    The patient was identified by name and date of birth. Judy Rios was informed that this is a telemedicine visit and that the visit is being conducted through the Epic Embedded platform. She agrees to proceed..  My office door was closed. No one else was in the room.  She acknowledged consent and understanding of privacy and security of the video platform. The patient has agreed to participate and understands they can discontinue the visit at any time.    I have spent a total time of 30 minutes in caring for this patient on the day of the visit/encounter including Risk factor reductions, Counseling / Coordination of care, and Obtaining or reviewing history  , not including the time spent for establishing the audio/video connection.    Visit Time  Visit Start Time: 2:25 PM  Visit Stop Time: 2:55 PM  Total Visit Duration: 30 minutes        Orlin Mares MD 06/23/25         [1]   Past Medical History:  Diagnosis Date    Arthritis     Asthma     Back pain     Bipolar 1 disorder (HCC)     Breast cancer (HCC) 2014    left    Cancer (HCC)     right breast    Constipation     Depression     GERD  (gastroesophageal reflux disease)     Hiatal hernia     HTN (hypertension)     Hypertension     Mental health problem     Obesity (BMI 35.0-39.9 without comorbidity)     Sleep apnea     No CPAP use    Wears glasses    [2]   Past Surgical History:  Procedure Laterality Date    ABDOMINOPLASTY      AUGMENTATION MAMMAPLASTY      BREAST BIOPSY Bilateral 03/14/2025    Rt and Lt axillary LN - open coil clips placed B/L    BREAST SURGERY Bilateral 2014    mastectomy - R side cancerous, L side prophylactic    COLONOSCOPY      COSMETIC SURGERY      breast reconstruction    EGD      GASTRIC BYPASS LAPAROSCOPIC N/A 10/03/2023    Procedure: LAPAROSCOPIC REMOVAL OF ADJUSTABLE GASTRIC BAND W/ ROBOT & INTROPERATIVE EGD;  Surgeon: Julian Davis MD;  Location: [facility];  Service: Bariatrics    HYSTERECTOMY      LAPAROSCOPIC GASTRIC BANDING      MASTECTOMY      R side cancerous, L side prophylactic (per pt)    ID INSJ/RPLCMT BREAST IMPLANT SEP DAY MASTECTOMY Bilateral 5/15/2025    Procedure: BILATERAL REVISION BREAST RECON WITH REPLACEMENT OF IMPLANTS, CAPSULOlectomy, MASTECTOMY FLAP REVISION;  Surgeon: Annabella Brown DO;  Location:  MAIN OR;  Service: Plastics    ID LAPS GSTR RSTCV PX W/BYP CHEN-EN-Y LIMB <150 CM N/A 01/16/2024    Procedure: R-N-Y GASTRIC BYPASS W/ ROBOTICS & INTRAOPERATIVE EGD;  Surgeon: Julian Davis MD;  Location: [facility];  Service: Bariatrics    US BREAST NEEDLE LOC LEFT Left 05/01/2014    US GUIDED BREAST LYMPH NODE BIOPSY LEFT Left 3/14/2025    US GUIDED BREAST LYMPH NODE BIOPSY RIGHT Right 3/14/2025   [3] No Known Allergies  [4]   Family History  Problem Relation Name Age of Onset    Stroke Mother      Cancer Mother

## 2025-07-10 ENCOUNTER — SOCIAL WORK (OUTPATIENT)
Dept: BEHAVIORAL/MENTAL HEALTH CLINIC | Facility: CLINIC | Age: 55
End: 2025-07-10
Payer: COMMERCIAL

## 2025-07-10 DIAGNOSIS — F31.9 BIPOLAR 1 DISORDER (HCC): Primary | ICD-10-CM

## 2025-07-10 DIAGNOSIS — F10.91 ALCOHOL USE DISORDER IN REMISSION: ICD-10-CM

## 2025-07-10 PROCEDURE — 90834 PSYTX W PT 45 MINUTES: CPT | Performed by: COUNSELOR
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
## 2025-07-14 DIAGNOSIS — F31.9 BIPOLAR 1 DISORDER (HCC): ICD-10-CM

## 2025-07-14 RX ORDER — QUETIAPINE FUMARATE 200 MG/1
200 TABLET, FILM COATED ORAL
Qty: 30 TABLET | Refills: 1 | Status: SHIPPED | OUTPATIENT
Start: 2025-07-14 | End: 2025-10-12

## 2025-07-21 ENCOUNTER — TELEPHONE (OUTPATIENT)
Age: 55
End: 2025-07-21

## 2025-07-28 ENCOUNTER — TELEMEDICINE (OUTPATIENT)
Dept: PSYCHIATRY | Facility: CLINIC | Age: 55
End: 2025-07-28
Payer: COMMERCIAL

## 2025-07-28 DIAGNOSIS — F10.91 ALCOHOL USE DISORDER IN REMISSION: ICD-10-CM

## 2025-07-28 DIAGNOSIS — F41.0 GENERALIZED ANXIETY DISORDER WITH PANIC ATTACKS: ICD-10-CM

## 2025-07-28 DIAGNOSIS — F31.9 BIPOLAR 1 DISORDER (HCC): Primary | ICD-10-CM

## 2025-07-28 DIAGNOSIS — F41.1 GENERALIZED ANXIETY DISORDER WITH PANIC ATTACKS: ICD-10-CM

## 2025-07-28 DIAGNOSIS — F31.9 BIPOLAR 1 DISORDER (HCC): ICD-10-CM

## 2025-07-28 PROCEDURE — 90833 PSYTX W PT W E/M 30 MIN: CPT | Performed by: STUDENT IN AN ORGANIZED HEALTH CARE EDUCATION/TRAINING PROGRAM

## 2025-07-28 PROCEDURE — 99214 OFFICE O/P EST MOD 30 MIN: CPT | Performed by: STUDENT IN AN ORGANIZED HEALTH CARE EDUCATION/TRAINING PROGRAM

## 2025-07-28 RX ORDER — ARIPIPRAZOLE 15 MG/1
15 TABLET, ORALLY DISINTEGRATING ORAL DAILY
Qty: 30 TABLET | Refills: 1 | Status: SHIPPED | OUTPATIENT
Start: 2025-07-28 | End: 2025-09-26

## 2025-07-28 RX ORDER — MIRTAZAPINE 15 MG/1
15 TABLET, FILM COATED ORAL
Qty: 30 TABLET | Refills: 1 | Status: SHIPPED | OUTPATIENT
Start: 2025-07-28 | End: 2025-09-26

## 2025-07-28 RX ORDER — BENZTROPINE MESYLATE 2 MG/1
2 TABLET ORAL 2 TIMES DAILY
Qty: 60 TABLET | Refills: 1 | Status: SHIPPED | OUTPATIENT
Start: 2025-07-28 | End: 2025-09-26

## 2025-07-28 RX ORDER — GABAPENTIN 300 MG/1
300 CAPSULE ORAL 3 TIMES DAILY
Qty: 90 CAPSULE | Refills: 1 | Status: SHIPPED | OUTPATIENT
Start: 2025-07-28 | End: 2025-09-26

## 2025-07-30 ENCOUNTER — OFFICE VISIT (OUTPATIENT)
Dept: FAMILY MEDICINE CLINIC | Facility: CLINIC | Age: 55
End: 2025-07-30

## 2025-07-30 VITALS
RESPIRATION RATE: 16 BRPM | HEIGHT: 61 IN | WEIGHT: 200 LBS | OXYGEN SATURATION: 97 % | SYSTOLIC BLOOD PRESSURE: 118 MMHG | TEMPERATURE: 97.4 F | HEART RATE: 83 BPM | DIASTOLIC BLOOD PRESSURE: 68 MMHG | BODY MASS INDEX: 37.76 KG/M2

## 2025-07-30 DIAGNOSIS — Z23 ENCOUNTER FOR IMMUNIZATION: ICD-10-CM

## 2025-07-30 DIAGNOSIS — J45.40 MODERATE PERSISTENT ASTHMA WITHOUT COMPLICATION: ICD-10-CM

## 2025-07-30 DIAGNOSIS — E66.01 MORBID (SEVERE) OBESITY DUE TO EXCESS CALORIES (HCC): ICD-10-CM

## 2025-07-30 DIAGNOSIS — F14.11 COCAINE ABUSE IN REMISSION (HCC): ICD-10-CM

## 2025-07-30 DIAGNOSIS — K21.9 GASTROESOPHAGEAL REFLUX DISEASE WITHOUT ESOPHAGITIS: ICD-10-CM

## 2025-07-30 DIAGNOSIS — I10 PRIMARY HYPERTENSION: Primary | ICD-10-CM

## 2025-07-30 DIAGNOSIS — G47.33 OSA (OBSTRUCTIVE SLEEP APNEA): ICD-10-CM

## 2025-07-30 DIAGNOSIS — Z98.84 STATUS POST BARIATRIC SURGERY: ICD-10-CM

## 2025-07-30 DIAGNOSIS — R73.03 PREDIABETES: ICD-10-CM

## 2025-07-30 PROCEDURE — 90471 IMMUNIZATION ADMIN: CPT

## 2025-07-30 PROCEDURE — 99214 OFFICE O/P EST MOD 30 MIN: CPT | Performed by: FAMILY MEDICINE

## 2025-07-30 PROCEDURE — 90750 HZV VACC RECOMBINANT IM: CPT

## 2025-07-30 RX ORDER — MIRTAZAPINE 15 MG/1
15 TABLET, FILM COATED ORAL
Qty: 90 TABLET | OUTPATIENT
Start: 2025-07-30

## 2025-08-14 ENCOUNTER — SOCIAL WORK (OUTPATIENT)
Dept: BEHAVIORAL/MENTAL HEALTH CLINIC | Facility: CLINIC | Age: 55
End: 2025-08-14
Payer: COMMERCIAL

## 2025-08-19 ENCOUNTER — OFFICE VISIT (OUTPATIENT)
Dept: SLEEP CENTER | Facility: CLINIC | Age: 55
End: 2025-08-19
Payer: COMMERCIAL

## 2025-08-19 VITALS
OXYGEN SATURATION: 97 % | BODY MASS INDEX: 37.57 KG/M2 | HEART RATE: 97 BPM | DIASTOLIC BLOOD PRESSURE: 70 MMHG | SYSTOLIC BLOOD PRESSURE: 118 MMHG | WEIGHT: 199 LBS | HEIGHT: 61 IN

## 2025-08-19 DIAGNOSIS — E66.09 CLASS 2 OBESITY DUE TO EXCESS CALORIES WITHOUT SERIOUS COMORBIDITY WITH BODY MASS INDEX (BMI) OF 37.0 TO 37.9 IN ADULT: ICD-10-CM

## 2025-08-19 DIAGNOSIS — E61.1 IRON DEFICIENCY: ICD-10-CM

## 2025-08-19 DIAGNOSIS — E66.812 CLASS 2 OBESITY DUE TO EXCESS CALORIES WITHOUT SERIOUS COMORBIDITY WITH BODY MASS INDEX (BMI) OF 37.0 TO 37.9 IN ADULT: ICD-10-CM

## 2025-08-19 DIAGNOSIS — G47.33 OSA (OBSTRUCTIVE SLEEP APNEA): Primary | ICD-10-CM

## 2025-08-19 PROCEDURE — 99214 OFFICE O/P EST MOD 30 MIN: CPT | Performed by: INTERNAL MEDICINE

## 2025-08-20 ENCOUNTER — HOSPITAL ENCOUNTER (OUTPATIENT)
Dept: SLEEP CENTER | Facility: CLINIC | Age: 55
Discharge: HOME | End: 2025-08-20
Attending: INTERNAL MEDICINE
Payer: COMMERCIAL

## 2025-08-20 DIAGNOSIS — G47.33 OSA (OBSTRUCTIVE SLEEP APNEA): ICD-10-CM

## 2025-08-20 PROCEDURE — G0399 HOME SLEEP TEST/TYPE 3 PORTA: HCPCS

## (undated) DEVICE — STRL COTTON TIP APPLCTR 6IN PK: Brand: CARDINAL HEALTH

## (undated) DEVICE — TUBE SET SMOKE EVAC PNEUMOCLEAR HIGH FLOW

## (undated) DEVICE — CADIERE FORCEPS: Brand: ENDOWRIST

## (undated) DEVICE — SUT PDS II 4-0 PS-2 18 IN Z496G

## (undated) DEVICE — SUT MONOCRYL 4-0 PS-2 27 IN Y426H

## (undated) DEVICE — VIOLET BRAIDED (POLYGLACTIN 910), SYNTHETIC ABSORBABLE SUTURE: Brand: COATED VICRYL

## (undated) DEVICE — PBT NON ABSORBABLE WOUND CLOSURE DEVICE: Brand: V-LOC

## (undated) DEVICE — GLOVE PI ULTRA TOUCH SZ.7.0

## (undated) DEVICE — SKIN MARKER DUAL TIP WITH RULER CAP, FLEXIBLE RULER AND LABELS: Brand: DEVON

## (undated) DEVICE — NEPTUNE E-SEP SMOKE EVACUATION PENCIL, COATED, 70MM BLADE, PUSH BUTTON SWITCH: Brand: NEPTUNE E-SEP

## (undated) DEVICE — FUNNEL E KELLER 2 DELIVERY DEVICE

## (undated) DEVICE — JACKSON-PRATT 100CC BULB RESERVOIR: Brand: CARDINAL HEALTH

## (undated) DEVICE — PLUMEPEN PRO 10FT

## (undated) DEVICE — ARM DRAPE

## (undated) DEVICE — WEBRIL 6 IN UNSTERILE

## (undated) DEVICE — GLOVE SRG BIOGEL 7.5

## (undated) DEVICE — TRAVELKIT CONTAINS FIRST STEP KIT (200ML EP-4 KIT) AND SOILED SCOPE BAG - 1 KIT: Brand: TRAVELKIT CONTAINS FIRST STEP KIT AND SOILED SCOPE BAG

## (undated) DEVICE — TROCAR: Brand: KII FIOS FIRST ENTRY

## (undated) DEVICE — URETERAL CATHETER ADAPTOR TIP

## (undated) DEVICE — POV-IOD SOLUTION 4OZ BT

## (undated) DEVICE — USED IN CONJUNCTION WITH A SYRINGE AS AN ADDITIVE DEVICE FOR ASPIRATION FROM MULTI-DOSE MEDICINE VIALS OR INJECTION INTO I.V. SYSTEMS AND PRE-SLIT SEPTUMS COVERING INJECTION SITES.: Brand: SOL-M™ BLUNT FILL NEEDLE

## (undated) DEVICE — DRAIN JACKSON PRATT 10FR 7MM: Brand: CARDINAL HEALTH

## (undated) DEVICE — DISPOSABLE OR TOWEL: Brand: CARDINAL HEALTH

## (undated) DEVICE — STAPLER CANNULA SEAL: Brand: ENDOWRIST

## (undated) DEVICE — EXOFIN PRECISION PEN HIGH VISCOSITY TOPICAL SKIN ADHESIVE: Brand: EXOFIN PRECISION PEN, 1G

## (undated) DEVICE — STERILE POLYISOPRENE POWDER-FREE SURGICAL GLOVES WITH EMOLLIENT COATING: Brand: PROTEXIS

## (undated) DEVICE — BETHLEHEM UNIVERSAL BREAST PK: Brand: CARDINAL HEALTH

## (undated) DEVICE — SUT ETHILON 2-0 FS 18 IN 664H

## (undated) DEVICE — MONOPOLAR CURVED SCISSORS: Brand: ENDOWRIST

## (undated) DEVICE — Device: Brand: DEFENDO AIR/WATER/SUCTION AND BIOPSY VALVE

## (undated) DEVICE — Device

## (undated) DEVICE — STAPLER 60 RELOAD WHITE: Brand: SUREFORM

## (undated) DEVICE — PROXIMATE SKIN STAPLERS (35 WIDE) CONTAINS 35 STAINLESS STEEL STAPLES (FIXED HEAD): Brand: PROXIMATE

## (undated) DEVICE — ENDOPATH PNEUMONEEDLE INSUFFLATION NEEDLES WITH LUER LOCK CONNECTORS 120MM: Brand: ENDOPATH

## (undated) DEVICE — LAPAROSCOPIC DUAL RIGID APPLICATOR: Brand: ETHICON

## (undated) DEVICE — STAPLER 60: Brand: SUREFORM

## (undated) DEVICE — COLUMN DRAPE

## (undated) DEVICE — DECANTER: Brand: UNBRANDED

## (undated) DEVICE — BLADELESS OBTURATOR: Brand: WECK VISTA

## (undated) DEVICE — VESSEL SEALER EXTEND: Brand: ENDOWRIST

## (undated) DEVICE — ASTOUND STANDARD SURGICAL GOWN, XL: Brand: CONVERTORS

## (undated) DEVICE — ELECTRO LUBE IS A SINGLE PATIENT USE DEVICE THAT IS INTENDED TO BE USED ON ELECTROSURGICAL ELECTRODES TO REDUCE STICKING.: Brand: KEY SURGICAL ELECTRO LUBE

## (undated) DEVICE — METZENBAUM ADTEC SINGLE USE DISSECTING SCISSORS, SHAFT ONLY, MONOPOLAR, CURVED TO LEFT, WORKING LENGTH: 12 1/4", (310 MM), DIAM. 5 MM, INSULATED, DOUBLE ACTION, STERILE, DISPOSABLE, PACKAGE OF 10 PIECES: Brand: AESCULAP

## (undated) DEVICE — KIT, ROBOTIC BARIATRIC: Brand: CARDINAL HEALTH

## (undated) DEVICE — SCD SEQUENTIAL COMPRESSION COMFORT SLEEVE MEDIUM KNEE LENGTH: Brand: KENDALL SCD

## (undated) DEVICE — ELECTRODE EZ CLEAN BLADE -0012

## (undated) DEVICE — 3M™ STERI-STRIP™ REINFORCED ADHESIVE SKIN CLOSURES, R1547, 1/2 IN X 4 IN (12 MM X 100 MM), 6 STRIPS/ENVELOPE: Brand: 3M™ STERI-STRIP™

## (undated) DEVICE — BRA SURGICAL SZ MED (33-36)

## (undated) DEVICE — STAPLER 60 RELOAD BLUE: Brand: SUREFORM

## (undated) DEVICE — VISIGI 3D®  CALIBRATION SYSTEM  SIZE 36FR BYPASS/SHORT: Brand: BOEHRINGER® VISIGI 3D®  CALIBRATION SYSTEM  SIZE 36FR BYPASS/SHORT

## (undated) DEVICE — INTENDED FOR TISSUE SEPARATION, AND OTHER PROCEDURES THAT REQUIRE A SHARP SURGICAL BLADE TO PUNCTURE OR CUT.: Brand: BARD-PARKER ® SAFETYLOCK CARBON RIB-BACK BLADES

## (undated) DEVICE — 3M™ TEGADERM™ TRANSPARENT FILM DRESSING FRAME STYLE, 1624W, 2-3/8 IN X 2-3/4 IN (6 CM X 7 CM), 100/CT 4CT/CASE: Brand: 3M™ TEGADERM™

## (undated) DEVICE — UNDYED MONOFILAMENT (POLYDIOXANONE), ABSORBABLE SURGICAL SUTURE: Brand: PDS

## (undated) DEVICE — TIP COVER ACCESSORY

## (undated) DEVICE — MEGA SUTURECUT ND: Brand: ENDOWRIST

## (undated) DEVICE — SCD SEQUENTIAL COMPRESSION COMFORT SLEEVE LARGE KNEE LENGTH: Brand: KENDALL SCD

## (undated) DEVICE — PROXIMATE PLUS MD MULTI-DIRECTIONAL RELEASE SKIN STAPLERS CONTAINS 35 STAINLESS STEEL STAPLES APPROXIMATE CLOSED DIMENSIONS: 6.9MM X 3.9MM WIDE: Brand: PROXIMATE

## (undated) DEVICE — SINGLE PORT MANIFOLD: Brand: NEPTUNE 2

## (undated) DEVICE — DRAPE SHEET THREE QUARTER

## (undated) DEVICE — NEEDLE 21 G X 1 1/2 SAFETY

## (undated) DEVICE — DRESSING BIOPATCH ANTIMICROBIAL 1 IN DISC

## (undated) DEVICE — DRAPE EQUIPMENT RF WAND

## (undated) DEVICE — SPONGE 4 X 4 XRAY 16 PLY STRL LF RFD

## (undated) DEVICE — SUT PDS II 2-0 CT-1 27 IN Z339H

## (undated) DEVICE — SYRINGE 30ML LL

## (undated) DEVICE — REDUCER: Brand: ENDOWRIST

## (undated) DEVICE — JP CHANNEL DRAIN, 19FR HUBLESS: Brand: CARDINAL HEALTH

## (undated) DEVICE — SMOKE EVAC FLUID SUCTION HEPA FILTER

## (undated) DEVICE — GLOVE INDICATOR PI UNDERGLOVE SZ 7 BLUE

## (undated) DEVICE — ELECTRODE BLADE E-Z CLEAN 6.5IN -0014

## (undated) DEVICE — BULB SYRINGE, IRRIGATION WITH PROTECTIVE CAP, 60 CC, INDIVIDUALLY WRAPPED: Brand: DOVER

## (undated) DEVICE — ABSORBABLE WOUND CLOSURE DEVICE: Brand: V-LOC 90

## (undated) DEVICE — CANNULA SEAL

## (undated) DEVICE — STAPLER INSORB SUBCUTICULAR 30 SINGLE USE

## (undated) DEVICE — SUT MONOCRYL 3-0 SH 27 IN Y416H

## (undated) DEVICE — SPECIMEN CONTAINER STERILE PEEL PACK

## (undated) DEVICE — SEALANT FIBRIN VISTASEAL 10ML

## (undated) DEVICE — STAPLER 60 RELOAD BLACK: Brand: SUREFORM

## (undated) DEVICE — SUT ETHIBOND 0 CT-1 30 IN X424H

## (undated) DEVICE — GLOVE PI ULTRA TOUCH SZ 6

## (undated) DEVICE — 450 ML BOTTLE OF 0.05% CHLORHEXIDINE GLUCONATE IN 99.95% STERILE WATER FOR IRRIGATION, USP AND APPLICATOR.: Brand: IRRISEPT ANTIMICROBIAL WOUND LAVAGE

## (undated) DEVICE — REM POLYHESIVE ADULT PATIENT RETURN ELECTRODE: Brand: VALLEYLAB